# Patient Record
Sex: MALE | Race: WHITE | NOT HISPANIC OR LATINO | Employment: FULL TIME | URBAN - METROPOLITAN AREA
[De-identification: names, ages, dates, MRNs, and addresses within clinical notes are randomized per-mention and may not be internally consistent; named-entity substitution may affect disease eponyms.]

---

## 2018-03-29 ENCOUNTER — OFFICE VISIT (OUTPATIENT)
Dept: CARDIOLOGY CLINIC | Facility: CLINIC | Age: 58
End: 2018-03-29
Payer: COMMERCIAL

## 2018-03-29 VITALS
DIASTOLIC BLOOD PRESSURE: 64 MMHG | HEART RATE: 78 BPM | HEIGHT: 71 IN | WEIGHT: 234 LBS | BODY MASS INDEX: 32.76 KG/M2 | SYSTOLIC BLOOD PRESSURE: 120 MMHG

## 2018-03-29 DIAGNOSIS — R07.9 CHEST PAIN, UNSPECIFIED TYPE: Primary | ICD-10-CM

## 2018-03-29 DIAGNOSIS — G47.30 SLEEP APNEA, UNSPECIFIED TYPE: ICD-10-CM

## 2018-03-29 DIAGNOSIS — I10 HTN (HYPERTENSION), BENIGN: ICD-10-CM

## 2018-03-29 PROBLEM — E78.5 HYPERLIPIDEMIA LDL GOAL <100: Status: ACTIVE | Noted: 2018-03-29

## 2018-03-29 PROBLEM — E11.9 TYPE 2 DIABETES MELLITUS (HCC): Status: ACTIVE | Noted: 2018-03-29

## 2018-03-29 PROCEDURE — 99204 OFFICE O/P NEW MOD 45 MIN: CPT | Performed by: INTERNAL MEDICINE

## 2018-03-29 PROCEDURE — 93000 ELECTROCARDIOGRAM COMPLETE: CPT | Performed by: INTERNAL MEDICINE

## 2018-03-29 RX ORDER — TRIAMCINOLONE ACETONIDE 1 MG/G
CREAM TOPICAL
COMMUNITY

## 2018-03-29 RX ORDER — GLIPIZIDE 5 MG/1
TABLET, FILM COATED, EXTENDED RELEASE ORAL
COMMUNITY

## 2018-03-29 RX ORDER — SAXAGLIPTIN AND METFORMIN HYDROCHLORIDE 2.5; 1 MG/1; MG/1
1 TABLET, FILM COATED, EXTENDED RELEASE ORAL DAILY
Refills: 6 | COMMUNITY
Start: 2018-03-12

## 2018-03-29 RX ORDER — MELOXICAM 15 MG/1
15 TABLET ORAL DAILY
Refills: 5 | COMMUNITY
Start: 2018-03-02

## 2018-03-29 RX ORDER — LISINOPRIL 5 MG/1
5 TABLET ORAL
COMMUNITY
Start: 2017-10-11

## 2018-03-29 RX ORDER — MELOXICAM 15 MG/1
TABLET ORAL
COMMUNITY
End: 2018-03-29

## 2018-03-29 RX ORDER — ASPIRIN 81 MG/1
81 TABLET, CHEWABLE ORAL
COMMUNITY
Start: 2014-06-27

## 2018-03-29 RX ORDER — INSULIN GLARGINE 100 [IU]/ML
INJECTION, SOLUTION SUBCUTANEOUS
Refills: 5 | COMMUNITY
Start: 2017-12-22

## 2018-03-29 RX ORDER — LORAZEPAM 0.5 MG/1
0.5 TABLET ORAL EVERY 8 HOURS
COMMUNITY
Start: 2018-03-26

## 2018-03-29 RX ORDER — HYDROCODONE BITARTRATE AND ACETAMINOPHEN 5; 325 MG/1; MG/1
TABLET ORAL
COMMUNITY

## 2018-03-29 RX ORDER — GABAPENTIN 100 MG/1
CAPSULE ORAL
COMMUNITY
Start: 2018-03-09

## 2018-03-29 NOTE — PROGRESS NOTES
Outpatient Cardiology Consult Note - Yumiko Archibald 62 y o  male MRN: [id number]    @ Encounter: [id number]        There are no active problems to display for this patient  Assessment:  1  HTN- lisinopril 5 mg daily  # DM2, last A1C 9 5%- glipizide, kombiglyze XR 2 5- 1000 mg  On lisinopril 5 mg daily  # Obesity, BMI 32 6%  # hyperlipidemia- 2/21/18: , HDL 43, Tri 132  # likely sleep apnea  Stops breathing at night    Today's Plan:  Stress echo  Recommend statin for goal LDL < 100 given DM, did not tolerate one in past  Weight loss discussed    HPI:     63 yo male with hx DM2, last A1C over 9, obesity, hyperlipidemia, HTN, right carotid aneurysm who presents to establish CV care  Occasionally getting a little chest discomfort, intermittent  Walks about a mile or two every day  Does not seem exertional  Wife states he stops breathing at night  Past Medical History:   Diagnosis Date    Diabetes mellitus ([facility] [address] )        Review of Systems - Negative except occasional chest discomfort as described above  Allergies   Allergen Reactions    Atorvastatin Other (See Comments)     felt flu-like    Rosiglitazone Other (See Comments)    Trazodone Other (See Comments)     Felt "hung over"    Sertraline Rash           Current Outpatient Prescriptions:     aspirin 81 mg chewable tablet, Chew 81 mg, Disp: , Rfl:     Canagliflozin 300 MG TABS, Take 300 mg by mouth, Disp: , Rfl:     gabapentin (NEURONTIN) 100 mg capsule, TAKE ONE CAPSULE DAILY IN THE MORNING, 3 CAPSULES IN THE EVENING, Disp: , Rfl:     insulin glargine (BASAGLAR KWIKPEN) injection pen 100 units/mL, Inject 30 Units under the skin, Disp: , Rfl:     KOMBIGLYZE XR 2 5-1000 MG TB24, Take 1 tablet by mouth daily, Disp: , Rfl: 6    LANTUS SOLOSTAR injection pen 100 units/mL, INJECT 15 UNITS UNDER THE SKIN TWO TIMES A DAY AT LUNCH AND AT BEDTIME, Disp: , Rfl: 5    lidocaine viscous (XYLOCAINE) 2 % mucosal solution, Apply to the mouth or throat, Disp: , Rfl:     lisinopril (ZESTRIL) 5 mg tablet, Take 5 mg by mouth, Disp: , Rfl:     LORazepam (ATIVAN) 0 5 mg tablet, Take 0 5 mg by mouth every 8 (eight) hours, Disp: , Rfl:     meloxicam (MOBIC) 15 mg tablet, Take 15 mg by mouth daily, Disp: , Rfl: 5    triamcinolone (KENALOG) 0 1 % cream, Apply topically, Disp: , Rfl:     glipiZIDE (GLUCOTROL XL) 5 mg 24 hr tablet, Take by mouth, Disp: , Rfl:     HYDROcodone-acetaminophen (NORCO) 5-325 mg per tablet, Take by mouth, Disp: , Rfl:     Social History     Social History    Marital status: /Civil Union     Spouse name: N/A    Number of children: N/A    Years of education: N/A     Occupational History    Not on file  Social History Main Topics    Smoking status: Never Smoker    Smokeless tobacco: Never Used    Alcohol use No    Drug use: No    Sexual activity: Yes     Other Topics Concern    Not on file     Social History Narrative    No narrative on file       No family history on file  Physical Exam:    Vitals: Blood pressure 120/64, pulse 78, height 5' 11" (1 803 m), weight 106 kg (234 lb)  , Body mass index is 32 64 kg/m² ,   Wt Readings from Last 3 Encounters:   03/29/18 106 kg (234 lb)   01/24/16 109 kg (240 lb)   12/04/15 109 kg (241 lb)       Physical Exam:  Vitals:    03/29/18 0834   BP: 120/64   BP Location: Right arm   Patient Position: Sitting   Cuff Size: Standard   Pulse: 78   Weight: 106 kg (234 lb)   Height: 5' 11" (1 803 m)       GEN: Maria A Uribe appears well, alert and oriented x 3, pleasant and cooperative   HEENT: pupils equal, round, and reactive to light; extraocular muscles intact  NECK: supple, no carotid bruits   HEART: regular rhythm, normal S1 and S2, no murmurs, clicks, gallops or rubs, JVP is    LUNGS: clear to auscultation bilaterally; no wheezes, rales, or rhonchi   ABDOMEN: normal bowel sounds, soft, no tenderness, no distention  EXTREMITIES: peripheral pulses normal; no clubbing, cyanosis, or edema  NEURO: no focal findings   SKIN: normal without suspicious lesions on exposed skin    Labs & Results:    No results found for: WBC, HGB, HCT, MCV, PLT  No results found for: GLUCOSE, CALCIUM, NA, K, CO2, CL, BUN, CREATININE  No results found for: BNP   No results found for: CHOL  No results found for: HDL  No results found for: LDLCALC  No results found for: TRIG  No components found for: CHOLHDL      EKG personally reviewed by Trude Hashimoto, DO  Counseling / Coordination of Care  Total floor / unit time spent today 40 minutes  Greater than 50% of total time was spent with the patient and / or family counseling and / or coordination of care  A description of the counseling / coordination of care: 25 min  Thank you for the opportunity to participate in the care of this patient  Trude Hashimoto D O    Director of Advanced Heart Failure Program  Medical Director of [address] [place] [address]

## 2018-04-11 ENCOUNTER — HOSPITAL ENCOUNTER (OUTPATIENT)
Dept: NON INVASIVE DIAGNOSTICS | Facility: CLINIC | Age: 58
Discharge: HOME | End: 2018-04-11
Payer: COMMERCIAL

## 2018-04-11 DIAGNOSIS — G47.30 SLEEP APNEA, UNSPECIFIED TYPE: ICD-10-CM

## 2018-04-11 LAB
ARRHY DURING EX: NORMAL
CHEST PAIN STATEMENT: NORMAL
MAX DIASTOLIC BP: 80 MMHG
MAX HEART RATE: 151 BPM
MAX PREDICTED HEART RATE: 163 BPM
MAX. SYSTOLIC BP: 164 MMHG
PROTOCOL NAME: NORMAL
TARGET HR FORMULA: NORMAL
TEST INDICATION: NORMAL
TIME IN EXERCISE PHASE: NORMAL

## 2018-04-11 PROCEDURE — 93351 STRESS TTE COMPLETE: CPT | Performed by: INTERNAL MEDICINE

## 2018-04-11 PROCEDURE — 93350 STRESS TTE ONLY: CPT

## 2018-05-21 ENCOUNTER — HOSPITAL ENCOUNTER (OUTPATIENT)
Dept: SLEEP CENTER | Facility: CLINIC | Age: 58
Discharge: HOME | End: 2018-05-21
Payer: COMMERCIAL

## 2018-05-21 DIAGNOSIS — G47.30 SLEEP APNEA, UNSPECIFIED TYPE: ICD-10-CM

## 2018-05-21 PROCEDURE — G0399 HOME SLEEP TEST/TYPE 3 PORTA: HCPCS

## 2018-05-24 PROCEDURE — G0399 HOME SLEEP TEST/TYPE 3 PORTA: HCPCS | Performed by: PSYCHIATRY & NEUROLOGY

## 2018-05-27 DIAGNOSIS — G47.33 OBSTRUCTIVE SLEEP APNEA HYPOPNEA, MODERATE: Primary | ICD-10-CM

## 2018-05-29 ENCOUNTER — TELEPHONE (OUTPATIENT)
Dept: SLEEP CENTER | Facility: CLINIC | Age: 58
End: 2018-05-29

## 2018-06-20 NOTE — TELEPHONE ENCOUNTER
Margie message sent to cardiology clinical that patient has not scheduled CPAP study or consult  He has appointment with Dr Gaby Mcknight on 6/22

## 2021-04-08 DIAGNOSIS — Z23 ENCOUNTER FOR IMMUNIZATION: ICD-10-CM

## 2023-03-29 LAB
LEFT EYE DIABETIC RETINOPATHY: NORMAL
RIGHT EYE DIABETIC RETINOPATHY: NORMAL
SEVERITY (EYE EXAM): NORMAL

## 2023-07-24 ENCOUNTER — APPOINTMENT (OUTPATIENT)
Dept: LAB | Facility: HOSPITAL | Age: 63
End: 2023-07-24
Payer: COMMERCIAL

## 2023-07-24 DIAGNOSIS — I25.119 CHEST PAIN DUE TO CAD (HCC): ICD-10-CM

## 2023-07-24 DIAGNOSIS — I25.119 CHEST PAIN DUE TO CAD (HCC): Primary | ICD-10-CM

## 2023-07-24 DIAGNOSIS — R06.09 DYSPNEA ON EXERTION: ICD-10-CM

## 2023-07-24 LAB
ALBUMIN SERPL BCP-MCNC: 4.3 G/DL (ref 3.5–5)
ALP SERPL-CCNC: 53 U/L (ref 34–104)
ALT SERPL W P-5'-P-CCNC: 17 U/L (ref 7–52)
ANION GAP SERPL CALCULATED.3IONS-SCNC: 9 MMOL/L
AST SERPL W P-5'-P-CCNC: 15 U/L (ref 13–39)
BASOPHILS # BLD AUTO: 0.14 THOUSANDS/ÂΜL (ref 0–0.1)
BASOPHILS NFR BLD AUTO: 1 % (ref 0–1)
BILIRUB SERPL-MCNC: 0.74 MG/DL (ref 0.2–1)
BNP SERPL-MCNC: 17 PG/ML (ref 0–100)
BUN SERPL-MCNC: 28 MG/DL (ref 5–25)
CALCIUM SERPL-MCNC: 9.1 MG/DL (ref 8.4–10.2)
CHLORIDE SERPL-SCNC: 100 MMOL/L (ref 96–108)
CO2 SERPL-SCNC: 24 MMOL/L (ref 21–32)
CREAT SERPL-MCNC: 1.21 MG/DL (ref 0.6–1.3)
EOSINOPHIL # BLD AUTO: 0.41 THOUSAND/ÂΜL (ref 0–0.61)
EOSINOPHIL NFR BLD AUTO: 4 % (ref 0–6)
ERYTHROCYTE [DISTWIDTH] IN BLOOD BY AUTOMATED COUNT: 13.1 % (ref 11.6–15.1)
GFR SERPL CREATININE-BSD FRML MDRD: 63 ML/MIN/1.73SQ M
GLUCOSE SERPL-MCNC: 171 MG/DL (ref 65–140)
HCT VFR BLD AUTO: 48.9 % (ref 36.5–49.3)
HGB BLD-MCNC: 16 G/DL (ref 12–17)
IMM GRANULOCYTES # BLD AUTO: 0.11 THOUSAND/UL (ref 0–0.2)
IMM GRANULOCYTES NFR BLD AUTO: 1 % (ref 0–2)
LYMPHOCYTES # BLD AUTO: 3.67 THOUSANDS/ÂΜL (ref 0.6–4.47)
LYMPHOCYTES NFR BLD AUTO: 33 % (ref 14–44)
MCH RBC QN AUTO: 29.1 PG (ref 26.8–34.3)
MCHC RBC AUTO-ENTMCNC: 32.7 G/DL (ref 31.4–37.4)
MCV RBC AUTO: 89 FL (ref 82–98)
MONOCYTES # BLD AUTO: 1.02 THOUSAND/ÂΜL (ref 0.17–1.22)
MONOCYTES NFR BLD AUTO: 9 % (ref 4–12)
NEUTROPHILS # BLD AUTO: 5.69 THOUSANDS/ÂΜL (ref 1.85–7.62)
NEUTS SEG NFR BLD AUTO: 52 % (ref 43–75)
NRBC BLD AUTO-RTO: 0 /100 WBCS
PLATELET # BLD AUTO: 194 THOUSANDS/UL (ref 149–390)
PMV BLD AUTO: 9.9 FL (ref 8.9–12.7)
POTASSIUM SERPL-SCNC: 4.4 MMOL/L (ref 3.5–5.3)
PROT SERPL-MCNC: 7.5 G/DL (ref 6.4–8.4)
RBC # BLD AUTO: 5.5 MILLION/UL (ref 3.88–5.62)
SODIUM SERPL-SCNC: 133 MMOL/L (ref 135–147)
TSH SERPL DL<=0.05 MIU/L-ACNC: 1.76 UIU/ML (ref 0.45–4.5)
WBC # BLD AUTO: 11.04 THOUSAND/UL (ref 4.31–10.16)

## 2023-07-24 PROCEDURE — 36415 COLL VENOUS BLD VENIPUNCTURE: CPT | Performed by: INTERNAL MEDICINE

## 2023-07-24 PROCEDURE — 80053 COMPREHEN METABOLIC PANEL: CPT | Performed by: INTERNAL MEDICINE

## 2023-07-24 PROCEDURE — 84443 ASSAY THYROID STIM HORMONE: CPT | Performed by: INTERNAL MEDICINE

## 2023-07-24 PROCEDURE — 85025 COMPLETE CBC W/AUTO DIFF WBC: CPT | Performed by: INTERNAL MEDICINE

## 2023-07-24 PROCEDURE — 83880 ASSAY OF NATRIURETIC PEPTIDE: CPT

## 2023-07-25 ENCOUNTER — TELEPHONE (OUTPATIENT)
Dept: CARDIOLOGY CLINIC | Facility: CLINIC | Age: 63
End: 2023-07-25

## 2023-07-26 NOTE — H&P (VIEW-ONLY)
Outpatient Cardiology Note - Kathia Kenney 61 y.o. male MRN: [id number]    @ Encounter: [id number]        Patient Active Problem List    Diagnosis Date Noted   • Sleep apnea    • HTN (hypertension), benign 03/29/2018   • Type 2 diabetes mellitus ([facility]) 03/29/2018   • Hyperlipidemia LDL goal <100 03/29/2018       Assessment:  # Chest discomfort    EKG today:  bpm, no ischemic changes    Echo 7/27/23:  LVEF: 60%  RV: normal  PASP: normal    Stress echo 4/11/18: 9 min 30 sec, 10.7 METs, negative for ischemia    #  HTN- lisinopril 5 mg daily  # DM2, last A1C 7.4%- glipizide, kombiglyze XR 2.5- 1000 mg  On lisinopril 5 mg daily  # Obesity, BMI 32.6%  # hyperlipidemia-   6/8 /23: , HDL 42  2/21/18: , HDL 43, Tri 132  # Mild sleep apnea. Stops breathing at night  # depression    Today's Plan:  Stress test already ordered  Continue asa daily  Recommend statin given DM and LDL not at goal    HPI:     61 yo male with hx DM2, last A1C over 9, obesity, hyperlipidemia, HTN, right carotid aneurysm who presents to establish CV care. Occasionally getting a little chest discomfort, intermittent. Walks about a mile or two every day. Does not seem exertional. Wife states he stops breathing at night. Interim:  Last seen 4-5 years ago  Feeling much more fatigued, occasional chest pressure  No energy  Did labs 7/24 normal    EKG today:  bpm    Echo 7/27/23:  LVEF: 60%  RV: normal    Consuming 8 diet coke a day- 520 mg caffeine daily      Past Medical History:   Diagnosis Date   • Diabetes mellitus ([facility])        Review of Systems   Constitutional: Negative for activity change, appetite change, fatigue and unexpected weight change. HENT: Negative for congestion and nosebleeds. Eyes: Negative. Respiratory: Negative for cough, chest tightness and shortness of breath. Cardiovascular: Negative for chest pain, palpitations and leg swelling. Gastrointestinal: Negative for abdominal distention. Endocrine: Negative. Genitourinary: Negative. Musculoskeletal: Negative. Skin: Negative. Neurological: Negative for dizziness, syncope and weakness. Hematological: Negative. Psychiatric/Behavioral: Negative. Allergies   Allergen Reactions   • Atorvastatin Other (See Comments)     felt flu-like   • Rosiglitazone Other (See Comments)   • Trazodone Other (See Comments)     Felt "hung over"   • Sertraline Rash     .     Current Outpatient Medications:   •  aspirin 81 mg chewable tablet, Chew 81 mg, Disp: , Rfl:   •  Canagliflozin 300 MG TABS, Take 300 mg by mouth, Disp: , Rfl:   •  gabapentin (NEURONTIN) 100 mg capsule, TAKE ONE CAPSULE DAILY IN THE MORNING, 3 CAPSULES IN THE EVENING, Disp: , Rfl:   •  glipiZIDE (GLUCOTROL XL) 5 mg 24 hr tablet, Take by mouth, Disp: , Rfl:   •  HYDROcodone-acetaminophen (NORCO) 5-325 mg per tablet, Take by mouth, Disp: , Rfl:   •  insulin glargine (BASAGLAR KWIKPEN) injection pen 100 units/mL, Inject 30 Units under the skin, Disp: , Rfl:   •  KOMBIGLYZE XR 2.5-1000 MG TB24, Take 1 tablet by mouth daily, Disp: , Rfl: 6  •  LANTUS SOLOSTAR injection pen 100 units/mL, INJECT 15 UNITS UNDER THE SKIN TWO TIMES A DAY AT LUNCH AND AT BEDTIME, Disp: , Rfl: 5  •  lidocaine viscous (XYLOCAINE) 2 % mucosal solution, Apply to the mouth or throat, Disp: , Rfl:   •  lisinopril (ZESTRIL) 5 mg tablet, Take 5 mg by mouth, Disp: , Rfl:   •  LORazepam (ATIVAN) 0.5 mg tablet, Take 0.5 mg by mouth every 8 (eight) hours, Disp: , Rfl:   •  meloxicam (MOBIC) 15 mg tablet, Take 15 mg by mouth daily, Disp: , Rfl: 5  •  triamcinolone (KENALOG) 0.1 % cream, Apply topically, Disp: , Rfl:     Social History     Socioeconomic History   • Marital status: /Civil Union     Spouse name: Not on file   • Number of children: Not on file   • Years of education: Not on file   • Highest education level: Not on file   Occupational History   • Not on file   Tobacco Use   • Smoking status: Never • Smokeless tobacco: Never   Substance and Sexual Activity   • Alcohol use: No   • Drug use: No   • Sexual activity: Yes   Other Topics Concern   • Not on file   Social History Narrative   • Not on file     Social Determinants of Health     Financial Resource Strain: Not on file   Food Insecurity: Not on file   Transportation Needs: Not on file   Physical Activity: Not on file   Stress: Not on file   Social Connections: Not on file   Intimate Partner Violence: Not on file   Housing Stability: Not on file       No family history on file. Physical Exam:    Vitals: There were no vitals taken for this visit. , There is no height or weight on file to calculate BMI.,   Wt Readings from Last 3 Encounters:   03/29/18 106 kg (234 lb)   01/24/16 109 kg (240 lb)   12/04/15 109 kg (241 lb)       Physical Exam:  There were no vitals filed for this visit.     GEN: Parisa Fernandes appears well, alert and oriented x 3, pleasant and cooperative   HEENT: pupils equal, round, and reactive to light; extraocular muscles intact  NECK: supple, no carotid bruits   HEART: regular rhythm, normal S1 and S2, no murmurs, clicks, gallops or rubs, JVP is    LUNGS: clear to auscultation bilaterally; no wheezes, rales, or rhonchi   ABDOMEN: normal bowel sounds, soft, no tenderness, no distention  EXTREMITIES: peripheral pulses normal; no clubbing, cyanosis, or edema  NEURO: no focal findings   SKIN: normal without suspicious lesions on exposed skin    Labs & Results:    Lab Results   Component Value Date    WBC 11.04 (H) 07/24/2023    HGB 16.0 07/24/2023    HCT 48.9 07/24/2023    MCV 89 07/24/2023     07/24/2023     Lab Results   Component Value Date    CALCIUM 9.1 07/24/2023    K 4.4 07/24/2023    CO2 24 07/24/2023     07/24/2023    BUN 28 (H) 07/24/2023    CREATININE 1.21 07/24/2023     Lab Results   Component Value Date    BNP 17 07/24/2023      No results found for: "CHOL"  No results found for: "HDL"  No results found for: "[address] [place] [address]"  No results found for: "TRIG"  No results found for: "CHOLHDL"      EKG personally reviewed by Domo Albrecht. Counseling / Coordination of Care  Total floor / unit time spent today 25 minutes. Greater than 50% of total time was spent with the patient and / or family counseling and / or coordination of care. A description of the counseling / coordination of care: 15 min. Thank you for the opportunity to participate in the care of this patient. Domo Albrecht D.O.   Director of Advanced Heart Failure Program  Medical Director of [address] [place] [address]

## 2023-07-26 NOTE — PROGRESS NOTES
Outpatient Cardiology Note - Aparna Malone 61 y.o. male MRN: [id number]    @ Encounter: [id number]        Patient Active Problem List    Diagnosis Date Noted   • Sleep apnea    • HTN (hypertension), benign 03/29/2018   • Type 2 diabetes mellitus ([facility]) 03/29/2018   • Hyperlipidemia LDL goal <100 03/29/2018       Assessment:  # Chest discomfort    EKG today:  bpm, no ischemic changes    Echo 7/27/23:  LVEF: 60%  RV: normal  PASP: normal    Stress echo 4/11/18: 9 min 30 sec, 10.7 METs, negative for ischemia    #  HTN- lisinopril 5 mg daily  # DM2, last A1C 7.4%- glipizide, kombiglyze XR 2.5- 1000 mg  On lisinopril 5 mg daily  # Obesity, BMI 32.6%  # hyperlipidemia-   6/8 /23: , HDL 42  2/21/18: , HDL 43, Tri 132  # Mild sleep apnea. Stops breathing at night  # depression    Today's Plan:  Stress test already ordered  Continue asa daily  Recommend statin given DM and LDL not at goal    HPI:     61 yo male with hx DM2, last A1C over 9, obesity, hyperlipidemia, HTN, right carotid aneurysm who presents to establish CV care. Occasionally getting a little chest discomfort, intermittent. Walks about a mile or two every day. Does not seem exertional. Wife states he stops breathing at night. Interim:  Last seen 4-5 years ago  Feeling much more fatigued, occasional chest pressure  No energy  Did labs 7/24 normal    EKG today:  bpm    Echo 7/27/23:  LVEF: 60%  RV: normal    Consuming 8 diet coke a day- 520 mg caffeine daily      Past Medical History:   Diagnosis Date   • Diabetes mellitus ([facility])        Review of Systems   Constitutional: Negative for activity change, appetite change, fatigue and unexpected weight change. HENT: Negative for congestion and nosebleeds. Eyes: Negative. Respiratory: Negative for cough, chest tightness and shortness of breath. Cardiovascular: Negative for chest pain, palpitations and leg swelling. Gastrointestinal: Negative for abdominal distention. Endocrine: Negative. Genitourinary: Negative. Musculoskeletal: Negative. Skin: Negative. Neurological: Negative for dizziness, syncope and weakness. Hematological: Negative. Psychiatric/Behavioral: Negative. Allergies   Allergen Reactions   • Atorvastatin Other (See Comments)     felt flu-like   • Rosiglitazone Other (See Comments)   • Trazodone Other (See Comments)     Felt "hung over"   • Sertraline Rash     .     Current Outpatient Medications:   •  aspirin 81 mg chewable tablet, Chew 81 mg, Disp: , Rfl:   •  Canagliflozin 300 MG TABS, Take 300 mg by mouth, Disp: , Rfl:   •  gabapentin (NEURONTIN) 100 mg capsule, TAKE ONE CAPSULE DAILY IN THE MORNING, 3 CAPSULES IN THE EVENING, Disp: , Rfl:   •  glipiZIDE (GLUCOTROL XL) 5 mg 24 hr tablet, Take by mouth, Disp: , Rfl:   •  HYDROcodone-acetaminophen (NORCO) 5-325 mg per tablet, Take by mouth, Disp: , Rfl:   •  insulin glargine (BASAGLAR KWIKPEN) injection pen 100 units/mL, Inject 30 Units under the skin, Disp: , Rfl:   •  KOMBIGLYZE XR 2.5-1000 MG TB24, Take 1 tablet by mouth daily, Disp: , Rfl: 6  •  LANTUS SOLOSTAR injection pen 100 units/mL, INJECT 15 UNITS UNDER THE SKIN TWO TIMES A DAY AT LUNCH AND AT BEDTIME, Disp: , Rfl: 5  •  lidocaine viscous (XYLOCAINE) 2 % mucosal solution, Apply to the mouth or throat, Disp: , Rfl:   •  lisinopril (ZESTRIL) 5 mg tablet, Take 5 mg by mouth, Disp: , Rfl:   •  LORazepam (ATIVAN) 0.5 mg tablet, Take 0.5 mg by mouth every 8 (eight) hours, Disp: , Rfl:   •  meloxicam (MOBIC) 15 mg tablet, Take 15 mg by mouth daily, Disp: , Rfl: 5  •  triamcinolone (KENALOG) 0.1 % cream, Apply topically, Disp: , Rfl:     Social History     Socioeconomic History   • Marital status: /Civil Union     Spouse name: Not on file   • Number of children: Not on file   • Years of education: Not on file   • Highest education level: Not on file   Occupational History   • Not on file   Tobacco Use   • Smoking status: Never • Smokeless tobacco: Never   Substance and Sexual Activity   • Alcohol use: No   • Drug use: No   • Sexual activity: Yes   Other Topics Concern   • Not on file   Social History Narrative   • Not on file     Social Determinants of Health     Financial Resource Strain: Not on file   Food Insecurity: Not on file   Transportation Needs: Not on file   Physical Activity: Not on file   Stress: Not on file   Social Connections: Not on file   Intimate Partner Violence: Not on file   Housing Stability: Not on file       No family history on file. Physical Exam:    Vitals: There were no vitals taken for this visit. , There is no height or weight on file to calculate BMI.,   Wt Readings from Last 3 Encounters:   03/29/18 106 kg (234 lb)   01/24/16 109 kg (240 lb)   12/04/15 109 kg (241 lb)       Physical Exam:  There were no vitals filed for this visit.     GEN: Jaya Guo appears well, alert and oriented x 3, pleasant and cooperative   HEENT: pupils equal, round, and reactive to light; extraocular muscles intact  NECK: supple, no carotid bruits   HEART: regular rhythm, normal S1 and S2, no murmurs, clicks, gallops or rubs, JVP is    LUNGS: clear to auscultation bilaterally; no wheezes, rales, or rhonchi   ABDOMEN: normal bowel sounds, soft, no tenderness, no distention  EXTREMITIES: peripheral pulses normal; no clubbing, cyanosis, or edema  NEURO: no focal findings   SKIN: normal without suspicious lesions on exposed skin    Labs & Results:    Lab Results   Component Value Date    WBC 11.04 (H) 07/24/2023    HGB 16.0 07/24/2023    HCT 48.9 07/24/2023    MCV 89 07/24/2023     07/24/2023     Lab Results   Component Value Date    CALCIUM 9.1 07/24/2023    K 4.4 07/24/2023    CO2 24 07/24/2023     07/24/2023    BUN 28 (H) 07/24/2023    CREATININE 1.21 07/24/2023     Lab Results   Component Value Date    BNP 17 07/24/2023      No results found for: "CHOL"  No results found for: "HDL"  No results found for: "[address] [place] [address]"  No results found for: "TRIG"  No results found for: "CHOLHDL"      EKG personally reviewed by Renetta Dang. Counseling / Coordination of Care  Total floor / unit time spent today 25 minutes. Greater than 50% of total time was spent with the patient and / or family counseling and / or coordination of care. A description of the counseling / coordination of care: 15 min. Thank you for the opportunity to participate in the care of this patient. Renetta Dang D.O.   Director of Advanced Heart Failure Program  Medical Director of [address] [place] [address]

## 2023-07-27 ENCOUNTER — OFFICE VISIT (OUTPATIENT)
Dept: CARDIOLOGY CLINIC | Facility: CLINIC | Age: 63
End: 2023-07-27
Payer: COMMERCIAL

## 2023-07-27 ENCOUNTER — HOSPITAL ENCOUNTER (OUTPATIENT)
Dept: NON INVASIVE DIAGNOSTICS | Facility: CLINIC | Age: 63
Discharge: HOME | End: 2023-07-27
Payer: COMMERCIAL

## 2023-07-27 VITALS
WEIGHT: 235 LBS | SYSTOLIC BLOOD PRESSURE: 118 MMHG | BODY MASS INDEX: 32.9 KG/M2 | OXYGEN SATURATION: 97 % | HEART RATE: 106 BPM | DIASTOLIC BLOOD PRESSURE: 70 MMHG | HEIGHT: 71 IN

## 2023-07-27 VITALS
BODY MASS INDEX: 32.2 KG/M2 | HEART RATE: 78 BPM | SYSTOLIC BLOOD PRESSURE: 120 MMHG | DIASTOLIC BLOOD PRESSURE: 64 MMHG | HEIGHT: 71 IN | WEIGHT: 230 LBS

## 2023-07-27 DIAGNOSIS — I25.119 CHEST PAIN DUE TO CAD (HCC): ICD-10-CM

## 2023-07-27 DIAGNOSIS — E78.5 HYPERLIPIDEMIA LDL GOAL <100: Primary | ICD-10-CM

## 2023-07-27 DIAGNOSIS — G47.33 OBSTRUCTIVE SLEEP APNEA SYNDROME: ICD-10-CM

## 2023-07-27 DIAGNOSIS — R07.9 CHEST PAIN IN ADULT: ICD-10-CM

## 2023-07-27 DIAGNOSIS — F32.0 CURRENT MILD EPISODE OF MAJOR DEPRESSIVE DISORDER WITHOUT PRIOR EPISODE (HCC): ICD-10-CM

## 2023-07-27 DIAGNOSIS — R06.09 DYSPNEA ON EXERTION: ICD-10-CM

## 2023-07-27 LAB
AORTIC ROOT: 3.6 CM
APICAL FOUR CHAMBER EJECTION FRACTION: 59 %
ASCENDING AORTA: 3.9 CM
E WAVE DECELERATION TIME: 118 MS
FRACTIONAL SHORTENING: 31 (ref 28–44)
INTERVENTRICULAR SEPTUM IN DIASTOLE (PARASTERNAL SHORT AXIS VIEW): 1 CM
INTERVENTRICULAR SEPTUM: 1 CM (ref 0.6–1.1)
LAAS-AP2: 14.2 CM2
LAAS-AP4: 19.2 CM2
LEFT ATRIUM SIZE: 4.3 CM
LEFT ATRIUM VOLUME (MOD BIPLANE): 47 ML
LEFT INTERNAL DIMENSION IN SYSTOLE: 2.7 CM (ref 2.1–4)
LEFT VENTRICULAR INTERNAL DIMENSION IN DIASTOLE: 3.9 CM (ref 3.5–6)
LEFT VENTRICULAR POSTERIOR WALL IN END DIASTOLE: 1.1 CM
LEFT VENTRICULAR STROKE VOLUME: 41 ML
LVSV (TEICH): 41 ML
MV E'TISSUE VEL-SEP: 8 CM/S
MV PEAK A VEL: 0.98 M/S
MV PEAK E VEL: 57 CM/S
MV STENOSIS PRESSURE HALF TIME: 34 MS
MV VALVE AREA P 1/2 METHOD: 6.47
RIGHT ATRIUM AREA SYSTOLE A4C: 15.9 CM2
RIGHT VENTRICLE ID DIMENSION: 4.2 CM
SL CV LEFT ATRIUM LENGTH A2C: 4.6 CM
SL CV LV EF: 60
SL CV PED ECHO LEFT VENTRICLE DIASTOLIC VOLUME (MOD BIPLANE) 2D: 67 ML
SL CV PED ECHO LEFT VENTRICLE SYSTOLIC VOLUME (MOD BIPLANE) 2D: 26 ML
TRICUSPID ANNULAR PLANE SYSTOLIC EXCURSION: 2.1 CM

## 2023-07-27 PROCEDURE — 93306 TTE W/DOPPLER COMPLETE: CPT | Performed by: INTERNAL MEDICINE

## 2023-07-27 PROCEDURE — 93000 ELECTROCARDIOGRAM COMPLETE: CPT | Performed by: INTERNAL MEDICINE

## 2023-07-27 PROCEDURE — 93306 TTE W/DOPPLER COMPLETE: CPT

## 2023-07-27 PROCEDURE — 99214 OFFICE O/P EST MOD 30 MIN: CPT | Performed by: INTERNAL MEDICINE

## 2023-07-27 RX ORDER — EMPAGLIFLOZIN 25 MG/1
TABLET, FILM COATED ORAL
COMMUNITY
Start: 2023-07-26

## 2023-07-27 RX ORDER — METFORMIN HYDROCHLORIDE 500 MG/1
1000 TABLET, EXTENDED RELEASE ORAL 2 TIMES DAILY
Status: ON HOLD | COMMUNITY
Start: 2023-07-12 | End: 2023-08-02 | Stop reason: SDUPTHER

## 2023-07-27 RX ORDER — SEMAGLUTIDE 2.68 MG/ML
INJECTION, SOLUTION SUBCUTANEOUS
COMMUNITY
Start: 2023-06-12

## 2023-07-31 ENCOUNTER — TELEPHONE (OUTPATIENT)
Dept: CARDIOLOGY CLINIC | Facility: CLINIC | Age: 63
End: 2023-07-31

## 2023-07-31 ENCOUNTER — HOSPITAL ENCOUNTER (OUTPATIENT)
Dept: NON INVASIVE DIAGNOSTICS | Facility: CLINIC | Age: 63
Discharge: HOME | End: 2023-07-31
Payer: COMMERCIAL

## 2023-07-31 VITALS
HEIGHT: 71 IN | SYSTOLIC BLOOD PRESSURE: 138 MMHG | OXYGEN SATURATION: 98 % | RESPIRATION RATE: 16 BRPM | BODY MASS INDEX: 32.76 KG/M2 | DIASTOLIC BLOOD PRESSURE: 78 MMHG | WEIGHT: 234 LBS | HEART RATE: 82 BPM

## 2023-07-31 DIAGNOSIS — I25.119 CHEST PAIN DUE TO CAD (HCC): ICD-10-CM

## 2023-07-31 DIAGNOSIS — R06.09 DYSPNEA ON EXERTION: ICD-10-CM

## 2023-07-31 LAB
CHEST PAIN STATEMENT: NORMAL
MAX DIASTOLIC BP: 82 MMHG
MAX HEART RATE: 139 BPM
MAX HR PERCENT: 88 %
MAX HR: 139 BPM
MAX PREDICTED HEART RATE: 157 BPM
MAX. SYSTOLIC BP: 140 MMHG
NUC STRESS EJECTION FRACTION: 55 %
PROTOCOL NAME: NORMAL
RATE PRESSURE PRODUCT: NORMAL
SL CV REST NUCLEAR ISOTOPE DOSE: 10.41 MCI
SL CV STRESS NUCLEAR ISOTOPE DOSE: 32.7 MCI
SL CV STRESS RECOVERY BP: NORMAL MMHG
SL CV STRESS RECOVERY HR: 83 BPM
SL CV STRESS RECOVERY O2 SAT: 99 %
SL CV STRESS STAGE REACHED: 2
STRESS ANGINA INDEX: 1
STRESS BASELINE BP: NORMAL MMHG
STRESS BASELINE HR: 82 BPM
STRESS DUKE TREADMILL SCORE: -7
STRESS O2 SAT REST: 98 %
STRESS PEAK HR: 137 BPM
STRESS POST ESTIMATED WORKLOAD: 7 METS
STRESS POST EXERCISE DUR MIN: 7 MIN
STRESS POST EXERCISE DUR SEC: 17 SEC
STRESS POST O2 SAT PEAK: 98 %
STRESS POST PEAK BP: 138 MMHG
STRESS ST DEPRESSION: 2 MM
STRESS ST ELEVATION: 1 MM
STRESS/REST PERFUSION RATIO: 1.11
TARGET HR FORMULA: NORMAL
TEST INDICATION: NORMAL
TIME IN EXERCISE PHASE: NORMAL

## 2023-07-31 PROCEDURE — 93016 CV STRESS TEST SUPVJ ONLY: CPT | Performed by: INTERNAL MEDICINE

## 2023-07-31 PROCEDURE — 93017 CV STRESS TEST TRACING ONLY: CPT

## 2023-07-31 PROCEDURE — 78452 HT MUSCLE IMAGE SPECT MULT: CPT

## 2023-07-31 PROCEDURE — A9502 TC99M TETROFOSMIN: HCPCS

## 2023-07-31 PROCEDURE — 78452 HT MUSCLE IMAGE SPECT MULT: CPT | Performed by: INTERNAL MEDICINE

## 2023-07-31 PROCEDURE — G1004 CDSM NDSC: HCPCS

## 2023-07-31 PROCEDURE — 93018 CV STRESS TEST I&R ONLY: CPT | Performed by: INTERNAL MEDICINE

## 2023-07-31 NOTE — TELEPHONE ENCOUNTER
Case Request Cath Lab: Cardiac Left Heart CathReleased 7/31/2023  Location: [facility], Date: 7/31/2023  Panel 1  Cardiac Left Heart Cath, Dx: 1. Angina of effort ([address])     Ordered on: 7/31/2023   Associated Dx:  Angina of effort [facility])   Completed:  7/31/2023   Authorizing provider: Alex Dawson MD

## 2023-07-31 NOTE — TELEPHONE ENCOUNTER
Left voicemail on machine informing patient to call and schedule a Left Heart Cath procedure.      Thanks,  Gagandeep and Jojo

## 2023-07-31 NOTE — TELEPHONE ENCOUNTER
Patient scheduled for Left Heart Cath on 8/2/23 at [facility] with Dr. Brittney Mart. Instructions sent to patient through [address] [place] [address]. Also printed instructions in office in person. Patient aware of all general instructions. Medication holds:   Jardiance - Do not take morning of procedure. Metformin - Do not take night before and morning of procedure. Glipizide - Do not take morning of procedure. Labs to be done on:  N/A  (Patient did CMP / CBC on 7/24/23)    Insurance: [facility]     Please obtain auth.      Thank you,  Lizeth Lin

## 2023-07-31 NOTE — LETTER
2023       Jaya Brant              : 1960        MRN: [id number]  [address] [address]       Procedure Name: LEFT HEART CATHETERIZATION    Procedure date: 23    Location: [address]  Address: [facility], [address] [facility]      The hospital will contact you the day prior to your procedure, usually between 4PM - 6PM to instruct you on the time and place to report. If you do not hear from a [facility]  by 6PM the evening prior to your procedure, please contact the campus that you are scheduled at. [place]: [address] [facility], [facility], [address]: [facility], [address] [place] [address] Day Surgery [phone]   [address] [place] [address] Avenue: [address] [facility] [address] [facility] [address] [phone]    You may have nothing to eat or drink from midnight the night prior to your procedure. You may have a minimal amount of water with your morning medications. DO NOT stop taking Plavix or Aspirin unless advised otherwise. If your procedure is scheduled after 12:00 noon, you may have clear liquids until 8:00AM the morning of your procedure. Clear liquids are 7UP, Ginger Ale, Jello or broth. Arrange for a responsible person to drive you to and from the hospital.    Please shower/bathe the night before your procedure and do not use powders or lotions. Please notify us if you have been admitted to the hospital within the past 30 days. Bring a list of daily medications, vitamins, minerals, herbals and nutritional supplements you take. Include dosage and time you take them each day. If packing an overnight bag, pack minimal clothing, you will be given hospital sleepwear. Do not bring money, valuables or jewelry. Wedding band is OK. If you use CPAP machine, bring it to the hospital.      Have your Photo ID and Insurance cards with you.     DO NOT take any diabetic medication, including insulin, the morning of the procedure. Oral diabetic medications may include: Glucophage, Prandin, Glyburide, Micronase, Avandia, Glocovance, Precose, Glynase y Starlix. You should continue to take your morning dose of heart and/or blood pressure medications with a sip of water UNLESS ADVISED OTHERWISE. Special Instructions:    Medication hold:     Jardiance - Do not take morning of procedure. Metformin - Do not take night before and morning of procedure. Glipizide - Do not take morning of procedure.       Blood work to be done on:  N/A        Thank you,   [facility]   [facility], [facility]  Ph: [phone]

## 2023-07-31 NOTE — TELEPHONE ENCOUNTER
I called patient and informed the pre-auth dept called and no auth required for his LHC and he's all good for 8/2/23.      Thanks,  Ja

## 2023-08-02 ENCOUNTER — HOSPITAL ENCOUNTER (OUTPATIENT)
Facility: HOSPITAL | Age: 63
Setting detail: OUTPATIENT SURGERY
Discharge: HOME | End: 2023-08-02
Attending: INTERNAL MEDICINE | Admitting: INTERNAL MEDICINE
Payer: COMMERCIAL

## 2023-08-02 VITALS
OXYGEN SATURATION: 95 % | HEART RATE: 74 BPM | WEIGHT: 239 LBS | SYSTOLIC BLOOD PRESSURE: 116 MMHG | RESPIRATION RATE: 18 BRPM | BODY MASS INDEX: 33.46 KG/M2 | TEMPERATURE: 97.9 F | HEIGHT: 71 IN | DIASTOLIC BLOOD PRESSURE: 63 MMHG

## 2023-08-02 DIAGNOSIS — I20.8 ANGINA OF EFFORT (HCC): ICD-10-CM

## 2023-08-02 DIAGNOSIS — I25.10 CORONARY ARTERY DISEASE INVOLVING NATIVE CORONARY ARTERY: Primary | ICD-10-CM

## 2023-08-02 LAB
ATRIAL RATE: 83 BPM
P AXIS: 21 DEGREES
PR INTERVAL: 200 MS
QRS AXIS: 16 DEGREES
QRSD INTERVAL: 96 MS
QT INTERVAL: 372 MS
QTC INTERVAL: 437 MS
T WAVE AXIS: -4 DEGREES
VENTRICULAR RATE: 83 BPM

## 2023-08-02 PROCEDURE — C1769 GUIDE WIRE: HCPCS | Performed by: INTERNAL MEDICINE

## 2023-08-02 PROCEDURE — 93458 L HRT ARTERY/VENTRICLE ANGIO: CPT | Performed by: INTERNAL MEDICINE

## 2023-08-02 PROCEDURE — C1894 INTRO/SHEATH, NON-LASER: HCPCS | Performed by: INTERNAL MEDICINE

## 2023-08-02 PROCEDURE — 99152 MOD SED SAME PHYS/QHP 5/>YRS: CPT | Performed by: INTERNAL MEDICINE

## 2023-08-02 PROCEDURE — 93005 ELECTROCARDIOGRAM TRACING: CPT

## 2023-08-02 PROCEDURE — 93010 ELECTROCARDIOGRAM REPORT: CPT | Performed by: INTERNAL MEDICINE

## 2023-08-02 PROCEDURE — 99153 MOD SED SAME PHYS/QHP EA: CPT | Performed by: INTERNAL MEDICINE

## 2023-08-02 RX ORDER — ONDANSETRON 2 MG/ML
4 INJECTION INTRAMUSCULAR; INTRAVENOUS ONCE AS NEEDED
Status: DISCONTINUED | OUTPATIENT
Start: 2023-08-02 | End: 2023-08-02 | Stop reason: HOSPADM

## 2023-08-02 RX ORDER — LIDOCAINE HYDROCHLORIDE 10 MG/ML
INJECTION, SOLUTION EPIDURAL; INFILTRATION; INTRACAUDAL; PERINEURAL CODE/TRAUMA/SEDATION MEDICATION
Status: DISCONTINUED | OUTPATIENT
Start: 2023-08-02 | End: 2023-08-02 | Stop reason: HOSPADM

## 2023-08-02 RX ORDER — SODIUM CHLORIDE 9 MG/ML
125 INJECTION, SOLUTION INTRAVENOUS CONTINUOUS
Status: DISCONTINUED | OUTPATIENT
Start: 2023-08-02 | End: 2023-08-02

## 2023-08-02 RX ORDER — HEPARIN SODIUM 1000 [USP'U]/ML
INJECTION, SOLUTION INTRAVENOUS; SUBCUTANEOUS CODE/TRAUMA/SEDATION MEDICATION
Status: DISCONTINUED | OUTPATIENT
Start: 2023-08-02 | End: 2023-08-02 | Stop reason: HOSPADM

## 2023-08-02 RX ORDER — ROSUVASTATIN CALCIUM 20 MG/1
20 TABLET, COATED ORAL DAILY
Qty: 90 TABLET | Refills: 3 | Status: ON HOLD | OUTPATIENT
Start: 2023-08-02 | End: 2023-08-14 | Stop reason: SDUPTHER

## 2023-08-02 RX ORDER — ACETAMINOPHEN 325 MG/1
975 TABLET ORAL ONCE AS NEEDED
Status: DISCONTINUED | OUTPATIENT
Start: 2023-08-02 | End: 2023-08-02 | Stop reason: HOSPADM

## 2023-08-02 RX ORDER — METFORMIN HYDROCHLORIDE 500 MG/1
1000 TABLET, EXTENDED RELEASE ORAL 2 TIMES DAILY
Refills: 0 | Status: ON HOLD
Start: 2023-08-03

## 2023-08-02 RX ORDER — VERAPAMIL HYDROCHLORIDE 2.5 MG/ML
INJECTION, SOLUTION INTRAVENOUS CODE/TRAUMA/SEDATION MEDICATION
Status: DISCONTINUED | OUTPATIENT
Start: 2023-08-02 | End: 2023-08-02 | Stop reason: HOSPADM

## 2023-08-02 RX ORDER — MIDAZOLAM HYDROCHLORIDE 2 MG/2ML
INJECTION, SOLUTION INTRAMUSCULAR; INTRAVENOUS CODE/TRAUMA/SEDATION MEDICATION
Status: DISCONTINUED | OUTPATIENT
Start: 2023-08-02 | End: 2023-08-02 | Stop reason: HOSPADM

## 2023-08-02 RX ORDER — FENTANYL CITRATE 50 UG/ML
INJECTION, SOLUTION INTRAMUSCULAR; INTRAVENOUS CODE/TRAUMA/SEDATION MEDICATION
Status: DISCONTINUED | OUTPATIENT
Start: 2023-08-02 | End: 2023-08-02 | Stop reason: HOSPADM

## 2023-08-02 RX ORDER — NITROGLYCERIN 0.4 MG/1
0.4 TABLET SUBLINGUAL ONCE AS NEEDED
Status: DISCONTINUED | OUTPATIENT
Start: 2023-08-02 | End: 2023-08-02 | Stop reason: HOSPADM

## 2023-08-02 RX ORDER — SODIUM CHLORIDE 9 MG/ML
100 INJECTION, SOLUTION INTRAVENOUS CONTINUOUS
Status: DISCONTINUED | OUTPATIENT
Start: 2023-08-02 | End: 2023-08-02 | Stop reason: HOSPADM

## 2023-08-02 RX ORDER — NITROGLYCERIN 20 MG/100ML
INJECTION INTRAVENOUS CODE/TRAUMA/SEDATION MEDICATION
Status: DISCONTINUED | OUTPATIENT
Start: 2023-08-02 | End: 2023-08-02 | Stop reason: HOSPADM

## 2023-08-02 RX ORDER — LISINOPRIL 5 MG/1
5 TABLET ORAL DAILY
Refills: 0 | Status: ON HOLD
Start: 2023-08-03

## 2023-08-02 RX ORDER — ASPIRIN 81 MG/1
324 TABLET, CHEWABLE ORAL ONCE
Status: COMPLETED | OUTPATIENT
Start: 2023-08-02 | End: 2023-08-02

## 2023-08-02 RX ADMIN — SODIUM CHLORIDE 125 ML/HR: 0.9 INJECTION, SOLUTION INTRAVENOUS at 10:07

## 2023-08-02 RX ADMIN — ASPIRIN 81 MG CHEWABLE TABLET 324 MG: 81 TABLET CHEWABLE at 10:06

## 2023-08-02 NOTE — INTERVAL H&P NOTE
H&P reviewed. After examining the patient, I find no changed to the H&P since it had been written. 63M w/ DM, HLD, HTN w/ intermittent nonexertional chest discomfort w/ exercise MPI showing large reversible inferior ischemia is referred for [facility] +/- PCI. /63   Pulse 83   Temp 98.3 °F (36.8 °C) (Temporal)   Resp 17   Ht 5' 11" (1.803 m)   Wt 108 kg (239 lb)   SpO2 94%   BMI 33.33 kg/m²      Patient re-evaluated.  Accept as history and physical.    Jenifer Bahena, DO/August 2, 2023/12:48 PM

## 2023-08-02 NOTE — DISCHARGE INSTR - AVS FIRST PAGE
1. Please see the post cardiac catheterization dishcarge instructions. No heavy lifting, greater than 10 lbs. or strenuous  activity for 48 hrs. 2.Remove band aid tomorrow. Shower and wash area- wrist gently with soap and water- beginning tomorrow. Rinse and pat dry. Apply new water seal band aid. Repeat this process for 5 days. No powders, creams lotions or antibiotic ointments  for 5 days. No tub baths, hot tubs or swimming for 5 days. 3. Please call our office ([phone]) if you have any fever, redness, swelling, discharge from your wrist access site.     4.No driving for 1 day

## 2023-08-02 NOTE — LETTER
[id number] [facility]  [address] [address]  Dept: [phone]    August 2, 2023     Patient: Meenakshi Mead   YOB: 1960   Date of Visit: 8/2/2023       To Whom it May Concern:    Meenakshi Mead is under my professional care. He was seen in the hospital from 8/2/2023 to 08/02/23. He may return to work on 8/3/2023. He may require occasional rest periods throughout the work day. If you have any questions or concerns, please don't hesitate to call.          Sincerely,          MARLENY Guzman

## 2023-08-03 ENCOUNTER — APPOINTMENT (OUTPATIENT)
Dept: LAB | Facility: CLINIC | Age: 63
End: 2023-08-03
Payer: COMMERCIAL

## 2023-08-03 ENCOUNTER — LAB REQUISITION (OUTPATIENT)
Dept: LAB | Facility: HOSPITAL | Age: 63
End: 2023-08-03
Payer: COMMERCIAL

## 2023-08-03 ENCOUNTER — OFFICE VISIT (OUTPATIENT)
Dept: CARDIAC SURGERY | Facility: CLINIC | Age: 63
End: 2023-08-03
Payer: COMMERCIAL

## 2023-08-03 VITALS
WEIGHT: 230 LBS | HEART RATE: 94 BPM | DIASTOLIC BLOOD PRESSURE: 62 MMHG | SYSTOLIC BLOOD PRESSURE: 113 MMHG | HEIGHT: 71 IN | OXYGEN SATURATION: 98 % | BODY MASS INDEX: 32.2 KG/M2 | TEMPERATURE: 98.5 F

## 2023-08-03 DIAGNOSIS — I25.118 CORONARY ARTERY DISEASE OF NATIVE ARTERY OF NATIVE HEART WITH STABLE ANGINA PECTORIS (HCC): ICD-10-CM

## 2023-08-03 DIAGNOSIS — I20.8 STABLE ANGINA PECTORIS (HCC): ICD-10-CM

## 2023-08-03 DIAGNOSIS — I25.118 ATHEROSCLEROTIC HEART DISEASE OF NATIVE CORONARY ARTERY WITH OTHER FORMS OF ANGINA PECTORIS (HCC): ICD-10-CM

## 2023-08-03 DIAGNOSIS — R06.02 SOB (SHORTNESS OF BREATH): ICD-10-CM

## 2023-08-03 DIAGNOSIS — Z13.6 ENCOUNTER FOR SCREENING FOR STENOSIS OF CAROTID ARTERY: ICD-10-CM

## 2023-08-03 DIAGNOSIS — Z01.89 ENCOUNTER FOR IMAGING OF BILATERAL GREATER SAPHENOUS VEINS: ICD-10-CM

## 2023-08-03 DIAGNOSIS — Z00.00 ROUTINE ADULT HEALTH MAINTENANCE: ICD-10-CM

## 2023-08-03 DIAGNOSIS — I25.118 CORONARY ARTERY DISEASE OF NATIVE ARTERY OF NATIVE HEART WITH STABLE ANGINA PECTORIS (HCC): Primary | ICD-10-CM

## 2023-08-03 LAB
ABO GROUP BLD: NORMAL
BLD GP AB SCN SERPL QL: NEGATIVE
INR PPP: 0.97 (ref 0.84–1.19)
PROTHROMBIN TIME: 13.1 SECONDS (ref 11.6–14.5)
RH BLD: POSITIVE
SPECIMEN EXPIRATION DATE: NORMAL

## 2023-08-03 PROCEDURE — 86901 BLOOD TYPING SEROLOGIC RH(D): CPT | Performed by: THORACIC SURGERY (CARDIOTHORACIC VASCULAR SURGERY)

## 2023-08-03 PROCEDURE — 99205 OFFICE O/P NEW HI 60 MIN: CPT | Performed by: THORACIC SURGERY (CARDIOTHORACIC VASCULAR SURGERY)

## 2023-08-03 PROCEDURE — 87081 CULTURE SCREEN ONLY: CPT

## 2023-08-03 PROCEDURE — 36415 COLL VENOUS BLD VENIPUNCTURE: CPT

## 2023-08-03 PROCEDURE — 86850 RBC ANTIBODY SCREEN: CPT | Performed by: THORACIC SURGERY (CARDIOTHORACIC VASCULAR SURGERY)

## 2023-08-03 PROCEDURE — 86900 BLOOD TYPING SEROLOGIC ABO: CPT | Performed by: THORACIC SURGERY (CARDIOTHORACIC VASCULAR SURGERY)

## 2023-08-03 PROCEDURE — 85610 PROTHROMBIN TIME: CPT

## 2023-08-03 RX ORDER — NITROGLYCERIN 0.4 MG/1
0.4 TABLET SUBLINGUAL
Qty: 30 TABLET | Refills: 3 | Status: ON HOLD | OUTPATIENT
Start: 2023-08-03

## 2023-08-03 RX ORDER — CHLORHEXIDINE GLUCONATE 0.12 MG/ML
15 RINSE ORAL ONCE
Status: CANCELLED | OUTPATIENT
Start: 2023-08-03 | End: 2023-08-03

## 2023-08-03 RX ORDER — CEFAZOLIN SODIUM 2 G/50ML
2000 SOLUTION INTRAVENOUS ONCE
Status: CANCELLED | OUTPATIENT
Start: 2023-08-03 | End: 2023-08-03

## 2023-08-03 NOTE — H&P (VIEW-ONLY)
Pre-Op History & Physical - Cardiothoracic Surgery   Christiane Alfonso 61 y.o. male MRN: [id number]    Physician Requesting Consult: Souleymane Ramirez MD  Cardiologist: Dr. Clara Colón    Reason for Consult / Principal Problem: Coronary artery disease    History of Present Illness: Christiane Alfonso is a 61y.o. year old male referred for surgical consultation for coronary artery disease. Patient PMHx is notable for HTN, HLD, DM2, obesity (BMI 32.08), DUDLEY (no CPAP), depression and h/o R carotid aneurysm s/p repair (age 13). Upon interview, patient reports feeling "run down" for months. He noted during the winter months, while walking the dog in cold temperatures,  he was experiencing chest tightness. He states over the past few months his symptoms have become progressively worse. He feels more fatigued and is napping more. With activities,  such as mowing the lawn or carrying groceries,  he experiences chest tightness, SOB, diaphoresis and dizziness. His NM stress test on 7/31/23 was abnormal and subsequent cardiac cath performed yesterday demonstrates severe multivessel CAD. Patient denies tobacco, alcohol or drug use. FH is notable for premature CAD- father suffered an MI at age 46. Past Medical History:  Past Medical History:   Diagnosis Date   • Coronary artery disease    • Diabetes mellitus ([address])    • Hyperlipidemia    • Hypertension          Past Surgical History:   Past Surgical History:   Procedure Laterality Date   • ARTERIAL ANEURYSM REPAIR      Right Carotid Aneurysm - age 13. • CARDIAC CATHETERIZATION Left 08/02/2023    Procedure: Cardiac Left Heart Cath;  Surgeon: Tonie Aguirre DO;  Location: [facility]; Service: Cardiology   • CARDIAC CATHETERIZATION N/A 08/02/2023    Procedure: Cardiac Coronary Angiogram;  Surgeon: Tonie Aguirre DO;  Location: [facility];   Service: Cardiology   • CARDIAC CATHETERIZATION  08/02/2023    Procedure: Cardiac catheterization;  Surgeon: Catracho Huang DO;  Location: [facility]; Service: Cardiology   • HERNIA REPAIR     • VASECTOMY           Family History:  Family History   Problem Relation Age of Onset   • Leukemia Mother    • Heart attack Father 46         Social History:    Social History     Substance and Sexual Activity   Alcohol Use No     Social History     Substance and Sexual Activity   Drug Use No     Social History     Tobacco Use   Smoking Status Never   Smokeless Tobacco Never       Home Medications:   Prior to Admission medications    Medication Sig Start Date End Date Taking?  Authorizing Provider   aspirin 81 mg chewable tablet Chew 81 mg 6/27/14  Yes Historical Provider, MD   Continuous Blood Gluc Sensor (FreeStyle Ayanna 2 Sensor) MISC APPLY AND CHANGE EVERY 14 DAYS 7/26/23  Yes Historical Provider, MD   gabapentin (NEURONTIN) 100 mg capsule TAKE ONE CAPSULE DAILY IN THE MORNING, 3 CAPSULES IN THE EVENING 3/9/18  Yes Historical Provider, MD   glipiZIDE (GLUCOTROL XL) 5 mg 24 hr tablet Take 10 mg by mouth   Yes Historical Provider, MD   Jardiance 25 MG TABS  7/26/23  Yes Historical Provider, MD   lisinopril (ZESTRIL) 5 mg tablet Take 1 tablet (5 mg total) by mouth daily Do not start before August 3, 2023. 8/3/23  Yes MARLENY Melgar   LORazepam (ATIVAN) 0.5 mg tablet Take 0.5 mg by mouth daily at bedtime 3/26/18  Yes Historical Provider, MD   meloxicam (MOBIC) 15 mg tablet Take 15 mg by mouth daily 3/2/18  Yes Historical Provider, MD   metFORMIN (GLUCOPHAGE-XR) 500 mg 24 hr tablet Take 2 tablets (1,000 mg total) by mouth 2 (two) times a day Do not start before August 3, 2023. 8/3/23  Yes MARLENY Melgar   metoprolol tartrate (LOPRESSOR) 25 mg tablet Take 1 tablet (25 mg total) by mouth every 12 (twelve) hours 8/2/23  Yes MARLENY Oliver   Ozempic, 2 MG/DOSE, 8 MG/3ML injection pen INJECT 2MG UNDER THE SKIN ONCE A WEEK 6/12/23  Yes Historical Provider, MD   rosuvastatin (CRESTOR) 20 MG tablet Take 1 tablet (20 mg total) by mouth daily 8/2/23  Yes MARLENY Kay   sertraline (Zoloft) 50 mg tablet Take 1 tablet (50 mg total) by mouth daily 7/27/23  Yes Vivienne Dang DO       Allergies: Allergies   Allergen Reactions   • Atorvastatin Other (See Comments)     felt flu-like   • Rosiglitazone Other (See Comments)   • Trazodone Other (See Comments)     Felt "hung over"       Review of Systems:  Review of Systems - History obtained from chart review and the patient  General ROS: positive for  - fatigue and change in activity tolerance  negative for - chills, fever or sleep disturbance  Psychological ROS: negative  Ophthalmic ROS: positive for - decreased vision right eye (result of R carotid aneurysm repair age 13)  ENT ROS: negative  Allergy and Immunology ROS: negative  Hematological and Lymphatic ROS: negative  Endocrine ROS: negative  Breast ROS: negative  Respiratory ROS: no cough, shortness of breath, or wheezing  Cardiovascular ROS: positive for - chest pain, dyspnea on exertion and diaphoresis  negative for - edema, irregular heartbeat, loss of consciousness, orthopnea, palpitations, paroxysmal nocturnal dyspnea or rapid heart rate  Gastrointestinal ROS: no abdominal pain, change in bowel habits, or black or bloody stools  Genito-Urinary ROS: no dysuria, trouble voiding, or hematuria  Musculoskeletal ROS: negative  Neurological ROS: positive for - dizziness and lightheadedness  Dermatological ROS: negative    Vital Signs:     Vitals:    08/03/23 1506 08/03/23 1508   BP: 129/73 113/62   BP Location: Left arm Right arm   Patient Position: Sitting Sitting   Cuff Size: Standard Standard   Pulse: 94    Temp: 98.5 °F (36.9 °C)    TempSrc: Tympanic    SpO2: 98%    Weight: 104 kg (230 lb)    Height: 5' 11" (1.803 m)        Physical Exam:    General: Alert, oriented, obese, no acute distress  HEENT/NECK:  PERRLA. No jugular venous distention.     Cardiac:Regular rate and rhythm, no murmurs rubs or gallops. Carotid arteries: 2+ pulses, no bruits  Pulmonary:  Breath sounds clear bilaterally. Abdomen:  Non-tender, Non-distended. Positive bowel sounds. Upper extremities: 2+ radial pulses; brisk capillary refill; right hand dominance  Lower extremities: Extremities warm/dry. PT/DP pulses 2+ bilaterally. No edema B/L; no varicostities  Neuro: Alert and oriented X 3. Sensation is grossly intact. No focal deficits. Musculoskeletal: MAEE, stable gait  Skin: Warm/Dry, without rashes or lesions. Lab Results:   Lab Results   Component Value Date    WBC 11.04 (H) 07/24/2023    HGB 16.0 07/24/2023    HCT 48.9 07/24/2023    MCV 89 07/24/2023     07/24/2023     Lab Results   Component Value Date    SODIUM 133 (L) 07/24/2023    K 4.4 07/24/2023     07/24/2023    CO2 24 07/24/2023    AGAP 9 07/24/2023    BUN 28 (H) 07/24/2023    CREATININE 1.21 07/24/2023    GLUC 171 (H) 07/24/2023    CALCIUM 9.1 07/24/2023    AST 15 07/24/2023    ALT 17 07/24/2023    ALKPHOS 53 07/24/2023    TP 7.5 07/24/2023    TBILI 0.74 07/24/2023    EGFR 63 07/24/2023 6/8/23  8:50 AM     Cholesterol <200 mg/dL 193    Triglyceride <150 mg/dL 155 High     Cholesterol, HDL, Direct 23 - 92 mg/dL 42    Cholesterol, Non-HDL <160 mg/dL 151    Cholesterol, LDL, Calculated <130 mg/dL 120    Comment: LDL Cholesterol was calculated using the Friedewald equation. Direct measurement of LDL is not indicated for this patient based on [facility]'s analytical algorithm for measurement of LDL Cholesterol. CHOL/HDL Ratio  4.6        Lab Results   Component Value Date    HGBA1C 7.4 (H) 06/08/2023         Imaging Studies:     Cardiac Catheterization:  8/2/23    Left Anterior Descending   Prox LAD lesion is 70% stenosed. Mid LAD lesion is 80% stenosed. Dist LAD lesion is 90% stenosed. First Diagonal Branch   1st Diag-1 lesion is 90% stenosed. 1st Diag-2 lesion is 80% stenosed. Ramus Intermedius   Ramus lesion is 80% stenosed.       Left Circumflex   Mid Cx to Dist Cx lesion is 90% stenosed. Right Coronary Artery      Right Posterior Descending Artery   Collaterals   RPDA filled by collaterals from Dist LAD. EC23    Sinus rhythm with Premature atrial complexes with Aberrant conduction    Echocardiogram: 23    •  Left Ventricle: Left ventricular cavity size is normal. Wall thickness is upper normal. The left ventricular ejection fraction is 60%. Systolic function is normal. Wall motion is normal. Diastolic function is mildly abnormal, consistent with grade I (abnormal) relaxation. •  Mitral Valve: There is mild annular calcification. There is trace regurgitation. •  Aorta: The aortic root is normal in size. The ascending aorta is mildly dilated. The ascending aorta is 3.9 cm.     Findings    Left Ventricle Left ventricular cavity size is normal. Wall thickness is upper normal. The left ventricular ejection fraction is 60%. Systolic function is normal.  Wall motion is normal. Diastolic function is mildly abnormal, consistent with grade I (abnormal) relaxation. Right Ventricle Right ventricular cavity size is normal. Systolic function is normal. Wall thickness is normal.      Left Atrium The atrium is normal in size. Right Atrium The atrium is normal in size. Aortic Valve The aortic valve is trileaflet. The leaflets are not thickened. The leaflets are not calcified. The leaflets exhibit normal mobility. There is no evidence of regurgitation. The aortic valve has no significant stenosis. Mitral Valve The leaflets are not thickened. There is mild calcification of the anterior leaflet. The leaflets exhibit normal mobility. There is mild annular calcification. There is trace regurgitation. There is no evidence of stenosis. Tricuspid Valve Tricuspid valve structure is normal. There is trace regurgitation. There is no evidence of stenosis.       Pulmonic Valve Pulmonic valve structure is normal. There is trace regurgitation. There is no evidence of stenosis. Ascending Aorta The aortic root is normal in size. The ascending aorta is mildly dilated. The ascending aorta is 3.9 cm. IVC/SVC The inferior vena cava is normal in size. Pericardium There is no pericardial effusion. The pericardium is normal in appearance. Left Ventricle Measurements    Function/Volumes   A4C EF 59 %         Dimensions   LVIDd 3.9 cm         LVIDS 2.7 cm         IVSd 1 cm         LVPWd 1.1 cm         FS 31          Diastolic Filling   MV E' Tissue Velocity Septal 8 cm/s         E wave deceleration time 118 ms         MV Peak E Senthil 57 cm/s         MV Peak A Senthil 0.98 m/s               Right Ventricle Measurements    Dimensions   RVID d 4.2 cm         Tricuspid annular plane systolic excursion 2.1 cm               Left Atrium Measurements    Dimensions   LA size 4.3 cm         LA length (A2C) 4.6 cm         Volumes   LA volume (BP) 47 mL               Right Atrium Measurements    Dimensions   RAA A4C 15.9 cm2               Mitral Valve Measurements    Stenosis   MV stenosis pressure 1/2 time 34 ms         MV valve area p 1/2 method 6.47                Aorta Measurements    Aortic Dimensions   Ao root 3.6 cm         Asc Ao 3.9 cm                 I have personally reviewed pertinent films in PACS     PCP and Cardiology notes reviewed      Assessment:  Patient Active Problem List    Diagnosis Date Noted   • Coronary artery disease involving native coronary artery 08/02/2023   • Sleep apnea    • HTN (hypertension), benign 03/29/2018   • Type 2 diabetes mellitus ([address]) 03/29/2018   • Hyperlipidemia LDL goal <100 03/29/2018         Impression/Plan:    Multivessel Coronary Artery Disease    Risks and benefits of coronary artery bypass grafting were discussed in detail today with the patient. They understand and wish to proceed with further workup and ultimately surgical intervention.   We have ordered routine preoperative non-contrast chest CT scan, carotid duple, vein mapping and laboratory  studies. Pending the results of these tests, they will be scheduled for surgery on 8/11/23 with Dr. Seward Frankel, M.D. Pre op instructions reviewed with patient. He was instructed to stop Glipizide, Jardiance, Lisinopril, Meloxicam and Metformin 3 days before surgery. Luigi Felix was comfortable with our recommendations, and their questions were answered to their satisfaction. The patient was referred to gastroenterology for consideration of routine colonoscopy screening of all patients aged 52-65. Gastroenterology evaluation will not be necessary prior to any open heart procedures, and can be completed following surgical recovery.     SIGNATURE: MARLENY Olmedo  DATE: August 3, 2023  TIME: 4:05 PM

## 2023-08-03 NOTE — LETTER
August 3, 2023     Sybil Denilson[address] [address]    Patient: Monie Armstrong   YOB: 1960   Date of Visit: 8/3/2023       Dear Dr. Avinash Torres: Thank you for referring Monie Armstrong to me for evaluation. Below are my notes for this consultation. If you have questions, please do not hesitate to call me. I look forward to following your patient along with you. Sincerely,        Jessi Serrano MD        CC: DO Jaymie Zavala, [address] [place] [address]  8/3/2023  4:29 PM  Attested  Consultation - Cardiothoracic Surgery   Monie Armstrong 61 y.o. male MRN: [id number]    Physician Requesting Consult: Sybil Oreilly MD  Cardiologist: Dr. Melva Gallagher    Reason for Consult / Principal Problem: Coronary artery disease    History of Present Illness: Monie Armstrong is a 61y.o. year old male referred for surgical consultation for coronary artery disease. Patient PMHx is notable for HTN, HLD, DM2, obesity (BMI 32.08), DUDLEY (no CPAP), depression and h/o R carotid aneurysm s/p repair (age 13). Upon interview, patient reports feeling "run down" for months. He noted during the winter months, while walking the dog in cold temperatures,  he was experiencing chest tightness. He states over the past few months his symptoms have become progressively worse. He feels more fatigued and is napping more. With activities,  such as mowing the lawn or carrying groceries,  he experiences chest tightness, SOB, diaphoresis and dizziness. His NM stress test on 7/31/23 was abnormal and subsequent cardiac cath performed yesterday demonstrates severe multivessel CAD. Patient denies tobacco, alcohol or drug use. FH is notable for premature CAD- father suffered an MI at age 46.     Past Medical History:  Past Medical History:   Diagnosis Date   • Coronary artery disease    • Diabetes mellitus ([address])    • Hyperlipidemia    • Hypertension          Past Surgical History:   Past Surgical History:   Procedure Laterality Date   • ARTERIAL ANEURYSM REPAIR      Right Carotid Aneurysm - age 13. • CARDIAC CATHETERIZATION Left 08/02/2023    Procedure: Cardiac Left Heart Cath;  Surgeon: Nate Chapman DO;  Location: [facility]; Service: Cardiology   • CARDIAC CATHETERIZATION N/A 08/02/2023    Procedure: Cardiac Coronary Angiogram;  Surgeon: Nate Chapman DO;  Location: [facility]; Service: Cardiology   • CARDIAC CATHETERIZATION  08/02/2023    Procedure: Cardiac catheterization;  Surgeon: Nate Chapman DO;  Location: [facility]; Service: Cardiology   • HERNIA REPAIR     • VASECTOMY           Family History:  Family History   Problem Relation Age of Onset   • Leukemia Mother    • Heart attack Father 46         Social History:    Social History     Substance and Sexual Activity   Alcohol Use No     Social History     Substance and Sexual Activity   Drug Use No     Social History     Tobacco Use   Smoking Status Never   Smokeless Tobacco Never       Home Medications:   Prior to Admission medications    Medication Sig Start Date End Date Taking?  Authorizing Provider   aspirin 81 mg chewable tablet Chew 81 mg 6/27/14  Yes Historical Provider, MD   Continuous Blood Gluc Sensor (FreeStyle Ayanna 2 Sensor) MISC APPLY AND CHANGE EVERY 14 DAYS 7/26/23  Yes Historical Provider, MD   gabapentin (NEURONTIN) 100 mg capsule TAKE ONE CAPSULE DAILY IN THE MORNING, 3 CAPSULES IN THE EVENING 3/9/18  Yes Historical Provider, MD   glipiZIDE (GLUCOTROL XL) 5 mg 24 hr tablet Take 10 mg by mouth   Yes Historical Provider, MD   Jardiance 25 MG TABS  7/26/23  Yes Historical Provider, MD   lisinopril (ZESTRIL) 5 mg tablet Take 1 tablet (5 mg total) by mouth daily Do not start before August 3, 2023. 8/3/23  Yes Cheron Brittle, CRNP   LORazepam (ATIVAN) 0.5 mg tablet Take 0.5 mg by mouth daily at bedtime 3/26/18  Yes Historical Provider, MD   meloxicam (MOBIC) 15 mg tablet Take 15 mg by mouth daily 3/2/18  Yes Historical Provider, MD   metFORMIN (GLUCOPHAGE-XR) 500 mg 24 hr tablet Take 2 tablets (1,000 mg total) by mouth 2 (two) times a day Do not start before August 3, 2023. 8/3/23  Yes MARLENY Reeder   metoprolol tartrate (LOPRESSOR) 25 mg tablet Take 1 tablet (25 mg total) by mouth every 12 (twelve) hours 8/2/23  Yes MARLENY Oliver   Ozempic, 2 MG/DOSE, 8 MG/3ML injection pen INJECT 2MG UNDER THE SKIN ONCE A WEEK 6/12/23  Yes Historical Provider, MD   rosuvastatin (CRESTOR) 20 MG tablet Take 1 tablet (20 mg total) by mouth daily 8/2/23  Yes MARLENY Reeder   sertraline (Zoloft) 50 mg tablet Take 1 tablet (50 mg total) by mouth daily 7/27/23  Yes Socorro Cutler DO       Allergies:   Allergies   Allergen Reactions   • Atorvastatin Other (See Comments)     felt flu-like   • Rosiglitazone Other (See Comments)   • Trazodone Other (See Comments)     Felt "hung over"       Review of Systems:  Review of Systems - History obtained from chart review and the patient  General ROS: positive for  - fatigue and change in activity tolerance  negative for - chills, fever or sleep disturbance  Psychological ROS: negative  Ophthalmic ROS: positive for - decreased vision right eye (result of R carotid aneurysm repair age 13)  ENT ROS: negative  Allergy and Immunology ROS: negative  Hematological and Lymphatic ROS: negative  Endocrine ROS: negative  Breast ROS: negative  Respiratory ROS: no cough, shortness of breath, or wheezing  Cardiovascular ROS: positive for - chest pain, dyspnea on exertion and diaphoresis  negative for - edema, irregular heartbeat, loss of consciousness, orthopnea, palpitations, paroxysmal nocturnal dyspnea or rapid heart rate  Gastrointestinal ROS: no abdominal pain, change in bowel habits, or black or bloody stools  Genito-Urinary ROS: no dysuria, trouble voiding, or hematuria  Musculoskeletal ROS: negative  Neurological ROS: positive for - dizziness and lightheadedness  Dermatological ROS: negative    Vital Signs:     Vitals:    08/03/23 1506 08/03/23 1508   BP: 129/73 113/62   BP Location: Left arm Right arm   Patient Position: Sitting Sitting   Cuff Size: Standard Standard   Pulse: 94    Temp: 98.5 °F (36.9 °C)    TempSrc: Tympanic    SpO2: 98%    Weight: 104 kg (230 lb)    Height: 5' 11" (1.803 m)        Physical Exam:    General: Alert, oriented, obese, no acute distress  HEENT/NECK:  PERRLA. No jugular venous distention. Cardiac:Regular rate and rhythm, no murmurs rubs or gallops. Carotid arteries: 2+ pulses, no bruits  Pulmonary:  Breath sounds clear bilaterally. Abdomen:  Non-tender, Non-distended. Positive bowel sounds. Upper extremities: 2+ radial pulses; brisk capillary refill; right hand dominance  Lower extremities: Extremities warm/dry. PT/DP pulses 2+ bilaterally. No edema B/L; no varicostities  Neuro: Alert and oriented X 3. Sensation is grossly intact. No focal deficits. Musculoskeletal: MAEE, stable gait  Skin: Warm/Dry, without rashes or lesions. Lab Results:   Lab Results   Component Value Date    WBC 11.04 (H) 07/24/2023    HGB 16.0 07/24/2023    HCT 48.9 07/24/2023    MCV 89 07/24/2023     07/24/2023     Lab Results   Component Value Date    SODIUM 133 (L) 07/24/2023    K 4.4 07/24/2023     07/24/2023    CO2 24 07/24/2023    AGAP 9 07/24/2023    BUN 28 (H) 07/24/2023    CREATININE 1.21 07/24/2023    GLUC 171 (H) 07/24/2023    CALCIUM 9.1 07/24/2023    AST 15 07/24/2023    ALT 17 07/24/2023    ALKPHOS 53 07/24/2023    TP 7.5 07/24/2023    TBILI 0.74 07/24/2023    EGFR 63 07/24/2023 6/8/23  8:50 AM     Cholesterol <200 mg/dL 193    Triglyceride <150 mg/dL 155 High     Cholesterol, HDL, Direct 23 - 92 mg/dL 42    Cholesterol, Non-HDL <160 mg/dL 151    Cholesterol, LDL, Calculated <130 mg/dL 120    Comment: LDL Cholesterol was calculated using the Friedewald equation.  Direct measurement of LDL is not indicated for this patient based on [facility]'s analytical algorithm for measurement of LDL Cholesterol. CHOL/HDL Ratio  4.6        Lab Results   Component Value Date    HGBA1C 7.4 (H) 2023         Imaging Studies:     Cardiac Catheterization:  23    Left Anterior Descending   Prox LAD lesion is 70% stenosed. Mid LAD lesion is 80% stenosed. Dist LAD lesion is 90% stenosed. First Diagonal Branch   1st Diag-1 lesion is 90% stenosed. 1st Diag-2 lesion is 80% stenosed. Ramus Intermedius   Ramus lesion is 80% stenosed. Left Circumflex   Mid Cx to Dist Cx lesion is 90% stenosed. Right Coronary Artery      Right Posterior Descending Artery   Collaterals   RPDA filled by collaterals from Dist LAD. EC23    Sinus rhythm with Premature atrial complexes with Aberrant conduction    Echocardiogram: 23    •  Left Ventricle: Left ventricular cavity size is normal. Wall thickness is upper normal. The left ventricular ejection fraction is 60%. Systolic function is normal. Wall motion is normal. Diastolic function is mildly abnormal, consistent with grade I (abnormal) relaxation. •  Mitral Valve: There is mild annular calcification. There is trace regurgitation. •  Aorta: The aortic root is normal in size. The ascending aorta is mildly dilated. The ascending aorta is 3.9 cm. Findings    Left Ventricle Left ventricular cavity size is normal. Wall thickness is upper normal. The left ventricular ejection fraction is 60%. Systolic function is normal.  Wall motion is normal. Diastolic function is mildly abnormal, consistent with grade I (abnormal) relaxation. Right Ventricle Right ventricular cavity size is normal. Systolic function is normal. Wall thickness is normal.      Left Atrium The atrium is normal in size. Right Atrium The atrium is normal in size. Aortic Valve The aortic valve is trileaflet. The leaflets are not thickened. The leaflets are not calcified.  The leaflets exhibit normal mobility. There is no evidence of regurgitation. The aortic valve has no significant stenosis. Mitral Valve The leaflets are not thickened. There is mild calcification of the anterior leaflet. The leaflets exhibit normal mobility. There is mild annular calcification. There is trace regurgitation. There is no evidence of stenosis. Tricuspid Valve Tricuspid valve structure is normal. There is trace regurgitation. There is no evidence of stenosis. Pulmonic Valve Pulmonic valve structure is normal. There is trace regurgitation. There is no evidence of stenosis. Ascending Aorta The aortic root is normal in size. The ascending aorta is mildly dilated. The ascending aorta is 3.9 cm. IVC/SVC The inferior vena cava is normal in size. Pericardium There is no pericardial effusion. The pericardium is normal in appearance.         Left Ventricle Measurements    Function/Volumes   A4C EF 59 %         Dimensions   LVIDd 3.9 cm         LVIDS 2.7 cm         IVSd 1 cm         LVPWd 1.1 cm         FS 31          Diastolic Filling   MV E' Tissue Velocity Septal 8 cm/s         E wave deceleration time 118 ms         MV Peak E Senthil 57 cm/s         MV Peak A Senthil 0.98 m/s               Right Ventricle Measurements    Dimensions   RVID d 4.2 cm         Tricuspid annular plane systolic excursion 2.1 cm               Left Atrium Measurements    Dimensions   LA size 4.3 cm         LA length (A2C) 4.6 cm         Volumes   LA volume (BP) 47 mL               Right Atrium Measurements    Dimensions   RAA A4C 15.9 cm2               Mitral Valve Measurements    Stenosis   MV stenosis pressure 1/2 time 34 ms         MV valve area p 1/2 method 6.47                Aorta Measurements    Aortic Dimensions   Ao root 3.6 cm         Asc Ao 3.9 cm                 I have personally reviewed pertinent films in PACS     PCP and Cardiology notes reviewed      Assessment:  Patient Active Problem List    Diagnosis Date Noted   • Coronary artery disease involving native coronary artery 08/02/2023   • Sleep apnea    • HTN (hypertension), benign 03/29/2018   • Type 2 diabetes mellitus ([address]) 03/29/2018   • Hyperlipidemia LDL goal <100 03/29/2018         Impression/Plan:    Multivessel Coronary Artery Disease    Risks and benefits of coronary artery bypass grafting were discussed in detail today with the patient. They understand and wish to proceed with further workup and ultimately surgical intervention. We have ordered routine preoperative non-contrast chest CT scan, carotid duple, vein mapping and laboratory  studies. Pending the results of these tests, they will be scheduled for surgery on 8/11/23 with Dr. Gavino Hodgson M.D. Pre op instructions reviewed with patient. He was instructed to stop Glipizide, Jardiance, Lisinopril, Meloxicam and Metformin 3 days before surgery. Amada Lawson was comfortable with our recommendations, and their questions were answered to their satisfaction. The patient was referred to gastroenterology for consideration of routine colonoscopy screening of all patients aged 52-65. Gastroenterology evaluation will not be necessary prior to any open heart procedures, and can be completed following surgical recovery. SIGNATURE: MARLENY Miller  DATE: August 3, 2023  TIME: 4:05 PM  Attestation signed by Gavino Hodgson MD at 8/3/2023  4:51 PM:  Patient seen and evaluated with [address] [place] [address] / CRISTO. I agree with the above assessment and plan with the following additions. The patient is a 70-year-old man referred for evaluation of symptomatic severe multivessel coronary artery disease, he has dyspnea and angina with mild exertion. Significant comorbidities include obesity (BMI 32), DUDLEY, HTN, HLD, DM 2. I reviewed his diagnostic images, coronary angiography shows severe diffuse multivessel coronary artery disease, LAD proximal 70%, mid 80%, distal 90%.   D1 90%, D2 80% small vessel, ramus 80%, circumflex 90% with very small OM1, RCA is occluded with a faint collateral flow to the PDA. I explained the diagnosis to the patient and his wife and the treatment options including medications, PCI and surgery. I recommend coronary artery bypass surgery based on his coronary anatomy and associated diabetes. I explained the procedure, benefits, risk and possible complications. He understands and agrees to proceed with surgery. He will undergo preoperative testing and will return for an elective CABG. This note was completed in part utilizing [employer] direct voice recognition software. Grammatical errors, random word insertion, spelling mistakes, and incomplete sentences may be an occasional consequence of the system secondary to software limitations, ambient noise and hardware issues. At the time of dictation, efforts were made to edit, clarify and /or correct errors. Please read the chart carefully and recognize, using context, where substitutions have occurred.   If you have any questions or concerns about the context, text or information contained within the body of this dictation, please contact myself, the provider, for further clarification

## 2023-08-03 NOTE — PROGRESS NOTES
Assessment/Plan:    No problem-specific Assessment & Plan notes found for this encounter. {Assess/PlanSmartLinks:73084}      Subjective:      Patient ID: Jeana Banegas is a 61 y.o. male. HPI    {[facility] SmartLinks:39387}    Review of Systems      Objective: There were no vitals taken for this visit.          Physical Exam

## 2023-08-03 NOTE — PROGRESS NOTES
Consultation - Cardiothoracic Surgery   Alexandrea Sevilla 61 y.o. male MRN: [id number]    Physician Requesting Consult: Micheline Peck MD  Cardiologist: Dr. Brodie Almaguer    Reason for Consult / Principal Problem: Coronary artery disease    History of Present Illness: Alexandrea Sevilla is a 61y.o. year old male referred for surgical consultation for coronary artery disease. Patient PMHx is notable for HTN, HLD, DM2, obesity (BMI 32.08), DUDLEY (no CPAP), depression and h/o R carotid aneurysm s/p repair (age 13). Upon interview, patient reports feeling "run down" for months. He noted during the winter months, while walking the dog in cold temperatures,  he was experiencing chest tightness. He states over the past few months his symptoms have become progressively worse. He feels more fatigued and is napping more. With activities,  such as mowing the lawn or carrying groceries,  he experiences chest tightness, SOB, diaphoresis and dizziness. His NM stress test on 7/31/23 was abnormal and subsequent cardiac cath performed yesterday demonstrates severe multivessel CAD. Patient denies tobacco, alcohol or drug use. FH is notable for premature CAD- father suffered an MI at age 46. Past Medical History:  Past Medical History:   Diagnosis Date   • Coronary artery disease    • Diabetes mellitus ([address])    • Hyperlipidemia    • Hypertension          Past Surgical History:   Past Surgical History:   Procedure Laterality Date   • ARTERIAL ANEURYSM REPAIR      Right Carotid Aneurysm - age 13. • CARDIAC CATHETERIZATION Left 08/02/2023    Procedure: Cardiac Left Heart Cath;  Surgeon: Alberto Bahena DO;  Location: [facility]; Service: Cardiology   • CARDIAC CATHETERIZATION N/A 08/02/2023    Procedure: Cardiac Coronary Angiogram;  Surgeon: Alberto Bahena DO;  Location: [facility];   Service: Cardiology   • CARDIAC CATHETERIZATION  08/02/2023    Procedure: Cardiac catheterization;  Surgeon: Farnaz Pillai DO Henny;  Location: [facility]; Service: Cardiology   • HERNIA REPAIR     • VASECTOMY           Family History:  Family History   Problem Relation Age of Onset   • Leukemia Mother    • Heart attack Father 46         Social History:    Social History     Substance and Sexual Activity   Alcohol Use No     Social History     Substance and Sexual Activity   Drug Use No     Social History     Tobacco Use   Smoking Status Never   Smokeless Tobacco Never       Home Medications:   Prior to Admission medications    Medication Sig Start Date End Date Taking?  Authorizing Provider   aspirin 81 mg chewable tablet Chew 81 mg 6/27/14  Yes Historical Provider, MD   Continuous Blood Gluc Sensor (FreeStyle Ayanna 2 Sensor) MISC APPLY AND CHANGE EVERY 14 DAYS 7/26/23  Yes Historical Provider, MD   gabapentin (NEURONTIN) 100 mg capsule TAKE ONE CAPSULE DAILY IN THE MORNING, 3 CAPSULES IN THE EVENING 3/9/18  Yes Historical Provider, MD   glipiZIDE (GLUCOTROL XL) 5 mg 24 hr tablet Take 10 mg by mouth   Yes Historical Provider, MD   Jardiance 25 MG TABS  7/26/23  Yes Historical Provider, MD   lisinopril (ZESTRIL) 5 mg tablet Take 1 tablet (5 mg total) by mouth daily Do not start before August 3, 2023. 8/3/23  Yes MARLENY Sandy   LORazepam (ATIVAN) 0.5 mg tablet Take 0.5 mg by mouth daily at bedtime 3/26/18  Yes Historical Provider, MD   meloxicam (MOBIC) 15 mg tablet Take 15 mg by mouth daily 3/2/18  Yes Historical Provider, MD   metFORMIN (GLUCOPHAGE-XR) 500 mg 24 hr tablet Take 2 tablets (1,000 mg total) by mouth 2 (two) times a day Do not start before August 3, 2023. 8/3/23  Yes MARLENY Sandy   metoprolol tartrate (LOPRESSOR) 25 mg tablet Take 1 tablet (25 mg total) by mouth every 12 (twelve) hours 8/2/23  Yes MARLENY Oliver   Ozempic, 2 MG/DOSE, 8 MG/3ML injection pen INJECT 2MG UNDER THE SKIN ONCE A WEEK 6/12/23  Yes Historical Provider, MD   rosuvastatin (CRESTOR) 20 MG tablet Take 1 tablet (20 mg total) by mouth daily 8/2/23  Yes MARLENY Morrow   sertraline (Zoloft) 50 mg tablet Take 1 tablet (50 mg total) by mouth daily 7/27/23  Yes Karla Duverney, DO       Allergies: Allergies   Allergen Reactions   • Atorvastatin Other (See Comments)     felt flu-like   • Rosiglitazone Other (See Comments)   • Trazodone Other (See Comments)     Felt "hung over"       Review of Systems:  Review of Systems - History obtained from chart review and the patient  General ROS: positive for  - fatigue and change in activity tolerance  negative for - chills, fever or sleep disturbance  Psychological ROS: negative  Ophthalmic ROS: positive for - decreased vision right eye (result of R carotid aneurysm repair age 13)  ENT ROS: negative  Allergy and Immunology ROS: negative  Hematological and Lymphatic ROS: negative  Endocrine ROS: negative  Breast ROS: negative  Respiratory ROS: no cough, shortness of breath, or wheezing  Cardiovascular ROS: positive for - chest pain, dyspnea on exertion and diaphoresis  negative for - edema, irregular heartbeat, loss of consciousness, orthopnea, palpitations, paroxysmal nocturnal dyspnea or rapid heart rate  Gastrointestinal ROS: no abdominal pain, change in bowel habits, or black or bloody stools  Genito-Urinary ROS: no dysuria, trouble voiding, or hematuria  Musculoskeletal ROS: negative  Neurological ROS: positive for - dizziness and lightheadedness  Dermatological ROS: negative    Vital Signs:     Vitals:    08/03/23 1506 08/03/23 1508   BP: 129/73 113/62   BP Location: Left arm Right arm   Patient Position: Sitting Sitting   Cuff Size: Standard Standard   Pulse: 94    Temp: 98.5 °F (36.9 °C)    TempSrc: Tympanic    SpO2: 98%    Weight: 104 kg (230 lb)    Height: 5' 11" (1.803 m)        Physical Exam:    General: Alert, oriented, obese, no acute distress  HEENT/NECK:  PERRLA. No jugular venous distention. Cardiac:Regular rate and rhythm, no murmurs rubs or gallops.   Carotid arteries: 2+ pulses, no bruits  Pulmonary:  Breath sounds clear bilaterally. Abdomen:  Non-tender, Non-distended. Positive bowel sounds. Upper extremities: 2+ radial pulses; brisk capillary refill; right hand dominance  Lower extremities: Extremities warm/dry. PT/DP pulses 2+ bilaterally. No edema B/L; no varicostities  Neuro: Alert and oriented X 3. Sensation is grossly intact. No focal deficits. Musculoskeletal: MAEE, stable gait  Skin: Warm/Dry, without rashes or lesions. Lab Results:   Lab Results   Component Value Date    WBC 11.04 (H) 07/24/2023    HGB 16.0 07/24/2023    HCT 48.9 07/24/2023    MCV 89 07/24/2023     07/24/2023     Lab Results   Component Value Date    SODIUM 133 (L) 07/24/2023    K 4.4 07/24/2023     07/24/2023    CO2 24 07/24/2023    AGAP 9 07/24/2023    BUN 28 (H) 07/24/2023    CREATININE 1.21 07/24/2023    GLUC 171 (H) 07/24/2023    CALCIUM 9.1 07/24/2023    AST 15 07/24/2023    ALT 17 07/24/2023    ALKPHOS 53 07/24/2023    TP 7.5 07/24/2023    TBILI 0.74 07/24/2023    EGFR 63 07/24/2023 6/8/23  8:50 AM     Cholesterol <200 mg/dL 193    Triglyceride <150 mg/dL 155 High     Cholesterol, HDL, Direct 23 - 92 mg/dL 42    Cholesterol, Non-HDL <160 mg/dL 151    Cholesterol, LDL, Calculated <130 mg/dL 120    Comment: LDL Cholesterol was calculated using the Friedewald equation. Direct measurement of LDL is not indicated for this patient based on [facility]'s analytical algorithm for measurement of LDL Cholesterol. CHOL/HDL Ratio  4.6        Lab Results   Component Value Date    HGBA1C 7.4 (H) 06/08/2023         Imaging Studies:     Cardiac Catheterization:  8/2/23    Left Anterior Descending   Prox LAD lesion is 70% stenosed. Mid LAD lesion is 80% stenosed. Dist LAD lesion is 90% stenosed. First Diagonal Branch   1st Diag-1 lesion is 90% stenosed. 1st Diag-2 lesion is 80% stenosed. Ramus Intermedius   Ramus lesion is 80% stenosed.       Left Circumflex   Mid Cx to Dist Cx lesion is 90% stenosed. Right Coronary Artery      Right Posterior Descending Artery   Collaterals   RPDA filled by collaterals from Dist LAD. EC23    Sinus rhythm with Premature atrial complexes with Aberrant conduction    Echocardiogram: 23    •  Left Ventricle: Left ventricular cavity size is normal. Wall thickness is upper normal. The left ventricular ejection fraction is 60%. Systolic function is normal. Wall motion is normal. Diastolic function is mildly abnormal, consistent with grade I (abnormal) relaxation. •  Mitral Valve: There is mild annular calcification. There is trace regurgitation. •  Aorta: The aortic root is normal in size. The ascending aorta is mildly dilated. The ascending aorta is 3.9 cm.     Findings    Left Ventricle Left ventricular cavity size is normal. Wall thickness is upper normal. The left ventricular ejection fraction is 60%. Systolic function is normal.  Wall motion is normal. Diastolic function is mildly abnormal, consistent with grade I (abnormal) relaxation. Right Ventricle Right ventricular cavity size is normal. Systolic function is normal. Wall thickness is normal.      Left Atrium The atrium is normal in size. Right Atrium The atrium is normal in size. Aortic Valve The aortic valve is trileaflet. The leaflets are not thickened. The leaflets are not calcified. The leaflets exhibit normal mobility. There is no evidence of regurgitation. The aortic valve has no significant stenosis. Mitral Valve The leaflets are not thickened. There is mild calcification of the anterior leaflet. The leaflets exhibit normal mobility. There is mild annular calcification. There is trace regurgitation. There is no evidence of stenosis. Tricuspid Valve Tricuspid valve structure is normal. There is trace regurgitation. There is no evidence of stenosis. Pulmonic Valve Pulmonic valve structure is normal. There is trace regurgitation. There is no evidence of stenosis. Ascending Aorta The aortic root is normal in size. The ascending aorta is mildly dilated. The ascending aorta is 3.9 cm. IVC/SVC The inferior vena cava is normal in size. Pericardium There is no pericardial effusion. The pericardium is normal in appearance. Left Ventricle Measurements    Function/Volumes   A4C EF 59 %         Dimensions   LVIDd 3.9 cm         LVIDS 2.7 cm         IVSd 1 cm         LVPWd 1.1 cm         FS 31          Diastolic Filling   MV E' Tissue Velocity Septal 8 cm/s         E wave deceleration time 118 ms         MV Peak E Senthil 57 cm/s         MV Peak A Senthil 0.98 m/s               Right Ventricle Measurements    Dimensions   RVID d 4.2 cm         Tricuspid annular plane systolic excursion 2.1 cm               Left Atrium Measurements    Dimensions   LA size 4.3 cm         LA length (A2C) 4.6 cm         Volumes   LA volume (BP) 47 mL               Right Atrium Measurements    Dimensions   RAA A4C 15.9 cm2               Mitral Valve Measurements    Stenosis   MV stenosis pressure 1/2 time 34 ms         MV valve area p 1/2 method 6.47                Aorta Measurements    Aortic Dimensions   Ao root 3.6 cm         Asc Ao 3.9 cm                 I have personally reviewed pertinent films in PACS     PCP and Cardiology notes reviewed      Assessment:  Patient Active Problem List    Diagnosis Date Noted   • Coronary artery disease involving native coronary artery 08/02/2023   • Sleep apnea    • HTN (hypertension), benign 03/29/2018   • Type 2 diabetes mellitus ([address]) 03/29/2018   • Hyperlipidemia LDL goal <100 03/29/2018         Impression/Plan:    Multivessel Coronary Artery Disease    Risks and benefits of coronary artery bypass grafting were discussed in detail today with the patient. They understand and wish to proceed with further workup and ultimately surgical intervention.   We have ordered routine preoperative non-contrast chest CT scan, carotid duple, vein mapping and laboratory  studies. Pending the results of these tests, they will be scheduled for surgery on 8/11/23 with Dr. Shin Batres M.D. Pre op instructions reviewed with patient. He was instructed to stop Glipizide, Jardiance, Lisinopril, Meloxicam and Metformin 3 days before surgery. Leo Potter was comfortable with our recommendations, and their questions were answered to their satisfaction. The patient was referred to gastroenterology for consideration of routine colonoscopy screening of all patients aged 52-65. Gastroenterology evaluation will not be necessary prior to any open heart procedures, and can be completed following surgical recovery.     SIGNATURE: MARLENY Samaniego  DATE: August 3, 2023  TIME: 4:05 PM

## 2023-08-03 NOTE — H&P
Pre-Op History & Physical - Cardiothoracic Surgery   Blayne Ramirez 61 y.o. male MRN: [id number]    Physician Requesting Consult: Jak Sher MD  Cardiologist: Dr. Sabiha Yao    Reason for Consult / Principal Problem: Coronary artery disease    History of Present Illness: Blayne Ramirez is a 61y.o. year old male referred for surgical consultation for coronary artery disease. Patient PMHx is notable for HTN, HLD, DM2, obesity (BMI 32.08), DUDLEY (no CPAP), depression and h/o R carotid aneurysm s/p repair (age 13). Upon interview, patient reports feeling "run down" for months. He noted during the winter months, while walking the dog in cold temperatures,  he was experiencing chest tightness. He states over the past few months his symptoms have become progressively worse. He feels more fatigued and is napping more. With activities,  such as mowing the lawn or carrying groceries,  he experiences chest tightness, SOB, diaphoresis and dizziness. His NM stress test on 7/31/23 was abnormal and subsequent cardiac cath performed yesterday demonstrates severe multivessel CAD. Patient denies tobacco, alcohol or drug use. FH is notable for premature CAD- father suffered an MI at age 46. Past Medical History:  Past Medical History:   Diagnosis Date   • Coronary artery disease    • Diabetes mellitus ([address])    • Hyperlipidemia    • Hypertension          Past Surgical History:   Past Surgical History:   Procedure Laterality Date   • ARTERIAL ANEURYSM REPAIR      Right Carotid Aneurysm - age 13. • CARDIAC CATHETERIZATION Left 08/02/2023    Procedure: Cardiac Left Heart Cath;  Surgeon: Shaye Cadena DO;  Location: [facility]; Service: Cardiology   • CARDIAC CATHETERIZATION N/A 08/02/2023    Procedure: Cardiac Coronary Angiogram;  Surgeon: Shaye Cadena DO;  Location: [facility];   Service: Cardiology   • CARDIAC CATHETERIZATION  08/02/2023    Procedure: Cardiac catheterization;  Surgeon: Ariana James DO;  Location: [facility]; Service: Cardiology   • HERNIA REPAIR     • VASECTOMY           Family History:  Family History   Problem Relation Age of Onset   • Leukemia Mother    • Heart attack Father 46         Social History:    Social History     Substance and Sexual Activity   Alcohol Use No     Social History     Substance and Sexual Activity   Drug Use No     Social History     Tobacco Use   Smoking Status Never   Smokeless Tobacco Never       Home Medications:   Prior to Admission medications    Medication Sig Start Date End Date Taking?  Authorizing Provider   aspirin 81 mg chewable tablet Chew 81 mg 6/27/14  Yes Historical Provider, MD   Continuous Blood Gluc Sensor (FreeStyle Ayanna 2 Sensor) MISC APPLY AND CHANGE EVERY 14 DAYS 7/26/23  Yes Historical Provider, MD   gabapentin (NEURONTIN) 100 mg capsule TAKE ONE CAPSULE DAILY IN THE MORNING, 3 CAPSULES IN THE EVENING 3/9/18  Yes Historical Provider, MD   glipiZIDE (GLUCOTROL XL) 5 mg 24 hr tablet Take 10 mg by mouth   Yes Historical Provider, MD   Jardiance 25 MG TABS  7/26/23  Yes Historical Provider, MD   lisinopril (ZESTRIL) 5 mg tablet Take 1 tablet (5 mg total) by mouth daily Do not start before August 3, 2023. 8/3/23  Yes MARLENY Arriaga   LORazepam (ATIVAN) 0.5 mg tablet Take 0.5 mg by mouth daily at bedtime 3/26/18  Yes Historical Provider, MD   meloxicam (MOBIC) 15 mg tablet Take 15 mg by mouth daily 3/2/18  Yes Historical Provider, MD   metFORMIN (GLUCOPHAGE-XR) 500 mg 24 hr tablet Take 2 tablets (1,000 mg total) by mouth 2 (two) times a day Do not start before August 3, 2023. 8/3/23  Yes MARLENY Arriaga   metoprolol tartrate (LOPRESSOR) 25 mg tablet Take 1 tablet (25 mg total) by mouth every 12 (twelve) hours 8/2/23  Yes MARLENY Oliver   Ozempic, 2 MG/DOSE, 8 MG/3ML injection pen INJECT 2MG UNDER THE SKIN ONCE A WEEK 6/12/23  Yes Historical Provider, MD   rosuvastatin (CRESTOR) 20 MG tablet Take 1 tablet (20 mg total) by mouth daily 8/2/23  Yes MARLENY Good   sertraline (Zoloft) 50 mg tablet Take 1 tablet (50 mg total) by mouth daily 7/27/23  Yes Reese Horton DO       Allergies: Allergies   Allergen Reactions   • Atorvastatin Other (See Comments)     felt flu-like   • Rosiglitazone Other (See Comments)   • Trazodone Other (See Comments)     Felt "hung over"       Review of Systems:  Review of Systems - History obtained from chart review and the patient  General ROS: positive for  - fatigue and change in activity tolerance  negative for - chills, fever or sleep disturbance  Psychological ROS: negative  Ophthalmic ROS: positive for - decreased vision right eye (result of R carotid aneurysm repair age 13)  ENT ROS: negative  Allergy and Immunology ROS: negative  Hematological and Lymphatic ROS: negative  Endocrine ROS: negative  Breast ROS: negative  Respiratory ROS: no cough, shortness of breath, or wheezing  Cardiovascular ROS: positive for - chest pain, dyspnea on exertion and diaphoresis  negative for - edema, irregular heartbeat, loss of consciousness, orthopnea, palpitations, paroxysmal nocturnal dyspnea or rapid heart rate  Gastrointestinal ROS: no abdominal pain, change in bowel habits, or black or bloody stools  Genito-Urinary ROS: no dysuria, trouble voiding, or hematuria  Musculoskeletal ROS: negative  Neurological ROS: positive for - dizziness and lightheadedness  Dermatological ROS: negative    Vital Signs:     Vitals:    08/03/23 1506 08/03/23 1508   BP: 129/73 113/62   BP Location: Left arm Right arm   Patient Position: Sitting Sitting   Cuff Size: Standard Standard   Pulse: 94    Temp: 98.5 °F (36.9 °C)    TempSrc: Tympanic    SpO2: 98%    Weight: 104 kg (230 lb)    Height: 5' 11" (1.803 m)        Physical Exam:    General: Alert, oriented, obese, no acute distress  HEENT/NECK:  PERRLA. No jugular venous distention.     Cardiac:Regular rate and rhythm, no murmurs rubs or gallops. Carotid arteries: 2+ pulses, no bruits  Pulmonary:  Breath sounds clear bilaterally. Abdomen:  Non-tender, Non-distended. Positive bowel sounds. Upper extremities: 2+ radial pulses; brisk capillary refill; right hand dominance  Lower extremities: Extremities warm/dry. PT/DP pulses 2+ bilaterally. No edema B/L; no varicostities  Neuro: Alert and oriented X 3. Sensation is grossly intact. No focal deficits. Musculoskeletal: MAEE, stable gait  Skin: Warm/Dry, without rashes or lesions. Lab Results:   Lab Results   Component Value Date    WBC 11.04 (H) 07/24/2023    HGB 16.0 07/24/2023    HCT 48.9 07/24/2023    MCV 89 07/24/2023     07/24/2023     Lab Results   Component Value Date    SODIUM 133 (L) 07/24/2023    K 4.4 07/24/2023     07/24/2023    CO2 24 07/24/2023    AGAP 9 07/24/2023    BUN 28 (H) 07/24/2023    CREATININE 1.21 07/24/2023    GLUC 171 (H) 07/24/2023    CALCIUM 9.1 07/24/2023    AST 15 07/24/2023    ALT 17 07/24/2023    ALKPHOS 53 07/24/2023    TP 7.5 07/24/2023    TBILI 0.74 07/24/2023    EGFR 63 07/24/2023 6/8/23  8:50 AM     Cholesterol <200 mg/dL 193    Triglyceride <150 mg/dL 155 High     Cholesterol, HDL, Direct 23 - 92 mg/dL 42    Cholesterol, Non-HDL <160 mg/dL 151    Cholesterol, LDL, Calculated <130 mg/dL 120    Comment: LDL Cholesterol was calculated using the Friedewald equation. Direct measurement of LDL is not indicated for this patient based on [facility]'s analytical algorithm for measurement of LDL Cholesterol. CHOL/HDL Ratio  4.6        Lab Results   Component Value Date    HGBA1C 7.4 (H) 06/08/2023         Imaging Studies:     Cardiac Catheterization:  8/2/23    Left Anterior Descending   Prox LAD lesion is 70% stenosed. Mid LAD lesion is 80% stenosed. Dist LAD lesion is 90% stenosed. First Diagonal Branch   1st Diag-1 lesion is 90% stenosed. 1st Diag-2 lesion is 80% stenosed. Ramus Intermedius   Ramus lesion is 80% stenosed.       Left Circumflex   Mid Cx to Dist Cx lesion is 90% stenosed. Right Coronary Artery      Right Posterior Descending Artery   Collaterals   RPDA filled by collaterals from Dist LAD. EC23    Sinus rhythm with Premature atrial complexes with Aberrant conduction    Echocardiogram: 23    •  Left Ventricle: Left ventricular cavity size is normal. Wall thickness is upper normal. The left ventricular ejection fraction is 60%. Systolic function is normal. Wall motion is normal. Diastolic function is mildly abnormal, consistent with grade I (abnormal) relaxation. •  Mitral Valve: There is mild annular calcification. There is trace regurgitation. •  Aorta: The aortic root is normal in size. The ascending aorta is mildly dilated. The ascending aorta is 3.9 cm.     Findings    Left Ventricle Left ventricular cavity size is normal. Wall thickness is upper normal. The left ventricular ejection fraction is 60%. Systolic function is normal.  Wall motion is normal. Diastolic function is mildly abnormal, consistent with grade I (abnormal) relaxation. Right Ventricle Right ventricular cavity size is normal. Systolic function is normal. Wall thickness is normal.      Left Atrium The atrium is normal in size. Right Atrium The atrium is normal in size. Aortic Valve The aortic valve is trileaflet. The leaflets are not thickened. The leaflets are not calcified. The leaflets exhibit normal mobility. There is no evidence of regurgitation. The aortic valve has no significant stenosis. Mitral Valve The leaflets are not thickened. There is mild calcification of the anterior leaflet. The leaflets exhibit normal mobility. There is mild annular calcification. There is trace regurgitation. There is no evidence of stenosis. Tricuspid Valve Tricuspid valve structure is normal. There is trace regurgitation. There is no evidence of stenosis.       Pulmonic Valve Pulmonic valve structure is normal. There is trace regurgitation. There is no evidence of stenosis. Ascending Aorta The aortic root is normal in size. The ascending aorta is mildly dilated. The ascending aorta is 3.9 cm. IVC/SVC The inferior vena cava is normal in size. Pericardium There is no pericardial effusion. The pericardium is normal in appearance. Left Ventricle Measurements    Function/Volumes   A4C EF 59 %         Dimensions   LVIDd 3.9 cm         LVIDS 2.7 cm         IVSd 1 cm         LVPWd 1.1 cm         FS 31          Diastolic Filling   MV E' Tissue Velocity Septal 8 cm/s         E wave deceleration time 118 ms         MV Peak E Senthil 57 cm/s         MV Peak A Senthil 0.98 m/s               Right Ventricle Measurements    Dimensions   RVID d 4.2 cm         Tricuspid annular plane systolic excursion 2.1 cm               Left Atrium Measurements    Dimensions   LA size 4.3 cm         LA length (A2C) 4.6 cm         Volumes   LA volume (BP) 47 mL               Right Atrium Measurements    Dimensions   RAA A4C 15.9 cm2               Mitral Valve Measurements    Stenosis   MV stenosis pressure 1/2 time 34 ms         MV valve area p 1/2 method 6.47                Aorta Measurements    Aortic Dimensions   Ao root 3.6 cm         Asc Ao 3.9 cm                 I have personally reviewed pertinent films in PACS     PCP and Cardiology notes reviewed      Assessment:  Patient Active Problem List    Diagnosis Date Noted   • Coronary artery disease involving native coronary artery 08/02/2023   • Sleep apnea    • HTN (hypertension), benign 03/29/2018   • Type 2 diabetes mellitus ([address]) 03/29/2018   • Hyperlipidemia LDL goal <100 03/29/2018         Impression/Plan:    Multivessel Coronary Artery Disease    Risks and benefits of coronary artery bypass grafting were discussed in detail today with the patient. They understand and wish to proceed with further workup and ultimately surgical intervention.   We have ordered routine preoperative non-contrast chest CT scan, carotid duple, vein mapping and laboratory  studies. Pending the results of these tests, they will be scheduled for surgery on 8/11/23 with Dr. Alie Sethi M.D. Pre op instructions reviewed with patient. He was instructed to stop Glipizide, Jardiance, Lisinopril, Meloxicam and Metformin 3 days before surgery. Meenakshi Mead was comfortable with our recommendations, and their questions were answered to their satisfaction. The patient was referred to gastroenterology for consideration of routine colonoscopy screening of all patients aged 52-65. Gastroenterology evaluation will not be necessary prior to any open heart procedures, and can be completed following surgical recovery.     SIGNATURE: MARLENY Garcia  DATE: August 3, 2023  TIME: 4:05 PM

## 2023-08-04 LAB — MRSA NOSE QL CULT: NORMAL

## 2023-08-08 ENCOUNTER — HOSPITAL ENCOUNTER (OUTPATIENT)
Dept: NON INVASIVE DIAGNOSTICS | Facility: HOSPITAL | Age: 63
Discharge: HOME | End: 2023-08-08
Payer: COMMERCIAL

## 2023-08-08 DIAGNOSIS — Z01.89 ENCOUNTER FOR IMAGING OF BILATERAL GREATER SAPHENOUS VEINS: ICD-10-CM

## 2023-08-08 DIAGNOSIS — I25.118 CORONARY ARTERY DISEASE OF NATIVE ARTERY OF NATIVE HEART WITH STABLE ANGINA PECTORIS (HCC): ICD-10-CM

## 2023-08-08 DIAGNOSIS — R06.02 SOB (SHORTNESS OF BREATH): ICD-10-CM

## 2023-08-08 DIAGNOSIS — Z13.6 ENCOUNTER FOR SCREENING FOR STENOSIS OF CAROTID ARTERY: ICD-10-CM

## 2023-08-08 DIAGNOSIS — I20.8 STABLE ANGINA PECTORIS (HCC): ICD-10-CM

## 2023-08-08 PROCEDURE — 93971 EXTREMITY STUDY: CPT | Performed by: SURGERY

## 2023-08-08 PROCEDURE — 93880 EXTRACRANIAL BILAT STUDY: CPT

## 2023-08-08 PROCEDURE — 93971 EXTREMITY STUDY: CPT

## 2023-08-09 ENCOUNTER — TELEPHONE (OUTPATIENT)
Dept: CARDIAC SURGERY | Facility: CLINIC | Age: 63
End: 2023-08-09

## 2023-08-09 ENCOUNTER — HOSPITAL ENCOUNTER (OUTPATIENT)
Dept: RADIOLOGY | Facility: HOSPITAL | Age: 63
Discharge: HOME | End: 2023-08-09
Payer: COMMERCIAL

## 2023-08-09 DIAGNOSIS — I25.118 CORONARY ARTERY DISEASE OF NATIVE ARTERY OF NATIVE HEART WITH STABLE ANGINA PECTORIS (HCC): ICD-10-CM

## 2023-08-09 PROCEDURE — 71250 CT THORAX DX C-: CPT

## 2023-08-09 PROCEDURE — G1004 CDSM NDSC: HCPCS

## 2023-08-09 NOTE — TELEPHONE ENCOUNTER
Called patient with vein mapping and carotid duplex results. CT scan performed today- report pending.

## 2023-08-10 ENCOUNTER — ANESTHESIA EVENT (INPATIENT)
Dept: PERIOP | Facility: HOSPITAL | Age: 63
DRG: 236 | End: 2023-08-10
Payer: COMMERCIAL

## 2023-08-10 ENCOUNTER — TELEPHONE (OUTPATIENT)
Dept: CARDIAC SURGERY | Facility: CLINIC | Age: 63
End: 2023-08-10

## 2023-08-10 NOTE — ANESTHESIA PREPROCEDURE EVALUATION
Procedure:  CORONARY ARTERY BYPASS GRAFT (CABG) X-4 VESSELS EVH  W/ JOHN (Chest)    Relevant Problems   CARDIO   (+) Coronary artery disease involving native coronary artery   (+) HTN (hypertension), benign   (+) Hyperlipidemia LDL goal <100      ENDO   (+) Type 2 diabetes mellitus (HCC)      PULMONARY   (+) Sleep apnea      LAD proximal 70%, mid 80%, distal 90%. D1 90%, D2 80% small vessel, ramus 80%, circumflex 90% with very small OM1, RCA is occluded with a faint collateral flow to the PDA.    7/27/23: Left Ventricle: Left ventricular cavity size is normal. Wall thickness is upper normal. The left ventricular ejection fraction is 60%. Systolic function is normal. Wall motion is normal. Diastolic function is mildly abnormal, consistent with grade I (abnormal) relaxation. •  Mitral Valve: There is mild annular calcification. There is trace regurgitation. •  Aorta: The aortic root is normal in size. The ascending aorta is mildly dilated. The ascending aorta is 3.9 cm. Physical Exam    Airway  Comment: guerrero  Mallampati score: IV  TM Distance: >3 FB  Neck ROM: full     Dental   Comment: As per patient, no loose teeth  Lower partial place removable, lower dentures,     Cardiovascular      Pulmonary      Other Findings        Anesthesia Plan  ASA Score- 4     Anesthesia Type- general with ASA Monitors. Additional Monitors: arterial line, central venous line and pulmonary artery catheter. Airway Plan: ETT. Comment: GA with ETT, IV, [place], TLC/PAC, JOHN, blood products, postop mechanical ventilation. Plan Factors-    Chart reviewed. EKG reviewed. Existing labs reviewed. Patient summary reviewed. Patient is not a current smoker. Patient did not smoke on day of surgery. Induction- intravenous. Postoperative Plan-     Informed Consent- Anesthetic plan and risks discussed with patient. I personally reviewed this patient with the CRNA.  Discussed and agreed on the Anesthesia Plan with Hugh Martin

## 2023-08-11 ENCOUNTER — HOSPITAL ENCOUNTER (INPATIENT)
Facility: HOSPITAL | Age: 63
LOS: 4 days | Discharge: HOME HEALTH | DRG: 236 | End: 2023-08-15
Attending: THORACIC SURGERY (CARDIOTHORACIC VASCULAR SURGERY) | Admitting: THORACIC SURGERY (CARDIOTHORACIC VASCULAR SURGERY)
Payer: COMMERCIAL

## 2023-08-11 ENCOUNTER — APPOINTMENT (OUTPATIENT)
Dept: RADIOLOGY | Facility: HOSPITAL | Age: 63
DRG: 236 | End: 2023-08-11
Payer: COMMERCIAL

## 2023-08-11 ENCOUNTER — APPOINTMENT (OUTPATIENT)
Dept: NON INVASIVE DIAGNOSTICS | Facility: HOSPITAL | Age: 63
DRG: 236 | End: 2023-08-11
Payer: COMMERCIAL

## 2023-08-11 ENCOUNTER — ANESTHESIA (INPATIENT)
Dept: PERIOP | Facility: HOSPITAL | Age: 63
DRG: 236 | End: 2023-08-11
Payer: COMMERCIAL

## 2023-08-11 DIAGNOSIS — I25.10 CAD IN NATIVE ARTERY: ICD-10-CM

## 2023-08-11 DIAGNOSIS — Z95.1 S/P CABG X 5: ICD-10-CM

## 2023-08-11 DIAGNOSIS — Z95.1 S/P CABG X 4: Primary | ICD-10-CM

## 2023-08-11 DIAGNOSIS — E11.9 TYPE 2 DIABETES MELLITUS (HCC): ICD-10-CM

## 2023-08-11 DIAGNOSIS — I25.10 CORONARY ARTERY DISEASE INVOLVING NATIVE CORONARY ARTERY: ICD-10-CM

## 2023-08-11 PROBLEM — E11.51 PERIPHERAL ANGIOPATHY DUE TO TYPE 2 DIABETES MELLITUS (HCC): Status: ACTIVE | Noted: 2023-01-16

## 2023-08-11 LAB
ABO GROUP BLD: NORMAL
ANION GAP SERPL CALCULATED.3IONS-SCNC: 8 MMOL/L
BASE EXCESS BLDA CALC-SCNC: -3 MMOL/L (ref -2–3)
BASE EXCESS BLDA CALC-SCNC: -4 MMOL/L (ref -2–3)
BASE EXCESS BLDA CALC-SCNC: -5 MMOL/L (ref -2–3)
BASE EXCESS BLDA CALC-SCNC: 0 MMOL/L (ref -2–3)
BASE EXCESS BLDA CALC-SCNC: 1 MMOL/L (ref -2–3)
BUN SERPL-MCNC: 12 MG/DL (ref 5–25)
CA-I BLD-SCNC: 1.04 MMOL/L (ref 1.12–1.32)
CA-I BLD-SCNC: 1.05 MMOL/L (ref 1.12–1.32)
CA-I BLD-SCNC: 1.1 MMOL/L (ref 1.12–1.32)
CA-I BLD-SCNC: 1.11 MMOL/L (ref 1.12–1.32)
CA-I BLD-SCNC: 1.13 MMOL/L (ref 1.12–1.32)
CA-I BLD-SCNC: 1.15 MMOL/L (ref 1.12–1.32)
CA-I BLD-SCNC: 1.16 MMOL/L (ref 1.12–1.32)
CA-I BLD-SCNC: 1.22 MMOL/L (ref 1.12–1.32)
CALCIUM SERPL-MCNC: 7.3 MG/DL (ref 8.3–10.1)
CHLORIDE SERPL-SCNC: 113 MMOL/L (ref 96–108)
CO2 SERPL-SCNC: 22 MMOL/L (ref 21–32)
CREAT SERPL-MCNC: 0.97 MG/DL (ref 0.6–1.3)
GFR SERPL CREATININE-BSD FRML MDRD: 82 ML/MIN/1.73SQ M
GLUCOSE SERPL-MCNC: 121 MG/DL (ref 65–140)
GLUCOSE SERPL-MCNC: 127 MG/DL (ref 65–140)
GLUCOSE SERPL-MCNC: 131 MG/DL (ref 65–140)
GLUCOSE SERPL-MCNC: 139 MG/DL (ref 65–140)
GLUCOSE SERPL-MCNC: 144 MG/DL (ref 65–140)
GLUCOSE SERPL-MCNC: 146 MG/DL (ref 65–140)
GLUCOSE SERPL-MCNC: 149 MG/DL (ref 65–140)
GLUCOSE SERPL-MCNC: 149 MG/DL (ref 65–140)
GLUCOSE SERPL-MCNC: 152 MG/DL (ref 65–140)
GLUCOSE SERPL-MCNC: 180 MG/DL (ref 65–140)
GLUCOSE SERPL-MCNC: 182 MG/DL (ref 65–140)
GLUCOSE SERPL-MCNC: 184 MG/DL (ref 65–140)
GLUCOSE SERPL-MCNC: 191 MG/DL (ref 65–140)
GLUCOSE SERPL-MCNC: 194 MG/DL (ref 65–140)
HCO3 BLDA-SCNC: 21 MMOL/L (ref 22–28)
HCO3 BLDA-SCNC: 21.2 MMOL/L (ref 22–28)
HCO3 BLDA-SCNC: 24.9 MMOL/L (ref 22–28)
HCO3 BLDA-SCNC: 24.9 MMOL/L (ref 22–28)
HCO3 BLDA-SCNC: 25.6 MMOL/L (ref 22–28)
HCO3 BLDA-SCNC: 25.7 MMOL/L (ref 24–30)
HCO3 BLDA-SCNC: 26.2 MMOL/L (ref 22–28)
HCO3 BLDA-SCNC: 26.9 MMOL/L (ref 22–28)
HCT VFR BLD AUTO: 36.4 % (ref 36.5–49.3)
HCT VFR BLD AUTO: 36.8 % (ref 36.5–49.3)
HCT VFR BLD CALC: 26 % (ref 36.5–49.3)
HCT VFR BLD CALC: 33 % (ref 36.5–49.3)
HCT VFR BLD CALC: 35 % (ref 36.5–49.3)
HCT VFR BLD CALC: 35 % (ref 36.5–49.3)
HCT VFR BLD CALC: 41 % (ref 36.5–49.3)
HCT VFR BLD CALC: 45 % (ref 36.5–49.3)
HGB BLD-MCNC: 12.4 G/DL (ref 12–17)
HGB BLD-MCNC: 12.4 G/DL (ref 12–17)
HGB BLDA-MCNC: 11.2 G/DL (ref 12–17)
HGB BLDA-MCNC: 11.9 G/DL (ref 12–17)
HGB BLDA-MCNC: 11.9 G/DL (ref 12–17)
HGB BLDA-MCNC: 13.9 G/DL (ref 12–17)
HGB BLDA-MCNC: 15.3 G/DL (ref 12–17)
HGB BLDA-MCNC: 8.8 G/DL (ref 12–17)
KCT BLD-ACNC: 132 SEC (ref 89–137)
KCT BLD-ACNC: 139 SEC (ref 89–137)
KCT BLD-ACNC: 496 SEC (ref 89–137)
KCT BLD-ACNC: 516 SEC (ref 89–137)
KCT BLD-ACNC: 613 SEC (ref 89–137)
KCT BLD-ACNC: 650 SEC (ref 89–137)
KCT BLD-ACNC: 819 SEC (ref 89–137)
PCO2 BLD: 22 MMOL/L (ref 21–32)
PCO2 BLD: 23 MMOL/L (ref 21–32)
PCO2 BLD: 26 MMOL/L (ref 21–32)
PCO2 BLD: 27 MMOL/L (ref 21–32)
PCO2 BLD: 28 MMOL/L (ref 21–32)
PCO2 BLD: 28 MMOL/L (ref 21–32)
PCO2 BLD: 37.1 MM HG (ref 36–44)
PCO2 BLD: 40.9 MM HG (ref 36–44)
PCO2 BLD: 45.5 MM HG (ref 36–44)
PCO2 BLD: 46.1 MM HG (ref 36–44)
PCO2 BLD: 46.9 MM HG (ref 36–44)
PCO2 BLD: 47.2 MM HG (ref 42–50)
PCO2 BLD: 49 MM HG (ref 36–44)
PCO2 BLD: 53.7 MM HG (ref 36–44)
PH BLD: 7.28 [PH] (ref 7.35–7.45)
PH BLD: 7.28 [PH] (ref 7.35–7.45)
PH BLD: 7.34 [PH] (ref 7.35–7.45)
PH BLD: 7.34 [PH] (ref 7.3–7.4)
PH BLD: 7.35 [PH] (ref 7.35–7.45)
PH BLD: 7.36 [PH] (ref 7.35–7.45)
PH BLD: 7.37 [PH] (ref 7.35–7.45)
PH BLD: 7.39 [PH] (ref 7.35–7.45)
PLATELET # BLD AUTO: 200 THOUSANDS/UL (ref 149–390)
PLATELET # BLD AUTO: 216 THOUSANDS/UL (ref 149–390)
PMV BLD AUTO: 10.3 FL (ref 8.9–12.7)
PMV BLD AUTO: 9.9 FL (ref 8.9–12.7)
PO2 BLD: 112 MM HG (ref 75–129)
PO2 BLD: 120 MM HG (ref 75–129)
PO2 BLD: 234 MM HG (ref 75–129)
PO2 BLD: 241 MM HG (ref 75–129)
PO2 BLD: 285 MM HG (ref 75–129)
PO2 BLD: 327 MM HG (ref 75–129)
PO2 BLD: 42 MM HG (ref 35–45)
PO2 BLD: >400 MM HG (ref 75–129)
POTASSIUM BLD-SCNC: 3.3 MMOL/L (ref 3.5–5.3)
POTASSIUM BLD-SCNC: 3.5 MMOL/L (ref 3.5–5.3)
POTASSIUM BLD-SCNC: 4.4 MMOL/L (ref 3.5–5.3)
POTASSIUM BLD-SCNC: 4.7 MMOL/L (ref 3.5–5.3)
POTASSIUM BLD-SCNC: 4.9 MMOL/L (ref 3.5–5.3)
POTASSIUM BLD-SCNC: 5.2 MMOL/L (ref 3.5–5.3)
POTASSIUM BLD-SCNC: 5.3 MMOL/L (ref 3.5–5.3)
POTASSIUM BLD-SCNC: 5.8 MMOL/L (ref 3.5–5.3)
POTASSIUM SERPL-SCNC: 3.5 MMOL/L (ref 3.5–5.3)
POTASSIUM SERPL-SCNC: 4.8 MMOL/L (ref 3.5–5.3)
POTASSIUM SERPL-SCNC: 5 MMOL/L (ref 3.5–5.3)
RH BLD: POSITIVE
SAO2 % BLD FROM PO2: 100 % (ref 60–85)
SAO2 % BLD FROM PO2: 73 % (ref 60–85)
SAO2 % BLD FROM PO2: 98 % (ref 60–85)
SAO2 % BLD FROM PO2: 99 % (ref 60–85)
SODIUM BLD-SCNC: 135 MMOL/L (ref 136–145)
SODIUM BLD-SCNC: 137 MMOL/L (ref 136–145)
SODIUM BLD-SCNC: 138 MMOL/L (ref 136–145)
SODIUM BLD-SCNC: 138 MMOL/L (ref 136–145)
SODIUM BLD-SCNC: 139 MMOL/L (ref 136–145)
SODIUM BLD-SCNC: 139 MMOL/L (ref 136–145)
SODIUM BLD-SCNC: 141 MMOL/L (ref 136–145)
SODIUM BLD-SCNC: 142 MMOL/L (ref 136–145)
SODIUM SERPL-SCNC: 143 MMOL/L (ref 135–147)
SPECIMEN SOURCE: ABNORMAL
SPECIMEN SOURCE: NORMAL

## 2023-08-11 PROCEDURE — 86920 COMPATIBILITY TEST SPIN: CPT

## 2023-08-11 PROCEDURE — 71045 X-RAY EXAM CHEST 1 VIEW: CPT

## 2023-08-11 PROCEDURE — 33521 CABG ARTERY-VEIN FOUR: CPT | Performed by: THORACIC SURGERY (CARDIOTHORACIC VASCULAR SURGERY)

## 2023-08-11 PROCEDURE — 33533 CABG ARTERIAL SINGLE: CPT | Performed by: THORACIC SURGERY (CARDIOTHORACIC VASCULAR SURGERY)

## 2023-08-11 PROCEDURE — 82948 REAGENT STRIP/BLOOD GLUCOSE: CPT

## 2023-08-11 PROCEDURE — 93005 ELECTROCARDIOGRAM TRACING: CPT

## 2023-08-11 PROCEDURE — 82330 ASSAY OF CALCIUM: CPT

## 2023-08-11 PROCEDURE — 02100Z9 BYPASS CORONARY ARTERY, ONE ARTERY FROM LEFT INTERNAL MAMMARY, OPEN APPROACH: ICD-10-PCS | Performed by: THORACIC SURGERY (CARDIOTHORACIC VASCULAR SURGERY)

## 2023-08-11 PROCEDURE — 85347 COAGULATION TIME ACTIVATED: CPT

## 2023-08-11 PROCEDURE — A7041 WATER SEAL DRAIN CONTAINER: HCPCS | Performed by: THORACIC SURGERY (CARDIOTHORACIC VASCULAR SURGERY)

## 2023-08-11 PROCEDURE — 93355 ECHO TRANSESOPHAGEAL (TEE): CPT

## 2023-08-11 PROCEDURE — 33508 ENDOSCOPIC VEIN HARVEST: CPT | Performed by: THORACIC SURGERY (CARDIOTHORACIC VASCULAR SURGERY)

## 2023-08-11 PROCEDURE — 5A1223Z PERFORMANCE OF CARDIAC PACING, CONTINUOUS: ICD-10-PCS | Performed by: THORACIC SURGERY (CARDIOTHORACIC VASCULAR SURGERY)

## 2023-08-11 PROCEDURE — 33521 CABG ARTERY-VEIN FOUR: CPT | Performed by: PHYSICIAN ASSISTANT

## 2023-08-11 PROCEDURE — 5A1221Z PERFORMANCE OF CARDIAC OUTPUT, CONTINUOUS: ICD-10-PCS | Performed by: THORACIC SURGERY (CARDIOTHORACIC VASCULAR SURGERY)

## 2023-08-11 PROCEDURE — 85014 HEMATOCRIT: CPT | Performed by: PHYSICIAN ASSISTANT

## 2023-08-11 PROCEDURE — 82803 BLOOD GASES ANY COMBINATION: CPT

## 2023-08-11 PROCEDURE — 84295 ASSAY OF SERUM SODIUM: CPT

## 2023-08-11 PROCEDURE — 30233N0 TRANSFUSION OF AUTOLOGOUS RED BLOOD CELLS INTO PERIPHERAL VEIN, PERCUTANEOUS APPROACH: ICD-10-PCS | Performed by: THORACIC SURGERY (CARDIOTHORACIC VASCULAR SURGERY)

## 2023-08-11 PROCEDURE — 33533 CABG ARTERIAL SINGLE: CPT | Performed by: PHYSICIAN ASSISTANT

## 2023-08-11 PROCEDURE — 84132 ASSAY OF SERUM POTASSIUM: CPT | Performed by: PHYSICIAN ASSISTANT

## 2023-08-11 PROCEDURE — 82947 ASSAY GLUCOSE BLOOD QUANT: CPT

## 2023-08-11 PROCEDURE — 99223 1ST HOSP IP/OBS HIGH 75: CPT | Performed by: ANESTHESIOLOGY

## 2023-08-11 PROCEDURE — 80048 BASIC METABOLIC PNL TOTAL CA: CPT | Performed by: PHYSICIAN ASSISTANT

## 2023-08-11 PROCEDURE — 33508 ENDOSCOPIC VEIN HARVEST: CPT | Performed by: PHYSICIAN ASSISTANT

## 2023-08-11 PROCEDURE — 85018 HEMOGLOBIN: CPT | Performed by: PHYSICIAN ASSISTANT

## 2023-08-11 PROCEDURE — 94002 VENT MGMT INPAT INIT DAY: CPT

## 2023-08-11 PROCEDURE — 06BQ4ZZ EXCISION OF LEFT SAPHENOUS VEIN, PERCUTANEOUS ENDOSCOPIC APPROACH: ICD-10-PCS | Performed by: THORACIC SURGERY (CARDIOTHORACIC VASCULAR SURGERY)

## 2023-08-11 PROCEDURE — 85014 HEMATOCRIT: CPT

## 2023-08-11 PROCEDURE — 06BP4ZZ EXCISION OF RIGHT SAPHENOUS VEIN, PERCUTANEOUS ENDOSCOPIC APPROACH: ICD-10-PCS | Performed by: THORACIC SURGERY (CARDIOTHORACIC VASCULAR SURGERY)

## 2023-08-11 PROCEDURE — 021309W BYPASS CORONARY ARTERY, FOUR OR MORE ARTERIES FROM AORTA WITH AUTOLOGOUS VENOUS TISSUE, OPEN APPROACH: ICD-10-PCS | Performed by: THORACIC SURGERY (CARDIOTHORACIC VASCULAR SURGERY)

## 2023-08-11 PROCEDURE — 85049 AUTOMATED PLATELET COUNT: CPT | Performed by: PHYSICIAN ASSISTANT

## 2023-08-11 PROCEDURE — 02HV33Z INSERTION OF INFUSION DEVICE INTO SUPERIOR VENA CAVA, PERCUTANEOUS APPROACH: ICD-10-PCS | Performed by: ANESTHESIOLOGY

## 2023-08-11 PROCEDURE — 94760 N-INVAS EAR/PLS OXIMETRY 1: CPT

## 2023-08-11 PROCEDURE — 84132 ASSAY OF SERUM POTASSIUM: CPT

## 2023-08-11 PROCEDURE — 85049 AUTOMATED PLATELET COUNT: CPT | Performed by: THORACIC SURGERY (CARDIOTHORACIC VASCULAR SURGERY)

## 2023-08-11 DEVICE — MARKER CORONARY BYPASS VOSS GRAFT: Type: IMPLANTABLE DEVICE | Site: HEART | Status: FUNCTIONAL

## 2023-08-11 RX ORDER — CHLORHEXIDINE GLUCONATE 0.12 MG/ML
15 RINSE ORAL 2 TIMES DAILY
Status: DISCONTINUED | OUTPATIENT
Start: 2023-08-11 | End: 2023-08-15 | Stop reason: HOSPADM

## 2023-08-11 RX ORDER — AMIODARONE HYDROCHLORIDE 200 MG/1
200 TABLET ORAL EVERY 8 HOURS SCHEDULED
Status: DISCONTINUED | OUTPATIENT
Start: 2023-08-11 | End: 2023-08-15 | Stop reason: HOSPADM

## 2023-08-11 RX ORDER — MIDAZOLAM HYDROCHLORIDE 2 MG/2ML
INJECTION, SOLUTION INTRAMUSCULAR; INTRAVENOUS AS NEEDED
Status: DISCONTINUED | OUTPATIENT
Start: 2023-08-11 | End: 2023-08-11

## 2023-08-11 RX ORDER — PANTOPRAZOLE SODIUM 40 MG/1
40 TABLET, DELAYED RELEASE ORAL
Status: DISCONTINUED | OUTPATIENT
Start: 2023-08-12 | End: 2023-08-15 | Stop reason: HOSPADM

## 2023-08-11 RX ORDER — ALBUMIN, HUMAN INJ 5% 5 %
12.5 SOLUTION INTRAVENOUS ONCE
Status: COMPLETED | OUTPATIENT
Start: 2023-08-11 | End: 2023-08-11

## 2023-08-11 RX ORDER — OXYCODONE HYDROCHLORIDE 5 MG/1
5 TABLET ORAL EVERY 4 HOURS PRN
Status: DISCONTINUED | OUTPATIENT
Start: 2023-08-11 | End: 2023-08-11

## 2023-08-11 RX ORDER — FENTANYL CITRATE 50 UG/ML
50 INJECTION, SOLUTION INTRAMUSCULAR; INTRAVENOUS
Status: DISCONTINUED | OUTPATIENT
Start: 2023-08-11 | End: 2023-08-12

## 2023-08-11 RX ORDER — LORAZEPAM 0.5 MG/1
0.5 TABLET ORAL
Status: DISCONTINUED | OUTPATIENT
Start: 2023-08-11 | End: 2023-08-15 | Stop reason: HOSPADM

## 2023-08-11 RX ORDER — MAGNESIUM SULFATE HEPTAHYDRATE 40 MG/ML
2 INJECTION, SOLUTION INTRAVENOUS ONCE
Status: COMPLETED | OUTPATIENT
Start: 2023-08-11 | End: 2023-08-11

## 2023-08-11 RX ORDER — SODIUM CHLORIDE, SODIUM GLUCONATE, SODIUM ACETATE, POTASSIUM CHLORIDE, MAGNESIUM CHLORIDE, SODIUM PHOSPHATE, DIBASIC, AND POTASSIUM PHOSPHATE .53; .5; .37; .037; .03; .012; .00082 G/100ML; G/100ML; G/100ML; G/100ML; G/100ML; G/100ML; G/100ML
INJECTION, SOLUTION INTRAVENOUS AS NEEDED
Status: DISCONTINUED | OUTPATIENT
Start: 2023-08-11 | End: 2023-08-11

## 2023-08-11 RX ORDER — CALCIUM CHLORIDE 100 MG/ML
INJECTION INTRAVENOUS; INTRAVENTRICULAR AS NEEDED
Status: DISCONTINUED | OUTPATIENT
Start: 2023-08-11 | End: 2023-08-11

## 2023-08-11 RX ORDER — ALBUMIN, HUMAN INJ 5% 5 %
SOLUTION INTRAVENOUS
Status: COMPLETED
Start: 2023-08-11 | End: 2023-08-11

## 2023-08-11 RX ORDER — HYDRALAZINE HYDROCHLORIDE 20 MG/ML
10 INJECTION INTRAMUSCULAR; INTRAVENOUS EVERY 6 HOURS PRN
Status: DISCONTINUED | OUTPATIENT
Start: 2023-08-11 | End: 2023-08-12

## 2023-08-11 RX ORDER — SODIUM CHLORIDE, SODIUM LACTATE, POTASSIUM CHLORIDE, CALCIUM CHLORIDE 600; 310; 30; 20 MG/100ML; MG/100ML; MG/100ML; MG/100ML
INJECTION, SOLUTION INTRAVENOUS CONTINUOUS PRN
Status: DISCONTINUED | OUTPATIENT
Start: 2023-08-11 | End: 2023-08-11

## 2023-08-11 RX ORDER — ACETAMINOPHEN 325 MG/1
650 TABLET ORAL EVERY 4 HOURS PRN
Status: DISCONTINUED | OUTPATIENT
Start: 2023-08-11 | End: 2023-08-12

## 2023-08-11 RX ORDER — HEPARIN SODIUM 1000 [USP'U]/ML
400 INJECTION, SOLUTION INTRAVENOUS; SUBCUTANEOUS ONCE
Status: COMPLETED | OUTPATIENT
Start: 2023-08-11 | End: 2023-08-11

## 2023-08-11 RX ORDER — ONDANSETRON 2 MG/ML
4 INJECTION INTRAMUSCULAR; INTRAVENOUS EVERY 6 HOURS PRN
Status: DISCONTINUED | OUTPATIENT
Start: 2023-08-11 | End: 2023-08-15 | Stop reason: HOSPADM

## 2023-08-11 RX ORDER — FUROSEMIDE 10 MG/ML
40 INJECTION INTRAMUSCULAR; INTRAVENOUS EVERY 6 HOURS
Status: DISCONTINUED | OUTPATIENT
Start: 2023-08-11 | End: 2023-08-11

## 2023-08-11 RX ORDER — ASPIRIN 325 MG
325 TABLET ORAL DAILY
Status: DISCONTINUED | OUTPATIENT
Start: 2023-08-11 | End: 2023-08-15 | Stop reason: HOSPADM

## 2023-08-11 RX ORDER — LIDOCAINE HCL/PF 100 MG/5ML
SYRINGE (ML) INJECTION AS NEEDED
Status: DISCONTINUED | OUTPATIENT
Start: 2023-08-11 | End: 2023-08-11

## 2023-08-11 RX ORDER — CEFAZOLIN SODIUM 1 G/3ML
INJECTION, POWDER, FOR SOLUTION INTRAMUSCULAR; INTRAVENOUS AS NEEDED
Status: DISCONTINUED | OUTPATIENT
Start: 2023-08-11 | End: 2023-08-11

## 2023-08-11 RX ORDER — GABAPENTIN 100 MG/1
100 CAPSULE ORAL DAILY
Status: DISCONTINUED | OUTPATIENT
Start: 2023-08-11 | End: 2023-08-15 | Stop reason: HOSPADM

## 2023-08-11 RX ORDER — ALBUMIN, HUMAN INJ 5% 5 %
SOLUTION INTRAVENOUS AS NEEDED
Status: DISCONTINUED | OUTPATIENT
Start: 2023-08-11 | End: 2023-08-11

## 2023-08-11 RX ORDER — GABAPENTIN 300 MG/1
300 CAPSULE ORAL
Status: DISCONTINUED | OUTPATIENT
Start: 2023-08-11 | End: 2023-08-15 | Stop reason: HOSPADM

## 2023-08-11 RX ORDER — PROTAMINE SULFATE 10 MG/ML
INJECTION, SOLUTION INTRAVENOUS AS NEEDED
Status: DISCONTINUED | OUTPATIENT
Start: 2023-08-11 | End: 2023-08-11

## 2023-08-11 RX ORDER — CALCIUM CHLORIDE 100 MG/ML
1 INJECTION INTRAVENOUS; INTRAVENTRICULAR ONCE
Status: DISCONTINUED | OUTPATIENT
Start: 2023-08-11 | End: 2023-08-12

## 2023-08-11 RX ORDER — CEFAZOLIN SODIUM 2 G/50ML
2000 SOLUTION INTRAVENOUS EVERY 8 HOURS
Status: COMPLETED | OUTPATIENT
Start: 2023-08-11 | End: 2023-08-13

## 2023-08-11 RX ORDER — ATORVASTATIN CALCIUM 80 MG/1
80 TABLET, FILM COATED ORAL
Status: DISCONTINUED | OUTPATIENT
Start: 2023-08-11 | End: 2023-08-15 | Stop reason: HOSPADM

## 2023-08-11 RX ORDER — POTASSIUM CHLORIDE 14.9 MG/ML
20 INJECTION INTRAVENOUS ONCE AS NEEDED
Status: DISCONTINUED | OUTPATIENT
Start: 2023-08-11 | End: 2023-08-12

## 2023-08-11 RX ORDER — POLYETHYLENE GLYCOL 3350 17 G/17G
17 POWDER, FOR SOLUTION ORAL DAILY
Status: DISCONTINUED | OUTPATIENT
Start: 2023-08-11 | End: 2023-08-15 | Stop reason: HOSPADM

## 2023-08-11 RX ORDER — SODIUM CHLORIDE 9 MG/ML
INJECTION, SOLUTION INTRAVENOUS CONTINUOUS PRN
Status: DISCONTINUED | OUTPATIENT
Start: 2023-08-11 | End: 2023-08-11

## 2023-08-11 RX ORDER — FENTANYL CITRATE 50 UG/ML
50 INJECTION, SOLUTION INTRAMUSCULAR; INTRAVENOUS ONCE
Status: DISCONTINUED | OUTPATIENT
Start: 2023-08-11 | End: 2023-08-12

## 2023-08-11 RX ORDER — LIDOCAINE HYDROCHLORIDE 10 MG/ML
INJECTION, SOLUTION INFILTRATION; PERINEURAL AS NEEDED
Status: DISCONTINUED | OUTPATIENT
Start: 2023-08-11 | End: 2023-08-11

## 2023-08-11 RX ORDER — BISACODYL 10 MG
10 SUPPOSITORY, RECTAL RECTAL DAILY PRN
Status: DISCONTINUED | OUTPATIENT
Start: 2023-08-11 | End: 2023-08-15 | Stop reason: HOSPADM

## 2023-08-11 RX ORDER — HYDROMORPHONE HCL/PF 1 MG/ML
0.5 SYRINGE (ML) INJECTION ONCE
Status: COMPLETED | OUTPATIENT
Start: 2023-08-11 | End: 2023-08-11

## 2023-08-11 RX ORDER — ETOMIDATE 2 MG/ML
INJECTION INTRAVENOUS AS NEEDED
Status: DISCONTINUED | OUTPATIENT
Start: 2023-08-11 | End: 2023-08-11

## 2023-08-11 RX ORDER — OXYCODONE HYDROCHLORIDE 5 MG/1
5 TABLET ORAL EVERY 4 HOURS PRN
Status: DISCONTINUED | OUTPATIENT
Start: 2023-08-11 | End: 2023-08-15

## 2023-08-11 RX ORDER — CEFAZOLIN SODIUM 2 G/50ML
2000 SOLUTION INTRAVENOUS ONCE
Status: DISCONTINUED | OUTPATIENT
Start: 2023-08-11 | End: 2023-08-11

## 2023-08-11 RX ORDER — LIDOCAINE HCL/PF 100 MG/5ML
100 SYRINGE (ML) INJECTION
Status: DISCONTINUED | OUTPATIENT
Start: 2023-08-11 | End: 2023-08-15 | Stop reason: HOSPADM

## 2023-08-11 RX ORDER — HEPARIN SODIUM 1000 [USP'U]/ML
10000 INJECTION, SOLUTION INTRAVENOUS; SUBCUTANEOUS ONCE
Status: COMPLETED | OUTPATIENT
Start: 2023-08-11 | End: 2023-08-11

## 2023-08-11 RX ORDER — POTASSIUM CHLORIDE 14.9 MG/ML
20 INJECTION INTRAVENOUS
Status: DISCONTINUED | OUTPATIENT
Start: 2023-08-11 | End: 2023-08-12

## 2023-08-11 RX ORDER — CHLORHEXIDINE GLUCONATE 0.12 MG/ML
15 RINSE ORAL ONCE
Status: COMPLETED | OUTPATIENT
Start: 2023-08-11 | End: 2023-08-11

## 2023-08-11 RX ORDER — PHENYLEPHRINE HYDROCHLORIDE 10 MG/ML
INJECTION INTRAVENOUS
Status: COMPLETED
Start: 2023-08-11 | End: 2023-08-11

## 2023-08-11 RX ORDER — MAGNESIUM HYDROXIDE 1200 MG/15ML
LIQUID ORAL AS NEEDED
Status: DISCONTINUED | OUTPATIENT
Start: 2023-08-11 | End: 2023-08-11 | Stop reason: HOSPADM

## 2023-08-11 RX ORDER — HYDROMORPHONE HCL/PF 1 MG/ML
0.5 SYRINGE (ML) INJECTION EVERY 2 HOUR PRN
Status: DISCONTINUED | OUTPATIENT
Start: 2023-08-11 | End: 2023-08-12

## 2023-08-11 RX ORDER — HEPARIN SODIUM 5000 [USP'U]/ML
5000 INJECTION, SOLUTION INTRAVENOUS; SUBCUTANEOUS EVERY 8 HOURS SCHEDULED
Status: DISCONTINUED | OUTPATIENT
Start: 2023-08-12 | End: 2023-08-12

## 2023-08-11 RX ORDER — FENTANYL CITRATE 50 UG/ML
INJECTION, SOLUTION INTRAMUSCULAR; INTRAVENOUS AS NEEDED
Status: DISCONTINUED | OUTPATIENT
Start: 2023-08-11 | End: 2023-08-11

## 2023-08-11 RX ORDER — OXYCODONE HYDROCHLORIDE 5 MG/1
2.5 TABLET ORAL EVERY 4 HOURS PRN
Status: DISCONTINUED | OUTPATIENT
Start: 2023-08-11 | End: 2023-08-11

## 2023-08-11 RX ORDER — ROCURONIUM BROMIDE 10 MG/ML
INJECTION, SOLUTION INTRAVENOUS AS NEEDED
Status: DISCONTINUED | OUTPATIENT
Start: 2023-08-11 | End: 2023-08-11

## 2023-08-11 RX ORDER — OXYCODONE HYDROCHLORIDE 10 MG/1
10 TABLET ORAL EVERY 4 HOURS PRN
Status: DISCONTINUED | OUTPATIENT
Start: 2023-08-11 | End: 2023-08-15

## 2023-08-11 RX ORDER — MANNITOL 250 MG/ML
INJECTION, SOLUTION INTRAVENOUS AS NEEDED
Status: DISCONTINUED | OUTPATIENT
Start: 2023-08-11 | End: 2023-08-11

## 2023-08-11 RX ORDER — PROPOFOL 10 MG/ML
INJECTION, EMULSION INTRAVENOUS CONTINUOUS PRN
Status: DISCONTINUED | OUTPATIENT
Start: 2023-08-11 | End: 2023-08-11

## 2023-08-11 RX ORDER — SODIUM CHLORIDE 450 MG/100ML
20 INJECTION, SOLUTION INTRAVENOUS CONTINUOUS
Status: DISCONTINUED | OUTPATIENT
Start: 2023-08-11 | End: 2023-08-15 | Stop reason: HOSPADM

## 2023-08-11 RX ORDER — VANCOMYCIN HYDROCHLORIDE 1 G/20ML
INJECTION, POWDER, LYOPHILIZED, FOR SOLUTION INTRAVENOUS AS NEEDED
Status: DISCONTINUED | OUTPATIENT
Start: 2023-08-11 | End: 2023-08-11 | Stop reason: HOSPADM

## 2023-08-11 RX ADMIN — MAGNESIUM SULFATE HEPTAHYDRATE 1 G: 500 INJECTION, SOLUTION INTRAMUSCULAR; INTRAVENOUS at 12:23

## 2023-08-11 RX ADMIN — HEPARIN SODIUM 5000 UNITS: 1000 INJECTION INTRAVENOUS; SUBCUTANEOUS at 09:54

## 2023-08-11 RX ADMIN — SODIUM CHLORIDE 20 ML/HR: 0.45 INJECTION, SOLUTION INTRAVENOUS at 14:00

## 2023-08-11 RX ADMIN — Medication 2 MCG/MIN: at 12:07

## 2023-08-11 RX ADMIN — CEFAZOLIN SODIUM 2000 MG: 2 SOLUTION INTRAVENOUS at 21:00

## 2023-08-11 RX ADMIN — LIDOCAINE HYDROCHLORIDE 100 MG: 20 INJECTION INTRAVENOUS at 12:14

## 2023-08-11 RX ADMIN — HEPARIN SODIUM 10000 UNITS: 1000 INJECTION INTRAVENOUS; SUBCUTANEOUS at 07:26

## 2023-08-11 RX ADMIN — MIDAZOLAM 2 MG: 1 INJECTION INTRAMUSCULAR; INTRAVENOUS at 07:28

## 2023-08-11 RX ADMIN — PROPOFOL 50 MCG/KG/MIN: 10 INJECTION, EMULSION INTRAVENOUS at 09:29

## 2023-08-11 RX ADMIN — SODIUM CHLORIDE, SODIUM GLUCONATE, SODIUM ACETATE, POTASSIUM CHLORIDE, MAGNESIUM CHLORIDE, SODIUM PHOSPHATE, DIBASIC, AND POTASSIUM PHOSPHATE 200 ML: .53; .5; .37; .037; .03; .012; .00082 INJECTION, SOLUTION INTRAVENOUS at 11:51

## 2023-08-11 RX ADMIN — SODIUM BICARBONATE 50 MEQ: 84 INJECTION, SOLUTION INTRAVENOUS at 07:26

## 2023-08-11 RX ADMIN — MIDAZOLAM 1 MG: 1 INJECTION INTRAMUSCULAR; INTRAVENOUS at 08:43

## 2023-08-11 RX ADMIN — FENTANYL CITRATE 250 MCG: 0.05 INJECTION, SOLUTION INTRAMUSCULAR; INTRAVENOUS at 12:30

## 2023-08-11 RX ADMIN — SODIUM CHLORIDE, SODIUM GLUCONATE, SODIUM ACETATE, POTASSIUM CHLORIDE, MAGNESIUM CHLORIDE, SODIUM PHOSPHATE, DIBASIC, AND POTASSIUM PHOSPHATE 200 ML: .53; .5; .37; .037; .03; .012; .00082 INJECTION, SOLUTION INTRAVENOUS at 12:05

## 2023-08-11 RX ADMIN — SODIUM CHLORIDE, SODIUM LACTATE, POTASSIUM CHLORIDE, AND CALCIUM CHLORIDE: .6; .31; .03; .02 INJECTION, SOLUTION INTRAVENOUS at 12:35

## 2023-08-11 RX ADMIN — ALBUMIN, HUMAN INJ 5% 12.5 G: 5 SOLUTION at 20:37

## 2023-08-11 RX ADMIN — FENTANYL CITRATE 150 MCG: 0.05 INJECTION, SOLUTION INTRAMUSCULAR; INTRAVENOUS at 07:54

## 2023-08-11 RX ADMIN — HEPARIN SODIUM 36600 UNITS: 1000 INJECTION INTRAVENOUS; SUBCUTANEOUS at 10:16

## 2023-08-11 RX ADMIN — SODIUM CHLORIDE, SODIUM LACTATE, POTASSIUM CHLORIDE, AND CALCIUM CHLORIDE 500 ML: .6; .31; .03; .02 INJECTION, SOLUTION INTRAVENOUS at 15:39

## 2023-08-11 RX ADMIN — OXYCODONE HYDROCHLORIDE 10 MG: 10 TABLET ORAL at 21:24

## 2023-08-11 RX ADMIN — POTASSIUM CHLORIDE 20 MEQ: 14.9 INJECTION, SOLUTION INTRAVENOUS at 16:17

## 2023-08-11 RX ADMIN — SODIUM BICARBONATE 250 ML: 84 INJECTION, SOLUTION INTRAVENOUS at 11:58

## 2023-08-11 RX ADMIN — GABAPENTIN 300 MG: 300 CAPSULE ORAL at 21:22

## 2023-08-11 RX ADMIN — FENTANYL CITRATE 100 MCG: 0.05 INJECTION, SOLUTION INTRAMUSCULAR; INTRAVENOUS at 07:39

## 2023-08-11 RX ADMIN — PHENYLEPHRINE HYDROCHLORIDE 500 MCG: 10 INJECTION INTRAVENOUS at 11:09

## 2023-08-11 RX ADMIN — AMIODARONE HYDROCHLORIDE 200 MG: 200 TABLET ORAL at 21:21

## 2023-08-11 RX ADMIN — LORAZEPAM 0.5 MG: 0.5 TABLET ORAL at 21:22

## 2023-08-11 RX ADMIN — SODIUM CHLORIDE, SODIUM LACTATE, POTASSIUM CHLORIDE, AND CALCIUM CHLORIDE: .6; .31; .03; .02 INJECTION, SOLUTION INTRAVENOUS at 07:21

## 2023-08-11 RX ADMIN — PHENYLEPHRINE HYDROCHLORIDE 350 MCG: 10 INJECTION INTRAVENOUS at 11:01

## 2023-08-11 RX ADMIN — ETOMIDATE 20 MG: 2 INJECTION INTRAVENOUS at 07:54

## 2023-08-11 RX ADMIN — PHENYLEPHRINE HYDROCHLORIDE 500 MCG: 10 INJECTION INTRAVENOUS at 11:22

## 2023-08-11 RX ADMIN — Medication 12.5 MG: at 05:59

## 2023-08-11 RX ADMIN — SODIUM CHLORIDE 5 ML/HR: 9 INJECTION, SOLUTION INTRAVENOUS at 08:35

## 2023-08-11 RX ADMIN — ALBUMIN (HUMAN) 12.5 G: 12.5 INJECTION, SOLUTION INTRAVENOUS at 20:37

## 2023-08-11 RX ADMIN — Medication 5 MG/HR: at 08:42

## 2023-08-11 RX ADMIN — SODIUM CHLORIDE, SODIUM GLUCONATE, SODIUM ACETATE, POTASSIUM CHLORIDE, MAGNESIUM CHLORIDE, SODIUM PHOSPHATE, DIBASIC, AND POTASSIUM PHOSPHATE 500 ML: .53; .5; .37; .037; .03; .012; .00082 INJECTION, SOLUTION INTRAVENOUS at 07:25

## 2023-08-11 RX ADMIN — MANNITOL 25 G: 250 INJECTION, SOLUTION INTRAVENOUS at 07:26

## 2023-08-11 RX ADMIN — FENTANYL CITRATE 250 MCG: 0.05 INJECTION, SOLUTION INTRAMUSCULAR; INTRAVENOUS at 08:48

## 2023-08-11 RX ADMIN — ALBUMIN (HUMAN) 250 ML: 12.5 INJECTION, SOLUTION INTRAVENOUS at 11:37

## 2023-08-11 RX ADMIN — PROTAMINE SULFATE 220 MG: 10 INJECTION, SOLUTION INTRAVENOUS at 12:30

## 2023-08-11 RX ADMIN — MIDAZOLAM 3 MG: 1 INJECTION INTRAMUSCULAR; INTRAVENOUS at 12:29

## 2023-08-11 RX ADMIN — HEPARIN SODIUM 10000 UNITS: 1000 INJECTION INTRAVENOUS; SUBCUTANEOUS at 11:15

## 2023-08-11 RX ADMIN — SODIUM CHLORIDE, SODIUM GLUCONATE, SODIUM ACETATE, POTASSIUM CHLORIDE, MAGNESIUM CHLORIDE, SODIUM PHOSPHATE, DIBASIC, AND POTASSIUM PHOSPHATE 200 ML: .53; .5; .37; .037; .03; .012; .00082 INJECTION, SOLUTION INTRAVENOUS at 11:28

## 2023-08-11 RX ADMIN — CEFAZOLIN 2000 MG: 1 INJECTION, POWDER, FOR SOLUTION INTRAMUSCULAR; INTRAVENOUS at 08:35

## 2023-08-11 RX ADMIN — MUPIROCIN 1 APPLICATION: 20 OINTMENT TOPICAL at 21:22

## 2023-08-11 RX ADMIN — PHENYLEPHRINE HYDROCHLORIDE 250 MCG: 10 INJECTION INTRAVENOUS at 12:18

## 2023-08-11 RX ADMIN — SODIUM CHLORIDE 4 UNITS/HR: 9 INJECTION, SOLUTION INTRAVENOUS at 14:41

## 2023-08-11 RX ADMIN — LIDOCAINE HYDROCHLORIDE 5 ML: 10 INJECTION, SOLUTION EPIDURAL; INFILTRATION; INTRACAUDAL; PERINEURAL at 07:54

## 2023-08-11 RX ADMIN — Medication 50 MCG/MIN: at 11:26

## 2023-08-11 RX ADMIN — MUPIROCIN 1 APPLICATION: 20 OINTMENT TOPICAL at 06:02

## 2023-08-11 RX ADMIN — PHENYLEPHRINE HYDROCHLORIDE: 10 INJECTION INTRAVENOUS at 21:21

## 2023-08-11 RX ADMIN — AMIODARONE HYDROCHLORIDE 150 MG: 50 INJECTION, SOLUTION INTRAVENOUS at 11:13

## 2023-08-11 RX ADMIN — PHENYLEPHRINE HYDROCHLORIDE 500 MCG: 10 INJECTION INTRAVENOUS at 12:26

## 2023-08-11 RX ADMIN — PHENYLEPHRINE HYDROCHLORIDE 500 MCG: 10 INJECTION INTRAVENOUS at 11:33

## 2023-08-11 RX ADMIN — AMINOCAPROIC ACID 5 G: 250 INJECTION, SOLUTION INTRAVENOUS at 08:39

## 2023-08-11 RX ADMIN — HYDROMORPHONE HYDROCHLORIDE 0.5 MG: 1 INJECTION, SOLUTION INTRAMUSCULAR; INTRAVENOUS; SUBCUTANEOUS at 15:56

## 2023-08-11 RX ADMIN — ROCURONIUM BROMIDE 50 MG: 10 INJECTION, SOLUTION INTRAVENOUS at 08:45

## 2023-08-11 RX ADMIN — PHENYLEPHRINE HYDROCHLORIDE 150 MCG: 10 INJECTION INTRAVENOUS at 10:46

## 2023-08-11 RX ADMIN — ROCURONIUM BROMIDE 50 MG: 10 INJECTION, SOLUTION INTRAVENOUS at 07:54

## 2023-08-11 RX ADMIN — SODIUM CHLORIDE: 0.9 INJECTION, SOLUTION INTRAVENOUS at 12:42

## 2023-08-11 RX ADMIN — HEPARIN SODIUM 1 EACH: 5000 INJECTION INTRAVENOUS; SUBCUTANEOUS at 07:25

## 2023-08-11 RX ADMIN — OXYCODONE HYDROCHLORIDE 10 MG: 10 TABLET ORAL at 16:59

## 2023-08-11 RX ADMIN — FENTANYL CITRATE 250 MCG: 0.05 INJECTION, SOLUTION INTRAMUSCULAR; INTRAVENOUS at 08:45

## 2023-08-11 RX ADMIN — PHENYLEPHRINE HYDROCHLORIDE 500 MCG: 10 INJECTION INTRAVENOUS at 11:10

## 2023-08-11 RX ADMIN — SODIUM CHLORIDE: 0.9 INJECTION, SOLUTION INTRAVENOUS at 08:26

## 2023-08-11 RX ADMIN — CALCIUM CHLORIDE 1 G: 100 INJECTION INTRAVENOUS; INTRAVENTRICULAR at 12:27

## 2023-08-11 RX ADMIN — SODIUM CHLORIDE, SODIUM GLUCONATE, SODIUM ACETATE, POTASSIUM CHLORIDE, MAGNESIUM CHLORIDE, SODIUM PHOSPHATE, DIBASIC, AND POTASSIUM PHOSPHATE 200 ML: .53; .5; .37; .037; .03; .012; .00082 INJECTION, SOLUTION INTRAVENOUS at 11:54

## 2023-08-11 RX ADMIN — ACETAMINOPHEN 650 MG: 325 TABLET, FILM COATED ORAL at 21:21

## 2023-08-11 RX ADMIN — MAGNESIUM SULFATE HEPTAHYDRATE 2 G: 40 INJECTION, SOLUTION INTRAVENOUS at 14:14

## 2023-08-11 RX ADMIN — SERTRALINE HYDROCHLORIDE 50 MG: 50 TABLET ORAL at 14:50

## 2023-08-11 RX ADMIN — MAGNESIUM SULFATE HEPTAHYDRATE 2 G: 500 INJECTION, SOLUTION INTRAMUSCULAR; INTRAVENOUS at 12:02

## 2023-08-11 RX ADMIN — CHLORHEXIDINE GLUCONATE 15 ML: 1.2 SOLUTION ORAL at 06:02

## 2023-08-11 RX ADMIN — SODIUM BICARBONATE 1000 ML: 84 INJECTION, SOLUTION INTRAVENOUS at 10:45

## 2023-08-11 RX ADMIN — CEFAZOLIN 2000 MG: 1 INJECTION, POWDER, FOR SOLUTION INTRAMUSCULAR; INTRAVENOUS at 12:06

## 2023-08-11 RX ADMIN — HYDROMORPHONE HYDROCHLORIDE 0.5 MG: 1 INJECTION, SOLUTION INTRAMUSCULAR; INTRAVENOUS; SUBCUTANEOUS at 20:37

## 2023-08-11 RX ADMIN — ALBUMIN (HUMAN): 12.5 INJECTION, SOLUTION INTRAVENOUS at 18:46

## 2023-08-11 RX ADMIN — OXYCODONE HYDROCHLORIDE 5 MG: 5 TABLET ORAL at 14:50

## 2023-08-11 RX ADMIN — ALBUMIN, HUMAN INJ 5%: 5 SOLUTION at 18:46

## 2023-08-11 RX ADMIN — PHENYLEPHRINE HYDROCHLORIDE 500 MCG: 10 INJECTION INTRAVENOUS at 12:12

## 2023-08-11 RX ADMIN — HYDROMORPHONE HYDROCHLORIDE 0.5 MG: 1 INJECTION, SOLUTION INTRAMUSCULAR; INTRAVENOUS; SUBCUTANEOUS at 15:10

## 2023-08-11 RX ADMIN — SODIUM CHLORIDE, SODIUM GLUCONATE, SODIUM ACETATE, POTASSIUM CHLORIDE, MAGNESIUM CHLORIDE, SODIUM PHOSPHATE, DIBASIC, AND POTASSIUM PHOSPHATE 400 ML: .53; .5; .37; .037; .03; .012; .00082 INJECTION, SOLUTION INTRAVENOUS at 12:00

## 2023-08-11 NOTE — ANESTHESIA PROCEDURE NOTES
Procedure Performed: JOHN Anesthesia  Start Time:  8/11/2023 8:36 AM        Preanesthesia Checklist    Patient identified, IV assessed, risks and benefits discussed, monitors and equipment assessed, procedure being performed at surgeon's request and anesthesia consent obtained. Procedure    Diagnostic Indications for JOHN:  assessment of surgical repair and hemodynamic monitoring. Type of JOHN: interventional JOHN with real time guidance of intracardiac procedure. Images Saved: ultrasound permanent image saved. Physician Requesting Echo: Rachel Haynes MD.  Location performed: OR. Not intubated. Bite block not placed. heart not visualized. Insertion of JOHN Probe:  Atraumatic. Probe Type:  Multiplane. Modalities:  3D, color flow mapping, continuous wave Doppler and pulse wave Doppler. Echocardiographic and Doppler Measurements    PREPROCEDURE    LEFT VENTRICLE:  Systolic Function: normal. Ejection Fraction: 60%. Cavity size: normal.   Regional Wall Motion Abnormalities: none seen. RIGHT VENTRICLE:  Systolic Function: normal.  Cavity size normal. No hypertrophy              AORTIC VALVE:  Leaflets: normal and trileaflet. Leaflet motions normal and normal. Stenosis: none. Regurgitation: none. MITRAL VALVE:  Leaflets: normal. Leaflet Motions: normal. Regurgitation: trace. Stenosis: none. TRICUSPID VALVE:  Leaflets: normal. Leaflet Motions: normal. Stenosis: none. Regurgitation: trace. PULMONIC VALVE:  Leaflets: normal. Regurgitation: trace. Stenosis: none. ASCENDING AORTA:  Size:  normal.  Dissection not present. AORTIC ARCH:  Size:  normal.  dissection not present. Grade 3: atheroma protruding < 0.5 cm into lumen. DESCENDING AORTA:  Size: normal.  Dissection not present. Grade 3: atheroma protruding < 0.5 cm into lumen.         RIGHT ATRIUM:  Size:  normal. No spontaneous echo contrast.    LEFT ATRIUM:  Size: normal. No spontaneous echo contrast.    LEFT ATRIAL APPENDAGE:  Size: normal. No spontaneous echo contrast         ATRIAL SEPTUM:  Intra-atrial septal morphology: normal.          VENTRICULAR SEPTUM:  Intra-ventricular septum morphology: normal.         EPIAORTIC:  Plaque Thickness: 0-5 mm. OTHER FINDINGS:  Pericardium:  normal. Pleural Effusion:  none. POSTPROCEDURE    LEFT VENTRICLE: Unchanged . Systolic Function: normal. Ejection Fraction: 60 %. Cavity Size: normal. Regional Wall Motion Abnormalities: none seen. RIGHT VENTRICLE: Unchanged . Systolic Function: normal. Cavity Size: normal.        AORTIC VALVE: Unchanged . Leaflets: native. Stenosis: none. Regurgitation: none. MITRAL VALVE: Unchanged . Leaflets: native. Regurgitation: trace. Stenosis: none. TRICUSPID VALVE: Unchanged . Leaflets: native. Regurgitation: trace. Stenosis: none. PULMONIC VALVE: Unchanged   Leaflets: native. Regurgitation: trace. Stenosis: none. ATRIA: Unchanged . Left Atrial Appendage Ligate: No. Residual Flow in Left Atrial Appendage by Color Flow Doppler: No.       AORTA: Unchanged . Dissection: Dissection not present. REMOVAL:  Probe Removal: atraumatic.

## 2023-08-11 NOTE — ANESTHESIA PROCEDURE NOTES
Arterial Line Insertion    Performed by: Veronica Diaz MD  Authorized by: Veronica Diaz MD  Consent: Written consent obtained. Risks and benefits: risks, benefits and alternatives were discussed  Consent given by: patient  Patient understanding: patient states understanding of the procedure being performed  Required items: required blood products, implants, devices, and special equipment available  Patient identity confirmed: arm band  Preparation: Patient was prepped and draped in the usual sterile fashion. Indications: multiple ABGs and hemodynamic monitoring  Orientation:  Left  Location: radial artery  Sedation:  Patient sedated: yes  Sedatives: midazolam and fentanyl  Vitals: Vital signs were monitored during sedation.     Procedure Details:  Needle gauge: 20  Seldinger technique: Seldinger technique used  Number of attempts: 1    Post-procedure:  Post-procedure: dressing applied  Waveform: good waveform and waveform confirmed  Post-procedure CNS: normal  Patient tolerance: patient tolerated the procedure well with no immediate complications  Comments: Ultrasound guided  Left radial A line, One attempt  Performed predinduction to anesthesia  [phone]

## 2023-08-11 NOTE — ANESTHESIA POSTPROCEDURE EVALUATION
Post-Op Assessment Note    CV Status:  Stable  Pain scale: intubated sedated. Pain control: intubated sedated. Post-procedure mental status: intubated sedated. Hydration Status:  Stable   PONV status: intubated sedated. Airway patency: intubated. Intubated: intubated. Post Op Vitals Reviewed: Yes      Staff: Anesthesiologist   Comments: transferred to CICU with epi, phenylephrine, insulin gtt. VSS. No notable events documented.     BP      Temp      Pulse     Resp      SpO2

## 2023-08-11 NOTE — OP NOTE
OPERATIVE REPORT  PATIENT NAME: Maryjo George    :  1957  MRN: [id number]  Pt Location: [facility] OR ROOM 16     SURGERY DATE: 2023     SURGEON: Soham Hodgson MD     ASSISTANT: Rakel Mary PA-C     ADDITIONAL ASSISTANT: Danya Ferrera PA-C     PREOPERATIVE DIAGNOSIS:  Multivessel coronary artery disease     POSTOPERATIVE DIAGNOSIS:  Multivessel coronary artery disease     NYHA: 3                      CCS: 2     PROCEDURE:   Coronary artery bypass grafting x 5 with left internal mammary artery to left anterior descending artery, saphenous vein graft to diagonal 1, saphenous vein graft - Y -  to ramus intermedius and obtuse marginal 1 and saphenous vein graft to right posterior descending artery     CARDIOPULMONARY BYPASS TIME: 108 minutes. CROSSCLAMP TIME: 89 minutes.     ANESTHESIA: General endotracheal anesthesia with transesophageal echocardiogram  guidance, Dr. Nicolas Beavers     INDICATIONS:  The patient is a 77y.o. year-old male diagnosed with Multivessel coronary artery disease. Coronary angiography showed LAD 90%, D1 90%, Ramus 80%, CX 90%, %. . I saw the patient in consultation and recommended the procedure described previously.     FINDINGS:  1. Intraoperative transesophageal echocardiogram revealed EF 55%, no significant valvular disease. 2. Epiaortic ultrasound showed less than 5 mm non-mobile plaque  3. Coronary anatomy as described in coronary angiography, all the vessels were very heavily calcified  4. Final transesophageal echocardiogram demonstrated no changes.         OPERATIVE TECHNIQUE:     The patient was taken to the operating room, properly identified and placed supine on the operating table. Following the satisfactory induction of general anesthesia and placement of monitoring lines, the patient was prepped and draped in the usual sterile fashion.  A time-out procedure was performed.     The patient underwent median sternotomy, pericardiotomy, left internal mammary artery harvest with the standard technique and endoscopic bilateral greater saphenous vein harvest, systemic heparinization and conduit preparation. Epiaortic ultrasound showed less than 5 mm non-mobile plaque. Cannulation for cardiopulmonary bypass was performed with an arterial dispersion cannula, double stage venous cannula and a vented antegrade cardioplegia cannula. Once the ACT was greater than 480 seconds we placed the patient on cardiopulmonary bypass, the ascending aorta was crossclamped and cardiac arrest was obtained without complications with [place] cardioplegia.      The following grafts were completed, left internal mamamry artery to left anterior descending artery with excellent flow, saphenous vein graft to diagonal 1 with flow of 90 ml/min, saphenous vein graft - Y -  to ramus intermedius and obtuse marginal 1 with flow of 120 ml/min and 80 ml/min respectively and saphenous vein graft to right posterior descending artery with flow of 90 ml/min. All the distal anastomoses were performed in end-to-side fashion with 7-0 Prolene. A total of 3 proximal anastomoses were completed on the ascending aorta in end-to-side fashion using running 6-0 Prolene suture. The patient was placed in the Trendelenburg position, the heart was de-aired, a hotshot was given and the crossclamp was removed. The SVG to OM1 was anastomosed to the SVG to ramus in an end-to-side fashion with a running suture of 7-0 Prolene. Atrial and ventricular pacing wires were placed. Following a period of reperfusion, the patient was weaned from cardiopulmonary bypass and decannulated. Protamine was administered with normalization of the ACT. Hemostasis was confirmed in all fields. Thoracostomy tubes were placed. The sternum was closed with stainless steel wires.  The fascia, subdermis and skin were closed with multiple layers of running absorbable suture.     COMPLICATIONS: None.     PACKS/TUBES/DRAINS: Chest tubes x 3.      EBL: 200 cc.     TRANSFUSION: None.      SPECIMENS: None.      As the attending surgeon, I was present and scrubbed for all critical portions of this procedure. Sponge, needle and instrument counts were reported as correct by the nursing staff. There is no cardiac residency program at this facility and for complex procedures such as this, it is vital to have a physician assistant experienced in cardiac surgery.   The assistance of Lindy Mack PA-C was necessary for endoscopic saphenous vein harvesting, retraction of the heart and coronary conduit with proper tissue handling techniques, and following of the suture with correct tension during construction of the proximal and distal coronary anastomoses.              SIGNATURE: Jessi Serrano MD  DATE: August 11, 2023  TIME: 1:30 PM

## 2023-08-11 NOTE — ANESTHESIA PROCEDURE NOTES
Introducer/[place]    Performed by: Jacqueline Sweeney MD  Authorized by: Jacqueline Sweeney MD    Date/Time: 8/11/2023 8:26 AM  Consent: Written consent obtained. Risks and benefits: risks, benefits and alternatives were discussed  Consent given by: patient  Patient understanding: patient states understanding of the procedure being performed  Required items: required blood products, implants, devices, and special equipment available  Patient identity confirmed: arm band  Time out: Immediately prior to procedure a "time out" was called to verify the correct patient, procedure, equipment, support staff and site/side marked as required. Indications: vascular access and central pressure monitoring  Location details: right internal jugular  Catheter size: 7 Fr  Patient position: Trendelenburg  Anesthesia method: under GA. Sedation:  Patient sedated: under GA.     Assessment: blood return through all ports and free fluid flow  Preparation: skin prepped with ChloraPrep  Skin prep agent dried: skin prep agent completely dried prior to procedure  Sterile barriers: all five maximum sterile barriers used - cap, mask, sterile gown, sterile gloves, and large sterile sheet  Hand hygiene: hand hygiene performed prior to central venous catheter insertion  Ultrasound guidance: yes  ultrasound permanent image saved  Pre-procedure: landmarks identified  Number of attempts: 1  Successful placement: yes  Post-procedure: line sutured and dressing applied  Patient tolerance: patient tolerated the procedure well with no immediate complications  Comments: Ultrasound guidance  Ultrasound pic in MEDIA section of Epic  Performed postinduction to anesthesia  [id number]

## 2023-08-11 NOTE — CONSULTS
[address] [place]  Consult: Critical Care   Date of Service: 8/11/2023  Hospital Day: 0  Name: David Galvez  MRN: [id number]  Unit/Bed#: [facility] 414-01    Consults  Assessment/Plan     Impressions:  1. MVCAD s/p CABGx5    Neuro:   · Discontinue continuous sedation. · ATC tylenol, PRN oxycodone for pain  · Trend neuro exam  · Delirium precautions    CV:   · Goals:   · Cardiac Surgery Hemodynamic Monitoring goals: MAP goal >65 CI >2.2 Continue pulmonary artery catheter for hemodynamic monitoring  Continue central line due to vasoactive medications Continue arterial line for blood pressure monitoring and/or frequent blood gas draws  · Volume resuscitation as needed. · Epicardial pacing wire plan: Epicardial pacing wires in place. Will pace as needed for bradycardia or cardiac output   · Monitor rhythm on telemetry. Lung:   · Check STAT post-op ABG and CXR  · Wean vent with spontaneous breathing trial with goal to extubate today    GI:   · GI prophylaxis with PPI  · Bowel regimen  · Zofran PRN for nausea. FEN:   · NPO Replenish K >4.0, mag >2.0 and calcium >7.0. :   · Check STAT post-op BMP  · Gandhi in place. · Monitor UOP with goal >0.5cc/kg/hour. · Lasix versus volume resuscitate as needed depending on hemodynamics and volume status. ID:   · Prophylactic post-op abx. Maintain normothermia. Trend temps. Heme:   · Check STAT post-op H/H and platelets. · Monitor incision site, invasive lines, and chest tube outputs for bleeding. Send coag panel if needed. Endo:   · Insulin gtt for blood sugar control. Disposition: ICU Care        Subjective     HPI: Alexandrea Sevilla is a 61 y.o. male with PMH HTN, DM2, DUDLYE, Obesity who was evaluated for chest tightness, fatigue and SOB, diaphoresis and dizziness with exertion. Father had MI at age of 46. 7/31 NM stress test abnormal and LHC 8/2 with MVCAD. CTS evaluated and recommend CABG.  He is now s/p CABGx5 on Zbigniew and Epi. He required shock x2 of 50J. Post op EF 60%. Intra-op JOHN LVEF 60%  Post-op medications: Critical Care Infusions: Neosynephrine 30 mcg/min and Epinephrine 6 mcg/min    ROS: ROS unable to be obtained due to patient being intubated and sedated. History obtained from chart review due to patient being intubated and sedated. Objective          Vitals: Invasive Monitoring      /80   Pulse 80   Resp 16   O2 Sat 98 %   O2 Device Nasal cannula   Temp (!) 96.7 °F (35.9 °C)    Arterial Line  [place] BP    No data recorded   MAP    No data recorded     PA Catheter   Most Recent  Min/Max in 24hrs    PAP   No data recorded   CVP   No data recorded   CI    No data recorded   SVR   No data recorded            Diagnostic Studies Physical exam     08/11/23 CXR: ETT tube above noble appropriately, no pneumothorax, no pleural effusion. This was personally reviewed by myself in PACS.  08/11/23 EKG: NSR, PACs, no ST segment changes, no significant T wave changes. This was personally reviewed by myself. Physical Exam  Eyes:      Extraocular Movements: Extraocular movements intact. Pupils: Pupils are equal, round, and reactive to light. Skin:     General: Skin is warm and dry. Capillary Refill: Capillary refill takes less than 2 seconds. Neck:     Vascular: No JVD. Cardiovascular:      Rate and Rhythm: Normal rate and regular rhythm. Pulses: Normal pulses. Heart sounds: Normal heart sounds. Musculoskeletal:      Right lower leg: No edema. Left lower leg: No edema. Abdominal:      Palpations: Abdomen is soft. Constitutional:       Interventions: He is sedated and intubated. Pulmonary:      Effort: He is intubated. Breath sounds: Normal breath sounds.                    Medications:  Scheduled PRN   amiodarone, 200 mg, Q8H [address]  aspirin, 325 mg, Daily  atorvastatin, 80 mg, Daily With Dinner  calcium chloride, 1 g, Once  cefazolin, 2,000 mg, Q8H  chlorhexidine, 15 mL, BID  fentanyl citrate (PF), 50 mcg, Once  furosemide, 40 mg, Q6H  gabapentin, 100 mg, Daily  gabapentin, 300 mg, HS  [START ON 8/12/2023] heparin (porcine), 5,000 Units, Q8H BECKY  LORazepam, 0.5 mg, HS  magnesium sulfate, 2 g, Once  mupirocin, 1 Application, O98R [address]  [START ON 8/12/2023] pantoprazole, 40 mg, Early Morning  polyethylene glycol, 17 g, Daily  sertraline, 50 mg, Daily      acetaminophen, 650 mg, Q4H PRN  acetaminophen, 650 mg, Q4H PRN  bisacodyl, 10 mg, Daily PRN  fentanyl citrate (PF), 50 mcg, Q1H PRN  hydrALAZINE, 10 mg, Q6H PRN  HYDROmorphone, 0.5 mg, Q2H PRN  lactated ringers, 500 mL, Q30 Min PRN  lidocaine (cardiac), 100 mg, Q30 Min PRN  ondansetron, 4 mg, Q6H PRN  oxyCODONE, 2.5 mg, Q4H PRN  oxyCODONE, 5 mg, Q4H PRN  potassium chloride, 20 mEq, Once PRN  potassium chloride, 20 mEq, Q1H PRN  potassium chloride, 20 mEq, Q30 Min PRN       Continuous    EPINEPHrine 5,000 mcg (STANDARD CONCENTRATION) in sodium chloride 0.9% 250 mL, 1-10 mcg/min, Last Rate: 5 mcg/min (08/11/23 1420)  insulin regular (HumuLIN R,NovoLIN R) 1 Units/mL in sodium chloride 0.9 % 100 mL infusion, 0.3-21 Units/hr, Last Rate: 4 Units/hr (08/11/23 1441)  niCARdipine, 2.5-15 mg/hr  phenylephine,  mcg/min, Last Rate: 50 mcg/min (08/11/23 1415)  sodium chloride, 20 mL/hr, Last Rate: 20 mL/hr (08/11/23 1400)         Labs:  CBC    Recent Labs     08/11/23  1118 08/11/23  1123 08/11/23  1245 08/11/23  1408   HGB  --    < > 8.8* 11.2*   HCT  --    < > 26* 33*     --   --   --     < > = values in this interval not displayed.      BMP    Recent Labs     08/11/23  1405 08/11/23  1408   SODIUM 143  --    K 3.5  --    *  --    CO2 22 23   AGAP 8  --    BUN 12  --    CREATININE 0.97  --    CALCIUM 7.3*  --        Coags    No recent results     Additional Electrolytes  Recent Labs     08/11/23  1245 08/11/23  1408   CAIONIZED 1.16 1.13          Blood Gas    No recent results  No recent results LFTs  No recent results Infectious    No recent results     No recent results Glucose  Recent Labs     08/11/23  1405   GLUC 191*          Invasive lines and devices: Invasive Devices     Central Venous Catheter Line  Duration           CVC Central Lines 08/11/23 Triple <1 day    Introducer 08/11/23 <1 day          Peripheral Intravenous Line  Duration           Peripheral IV 08/11/23 Left Antecubital <1 day    Peripheral IV 08/11/23 Right Antecubital <1 day          Arterial Line  Duration           Arterial Line 08/11/23 Left Radial <1 day          Line  Duration           Pacer Wires <1 day    Pacer Wires <1 day          Drain  Duration           Chest Tube 1 Anterior;Mediastinal 32 Fr. <1 day    Chest Tube 2 Posterior;Mediastinal 32 Fr. <1 day    Chest Tube 3 Posterior;Mediastinal 24 Fr. <1 day    Urethral Catheter Non-latex;Straight-tip; Temperature probe 16 Fr. <1 day          Airway  Duration           ETT  Hi-Lo; Cuffed;Oral 8 mm <1 day                 Historical Information   Past Medical History:  No date: Coronary artery disease  No date: Diabetes mellitus ([address])  No date: Hyperlipidemia  No date: Hypertension Past Surgical History:  No date: ARTERIAL ANEURYSM REPAIR      Comment:  Right Carotid Aneurysm - age 13.  08/02/2023: CARDIAC CATHETERIZATION; Left      Comment:  Procedure: Cardiac Left Heart Cath;  Surgeon:                Leyda Goncalves DO;  Location: [facility]; Service: Cardiology  08/02/2023: CARDIAC CATHETERIZATION; N/A      Comment:  Procedure: Cardiac Coronary Angiogram;  Surgeon:                Leyda Goncalves DO;  [facility]; Service: Cardiology  08/02/2023: CARDIAC CATHETERIZATION      Comment:  Procedure: Cardiac catheterization;  Surgeon:                Leyda Goncalves DO;  Location[facility];                Service: Cardiology  No date: HERNIA REPAIR  No date: VASECTOMY   Current Outpatient Medications   Medication Instructions   • aspirin 81 mg, Oral   • Continuous Blood Gluc Sensor (FreeStyle Ayanna 2 Sensor) MISC APPLY AND CHANGE EVERY 14 DAYS   • gabapentin (NEURONTIN) 100 mg capsule TAKE ONE CAPSULE DAILY IN THE MORNING, 3 CAPSULES IN THE EVENING   • glipiZIDE (GLUCOTROL XL) 10 mg, Oral   • Jardiance 25 MG TABS No dose, route, or frequency recorded. • lisinopril (ZESTRIL) 5 mg, Oral, Daily   • LORazepam (ATIVAN) 0.5 mg, Oral, Daily at bedtime   • meloxicam (MOBIC) 15 mg, Oral, Daily   • metFORMIN (GLUCOPHAGE-XR) 1,000 mg, Oral, 2 times daily   • metoprolol tartrate (LOPRESSOR) 25 mg, Oral, Every 12 hours scheduled   • mupirocin (BACTROBAN) 2 % ointment Topical, 2 times daily, Apply to each nostril twice daily for five days before your operation.    • nitroglycerin (NITROSTAT) 0.4 mg, Sublingual, Every 5 minutes PRN   • Ozempic, 2 MG/DOSE, 8 MG/3ML injection pen INJECT 2MG UNDER THE SKIN ONCE A WEEK   • rosuvastatin (CRESTOR) 20 mg, Oral, Daily   • sertraline (ZOLOFT) 50 mg, Oral, Daily    Allergies   Allergen Reactions   • Atorvastatin Other (See Comments)     felt flu-like   • Rosiglitazone Other (See Comments)   • Trazodone Other (See Comments)     Felt "hung over"      Social History     Tobacco Use   • Smoking status: Never   • Smokeless tobacco: Never   Substance Use Topics   • Alcohol use: No   • Drug use: No    Family History   Problem Relation Age of Onset   • Leukemia Mother    • Heart attack Father 46            SIGNATURE: Ye Hernandez PA-C

## 2023-08-11 NOTE — RESPIRATORY THERAPY NOTE
Resp Care   08/11/23 1513   Respiratory Assessment   Resp Comments Pt extubated per order, pt extubated to 4L NC. Pt able to speak and no stridor heard. Will continue to monitor pt per protocol.    Vent Information   Vent type    Ventilator End Yes   $ Pulse Oximetry Spot Check Charge Completed   Daily Screen   Patient safety screen outcome: Passed   Spont breathing trial % for 30 min Yes   Spont breathing trial outcome: Passed   Name of Medical Team Notified: Dr Crespo Plan   Preparing to extubate/ Notify Nurse Yes   Extubation order obtained Yes   Consider Cuff Test Yes   Patient extubated Yes

## 2023-08-11 NOTE — INTERVAL H&P NOTE
H&P reviewed. After examining the patient I find no changes in the patients condition since the H&P had been written. Vitals:    08/11/23 0546   BP: 122/80   Pulse: 80   Resp: 16   Temp: (!) 96.7 °F (35.9 °C)   SpO2: 98%     Anticipated Length of Stay:  Patient will be admitted on an Inpatient basis with an anticipated length of stay of  greater than 2 midnights. Justification for Hospital Stay:  Post surgical recovery following open heart surgery.

## 2023-08-11 NOTE — ANESTHESIA PROCEDURE NOTES
Central Line Insertion    Performed by: Leopoldo Larch, MD  Authorized by: Leopoldo Larch, MD    Date/Time: 8/11/2023 8:26 AM  Catheter Type:  triple lumen  Consent: Written consent obtained. Risks and benefits: risks, benefits and alternatives were discussed  Consent given by: patient  Patient understanding: patient states understanding of the procedure being performed  Required items: required blood products, implants, devices, and special equipment available  Patient identity confirmed: arm band  Indications: vascular access and central pressure monitoring  Catheter size: 7 Fr  Patient position: Trendelenburg  Anesthesia method: under GA. Sedation:  Patient sedated: under GA.     Assessment: blood return through all ports and free fluid flow  Preparation: skin prepped with ChloraPrep  Skin prep agent dried: skin prep agent completely dried prior to procedure  Sterile barriers: all five maximum sterile barriers used - cap, mask, sterile gown, sterile gloves, and large sterile sheet  Hand hygiene: hand hygiene performed prior to central venous catheter insertion  sterile gel and probe cover used in ultrasound-guided central venous catheter insertionultrasound permanent image saved  Pre-procedure: landmarks identified  Number of attempts: 1  Successful placement: yes  Post-procedure: dressing applied and line sutured  Patient tolerance: patient tolerated the procedure well with no immediate complications  Comments: Ultrasound guidance  Ultrasound pic in MEDIA section of Epic  Performed postinduction to anesthesia [id number]

## 2023-08-12 ENCOUNTER — APPOINTMENT (OUTPATIENT)
Dept: RADIOLOGY | Facility: HOSPITAL | Age: 63
DRG: 236 | End: 2023-08-12
Payer: COMMERCIAL

## 2023-08-12 LAB
ABO GROUP BLD BPU: NORMAL
ANION GAP SERPL CALCULATED.3IONS-SCNC: 5 MMOL/L
ATRIAL RATE: 84 BPM
BASE EX.OXY STD BLDV CALC-SCNC: 59.3 % (ref 60–80)
BASE EXCESS BLDV CALC-SCNC: -4.7 MMOL/L
BPU ID: NORMAL
BUN SERPL-MCNC: 14 MG/DL (ref 5–25)
CA-I BLD-SCNC: 1.03 MMOL/L (ref 1.12–1.32)
CALCIUM SERPL-MCNC: 7.1 MG/DL (ref 8.3–10.1)
CHLORIDE SERPL-SCNC: 116 MMOL/L (ref 96–108)
CO2 SERPL-SCNC: 20 MMOL/L (ref 21–32)
CREAT SERPL-MCNC: 0.91 MG/DL (ref 0.6–1.3)
CROSSMATCH: NORMAL
ERYTHROCYTE [DISTWIDTH] IN BLOOD BY AUTOMATED COUNT: 13.2 % (ref 11.6–15.1)
GFR SERPL CREATININE-BSD FRML MDRD: 89 ML/MIN/1.73SQ M
GLUCOSE SERPL-MCNC: 106 MG/DL (ref 65–140)
GLUCOSE SERPL-MCNC: 111 MG/DL (ref 65–140)
GLUCOSE SERPL-MCNC: 112 MG/DL (ref 65–140)
GLUCOSE SERPL-MCNC: 114 MG/DL (ref 65–140)
GLUCOSE SERPL-MCNC: 118 MG/DL (ref 65–140)
GLUCOSE SERPL-MCNC: 123 MG/DL (ref 65–140)
GLUCOSE SERPL-MCNC: 132 MG/DL (ref 65–140)
GLUCOSE SERPL-MCNC: 151 MG/DL (ref 65–140)
GLUCOSE SERPL-MCNC: 156 MG/DL (ref 65–140)
GLUCOSE SERPL-MCNC: 158 MG/DL (ref 65–140)
GLUCOSE SERPL-MCNC: 161 MG/DL (ref 65–140)
GLUCOSE SERPL-MCNC: 164 MG/DL (ref 65–140)
GLUCOSE SERPL-MCNC: 178 MG/DL (ref 65–140)
GLUCOSE SERPL-MCNC: 179 MG/DL (ref 65–140)
HCO3 BLDV-SCNC: 23.3 MMOL/L (ref 24–30)
HCT VFR BLD AUTO: 37.4 % (ref 36.5–49.3)
HGB BLD-MCNC: 12.3 G/DL (ref 12–17)
MCH RBC QN AUTO: 29.9 PG (ref 26.8–34.3)
MCHC RBC AUTO-ENTMCNC: 32.9 G/DL (ref 31.4–37.4)
MCV RBC AUTO: 91 FL (ref 82–98)
NASAL CANNULA: 2
O2 CT BLDV-SCNC: 10.8 ML/DL
P AXIS: 38 DEGREES
PCO2 BLDV: 55.5 MM HG (ref 42–50)
PH BLDV: 7.24 [PH] (ref 7.3–7.4)
PLATELET # BLD AUTO: 187 THOUSANDS/UL (ref 149–390)
PMV BLD AUTO: 10.6 FL (ref 8.9–12.7)
PO2 BLDV: 32.4 MM HG (ref 35–45)
POTASSIUM SERPL-SCNC: 5.3 MMOL/L (ref 3.5–5.3)
PR INTERVAL: 192 MS
QRS AXIS: 40 DEGREES
QRSD INTERVAL: 92 MS
QT INTERVAL: 390 MS
QTC INTERVAL: 460 MS
RBC # BLD AUTO: 4.11 MILLION/UL (ref 3.88–5.62)
SODIUM SERPL-SCNC: 141 MMOL/L (ref 135–147)
T WAVE AXIS: 63 DEGREES
UNIT DISPENSE STATUS: NORMAL
UNIT PRODUCT CODE: NORMAL
UNIT PRODUCT VOLUME: 350 ML
UNIT RH: NORMAL
VENTRICULAR RATE: 84 BPM
WBC # BLD AUTO: 17.02 THOUSAND/UL (ref 4.31–10.16)

## 2023-08-12 PROCEDURE — 82805 BLOOD GASES W/O2 SATURATION: CPT | Performed by: NURSE PRACTITIONER

## 2023-08-12 PROCEDURE — 99222 1ST HOSP IP/OBS MODERATE 55: CPT | Performed by: INTERNAL MEDICINE

## 2023-08-12 PROCEDURE — 82330 ASSAY OF CALCIUM: CPT | Performed by: ANESTHESIOLOGY

## 2023-08-12 PROCEDURE — 85027 COMPLETE CBC AUTOMATED: CPT | Performed by: PHYSICIAN ASSISTANT

## 2023-08-12 PROCEDURE — 99233 SBSQ HOSP IP/OBS HIGH 50: CPT | Performed by: ANESTHESIOLOGY

## 2023-08-12 PROCEDURE — 82948 REAGENT STRIP/BLOOD GLUCOSE: CPT

## 2023-08-12 PROCEDURE — 93010 ELECTROCARDIOGRAM REPORT: CPT | Performed by: INTERNAL MEDICINE

## 2023-08-12 PROCEDURE — 80048 BASIC METABOLIC PNL TOTAL CA: CPT | Performed by: PHYSICIAN ASSISTANT

## 2023-08-12 PROCEDURE — 99024 POSTOP FOLLOW-UP VISIT: CPT | Performed by: THORACIC SURGERY (CARDIOTHORACIC VASCULAR SURGERY)

## 2023-08-12 PROCEDURE — 71045 X-RAY EXAM CHEST 1 VIEW: CPT

## 2023-08-12 RX ORDER — DOCUSATE SODIUM 100 MG/1
100 CAPSULE, LIQUID FILLED ORAL 2 TIMES DAILY
Status: DISCONTINUED | OUTPATIENT
Start: 2023-08-12 | End: 2023-08-15 | Stop reason: HOSPADM

## 2023-08-12 RX ORDER — FUROSEMIDE 10 MG/ML
40 INJECTION INTRAMUSCULAR; INTRAVENOUS DAILY
Status: DISCONTINUED | OUTPATIENT
Start: 2023-08-12 | End: 2023-08-13

## 2023-08-12 RX ORDER — FONDAPARINUX SODIUM 2.5 MG/.5ML
2.5 INJECTION SUBCUTANEOUS EVERY 24 HOURS
Status: DISCONTINUED | OUTPATIENT
Start: 2023-08-12 | End: 2023-08-15 | Stop reason: HOSPADM

## 2023-08-12 RX ORDER — MAGNESIUM SULFATE HEPTAHYDRATE 40 MG/ML
2 INJECTION, SOLUTION INTRAVENOUS ONCE
Status: COMPLETED | OUTPATIENT
Start: 2023-08-12 | End: 2023-08-12

## 2023-08-12 RX ORDER — TEMAZEPAM 15 MG/1
15 CAPSULE ORAL
Status: DISCONTINUED | OUTPATIENT
Start: 2023-08-12 | End: 2023-08-12

## 2023-08-12 RX ORDER — ACETAMINOPHEN 325 MG/1
975 TABLET ORAL EVERY 8 HOURS SCHEDULED
Status: DISCONTINUED | OUTPATIENT
Start: 2023-08-12 | End: 2023-08-15 | Stop reason: HOSPADM

## 2023-08-12 RX ORDER — PHENYLEPHRINE HYDROCHLORIDE 10 MG/ML
INJECTION INTRAVENOUS
Status: COMPLETED
Start: 2023-08-12 | End: 2023-08-12

## 2023-08-12 RX ORDER — METHOCARBAMOL 500 MG/1
500 TABLET, FILM COATED ORAL EVERY 6 HOURS SCHEDULED
Status: COMPLETED | OUTPATIENT
Start: 2023-08-13 | End: 2023-08-14

## 2023-08-12 RX ADMIN — OXYCODONE HYDROCHLORIDE 10 MG: 10 TABLET ORAL at 22:15

## 2023-08-12 RX ADMIN — SODIUM CHLORIDE 1.5 UNITS/HR: 9 INJECTION, SOLUTION INTRAVENOUS at 20:49

## 2023-08-12 RX ADMIN — Medication 90 MCG/MIN: at 06:34

## 2023-08-12 RX ADMIN — CHLORHEXIDINE GLUCONATE 15 ML: 1.2 SOLUTION ORAL at 08:07

## 2023-08-12 RX ADMIN — GABAPENTIN 100 MG: 100 CAPSULE ORAL at 08:00

## 2023-08-12 RX ADMIN — MUPIROCIN 1 APPLICATION: 20 OINTMENT TOPICAL at 08:00

## 2023-08-12 RX ADMIN — ASPIRIN 325 MG ORAL TABLET 325 MG: 325 PILL ORAL at 08:01

## 2023-08-12 RX ADMIN — HEPARIN SODIUM 5000 UNITS: 5000 INJECTION INTRAVENOUS; SUBCUTANEOUS at 06:14

## 2023-08-12 RX ADMIN — CEFAZOLIN SODIUM 2000 MG: 2 SOLUTION INTRAVENOUS at 03:43

## 2023-08-12 RX ADMIN — PHENYLEPHRINE HYDROCHLORIDE 50 MG: 10 INJECTION INTRAVENOUS at 17:56

## 2023-08-12 RX ADMIN — OXYCODONE HYDROCHLORIDE 10 MG: 10 TABLET ORAL at 14:30

## 2023-08-12 RX ADMIN — GABAPENTIN 300 MG: 300 CAPSULE ORAL at 21:08

## 2023-08-12 RX ADMIN — PANTOPRAZOLE SODIUM 40 MG: 40 TABLET, DELAYED RELEASE ORAL at 06:14

## 2023-08-12 RX ADMIN — POLYETHYLENE GLYCOL 3350 17 G: 17 POWDER, FOR SOLUTION ORAL at 08:00

## 2023-08-12 RX ADMIN — FONDAPARINUX SODIUM 2.5 MG: 2.5 INJECTION, SOLUTION SUBCUTANEOUS at 08:00

## 2023-08-12 RX ADMIN — CHLORHEXIDINE GLUCONATE 15 ML: 1.2 SOLUTION ORAL at 17:55

## 2023-08-12 RX ADMIN — SERTRALINE HYDROCHLORIDE 50 MG: 50 TABLET ORAL at 08:00

## 2023-08-12 RX ADMIN — AMIODARONE HYDROCHLORIDE 200 MG: 200 TABLET ORAL at 06:14

## 2023-08-12 RX ADMIN — ONDANSETRON 4 MG: 2 INJECTION INTRAMUSCULAR; INTRAVENOUS at 06:01

## 2023-08-12 RX ADMIN — AMIODARONE HYDROCHLORIDE 200 MG: 200 TABLET ORAL at 14:32

## 2023-08-12 RX ADMIN — FUROSEMIDE 40 MG: 10 INJECTION, SOLUTION INTRAMUSCULAR; INTRAVENOUS at 08:01

## 2023-08-12 RX ADMIN — ACETAMINOPHEN 975 MG: 325 TABLET, FILM COATED ORAL at 21:08

## 2023-08-12 RX ADMIN — MUPIROCIN 1 APPLICATION: 20 OINTMENT TOPICAL at 21:08

## 2023-08-12 RX ADMIN — LORAZEPAM 0.5 MG: 0.5 TABLET ORAL at 21:08

## 2023-08-12 RX ADMIN — MAGNESIUM SULFATE HEPTAHYDRATE 2 G: 40 INJECTION, SOLUTION INTRAVENOUS at 08:00

## 2023-08-12 RX ADMIN — AMIODARONE HYDROCHLORIDE 200 MG: 200 TABLET ORAL at 21:08

## 2023-08-12 RX ADMIN — OXYCODONE HYDROCHLORIDE 10 MG: 10 TABLET ORAL at 18:23

## 2023-08-12 RX ADMIN — CEFAZOLIN SODIUM 2000 MG: 2 SOLUTION INTRAVENOUS at 12:04

## 2023-08-12 RX ADMIN — DOCUSATE SODIUM 100 MG: 100 CAPSULE, LIQUID FILLED ORAL at 17:55

## 2023-08-12 RX ADMIN — OXYCODONE HYDROCHLORIDE 10 MG: 10 TABLET ORAL at 03:43

## 2023-08-12 RX ADMIN — OXYCODONE HYDROCHLORIDE 10 MG: 10 TABLET ORAL at 08:03

## 2023-08-12 RX ADMIN — ACETAMINOPHEN 975 MG: 325 TABLET, FILM COATED ORAL at 14:30

## 2023-08-12 RX ADMIN — ATORVASTATIN CALCIUM 80 MG: 80 TABLET, FILM COATED ORAL at 17:55

## 2023-08-12 RX ADMIN — DOCUSATE SODIUM 100 MG: 100 CAPSULE, LIQUID FILLED ORAL at 08:00

## 2023-08-12 NOTE — CASE MANAGEMENT
Case Management Discharge Planning Note    Patient name Ceci Pettit  Location [address] [place] [address] [address]/[facility] 257-77 MRN [id number]  : 1960 Date 2023       Current Admission Date: 2023  Current Admission Diagnosis:Coronary artery disease involving native coronary artery   Patient Active Problem List    Diagnosis Date Noted   • S/P CABG x 5 2023   • Coronary artery disease involving native coronary artery 2023   • Peripheral angiopathy due to type 2 diabetes mellitus ([address]) 2023   • Sleep apnea    • HTN (hypertension), benign 2018   • Type 2 diabetes mellitus ([address]) 2018   • Hyperlipidemia LDL goal <100 2018   • Right knee pain 2014   • Myalgia and myositis 2014   • Vitamin D deficiency 2014   • Depressive disorder 2012      LOS (days): 1  Geometric Mean LOS (GMLOS) (days):   Days to GMLOS:     OBJECTIVE:  Risk of Unplanned Readmission Score: 8.7         Current admission status: Inpatient   Preferred Pharmacy:   [place] [address]  Phone: [phone] Fax: [phone]    Primary Care Provider: Isaac Gonzales MD    Primary Insurance: [facility]  Secondary Insurance:     DISCHARGE DETAILS:                                [address] [facility] Agency Name[de-identified] Donavon HAMILTON         Other Referral/Resources/Interventions Provided:  Referral Comments: Accepted by [facility]                                                      Additional Comments: Accepted by [facility]

## 2023-08-12 NOTE — CASE MANAGEMENT
Case Management Assessment & Discharge Planning Note    Patient name Tyrese Purvis  Location [address] [place] [address] 414/[facility] 464-72 MRN [id number]  : 1960 Date 2023       Current Admission Date: 2023  Current Admission Diagnosis:Coronary artery disease involving native coronary artery   Patient Active Problem List    Diagnosis Date Noted   • S/P CABG x 5 2023   • Coronary artery disease involving native coronary artery 2023   • Peripheral angiopathy due to type 2 diabetes mellitus ([address]) 2023   • Sleep apnea    • HTN (hypertension), benign 2018   • Type 2 diabetes mellitus ([address]) 2018   • Hyperlipidemia LDL goal <100 2018   • Right knee pain 2014   • Myalgia and myositis 2014   • Vitamin D deficiency 2014   • Depressive disorder 2012      LOS (days): 1  Geometric Mean LOS (GMLOS) (days):   Days to GMLOS:     OBJECTIVE:    Risk of Unplanned Readmission Score: 9.08         Current admission status: Inpatient       Preferred Pharmacy:   [facility], [address] [address]  Phone: [phone] Fax: [phone]    Primary Care Provider: Oz Deleon MD    Primary Insurance: [facility]  Secondary Insurance:     ASSESSMENT:  [facility] Proxies    There are no active Health Care Proxies on file.        Advance Directives  Does patient have a [address]?: No  Was patient offered paperwork?: Yes (declined)  Does patient currently have a Health Care decision maker?: Yes, please see Health Care Proxy section  Does patient have Advance Directives?: No  Was patient offered paperwork?: Yes (declined)  Primary Contact: wife Chinyere Savage         Readmission Root Cause  30 Day Readmission: No    Patient Information  Admitted from[de-identified] Home  Mental Status: Alert  During Assessment patient was accompanied by: Spouse  Assessment information provided by[de-identified] Patient, Spouse  Primary Caregiver: Self  Support Systems: Spouse/significant other  [facility] Residence: [address] [place] [address] do you live in?: [address] entry access options.  Select all that apply.: Stairs  Number of steps to enter home.: 3  Do the steps have railings?: Yes  Type of Current Residence: 2 story home, Other (Comment) (first floor setup)  Upon entering residence, is there a bedroom on the main floor (no further steps)?: Yes  Upon entering residence, is there a bathroom on the main floor (no further steps)?: Yes  In the last 12 months, was there a time when you were not able to pay the mortgage or rent on time?: No  In the last 12 months, how many places have you lived?: 1  In the last 12 months, was there a time when you did not have a steady place to sleep or slept in a shelter (including now)?: No  Homeless/housing insecurity resource given?: N/A  Living Arrangements: Lives w/ Spouse/significant other  Is patient a ?: No    Activities of Daily Living Prior to Admission  Functional Status: Independent  Completes ADLs independently?: Yes  Ambulates independently?: Yes  Does patient use assisted devices?: No  Does patient currently own DME?: Yes  What DME does the patient currently own?: [employer]  Does patient have a history of Outpatient Therapy (PT/OT)?: No  Does the patient have a history of Short-Term Rehab?: No  Does patient have a history of HHC?: No  Does patient currently have [facility]?: No         Patient Information Continued  Income Source: Employed  Does patient have prescription coverage?: Yes  Within the past 12 months, you worried that your food would run out before you got the money to buy more.: Never true  Within the past 12 months, the food you bought just didn't last and you didn't have money to get more.: Never true  Food insecurity resource given?: N/A  Does patient receive dialysis treatments?: No  Does patient have a history of substance abuse?: No  Does patient have a history of Mental Health Diagnosis?: No         Means of Transportation  Means of Transport to Appts[de-identified] Drives Self  In the past 12 months, has lack of transportation kept you from medical appointments or from getting medications?: No  In the past 12 months, has lack of transportation kept you from meetings, work, or from getting things needed for daily living?: No  Was application for public transport provided?: N/A        DISCHARGE DETAILS:    Discharge planning discussed with[de-identified] pt and wife--would prefer [facility] for [facility], wants to stay with [facility]  [place] of Choice: Yes     CM contacted family/caregiver?: Yes  Were Treatment Team discharge recommendations reviewed with patient/caregiver?: Yes  Did patient/caregiver verbalize understanding of patient care needs?: Yes  Were patient/caregiver advised of the risks associated with not following Treatment Team discharge recommendations?: Yes    Contacts  Patient Contacts: wife Jonathan Brought  Relationship to Patient[de-identified] Family  Contact Method: Phone  Phone Number: [phone]  Reason/Outcome: Continuity of Care, Emergency Contact, Discharge [facility]         Is the patient interested in [facility] at discharge?: Yes  [facility] requested[de-identified] [address] Provider[de-identified] PCP  Home Health Services Needed[de-identified] Post-Op Care and Assessment, Diabetes Management  Homebound Criteria Met[de-identified] Requires the Assistance of Another Person for Safe Ambulation or to Leave the Home  Supporting Clincal Findings[de-identified] Fatigues Easliy in [employer], Limited Endurance         Other Referral/Resources/Interventions Provided:  Interventions: [facility]  Referral Comments: Referral to [facility] as requested    Would you like to participate in our [facility] "[employer]" service program?  : No - Declined    Treatment Team Recommendation: Home with [facility]  Discharge Destination Plan[de-identified] Home with [address] [facility] at Discharge : Family Family notified[de-identified] wife here  Additional Comments: 59yo male is s/p CABGx5, Neosynephrine gtt for low BP. Delined.  Lives with wife in [facility], requested [id number] if available

## 2023-08-12 NOTE — PROGRESS NOTES
[address] [facility]  Progress Note: Critical Care   Date of Service: 8/12/2023  Hospital Day: 1  Name: Shweta Elizalde  MRN: [id number]  Unit/Bed#: [facility] 414-01      Assessment/Plan     Impressions:  1. CAD s/p CABG x5  2. HTN  3. HLD  4. DM2  5. DUDLEY    Neuro:   · Cardiac Surgery Pain Control: ATC tylenol and Increase PRN oxycodone today due to poor pain control  · Delirium precautions  · Regulate sleep/wake cycle  · CAM-ICU daily  · Trend neuro exam      CV:   · Cardiac Surgery Hemodynamic Monitoring: MAP goal >65 Continue central line due to vasoactive medications Continue arterial line for blood pressure monitoring and/or frequent blood gas draws Discontinue PA catheter  · Follow rhythm on telemetry  · Critical Care Infusions: Neosynephrine 100 mcg/min  · Beta Blocker Plan: Hold beta blocker secondary to hypotension  · Epicardial pacing wire plan: Epicardial pacing wires in place. Will pace as needed for bradycardia or cardiac output   · Post op cardiac surgery medications:   ·  mg QD  · Statin: Lipitor 80 mg qHS  · Amiodarone 200 mg PO q8 hours     Lung:   · Good room air saturation   · Chest tube output remains persistently high; Continue chest tubes to suction today    GI:   · Continue PPI for stress ulcer prophylaxis  · Continue bowel regimen  · Trend abdominal exam and bowel function    FEN:   · Diuresis Plan: Lasix 40mg IV QD  · Nutrition plan: as tolerated  · Replenish electrolytes with goals: K >4.0, Mag >2.0, and Phos >3.0    :   Gandhi Plan: Gandhi to be removed. Order has been placed  · Trend UOP and BUN/creat  · Strict I and O     ID:   · Trend temps and WBC count  · Maintain normothermia    Heme:   · Trend hgb and plts    Endo:   Continue Insulin infusion and consult endocrine. Initiate home insulin teaching.     MSK/Skin:  · Mobility goal:   · PT/OT consult as medically appropriate   · Frequent turning and pressure off-loading  · Local wound care as needed    Disposition:  Critical care    ICU Core Measures     A: Assess, Prevent, and Manage Pain · Has pain been assessed? Yes  · Need for changes to pain regimen? No   B: Both SAT/SAT  · N/A   C: Choice of Sedation · RASS Goal: N/A patient not on sedation  · Need for changes to sedation or analgesia regimen? NA   D: Delirium · CAM-ICU: Negative   E: Early Mobility  · Plan for early mobility? Yes   F: Family Engagement · Plan for family engagement today? Yes       Antibiotic Review: Post op requirements     Review of Invasive Devices: Gandhi Plan: Gandhi to be removed. Order has been placed  Central access plan: Medications requiring central line  Sera Plan: Keep arterial line for hemodynamic monitoring    Prophylaxis:  VTE VTE covered by:  heparin (porcine), Subcutaneous, 5,000 Units at 08/12/23 9464       Stress Ulcer  covered bypantoprazole (PROTONIX) EC tablet 40 mg [[id number]]          Significant 24hr Events      24 Hour Events/HPI: POD # 1 s/p CABG x5. Epi weaned to off and delined. Maintained on sonali for MAP goals. Subjective     Review of Systems   Respiratory: Positive for chest tightness. Negative for shortness of breath. Cardiovascular: Negative for chest pain. Gastrointestinal: Negative for nausea and vomiting. All other systems reviewed and are negative.          Objective     Medications:  Scheduled Continuous   acetaminophen, 975 mg, Q8H [address]  amiodarone, 200 mg, Q8H BECKY  aspirin, 325 mg, Daily  atorvastatin, 80 mg, Daily With Dinner  cefazolin, 2,000 mg, Q8H  chlorhexidine, 15 mL, BID  gabapentin, 100 mg, Daily  gabapentin, 300 mg, HS  heparin (porcine), 5,000 Units, Q8H BECKY  LORazepam, 0.5 mg, HS  mupirocin, 1 Application, J72M BECKY  pantoprazole, 40 mg, Early Morning  polyethylene glycol, 17 g, Daily  sertraline, 50 mg, Daily      insulin regular (HumuLIN R,NovoLIN R) 1 Units/mL in sodium chloride 0.9 % 100 mL infusion, 0.3-21 Units/hr, Last Rate: 1 Units/hr (08/12/23 0240)  phenylephine,  mcg/min, Last Rate: 90 mcg/min (08/11/23 2230)  sodium chloride, 20 mL/hr, Last Rate: 20 mL/hr (08/11/23 1400)         Vitals: Invasive Monitoring       Most Recent Min/Max in 24hrs   Temp 99.3 °F (37.4 °C) Temp  Min: 97.7 °F (36.5 °C)  Max: 101.1 °F (38.4 °C)   Pulse 80 Pulse  Min: 80  Max: 101   Resp 20 Resp  Min: 12  Max: 29   /80 No data recorded   O2 Sat 97 % SpO2  Min: 92 %  Max: 99 %   O2 Device: Nasal cannula  Nasal Cannula O2 Flow Rate (L/min): 2 L/min Arterial Line  [place] /56  Arterial Line BP  Min: 95/53  Max: 125/52   MAP 72 mmHg  Arterial Line MAP (mmHg)  Min: 59 mmHg  Max: 72 mmHg     PA Catheter   Most Recent  Min/Max in 24hrs    PAP 37/16 PAP  Min: 18/5  Max: 40/21   CVP 15 mmHg CVP (mean)  Min: 5 mmHg  Max: 22 mmHg   CI 2.1 L/min/m2  CI (L/min/m2)  Min: 1.8 L/min/m2  Max: 2.8 L/min/m2   SVR 1077 (dyne*sec)/cm5 SVR (dyne*sec)/cm5  Min: 663 (dyne*sec)/cm5  Max: 1158 (dyne*sec)/cm5            I/O Chest tube output (if present)     Intake/Output Summary (Last 24 hours) at 8/12/2023 0618  Last data filed at 8/12/2023 0400  Gross per 24 hour   Intake 6447.41 ml   Output 4010 ml   Net 2437.41 ml     UOP: 50/hour    BM: 0 in the last 24 hours  Weight change: 7.101 kg (15 lb 10.5 oz)     Mediastinal tubes:  110 mL/8hr  400 mL/24hr   Pleural tubes: 55 mL/8hr  75 mL/24hr        Physical Exam   Physical Exam  Skin:     General: Skin is warm and dry. Neck:     Vascular: Central line present. Cardiovascular:      Rate and Rhythm: Normal rate and regular rhythm. Heart sounds: No murmur heard. No friction rub. Abdominal:      Palpations: Abdomen is soft. Tenderness: There is no abdominal tenderness. Constitutional:       General: He is not in acute distress. Appearance: He is well-developed. Pulmonary:      Effort: Pulmonary effort is normal.      Breath sounds: Normal breath sounds. Neurological:      General: No focal deficit present. Mental Status: He is alert and oriented to person, place and time. Genitourinary/Anorectal:  FoleyVitals reviewed. Diagnostic Studies      08/12/23 CXR: Lungs clear. No pTX. Gastric air. CVC shallow. This was personally reviewed by myself in PACS.  08/12/23 EKG: NSR. No ST changes. This was personally reviewed by myself. Labs:  CBC    Recent Labs     08/11/23  1405 08/11/23  1408 08/11/23 2204 08/12/23  0403   WBC  --   --   --  17.02*   HGB 12.4   < > 12.4 12.3   HCT 36.8   < > 36.4* 37.4     --   --  187    < > = values in this interval not displayed. BMP    Recent Labs     08/11/23  1405 08/11/23  1408 08/11/23  1802 08/11/23 2204 08/12/23  0402   SODIUM 143  --   --   --  141   K 3.5  --    < > 5.0 5.3   *  --   --   --  116*   CO2 22 23  --   --  20*   AGAP 8  --   --   --  5   BUN 12  --   --   --  14   CREATININE 0.97  --   --   --  0.91   CALCIUM 7.3*  --   --   --  7.1*    < > = values in this interval not displayed. Baseline Creat: 0.8   Coags    No recent results           Additional Electrolytes  Recent Labs     08/11/23  1245 08/11/23  1408   CAIONIZED 1.16 1.13          Blood Gas    No recent results  Recent Labs     08/12/23  0245   PHVEN 7.240*   KVS6YHC 55.5*   PO2VEN 32.4*   JAZ5GER 23.3*   BEVEN -4.7   U8ESDMN 59.3*    LFTs  No recent results    Infectious    No recent results     No recent results Glucose  Recent Labs     08/11/23  1405 08/12/23  0402   GLUC 191* 156*            Collaborative bedside rounds performed with cardiac surgery attending, critical care attending and bedside RN.      Duran Jones PA-C

## 2023-08-12 NOTE — PROGRESS NOTES
Progress Note - Cardiothoracic Surgery   Bertie Castleman 61 y.o. male MRN: [id number]  Unit/Bed#: [facility] 414-01 Encounter: [id number]      POD # 1 s/p CABG    Pt seen/examined. Interval history and data reviewed with critical care team.  Pt doing well. No specific complaints. Zbigniew gtt.       Medications:   Scheduled Meds:  Current Facility-Administered Medications   Medication Dose Route Frequency Provider Last Rate   • acetaminophen  975 mg Oral [facility] Urmila Amaya PA-C     • amiodarone  200 mg Oral [facility] Efra Malone PA-C     • aspirin  325 mg Oral Daily Efra Malone PA-C     • atorvastatin  80 mg Oral Daily With CRISTO Herrera     • bisacodyl  10 mg Rectal Daily PRN Efra Malone PA-C     • cefazolin  2,000 mg Intravenous Q8H Efra Malone PA-C 2,000 mg (08/12/23 0343)   • chlorhexidine  15 mL Mouth/Throat BID Efra Malone PA-C     • docusate sodium  100 mg Oral BID Urmila Amyaa PA-C     • fondaparinux  2.5 mg Subcutaneous Q24H Urmila Amaya PA-C     • furosemide  40 mg Intravenous Daily Urmila Amaya PA-C     • gabapentin  100 mg Oral Daily Efra Malone PA-C     • gabapentin  300 mg Oral HS Efra Malone PA-C     • insulin regular (HumuLIN R,NovoLIN R) 1 Units/mL in sodium chloride 0.9 % 100 mL infusion  0.3-21 Units/hr Intravenous Titrated Efra Malone PA-C 1 Units/hr (08/12/23 1000)   • lidocaine (cardiac)  100 mg Intravenous Q30 Min PRN Efra Malone PA-C     • LORazepam  0.5 mg Oral HS Efra Malone PA-C     • mupirocin  1 Application Nasal V75I [address] Efra Malone PA-C     • ondansetron  4 mg Intravenous Q6H PRN Efra Malone PA-C     • oxyCODONE  10 mg Oral Q4H PRN Urmila Amaya PA-C     • oxyCODONE  5 mg Oral Q4H PRN Urmila Amaya PA-C     • pantoprazole  40 mg Oral Early Morning Efra Malone PA-C     • phenylephine   mcg/min Intravenous Titrated Isaura Sigrid, MD 60 mcg/min (08/12/23 0902)   • polyethylene glycol  17 g Oral Daily Efra Malone PA-C     • sertraline  50 mg Oral Daily Hal Toribio PA-C     • sodium chloride  20 mL/hr Intravenous Continuous Hal Toribio PA-C 20 mL/hr (08/11/23 1400)   • temazepam  15 mg Oral HS PRN Urmila Amaya PA-C       Continuous Infusions:insulin regular (HumuLIN R,NovoLIN R) 1 Units/mL in sodium chloride 0.9 % 100 mL infusion, 0.3-21 Units/hr, Last Rate: 1 Units/hr (08/12/23 1000)  phenylephine,  mcg/min, Last Rate: 60 mcg/min (08/12/23 0902)  sodium chloride, 20 mL/hr, Last Rate: 20 mL/hr (08/11/23 1400)      PRN Meds:.•  bisacodyl  •  lidocaine (cardiac)  •  ondansetron  •  oxyCODONE  •  oxyCODONE  •  temazepam    Vitals: Blood pressure 122/80, pulse 82, temperature 98.9 °F (37.2 °C), temperature source Oral, resp. rate (!) 28, height 5' 11" (1.803 m), weight 108 kg (237 lb 10.5 oz), SpO2 97 %. ,Body mass index is 33.15 kg/m². I/O last 24 hours: In: 6688.5 [I.V.:4148.5;  IV Piggyback:1790]  Out: 1469 [Urine:3585; Blood:750; Chest Tube:580]  Invasive Devices     Central Venous Catheter Line  Duration           CVC Central Lines 08/11/23 Triple 1 day          Peripheral Intravenous Line  Duration           Peripheral IV 08/11/23 Left Antecubital 1 day    Peripheral IV 08/11/23 Right Antecubital 1 day          Arterial Line  Duration           Arterial Line 08/11/23 Left Radial 1 day          Line  Duration           Pacer Wires <1 day    Pacer Wires <1 day          Drain  Duration           Chest Tube 1 Anterior;Mediastinal 32 Fr. <1 day    Chest Tube 2 Posterior;Mediastinal 32 Fr. <1 day    Chest Tube 3 Posterior;Pleural 24 Fr. <1 day                  Lab, Imaging and other studies:   Results from last 7 days   Lab Units 08/12/23  0403 08/11/23  2204 08/11/23  1408 08/11/23  1405 08/11/23  1123 08/11/23  1118   WBC Thousand/uL 17.02*  --   --   --   --   --    HEMOGLOBIN g/dL 12.3 12.4  --  12.4  --   --    I STAT HEMOGLOBIN g/dl  --   --  11.2*  --    < >  --    HEMATOCRIT % 37.4 36.4*  --  36.8  --   --    HEMATOCRIT, ISTAT %  --   --  33* --    < >  --    PLATELETS Thousands/uL 187  --   --  200  --  216    < > = values in this interval not displayed. Results from last 7 days   Lab Units 08/12/23  0402 08/11/23  2204 08/11/23  1802 08/11/23  1408 08/11/23  1405   POTASSIUM mmol/L 5.3 5.0 4.8  --  3.5   CHLORIDE mmol/L 116*  --   --   --  113*   CO2 mmol/L 20*  --   --   --  22   CO2, I-STAT mmol/L  --   --   --  23  --    BUN mg/dL 14  --   --   --  12   CREATININE mg/dL 0.91  --   --   --  0.97   GLUCOSE, ISTAT mg/dl  --   --   --  184*  --    CALCIUM mg/dL 7.1*  --   --   --  7.3*         No results for input(s): "PHART", "EQX6YBR", "PO2ART", "TRO3LYG", "N0VRJHWF", "BEART" in the last 72 hours. Plan:    Wean Zbigniew gtt as able. DC [place]/Cordis  Cont [place]/Gandhi. Continue chest tubes, pacing wires. Ambulate. Incentive spirometry. PRN diuresis. PO ASA/Statin  No B blocker yet.       [facility] MD Chacha  DATE: August 12, 2023  TIME: 10:01 AM

## 2023-08-12 NOTE — UTILIZATION REVIEW
Initial Clinical Review    Elective Inpatient surgical procedure  Age/Sex: 61 y.o. male  Surgery Date: 8/11/23  Procedure: Coronary artery bypass grafting x 5 with left internal mammary artery to left anterior descending artery, saphenous vein graft to diagonal 1, saphenous vein graft - Y -  to ramus intermedius and obtuse marginal 1 and saphenous vein graft to right posterior descending artery  Anesthesia: General endotracheal anesthesia with transesophageal echocardiogram guidance    POD#1 Progress Note:  Pt doing well. No specific complaints. Wean Zbigniew gtt as able. DC [place]/Cordis. Cont [place]/Gandhi. Continue chest tubes, pacing wires. Ambulate. Incentive spirometry. PRN diuresis. PO ASA/Statin. No B blocker yet. Continue accuchecks, HOB at 30 degrees at all times. Incisional care. Admission Orders: Date/Time/Statement:   Admission Orders (From admission, onward)     Ordered        08/11/23 0644  Inpatient Admission  Once                      Orders Placed This Encounter   Procedures   • Inpatient Admission     Standing Status:   Standing     Number of Occurrences:   1     Order Specific Question:   Level of Care     Answer:   Critical Care [15]     Order Specific Question:   Estimated length of stay     Answer:   More than 2 Midnights     Order Specific Question:   Certification     Answer:   I certify that inpatient services are medically necessary for this patient for a duration of greater than two midnights. See H&P and MD Progress Notes for additional information about the patient's course of treatment. Vital Signs: /80   Pulse 82   Temp 98.9 °F (37.2 °C) (Oral)   Resp (!) 28   Ht 5' 11" (1.803 m)   Wt 108 kg (237 lb 10.5 oz)   SpO2 97%   BMI 33.15 kg/m²     Pertinent Labs/Diagnostic Test Results:   XR chest portable ICU   Final Result by Za Boone MD (08/11 7125)      Expected postoperative appearance of the chest with left basilar linear atelectasis.                   Workstation performed: [id number]         XR chest portable    (Results Pending)         Results from last 7 days   Lab Units 08/12/23  0403 08/11/23 2204 08/11/23  1408 08/11/23  1405 08/11/23  1245 08/11/23  1123 08/11/23  1118   WBC Thousand/uL 17.02*  --   --   --   --   --   --    HEMOGLOBIN g/dL 12.3 12.4  --  12.4  --   --   --    I STAT HEMOGLOBIN g/dl  --   --  11.2*  --  8.8*   < >  --    HEMATOCRIT % 37.4 36.4*  --  36.8  --   --   --    HEMATOCRIT, ISTAT %  --   --  33*  --  26*   < >  --    PLATELETS Thousands/uL 187  --   --  200  --   --  216    < > = values in this interval not displayed.          Results from last 7 days   Lab Units 08/12/23  0402 08/11/23 2204 08/11/23  1802 08/11/23  1408 08/11/23  1405 08/11/23  1245 08/11/23  1155 08/11/23  1123 08/11/23  1110   SODIUM mmol/L 141  --   --   --  143  --   --   --   --    POTASSIUM mmol/L 5.3 5.0 4.8  --  3.5  --   --   --   --    CHLORIDE mmol/L 116*  --   --   --  113*  --   --   --   --    CO2 mmol/L 20*  --   --   --  22  --   --   --   --    CO2, I-STAT mmol/L  --   --   --  23  --  22 26 28 27   ANION GAP mmol/L 5  --   --   --  8  --   --   --   --    BUN mg/dL 14  --   --   --  12  --   --   --   --    CREATININE mg/dL 0.91  --   --   --  0.97  --   --   --   --    EGFR ml/min/1.73sq m 89  --   --   --  82  --   --   --   --    CALCIUM mg/dL 7.1*  --   --   --  7.3*  --   --   --   --    CALCIUM, IONIZED, ISTAT mmol/L  --   --   --  1.13  --  1.16 1.05* 1.10* 1.11*         Results from last 7 days   Lab Units 08/12/23  1000 08/12/23  0754 08/12/23  0629 08/12/23  0410 08/12/23  0208 08/12/23  0033 08/11/23  2204 08/11/23  2027 08/11/23  1802 08/11/23  1608 08/11/23  1404 08/11/23  0555   POC GLUCOSE mg/dl 179* 112 132 158* 106 111 161* 121 149* 144* 180* 182*     Results from last 7 days   Lab Units 08/12/23  0402 08/11/23  1405   GLUCOSE RANDOM mg/dL 156* 191*     Results from last 7 days   Lab Units 08/12/23  0245   PH DARINEL  7.240*   PCO2 DARINEL mm Hg 55.5*   PO2 DARINEL mm Hg 32.4*   HCO3 DARINEL mmol/L 23.3*   BASE EXC DARINEL mmol/L -4.7   O2 CONTENT DARINEL ml/dL 10.8   O2 HGB, VENOUS % 59.3*     Results from last 7 days   Lab Units 08/11/23  1408 08/11/23  1245 08/11/23  1155 08/11/23  1123 08/11/23  1110   PH, DARINEL I-STAT   --   --   --   --  7.343   PCO2, DARINEL ISTAT mm HG  --   --   --   --  47.2   PO2, DARINEL ISTAT mm HG  --   --   --   --  42.0   HCO3, DARINEL ISTAT mmol/L  --   --   --   --  25.7   I STAT BASE EXC mmol/L -5* -4* 0   < > 0   I STAT O2 SAT % 98* 99* 100*  --  73   ISTAT PH ART  7.278* 7.361 7.392   < >  --    I STAT ART PCO2 mm HG 45.5* 37.1 40.9   < >  --    I STAT ART PO2 mm .0 120.0 327.0*   < >  --    I STAT ART HCO3 mmol/L 21.2* 21.0* 24.9   < >  --     < > = values in this interval not displayed.      Results from last 7 days   Lab Units 08/12/23  0642   UNIT PRODUCT CODE  [id number]  [id number]  [id number]  [id number]   UNIT NUMBER  [id number]  [id number]  [id number]  [id number]   UNITABO  O  O  O  O   UNITRH  POS  POS  POS  POS   CROSSMATCH  Compatible  Compatible  Compatible  Compatible   UNIT DISPENSE STATUS  Return to Inv  Return to Inv  Return to Inv  Return to Inv   UNIT PRODUCT VOL mL 350  350  350  350     Diet: cardiac with fluid restriction  Mobility: OOB and ambulation  DVT Prophylaxis: heparin, scd, ambulatory    Medications/Pain Control:   Scheduled Medications:  acetaminophen, 975 mg, Oral, Q8H [address]  amiodarone, 200 mg, Oral, Q8H BECKY  aspirin, 325 mg, Oral, Daily  atorvastatin, 80 mg, Oral, Daily With Dinner  cefazolin, 2,000 mg, Intravenous, Q8H  chlorhexidine, 15 mL, Mouth/Throat, BID  docusate sodium, 100 mg, Oral, BID  fondaparinux, 2.5 mg, Subcutaneous, Q24H  furosemide, 40 mg, Intravenous, Daily  gabapentin, 100 mg, Oral, Daily  gabapentin, 300 mg, Oral, HS  LORazepam, 0.5 mg, Oral, HS  mupirocin, 1 Application, Nasal, Y95V BECKY  pantoprazole, 40 mg, Oral, Early Morning  polyethylene glycol, 17 g, Oral, Daily  sertraline, 50 mg, Oral, Daily      Continuous IV Infusions:  insulin regular (HumuLIN R,NovoLIN R) 1 Units/mL in sodium chloride 0.9 % 100 mL infusion, 0.3-21 Units/hr, Intravenous, Titrated  phenylephine,  mcg/min, Intravenous, Titrated  sodium chloride, 20 mL/hr, Intravenous, Continuous      PRN Meds:  bisacodyl, 10 mg, Rectal, Daily PRN  lidocaine (cardiac), 100 mg, Intravenous, Q30 Min PRN  ondansetron, 4 mg, Intravenous, Q6H PRN  oxyCODONE, 10 mg, Oral, Q4H PRN  oxyCODONE, 5 mg, Oral, Q4H PRN  temazepam, 15 mg, Oral, HS PRN        Network Utilization Review Department  ATTENTION: Please call with any questions or concerns to [phone] and carefully listen to the prompts so that you are directed to the right person. All voicemails are confidential.  Maryan Gitelman all requests for admission clinical reviews, approved or denied determinations and any other requests to dedicated fax number below belonging to the campus where the patient is receiving treatment.  List of dedicated fax numbers for the Facilities:  [place] DENIALS (Administrative/Medical Necessity) [phone] [address] (Maternity/NICU/Pediatrics) [phone]   [address] [place] [address] [phone]   [facility] [address] [facility] [phone]   [address] [phone]   [address] [id number] [place] [address] [phone]

## 2023-08-12 NOTE — CONSULTS
Consultation - Katarina Scott 61 y.o. male MRN: [id number]    Unit/Bed#: [facility] 414-01 Encounter: [id number]      Assessment/Plan     Assessment: This is a 61y.o.-year-old male with diabetes with hyperglycemia. 1.  Type 2 diabetes. Hemoglobin A1c of 7.4% demonstrates reasonably controlled diabetes but previous to that had quite uncontrolled for many years with hemoglobin A1c over 12 in the past.  At this point, p.o. intake is not good and he is on IV pressors so we will continue IV insulin another 24 hours. 2.  CAD status post CABG x4. Treatment per primary care team and cardiothoracic surgery. Plan:  1. Continue IV insulin for another 24 hours and reevaluate to see if his appetite and eating improved. Hopefully plan is to transition to subcutaneous insulin on 8/13/2023.    2.  Continue Accu-Cheks every 2 hours. CC: Diabetes Consult    History of Present Illness     HPI: Katarina Scott is a 61y.o. year old male with type 2 diabetes for 15 years admitted with multivessel CAD and is post CABG x4 vessels of on 8/11/2023. He has a history of DUDLEY, hypertension, hyperlipidemia, anxiety and depression. Endocrine is asked to consult for diabetes treatment. He is on IV insulin drip at present and IV phenylephrine as his p.o. intake is quite poor. He is on oral agents at home. He was taking metformin  mg 2 tablets twice a day, Ozempic 2 mg once a week, Jardiance 25 mg daily, and glipizide XL 5 mg 2 daily. Of note, he was on Lantus insulin in the past.  He denies any polyuria, polyphagia, and blurry vision. He is always somewhat thirsty and has some nocturia. He does have some slight numbness and tingling the feet when lying down. He denies nephropathy, heart attack and stroke but does admit to neuropathy, retinopathy and claudication. He denies any hypoglycemia.   He utilizes freestyle lambert continuous glucose monitoring system at home and reports that his blood sugars have been between 130 and 170 at home. He he was following with [facility] endocrinology but is interested in switching to [facility] endocrinology. Inpatient consult to Endocrinology  Consult performed by: Samia Diehl MD  Consult ordered by: Gema Navarro PA-C          Review of Systems   Constitutional: Positive for appetite change and fatigue. Negative for unexpected weight change. Appetite still poor. HENT: Negative for trouble swallowing. Eyes: Negative for visual disturbance. Respiratory: Negative for chest tightness and shortness of breath. Feels can always get a deep breath in due to a sharp chest pain at incision site. Cardiovascular: Negative for chest pain, palpitations and leg swelling. Still some incisional chest pain. Gastrointestinal: Negative for abdominal pain, constipation, diarrhea and nausea. Endocrine: Positive for polydipsia. Negative for polyphagia and polyuria. Has nocturia. Skin: Negative for wound. Neurological: Positive for numbness. Negative for tremors and headaches. Some occasional numbness and tingling of the feet at night. Psychiatric/Behavioral: Negative for sleep disturbance. Historical Information   Past Medical History:   Diagnosis Date   • Coronary artery disease    • Diabetes mellitus ([address])    • Hyperlipidemia    • Hypertension      Past Surgical History:   Procedure Laterality Date   • ARTERIAL ANEURYSM REPAIR      Right Carotid Aneurysm - age 13. • CARDIAC CATHETERIZATION Left 08/02/2023    Procedure: Cardiac Left Heart Cath;  Surgeon: Pierre Stewart DO;  Location: [facility]; Service: Cardiology   • CARDIAC CATHETERIZATION N/A 08/02/2023    Procedure: Cardiac Coronary Angiogram;  Surgeon: Pierre Stewart DO;  Location: [facility];   Service: Cardiology   • CARDIAC CATHETERIZATION  08/02/2023    Procedure: Cardiac catheterization;  Surgeon: Pierre Stewart DO;  Location: [facility]; Service: Cardiology   • HERNIA REPAIR     • VASECTOMY       Social History   Social History     Substance and Sexual Activity   Alcohol Use No     Social History     Substance and Sexual Activity   Drug Use No     Social History     Tobacco Use   Smoking Status Never   Smokeless Tobacco Never     Family History:   Family History   Problem Relation Age of Onset   • Leukemia Mother    • Heart attack Father 46       Meds/Allergies   Current Facility-Administered Medications   Medication Dose Route Frequency Provider Last Rate Last Admin   • acetaminophen (TYLENOL) tablet 975 mg  975 mg Oral Q8H [address] Urmila Amaya PA-C       • amiodarone tablet 200 mg  200 mg Oral [facility] Darcy Schaefer PA-C   200 mg at 08/12/23 9150   • aspirin tablet 325 mg  325 mg Oral Daily Darcy Schaefer PA-C   325 mg at 08/12/23 0801   • atorvastatin (LIPITOR) tablet 80 mg  80 mg Oral Daily With CRISTO Herrera       • bisacodyl (DULCOLAX) rectal suppository 10 mg  10 mg Rectal Daily PRN Darcy Schaefer PA-C       • ceFAZolin (ANCEF) IVPB (premix in dextrose) 2,000 mg 50 mL  2,000 mg Intravenous Q8H Luisa Talavera PA-C 100 mL/hr at 08/12/23 0343 2,000 mg at 08/12/23 0343   • chlorhexidine (PERIDEX) 0.12 % oral rinse 15 mL  15 mL Mouth/Throat BID Darcy Schaefer PA-C   15 mL at 08/12/23 6246   • docusate sodium (COLACE) capsule 100 mg  100 mg Oral BID Urmila Amaya PA-C   100 mg at 08/12/23 0800   • fondaparinux (ARIXTRA) subcutaneous injection 2.5 mg  2.5 mg Subcutaneous Q24H Urmila Amaya PA-C   2.5 mg at 08/12/23 0800   • furosemide (LASIX) injection 40 mg  40 mg Intravenous Daily Urmila Amaya PA-C   40 mg at 08/12/23 0801   • gabapentin (NEURONTIN) capsule 100 mg  100 mg Oral Daily Darcy Schaefer PA-C   100 mg at 08/12/23 0800   • gabapentin (NEURONTIN) capsule 300 mg  300 mg Oral HS Darcy Schaefer PA-C   300 mg at 08/11/23 2122   • insulin regular (HumuLIN R,NovoLIN R) 1 Units/mL in sodium chloride 0.9 % 100 mL infusion  0.3-21 Units/hr Intravenous Titrated Jaskaran Zamorano PA-C 1 mL/hr at 08/12/23 1000 1 Units/hr at 08/12/23 1000   • lidocaine (cardiac) injection 100 mg  100 mg Intravenous Q30 Min PRN Jaskaran Zamorano PA-C       • LORazepam (ATIVAN) tablet 0.5 mg  0.5 mg Oral HS Jaskaran Zamorano PA-C   0.5 mg at 08/11/23 2122   • mupirocin (BACTROBAN) 2 % nasal ointment 1 Application  1 Application Nasal T82Z [address] Jaskaran Zamorano PA-C   1 Application at 05/31/57 0800   • ondansetron (ZOFRAN) injection 4 mg  4 mg Intravenous Q6H PRN Jaskaran Zamorano PA-C   4 mg at 08/12/23 0601   • oxyCODONE (ROXICODONE) immediate release tablet 10 mg  10 mg Oral Q4H PRN Gema Navarro PA-C   10 mg at 08/12/23 4509   • oxyCODONE (ROXICODONE) IR tablet 5 mg  5 mg Oral Q4H PRN Gema Navarro PA-C       • pantoprazole (PROTONIX) EC tablet 40 mg  40 mg Oral Early Morning Jaskaran Zamorano PA-C   40 mg at 08/12/23 5498   • phenylephine (PATRICK-SYNEPHRINE) 200 mcg/mL in sodium chloride 0.9% 250 mL   mcg/min Intravenous Titrated Fabian Pagan MD 21 mL/hr at 08/12/23 1013 70 mcg/min at 08/12/23 1013   • polyethylene glycol (MIRALAX) packet 17 g  17 g Oral Daily Jaskaran Zamorano PA-C   17 g at 08/12/23 0800   • sertraline (ZOLOFT) tablet 50 mg  50 mg Oral Daily Luisa Talavera PA-C   50 mg at 08/12/23 0800   • sodium chloride infusion 0.45 %  20 mL/hr Intravenous Continuous Jaskaran Zamorano PA-C 20 mL/hr at 08/11/23 1400 20 mL/hr at 08/11/23 1400     Allergies   Allergen Reactions   • Atorvastatin Other (See Comments)     felt flu-like   • Rosiglitazone Other (See Comments)   • Trazodone Other (See Comments)     Felt "hung over"       Objective   Vitals: Blood pressure 122/80, pulse 82, temperature 98.9 °F (37.2 °C), temperature source Oral, resp. rate (!) 28, height 5' 11" (1.803 m), weight 108 kg (237 lb 10.5 oz), SpO2 97 %.     Intake/Output Summary (Last 24 hours) at 8/12/2023 1140  Last data filed at 8/12/2023 0945  Gross per 24 hour   Intake 6648.49 ml   Output 4565 ml   Net 2083.49 ml Invasive Devices     Central Venous Catheter Line  Duration           CVC Central Lines 08/11/23 Triple 1 day          Peripheral Intravenous Line  Duration           Peripheral IV 08/11/23 Left Antecubital 1 day    Peripheral IV 08/11/23 Right Antecubital 1 day          Arterial Line  Duration           Arterial Line 08/11/23 Left Radial 1 day          Line  Duration           Pacer Wires <1 day    Pacer Wires <1 day          Drain  Duration           Chest Tube 1 Anterior;Mediastinal 32 Fr. <1 day    Chest Tube 2 Posterior;Mediastinal 32 Fr. <1 day    Chest Tube 3 Posterior;Pleural 24 Fr. <1 day                Physical Exam  Vitals reviewed. Constitutional:       Appearance: Normal appearance. He is well-developed. He is obese. HENT:      Head: Normocephalic and atraumatic. Eyes:      Conjunctiva/sclera: Conjunctivae normal.   Neck:      Thyroid: No thyromegaly. Vascular: No carotid bruit. Comments: Right IJ in place. No thyroid enlargement. Cardiovascular:      Rate and Rhythm: Normal rate and regular rhythm. Pulses: Normal pulses. Heart sounds: Normal heart sounds. No murmur heard. Pulmonary:      Effort: Pulmonary effort is normal.      Breath sounds: Normal breath sounds. No wheezing. Abdominal:      General: Bowel sounds are normal.      Palpations: Abdomen is soft. Tenderness: There is no abdominal tenderness. Musculoskeletal:         General: No deformity. Cervical back: Neck supple. Right lower leg: No edema. Left lower leg: No edema. Comments: No tremor of the outstretched hands. Lymphadenopathy:      Cervical: No cervical adenopathy. Skin:     General: Skin is warm and dry. Findings: No erythema or rash. Neurological:      Mental Status: He is alert and oriented to person, place, and time. Deep Tendon Reflexes: Reflexes are normal and symmetric.       Comments: Light touch sensation grossly intact to the plantar surface of the feet.         The history was obtained from the review of the chart, patient. Wife was present in the room. Lab Results:     Hemoglobin A1c performed on 6/8/2023 was 7.4%. Lab Results   Component Value Date    WBC 17.02 (H) 08/12/2023    HGB 12.3 08/12/2023    HCT 37.4 08/12/2023    MCV 91 08/12/2023     08/12/2023     Lab Results   Component Value Date/Time    BUN 14 08/12/2023 04:02 AM    K 5.3 08/12/2023 04:02 AM     (H) 08/12/2023 04:02 AM    CO2 20 (L) 08/12/2023 04:02 AM    CO2 23 08/11/2023 02:08 PM    CREATININE 0.91 08/12/2023 04:02 AM    AST 15 07/24/2023 04:16 PM    ALT 17 07/24/2023 04:16 PM    TP 7.5 07/24/2023 04:16 PM    ALB 4.3 07/24/2023 04:16 PM     No results for input(s): "CHOL", "HDL", "LDL", "TRIG", "VLDL" in the last 72 hours. No results found for: "Lia Mike", "[id number]"  POC Glucose (mg/dl)   Date Value   08/12/2023 179 (H)   08/12/2023 112   08/12/2023 132   08/12/2023 158 (H)   08/12/2023 106   08/12/2023 111   08/11/2023 161 (H)   08/11/2023 121   08/11/2023 149 (H)   08/11/2023 144 (H)       Imaging Studies: I have personally reviewed pertinent reports. Portions of the record may have been created with voice recognition software.

## 2023-08-13 ENCOUNTER — HOME HEALTH ADMISSION (OUTPATIENT)
Dept: HOME HEALTH SERVICES | Facility: HOME HEALTHCARE | Age: 63
End: 2023-08-13
Payer: COMMERCIAL

## 2023-08-13 LAB
ANION GAP SERPL CALCULATED.3IONS-SCNC: 8 MMOL/L
BUN SERPL-MCNC: 26 MG/DL (ref 5–25)
CALCIUM SERPL-MCNC: 8 MG/DL (ref 8.3–10.1)
CHLORIDE SERPL-SCNC: 108 MMOL/L (ref 96–108)
CO2 SERPL-SCNC: 21 MMOL/L (ref 21–32)
CREAT SERPL-MCNC: 1.2 MG/DL (ref 0.6–1.3)
ERYTHROCYTE [DISTWIDTH] IN BLOOD BY AUTOMATED COUNT: 13 % (ref 11.6–15.1)
GFR SERPL CREATININE-BSD FRML MDRD: 63 ML/MIN/1.73SQ M
GLUCOSE SERPL-MCNC: 105 MG/DL (ref 65–140)
GLUCOSE SERPL-MCNC: 113 MG/DL (ref 65–140)
GLUCOSE SERPL-MCNC: 115 MG/DL (ref 65–140)
GLUCOSE SERPL-MCNC: 125 MG/DL (ref 65–140)
GLUCOSE SERPL-MCNC: 126 MG/DL (ref 65–140)
GLUCOSE SERPL-MCNC: 127 MG/DL (ref 65–140)
GLUCOSE SERPL-MCNC: 129 MG/DL (ref 65–140)
GLUCOSE SERPL-MCNC: 131 MG/DL (ref 65–140)
GLUCOSE SERPL-MCNC: 139 MG/DL (ref 65–140)
GLUCOSE SERPL-MCNC: 141 MG/DL (ref 65–140)
GLUCOSE SERPL-MCNC: 145 MG/DL (ref 65–140)
GLUCOSE SERPL-MCNC: 179 MG/DL (ref 65–140)
GLUCOSE SERPL-MCNC: 196 MG/DL (ref 65–140)
GLUCOSE SERPL-MCNC: 209 MG/DL (ref 65–140)
HCT VFR BLD AUTO: 33.3 % (ref 36.5–49.3)
HGB BLD-MCNC: 11 G/DL (ref 12–17)
MAGNESIUM SERPL-MCNC: 2.6 MG/DL (ref 1.6–2.6)
MCH RBC QN AUTO: 30.3 PG (ref 26.8–34.3)
MCHC RBC AUTO-ENTMCNC: 33 G/DL (ref 31.4–37.4)
MCV RBC AUTO: 92 FL (ref 82–98)
PLATELET # BLD AUTO: 129 THOUSANDS/UL (ref 149–390)
PMV BLD AUTO: 10.5 FL (ref 8.9–12.7)
POTASSIUM SERPL-SCNC: 5.1 MMOL/L (ref 3.5–5.3)
RBC # BLD AUTO: 3.63 MILLION/UL (ref 3.88–5.62)
SODIUM SERPL-SCNC: 137 MMOL/L (ref 135–147)
WBC # BLD AUTO: 13.65 THOUSAND/UL (ref 4.31–10.16)

## 2023-08-13 PROCEDURE — 83735 ASSAY OF MAGNESIUM: CPT | Performed by: PHYSICIAN ASSISTANT

## 2023-08-13 PROCEDURE — 85027 COMPLETE CBC AUTOMATED: CPT | Performed by: PHYSICIAN ASSISTANT

## 2023-08-13 PROCEDURE — 80048 BASIC METABOLIC PNL TOTAL CA: CPT | Performed by: PHYSICIAN ASSISTANT

## 2023-08-13 PROCEDURE — 97163 PT EVAL HIGH COMPLEX 45 MIN: CPT

## 2023-08-13 PROCEDURE — 97167 OT EVAL HIGH COMPLEX 60 MIN: CPT

## 2023-08-13 PROCEDURE — 99024 POSTOP FOLLOW-UP VISIT: CPT | Performed by: PHYSICIAN ASSISTANT

## 2023-08-13 PROCEDURE — 82948 REAGENT STRIP/BLOOD GLUCOSE: CPT

## 2023-08-13 PROCEDURE — 99232 SBSQ HOSP IP/OBS MODERATE 35: CPT | Performed by: INTERNAL MEDICINE

## 2023-08-13 RX ORDER — FUROSEMIDE 10 MG/ML
40 INJECTION INTRAMUSCULAR; INTRAVENOUS
Status: DISCONTINUED | OUTPATIENT
Start: 2023-08-13 | End: 2023-08-14

## 2023-08-13 RX ORDER — INSULIN LISPRO 100 [IU]/ML
1-5 INJECTION, SOLUTION INTRAVENOUS; SUBCUTANEOUS
Status: DISCONTINUED | OUTPATIENT
Start: 2023-08-14 | End: 2023-08-15 | Stop reason: HOSPADM

## 2023-08-13 RX ORDER — INSULIN GLARGINE 100 [IU]/ML
25 INJECTION, SOLUTION SUBCUTANEOUS
Status: DISCONTINUED | OUTPATIENT
Start: 2023-08-13 | End: 2023-08-15 | Stop reason: HOSPADM

## 2023-08-13 RX ORDER — INSULIN LISPRO 100 [IU]/ML
1-6 INJECTION, SOLUTION INTRAVENOUS; SUBCUTANEOUS
Status: DISCONTINUED | OUTPATIENT
Start: 2023-08-14 | End: 2023-08-15 | Stop reason: HOSPADM

## 2023-08-13 RX ORDER — INSULIN LISPRO 100 [IU]/ML
5 INJECTION, SOLUTION INTRAVENOUS; SUBCUTANEOUS
Status: DISCONTINUED | OUTPATIENT
Start: 2023-08-13 | End: 2023-08-15 | Stop reason: HOSPADM

## 2023-08-13 RX ADMIN — PANTOPRAZOLE SODIUM 40 MG: 40 TABLET, DELAYED RELEASE ORAL at 05:15

## 2023-08-13 RX ADMIN — ASPIRIN 325 MG ORAL TABLET 325 MG: 325 PILL ORAL at 08:41

## 2023-08-13 RX ADMIN — ATORVASTATIN CALCIUM 80 MG: 80 TABLET, FILM COATED ORAL at 17:40

## 2023-08-13 RX ADMIN — AMIODARONE HYDROCHLORIDE 200 MG: 200 TABLET ORAL at 20:44

## 2023-08-13 RX ADMIN — ACETAMINOPHEN 975 MG: 325 TABLET, FILM COATED ORAL at 20:43

## 2023-08-13 RX ADMIN — CHLORHEXIDINE GLUCONATE 15 ML: 1.2 SOLUTION ORAL at 17:40

## 2023-08-13 RX ADMIN — DOCUSATE SODIUM 100 MG: 100 CAPSULE, LIQUID FILLED ORAL at 17:40

## 2023-08-13 RX ADMIN — METHOCARBAMOL 500 MG: 500 TABLET ORAL at 00:03

## 2023-08-13 RX ADMIN — METHOCARBAMOL 500 MG: 500 TABLET ORAL at 23:44

## 2023-08-13 RX ADMIN — OXYCODONE HYDROCHLORIDE 10 MG: 10 TABLET ORAL at 04:26

## 2023-08-13 RX ADMIN — AMIODARONE HYDROCHLORIDE 200 MG: 200 TABLET ORAL at 05:15

## 2023-08-13 RX ADMIN — INSULIN LISPRO 5 UNITS: 100 INJECTION, SOLUTION INTRAVENOUS; SUBCUTANEOUS at 17:40

## 2023-08-13 RX ADMIN — INSULIN LISPRO 5 UNITS: 100 INJECTION, SOLUTION INTRAVENOUS; SUBCUTANEOUS at 12:45

## 2023-08-13 RX ADMIN — AMIODARONE HYDROCHLORIDE 200 MG: 200 TABLET ORAL at 14:22

## 2023-08-13 RX ADMIN — CHLORHEXIDINE GLUCONATE 15 ML: 1.2 SOLUTION ORAL at 08:44

## 2023-08-13 RX ADMIN — MUPIROCIN 1 APPLICATION: 20 OINTMENT TOPICAL at 08:44

## 2023-08-13 RX ADMIN — GABAPENTIN 100 MG: 100 CAPSULE ORAL at 08:42

## 2023-08-13 RX ADMIN — MUPIROCIN 1 APPLICATION: 20 OINTMENT TOPICAL at 20:43

## 2023-08-13 RX ADMIN — FUROSEMIDE 40 MG: 10 INJECTION, SOLUTION INTRAMUSCULAR; INTRAVENOUS at 17:41

## 2023-08-13 RX ADMIN — POLYETHYLENE GLYCOL 3350 17 G: 17 POWDER, FOR SOLUTION ORAL at 08:44

## 2023-08-13 RX ADMIN — METHOCARBAMOL 500 MG: 500 TABLET ORAL at 05:15

## 2023-08-13 RX ADMIN — SODIUM CHLORIDE 20 ML/HR: 0.45 INJECTION, SOLUTION INTRAVENOUS at 16:23

## 2023-08-13 RX ADMIN — Medication 12.5 MG: at 10:21

## 2023-08-13 RX ADMIN — SERTRALINE HYDROCHLORIDE 50 MG: 50 TABLET ORAL at 08:41

## 2023-08-13 RX ADMIN — DOCUSATE SODIUM 100 MG: 100 CAPSULE, LIQUID FILLED ORAL at 08:41

## 2023-08-13 RX ADMIN — METHOCARBAMOL 500 MG: 500 TABLET ORAL at 17:40

## 2023-08-13 RX ADMIN — FONDAPARINUX SODIUM 2.5 MG: 2.5 INJECTION, SOLUTION SUBCUTANEOUS at 08:44

## 2023-08-13 RX ADMIN — OXYCODONE HYDROCHLORIDE 5 MG: 5 TABLET ORAL at 11:55

## 2023-08-13 RX ADMIN — INSULIN GLARGINE 25 UNITS: 100 INJECTION, SOLUTION SUBCUTANEOUS at 22:11

## 2023-08-13 RX ADMIN — GABAPENTIN 300 MG: 300 CAPSULE ORAL at 20:43

## 2023-08-13 RX ADMIN — METHOCARBAMOL 500 MG: 500 TABLET ORAL at 11:31

## 2023-08-13 RX ADMIN — ACETAMINOPHEN 975 MG: 325 TABLET, FILM COATED ORAL at 14:22

## 2023-08-13 RX ADMIN — LORAZEPAM 0.5 MG: 0.5 TABLET ORAL at 20:43

## 2023-08-13 RX ADMIN — FUROSEMIDE 40 MG: 10 INJECTION, SOLUTION INTRAMUSCULAR; INTRAVENOUS at 08:48

## 2023-08-13 RX ADMIN — Medication 12.5 MG: at 20:44

## 2023-08-13 RX ADMIN — ACETAMINOPHEN 975 MG: 325 TABLET, FILM COATED ORAL at 05:15

## 2023-08-13 NOTE — PROGRESS NOTES
Progress Note - Cardiothoracic Surgery   Marc Maurice 61 y.o. male MRN: [id number]  Unit/Bed#: [facility] 408-01 Encounter: [id number]    Coronary artery disease. S/P coronary artery bypass grafting; POD # 2      24 Hour Events:  Weaned off Zbigniew overnight at midnight. SR, no issues/events. 2LNC.      Medications:   Scheduled Meds:  Current Facility-Administered Medications   Medication Dose Route Frequency Provider Last Rate   • acetaminophen  975 mg Oral [facility] Urmila Amaya PA-C     • amiodarone  200 mg Oral [facility] Urmila Amaya PA-C     • aspirin  325 mg Oral Daily Urmila Amaya PA-C     • atorvastatin  80 mg Oral Daily With [employer], PA-C     • bisacodyl  10 mg Rectal Daily PRN Urmila Amaya PA-C     • chlorhexidine  15 mL Mouth/Throat BID Urmila Amaya PA-C     • docusate sodium  100 mg Oral BID Urmila Amaya PA-C     • fondaparinux  2.5 mg Subcutaneous Q24H Urmila Amaya PA-C     • furosemide  40 mg Intravenous Daily Urmila Amaya PA-C     • gabapentin  100 mg Oral Daily Urmila Amaya PA-C     • gabapentin  300 mg Oral HS Urmila Amaya PA-C     • insulin regular (HumuLIN R,NovoLIN R) 1 Units/mL in sodium chloride 0.9 % 100 mL infusion  0.3-21 Units/hr Intravenous Titrated Urmila Amaya PA-C 0.5 Units/hr (08/13/23 0425)   • lidocaine (cardiac)  100 mg Intravenous Q30 Min PRN Urmila Amaya PA-C     • LORazepam  0.5 mg Oral HS Urmila Amaya PA-C     • methocarbamol  500 mg Oral Q6H [facility] MARLENY Henry     • mupirocin  1 Application Nasal F12J [facility] Urmila Amaya PA-C     • ondansetron  4 mg Intravenous Q6H PRN Urmila Amaya PA-C     • oxyCODONE  10 mg Oral Q4H PRN Urmila Amaya PA-C     • oxyCODONE  5 mg Oral Q4H PRN Urmila Amaya PA-C     • pantoprazole  40 mg Oral Early Morning Urmila Amaya PA-C     • phenylephine   mcg/min Intravenous Titrated Margie Wakefield MD Stopped (08/13/23 0004)   • polyethylene glycol  17 g Oral Daily Urmila Amaya PA-C     • sertraline  50 mg Oral Daily Urmila Amaya, CRISTO     • sodium chloride  20 mL/hr Intravenous Continuous Urmila Amaya PA-C 20 mL/hr (08/11/23 1400)     Continuous Infusions:insulin regular (HumuLIN R,NovoLIN R) 1 Units/mL in sodium chloride 0.9 % 100 mL infusion, 0.3-21 Units/hr, Last Rate: 0.5 Units/hr (08/13/23 0425)  phenylephine,  mcg/min, Last Rate: Stopped (08/13/23 0004)  sodium chloride, 20 mL/hr, Last Rate: 20 mL/hr (08/11/23 1400)      PRN Meds:.•  bisacodyl  •  lidocaine (cardiac)  •  ondansetron  •  oxyCODONE  •  oxyCODONE    Vitals:   Vitals:    08/13/23 0200 08/13/23 0300 08/13/23 0500 08/13/23 0600   BP: (!) 87/60 102/61     BP Location: Right arm Right arm     Pulse: 83 82     Resp: 15 15     Temp:  98 °F (36.7 °C)     TempSrc:  Oral     SpO2: 97% 94%     Weight:   107 kg (235 lb 10.8 oz) 107 kg (235 lb 10.8 oz)   Height:           Telemetry: NSR; Heart Rate: 70s-80    Respiratory:   SpO2: SpO2: 94 %; 2 LPM    Intake/Output:     Intake/Output Summary (Last 24 hours) at 8/13/2023 0728  Last data filed at 8/13/2023 0601  Gross per 24 hour   Intake 1666.09 ml   Output 2075 ml   Net -408.91 ml        Chest tube Output:    Mediastinal tubes: 110 mL/8 hours  160 mL/24 hours   Pleural tubes: 50 mL/8 hours  90 mL/24 hours     Weights:   Weight (last 2 days)     Date/Time Weight    08/13/23 0600 107 (235.67)    08/13/23 0500 107 (235.67)    08/12/23 0600 108 (237.66)    08/11/23 0546 101 (222)            Results:   Results from last 7 days   Lab Units 08/13/23  0423 08/12/23  0403 08/11/23  2204 08/11/23  1408 08/11/23  1405   WBC Thousand/uL 13.65* 17.02*  --   --   --    HEMOGLOBIN g/dL 11.0* 12.3 12.4  --  12.4   I STAT HEMOGLOBIN   --   --   --    < >  --    HEMATOCRIT % 33.3* 37.4 36.4*  --  36.8   HEMATOCRIT, ISTAT   --   --   --    < >  --    PLATELETS Thousands/uL 129* 187  --   --  200    < > = values in this interval not displayed.      Results from last 7 days   Lab Units 08/13/23  0423 08/12/23  0402 08/11/23  2204 08/11/23  1805 08/11/23  1408 08/11/23  1405   SODIUM mmol/L 137 141  --   --   --  143   POTASSIUM mmol/L 5.1 5.3 5.0   < >  --  3.5   CHLORIDE mmol/L 108 116*  --   --   --  113*   CO2 mmol/L 21 20*  --   --   --  22   CO2, I-STAT mmol/L  --   --   --   --  23  --    BUN mg/dL 26* 14  --   --   --  12   CREATININE mg/dL 1.20 0.91  --   --   --  0.97   GLUCOSE, ISTAT mg/dl  --   --   --   --  184*  --    CALCIUM mg/dL 8.0* 7.1*  --   --   --  7.3*    < > = values in this interval not displayed. Point of care glucose: 105-140    Invasive Lines/Tubes:  Invasive Devices     Central Venous Catheter Line  Duration           CVC Central Lines 08/11/23 Triple 1 day          Peripheral Intravenous Line  Duration           Peripheral IV 08/11/23 Right Antecubital 1 day          Arterial Line  Duration           Arterial Line 08/11/23 Left Radial 1 day          Line  Duration           Pacer Wires 1 day    Pacer Wires 1 day          Drain  Duration           Chest Tube 1 Anterior;Mediastinal 32 Fr. 1 day    Chest Tube 2 Posterior;Mediastinal 32 Fr. 1 day    Chest Tube 3 Posterior;Pleural 24 Fr. 1 day                Physical Exam:    HEENT/NECK:  Normocephalic. Atraumatic. No jugular venous distention. Cardiac: Regular rate and rhythm  Pulmonary:  Breath sounds clear bilaterally and Breath sounds diminished in the bases bilaterally   Abdomen:  Non-tender, Non-distended and Hypo-active bowel sounds  Incisions: Sternum is stable. Incision dressed with Acticoat. No erythema or drainage and Saphenectomy incision dressed with Acticoat. No erythema or drainage  Extremities: 1+ edema B/L  Neuro: Alert and oriented X 3 and No focal deficits  Skin: Warm/Dry, without rashes or lesions.     Assessment:  Principal Problem:    Coronary artery disease involving native coronary artery  Active Problems:    HTN (hypertension), benign    Type 2 diabetes mellitus (HCC)    Hyperlipidemia LDL goal <100    Sleep apnea    S/P CABG x 5       Coronary artery disease. S/P coronary artery bypass grafting; POD # 2    Plan:    1. Cardiac:     NSR; HR well-controlled  Weaned off Zbigniew overnight, discontinue a- line  Start Lopressor, 12.5mg PO BID  Continue prophylactic Amiodarone, 200 mg PO TID    Continue ASA and Statin therapy  Epicardial pacing wires no longer required. Remove today  Maintain central IV access today for medications requiring central IV access  Continue DVT prophylaxis    2. Pulmonary:   On 2LNC,   Aggressive chest PT/IS, deep breathing, ambulate  Chest tube drainage diminished; D/C today    3. Renal:   Post op Creatinine stable; Follow up labs prn   Intake/Output net: -408 mL/24 hours  Diuretic Regimen:Lasix 40 IV daily  Increase to IV Lasix, 40 mg BID  Continue Potassium Chloride 20 mEq PO QD    4. Neuro:  Neurologically intact; No active issues  Incisional pain relatively well-controlled  Continue Tylenol, 975 mg PO q 8, standing dose  Continue Oxycodone, 5 to 10 mg PO q 4 hours prn pain    5. GI:  Controlled carbohydrate diet level two/Cardiac diet, with 1800 mL fluid restriction  Continue stool softeners and prn suppository  Continue GI prophylaxis    6. Endo:   History of DM2 a1c 7.9;    Endo following, continue insulin gtt, transition off insulin to  SC per endo   7    Hematology:    Post-operative acute blood loss anemia; Hemoglobin 11.0; trend prn    8.    Disposition:      Anticipated discharge date: 2-3 days     VTE Pharmacologic Prophylaxis: Fondaparinux (Arixtra)  VTE Mechanical Prophylaxis: sequential compression device    Collaborative rounds completed with supervising physician  Plan of care discussed with bedside nurse    SIGNATURE: Pari Hoang PA-C  DATE: August 13, 2023  TIME: 7:28 AM

## 2023-08-13 NOTE — OCCUPATIONAL THERAPY NOTE
Occupational Therapy Evaluation     Patient Name: Monie DEL REAL Date: 8/13/2023  Problem List  Principal Problem:    Coronary artery disease involving native coronary artery  Active Problems:    HTN (hypertension), benign    Type 2 diabetes mellitus ([address])    Hyperlipidemia LDL goal <100    Sleep apnea    S/P CABG x 5    Past Medical History  Past Medical History:   Diagnosis Date    Coronary artery disease     Diabetes mellitus ([address])     Hyperlipidemia     Hypertension      Past Surgical History  Past Surgical History:   Procedure Laterality Date    ARTERIAL ANEURYSM REPAIR      Right Carotid Aneurysm - age 13. CARDIAC CATHETERIZATION Left 08/02/2023    Procedure: Cardiac Left Heart Cath;  Surgeon: Stacey Daniels DO;  Location: [facility]; Service: Cardiology    CARDIAC CATHETERIZATION N/A 08/02/2023    Procedure: Cardiac Coronary Angiogram;  Surgeon: Stacey Daniels DO;  [facility]; Service: Cardiology    CARDIAC CATHETERIZATION  08/02/2023    Procedure: Cardiac catheterization;  Surgeon: Stacey Daniels DO;  [facility]; Service: Cardiology    HERNIA REPAIR      VASECTOMY             08/13/23 0950   OT Last Visit   OT Visit Date 08/13/23   Note Type   Note type Evaluation   Pain Assessment   Pain Assessment Tool 0-10   Pain Score 4   Pain Location/Orientation Location: Chest;Location: Incision   Hospital Pain Intervention(s) Repositioned; Ambulation/increased activity   Restrictions/Precautions   Weight Bearing Precautions Per Order No   Other Precautions Multiple lines;O2;Telemetry; Fall Risk;Pain;Cardiac/sternal  (CTx2, jayna)   Home Living   Type of [address] [place] [address] Two level; Work area in basement  ([facility] - primarily resides on lower level with bed/bath with 3STE; however, office is in the basement with FFOS to access)   Bathroom Shower/Tub Tub/shower unit   [employer] Standard   Additional Comments Denies AD/DME   Prior Function   Level of [place] Independent with ADLs; Independent with IADLS   Lives With Spouse;Daughter  (with wife and 2 dtrs)   Receives Help From Family   IADLs Independent with driving; Independent with meal prep; Independent with medication management   Falls in the last 6 months 0   Vocational Full time employment   Lifestyle   Autonomy PTA, pt reports being I with ADLs, IADLs, fnxl mobility, (+)    Reciprocal Relationships Family - wife is home and able to assist as needed, as well as supportive dtrs   Service to Others FTE -  for a [address] [facility]   ADL   Where Assessed Chair   Eating Assistance 7  Independent   Grooming Assistance 5  Supervision/Setup   [address] 4  Minimal Assistance    [address] 3  Moderate Assistance   [address] 4  Minimal Assistance   [address] 3  Moderate Assistance   [address]  4  Minimal Assistance   Bed Mobility   Supine to Sit Unable to assess   Sit to Supine 3  Moderate assistance   Additional items Assist x 1;HOB elevated; Bedrails; Increased time required;LE management   Transfers   Sit to Stand 5  Supervision   Additional items Increased time required   Stand to Sit 5  Supervision   Additional items Increased time required   Additional Comments transfers with RW   Functional Mobility   Functional Mobility 5  Supervision   Additional items Rolling walker   Balance   Static Sitting Fair   Dynamic Sitting Fair   Static Standing Fair -   Dynamic Standing Fair -   Ambulatory Poor +   Activity Tolerance   Activity Tolerance Patient limited by fatigue   Medical Staff Made Aware PT Chrissie Fernandes   Nurse Made Aware RN clearance for session   RUE Assessment   RUE Assessment WFL   LUE Assessment   LUE Assessment WFL   Cognition   Overall Cognitive Status WFL   Arousal/Participation Responsive; Cooperative   Attention Attends with cues to redirect   Orientation Level Oriented X4   Memory Within functional limits   Following Commands Follows all commands and directions without difficulty   Comments Pt pleasant and cooperative during session. Provided pt with cardiac education packet - verbalized understanding   Assessment   Limitation Decreased ADL status; Decreased UE strength;Decreased endurance;Decreased self-care trans;Decreased high-level ADLs   Prognosis Good   Assessment Pt is a 61 y.o. male admitted to [facility] on 8/11/2023 w/ Coronary artery disease involving native coronary artery. Pt now s/p CABG x 5.  has a past medical history of Coronary artery disease, Diabetes mellitus, Sleep apnea, Depressive disorder, Myalgia and myositis, Hyperlipidemia, and Hypertension. Pt with active OT orders and ambulate  orders. Pt seen as a co-evaluation with PT due to the patient's co-morbidities, clinically unstable presentation/clinical complexity, and present impairments. As per pt report, pta, resides with his wife and dtrs in a 2STH, 3STE. Pt was I w/  ADLS and IADLS, (+) drove. Upon evaluation, pt currently requires S with RW for transfers and mobility. Pt currently requires I eating, S grooming, MIN A UB ADLs, MOD A LB ADLs, and MIN A toileting. Pt is limited at this time 2*: pain, endurance, activity tolerance, functional mobility, balance, functional standing tolerance, decreased I w/ ADLS/IADLS and strength. The following Occupational Performance Areas to address include: grooming, bathing/shower, toilet hygiene, dressing, health maintenance, functional mobility, community mobility and clothing management. Pt to benefit from immediate acute skilled OT to address above deficits, improve overall functional independence, maximize fnxl mobility and reduce caregiver burden. From OT standpoint, recommendation at time of d/c would be home with increased social support, pending progress. Pt was left after session with all current needs met.  The patient's raw score on the AM-PAC Daily Activity Inpatient Short Form is 17. A raw score of less than 19 suggests the patient may benefit from discharge to post-acute rehabilitation services. Please refer to the recommendation of the Occupational Therapist for safe discharge planning. Goals   Patient Goals to get some of his lines removed   LTG Time Frame 10-14   Plan   Treatment Interventions ADL retraining;Functional transfer training;UE strengthening/ROM; Endurance training;Patient/family training;Equipment evaluation/education; Compensatory technique education;Continued evaluation; Energy conservation; Activityengagement;Cardiac education   Goal Expiration Date 08/27/23   OT Frequency 3-5x/wk   Recommendation   OT Discharge Recommendation No rehabilitation needs  (pending progress)   AM-PAC Daily Activity Inpatient   Lower Body Dressing 2   Bathing 2   Toileting 3   Upper Body Dressing 3   Grooming 3   Eating 4   Daily Activity Raw Score 17   Daily Activity Standardized Score (Calc for Raw Score >=11) 37.26   AM-PAC Applied Cognition Inpatient   Following a Speech/Presentation 4   Understanding Ordinary Conversation 4   Taking Medications 4   Remembering Where Things Are Placed or Put Away 4   Remembering List of 4-5 Errands 4   Taking Care of Complicated Tasks 4   Applied Cognition Raw Score 24   Applied Cognition Standardized Score 62.21     GOALS    - Pt will complete UB dressing/self care/bathing w/ mod I using adaptive device and DME as needed    - Pt will complete LB dressing/self care/bathing w/ mod I using adaptive device and DME as needed    - Pt will complete toileting w/ mod I w/ G hygiene/thoroughness using DME as needed    - Pt will improve functional transfers to Mod I on/off all surfaces using DME as needed w/ G balance/safety     - Pt will improve functional mobility during ADL/IADL/leisure tasks to Mod I using DME as needed w/ G balance/safety     - Pt will demonstrate G carryover of pt/caregiver education and training as appropriate.     - Pt will demonstrate 100% carryover of cardiac precautions t/o functional I/ADL/leisure tasks w/o cues s/p skilled education    - Pt will increase static and dynamic standing/sitting balance to F+  using AD/DME as needed to increase safety and I during fnxl transfers and ADLs    - Pt will maintain upright sitting position for at least 20 min during dynamic fnxl activity with F+  balance and endurance to improve ADL performance and independence, as well as reduce risk of falls     - Pt will participate in simulated IADL management task with DME as needed to increase independence to  w/ G safety and endurance       Fermín Arthur MS, OTR/L

## 2023-08-13 NOTE — PROGRESS NOTES
Progress Note - Ara Lundborg 61 y.o. male MRN: [id number]    Unit/Bed#: [facility] 408-01 Encounter: [id number]      CC: diabetes f/u    Subjective:   Ara Lundborg is a 61y.o. year old male with type 2 diabetes. Feels well. No complaints. No hypoglycemia. Still on IV insulin. Still not the best with p.o. intake. Does not like the food. Objective:     Vitals: Blood pressure 100/59, pulse 80, temperature (!) 97.1 °F (36.2 °C), temperature source Axillary, resp. rate 21, height 5' 11" (1.803 m), weight 107 kg (235 lb 10.8 oz), SpO2 95 %. ,Body mass index is 32.87 kg/m². Intake/Output Summary (Last 24 hours) at 8/13/2023 1233  Last data filed at 8/13/2023 1133  Gross per 24 hour   Intake 1589.19 ml   Output 1460 ml   Net 129.19 ml       Physical Exam:  General Appearance: awake, appears stated age and cooperative  Head: Normocephalic, without obvious abnormality, atraumatic  Extremities: moves all extremities  Skin: Skin color and temperature normal.   Pulm: no labored breathing    Lab, Imaging and other studies: I have personally reviewed pertinent reports. POC Glucose (mg/dl)   Date Value   08/13/2023 209 (H)   08/13/2023 196 (H)   08/13/2023 145 (H)   08/13/2023 141 (H)   08/13/2023 126   08/13/2023 105   08/13/2023 113   08/12/2023 123   08/12/2023 118   08/12/2023 114       Assessment:  diabetes with hyperglycemia     1. Type 2 diabetes. Currently on IV insulin with poor p.o. intake. Will transition to subcu insulin today and utilize low-dose Humalog insulin since not eating much. Will likely need to increase the Humalog dosage over time once he eats more. We will be able to switch from every 2 hour to 4 times daily blood sugars once he is on basal/bolus insulin regimen. 2.  Post CABG. Treatment per primary team and cardiothoracic surgery. Plan:  1. Start Lantus insulin 25 units at bedtime. 2.  Discontinue IV insulin 1 hour after Lantus insulin started.     3.  Start Humalog insulin 5 units with meals, this will likely need to be increased as p.o. intake increases. 4.  Humalog insulin sliding scale with 4 times daily blood sugar testing starting after insulin drip discontinued. Portions of the record may have been created with voice recognition software.

## 2023-08-13 NOTE — PROCEDURES
08/13/23    Procedure: Epicardial Pacing Wire removal    Pedro Luis Minor was returned to bed and informed of mandatory one hour post-procedure bed rest.  The assigned nurse was notified. Epicardial pacing wires removed in routine fashion, without incident. The patient tolerated the procedure well. Vital signs ordered  q 15 minutes for one hour, as per protocol. Procedure: Chest tube removal    Chest tubes removed in routine fashion without incident. Insertion site dressed with Acticoat. Pedro Luis Minor tolerated the procedure well. Nurse notified.     SIGNATURE: Kenia Albarran PA-C  DATE: August 13, 2023  TIME: 11:57 AM

## 2023-08-13 NOTE — PLAN OF CARE
Problem: MOBILITY - ADULT  Goal: Maintain or return to baseline ADL function  Description: INTERVENTIONS:  -  Assess patient's ability to carry out ADLs; assess patient's baseline for ADL function and identify physical deficits which impact ability to perform ADLs (bathing, care of mouth/teeth, toileting, grooming, dressing, etc.)  - Assess/evaluate cause of self-care deficits   - Assess range of motion  - Assess patient's mobility; develop plan if impaired  - Assess patient's need for assistive devices and provide as appropriate  - Encourage maximum independence but intervene and supervise when necessary  - Involve family in performance of ADLs  - Assess for home care needs following discharge   - Consider OT consult to assist with ADL evaluation and planning for discharge  - Provide patient education as appropriate  Outcome: Progressing  Goal: Maintains/Returns to pre admission functional level  Description: INTERVENTIONS:  - Perform BMAT or MOVE assessment daily.   - Set and communicate daily mobility goal to care team and patient/family/caregiver. - Collaborate with rehabilitation services on mobility goals if consulted  - Perform Range of Motion 3 times a day. - Reposition patient every 2 hours.   - Dangle patient 3 times a day  - Stand patient 3 times a day  - Ambulate patient 3-4 times a day  - Out of bed to chair 3 times a day   - Out of bed for meals 3 times a day  - Out of bed for toileting  - Record patient progress and toleration of activity level   Outcome: Progressing     Problem: PAIN - ADULT  Goal: Verbalizes/displays adequate comfort level or baseline comfort level  Description: Interventions:  - Encourage patient to monitor pain and request assistance  - Assess pain using appropriate pain scale  - Administer analgesics based on type and severity of pain and evaluate response  - Implement non-pharmacological measures as appropriate and evaluate response  - Consider cultural and social influences on pain and pain management  - Notify physician/advanced practitioner if interventions unsuccessful or patient reports new pain  Outcome: Progressing     Problem: SAFETY ADULT  Goal: Maintain or return to baseline ADL function  Description: INTERVENTIONS:  -  Assess patient's ability to carry out ADLs; assess patient's baseline for ADL function and identify physical deficits which impact ability to perform ADLs (bathing, care of mouth/teeth, toileting, grooming, dressing, etc.)  - Assess/evaluate cause of self-care deficits   - Assess range of motion  - Assess patient's mobility; develop plan if impaired  - Assess patient's need for assistive devices and provide as appropriate  - Encourage maximum independence but intervene and supervise when necessary  - Involve family in performance of ADLs  - Assess for home care needs following discharge   - Consider OT consult to assist with ADL evaluation and planning for discharge  - Provide patient education as appropriate  Outcome: Progressing  Goal: Maintains/Returns to pre admission functional level  Description: INTERVENTIONS:  - Perform BMAT or MOVE assessment daily.   - Set and communicate daily mobility goal to care team and patient/family/caregiver. - Collaborate with rehabilitation services on mobility goals if consulted  - Perform Range of Motion 3 times a day. - Reposition patient every 2 hours.   - Dangle patient 3 times a day  - Stand patient 3 times a day  - Ambulate patient 3 times a day  - Out of bed to chair 3 times a day   - Out of bed for meals 3 times a day  - Out of bed for toileting  - Record patient progress and toleration of activity level   Outcome: Progressing     Problem: CARDIOVASCULAR - ADULT  Goal: Maintains optimal cardiac output and hemodynamic stability  Description: INTERVENTIONS:  - Monitor I/O, vital signs and rhythm  - Monitor for S/S and trends of decreased cardiac output  - Administer and titrate ordered vasoactive medications to optimize hemodynamic stability  - Assess quality of pulses, skin color and temperature  - Assess for signs of decreased coronary artery perfusion  - Instruct patient to report change in severity of symptoms  Outcome: Progressing     Problem: CARDIOVASCULAR - ADULT  Goal: Absence of cardiac dysrhythmias or at baseline rhythm  Description: INTERVENTIONS:  - Continuous cardiac monitoring, vital signs, obtain 12 lead EKG if ordered  - Administer antiarrhythmic and heart rate control medications as ordered  - Monitor electrolytes and administer replacement therapy as ordered  Outcome: Progressing

## 2023-08-13 NOTE — PLAN OF CARE
Problem: OCCUPATIONAL THERAPY ADULT  Goal: Performs self-care activities at highest level of function for planned discharge setting. See evaluation for individualized goals. Description: Treatment Interventions: ADL retraining, Functional transfer training, UE strengthening/ROM, Endurance training, Patient/family training, Equipment evaluation/education, Compensatory technique education, Continued evaluation, Energy conservation, Activityengagement, Cardiac education          See flowsheet documentation for full assessment, interventions and recommendations. Note: Limitation: Decreased ADL status, Decreased UE strength, Decreased endurance, Decreased self-care trans, Decreased high-level ADLs  Prognosis: Good  Assessment: Pt is a 61 y.o. male admitted to [facility] on 8/11/2023 w/ Coronary artery disease involving native coronary artery. Pt now s/p CABG x 5.  has a past medical history of Coronary artery disease, Diabetes mellitus, Sleep apnea, Depressive disorder, Myalgia and myositis, Hyperlipidemia, and Hypertension. Pt with active OT orders and ambulate  orders. Pt seen as a co-evaluation with PT due to the patient's co-morbidities, clinically unstable presentation/clinical complexity, and present impairments. As per pt report, pta, resides with his wife and dtrs in a 2STH, 3STE. Pt was I w/  ADLS and IADLS, (+) drove. Upon evaluation, pt currently requires S with RW for transfers and mobility. Pt currently requires I eating, S grooming, MIN A UB ADLs, MOD A LB ADLs, and MIN A toileting. Pt is limited at this time 2*: pain, endurance, activity tolerance, functional mobility, balance, functional standing tolerance, decreased I w/ ADLS/IADLS and strength. The following Occupational Performance Areas to address include: grooming, bathing/shower, toilet hygiene, dressing, health maintenance, functional mobility, community mobility and clothing management.  Pt to benefit from immediate acute skilled OT to address above deficits, improve overall functional independence, maximize fnxl mobility and reduce caregiver burden. From OT standpoint, recommendation at time of d/c would be home with increased social support, pending progress. Pt was left after session with all current needs met. The patient's raw score on the AM-PAC Daily Activity Inpatient Short Form is 17. A raw score of less than 19 suggests the patient may benefit from discharge to post-acute rehabilitation services. Please refer to the recommendation of the Occupational Therapist for safe discharge planning.      OT Discharge Recommendation: No rehabilitation needs (pending progress)

## 2023-08-13 NOTE — PLAN OF CARE
Problem: PHYSICAL THERAPY ADULT  Goal: Performs mobility at highest level of function for planned discharge setting. See evaluation for individualized goals. Description: Treatment/Interventions: LE strengthening/ROM, Functional transfer training, Elevations, Therapeutic exercise, Endurance training, Patient/family training, Equipment eval/education, Bed mobility, Gait training  Equipment Recommended: Kiera Choudhury (may need RW)       See flowsheet documentation for full assessment, interventions and recommendations. Note: Prognosis: Good  Problem List: Decreased strength, Decreased endurance, Impaired balance, Decreased mobility  Assessment: Pt seen for physical therapy evaluation, portion of which was performed as a co-evaluation w/ OT due to concerns re: medical stability. PT portion of evaluation focused on mobility assessment where OT portion focused more on ADLs, self care. Pt is a 62 y/o male w/ history/comorbidities of HTN, HLD, DM II, obesity, DUDLEY, depression, R carootid aneurysm w/ repair, CAD who is now admitted w/ known CAD and symptoms of fatigue, chest tightness who is now admitted for elective CABG x 5 8/11/23. Due to acute medical issues, acute surgery, pain, fall risk, note unstable clinical picture. PT consulted to assess postop mobility, d/c needs. Pt presents w/ decreased functional mob, standing balance, endurance, B LE strength, barriers at home. Pt will benefit from skilled PT to correct for the above problems. anticipate d/c home once medically stable, may need RW        PT Discharge Recommendation: No rehabilitation needs    See flowsheet documentation for full assessment.

## 2023-08-13 NOTE — PHYSICAL THERAPY NOTE
Physical Therapy Evaluation    Patient's Name: Kojo Burgessr    Admitting Diagnosis  Coronary artery disease of native artery of native heart with stable angina pectoris ([address]) [I25.118]    Problem List  Patient Active Problem List   Diagnosis    HTN (hypertension), benign    Type 2 diabetes mellitus ([address])    Hyperlipidemia LDL goal <100    Sleep apnea    Coronary artery disease involving native coronary artery    S/P CABG x 5    Depressive disorder    Myalgia and myositis    Peripheral angiopathy due to type 2 diabetes mellitus ([address])    Right knee pain    Vitamin D deficiency       Past Medical History  Past Medical History:   Diagnosis Date    Coronary artery disease     Diabetes mellitus ([address])     Hyperlipidemia     Hypertension        Past Surgical History  Past Surgical History:   Procedure Laterality Date    ARTERIAL ANEURYSM REPAIR      Right Carotid Aneurysm - age 13. CARDIAC CATHETERIZATION Left 08/02/2023    Procedure: Cardiac Left Heart Cath;  Surgeon: Ese oGnzalez DO;  Location: [facility]; Service: Cardiology    CARDIAC CATHETERIZATION N/A 08/02/2023    Procedure: Cardiac Coronary Angiogram;  Surgeon: Ese Gonzalez DO;  Location: [facility]; Service: Cardiology    CARDIAC CATHETERIZATION  08/02/2023    Procedure: Cardiac catheterization;  Surgeon: Ese Gonzalez DO;  Location: [facility];   Service: Cardiology    HERNIA REPAIR      VASECTOMY          08/13/23 0951   PT Last Visit   PT Visit Date 08/13/23   Note Type   Note type Evaluation   Pain Assessment   Pain Assessment Tool 0-10   Pain Score 5   Pain Location/Orientation Location: Chest   Patient's Stated Pain Goal No pain   Hospital Pain Intervention(s) Ambulation/increased activity   Restrictions/Precautions   Weight Bearing Precautions Per Order No   Other Precautions Cardiac/sternal;Multiple lines;Telemetry;O2;Pain   Home Living   Type of Home House   Additional Comments Resides w/ wife in 2 story home, primarily stays on 1st floor. several MANUEL. Indep self care, ambulates w/ out device. Prior Function   Level of [place] Independent with ADLs; Independent with functional mobility   Falls in the last 6 months 0   General   Family/Caregiver Present No   Cognition   Overall Cognitive Status WFL   Arousal/Participation Responsive   Attention Within functional limits   Orientation Level Oriented X4   Memory Unable to assess   Following Commands Follows all commands and directions without difficulty   Subjective   Subjective cooperative w/ session, some pain   RLE Assessment   RLE Assessment   (strength grossly 4-/5)   LLE Assessment   LLE Assessment   (strength grossly 4-/5)   Coordination   Movements are Fluid and Coordinated 1   Bed Mobility   Sit to Supine 3  Moderate assistance   Additional items Assist x 1;LE management; Increased time required   Additional Comments returned to supine post session to have tubes pulled   Transfers   Sit to Stand 5  Supervision   Additional items Assist x 1; Increased time required;Verbal cues   Stand to Sit 5  Supervision   Additional items Assist x 1; Increased time required;Verbal cues   Additional Comments stood x several minutes prior to gait to urinate   Ambulation/Elevation   Gait pattern   (slow, short step length, forward flexion)   Gait Assistance 4  Minimal assist   Additional items Assist x 1  (w/ 2nd for lines)   Assistive Device Rolling walker   Distance 120'   Balance   Static Sitting Normal   Dynamic Sitting Good   Static Standing Fair   Dynamic Standing Fair -   Ambulatory Fair -   Endurance Deficit   Endurance Deficit Yes   Endurance Deficit Description fatigue, weakness, pain   Activity Tolerance   Activity Tolerance Patient limited by fatigue;Patient limited by pain;Treatment limited secondary to medical complications (Comment)   Nurse Made Aware yes   Assessment   Prognosis Good   Problem List Decreased strength;Decreased endurance; Impaired balance;Decreased mobility   Assessment Pt seen for physical therapy evaluation, portion of which was performed as a co-evaluation w/ OT due to concerns re: medical stability. PT portion of evaluation focused on mobility assessment where OT portion focused more on ADLs, self care. Pt is a 62 y/o male w/ history/comorbidities of HTN, HLD, DM II, obesity, DUDLEY, depression, R carootid aneurysm w/ repair, CAD who is now admitted w/ known CAD and symptoms of fatigue, chest tightness who is now admitted for elective CABG x 5 8/11/23. Due to acute medical issues, acute surgery, pain, fall risk, note unstable clinical picture. PT consulted to assess postop mobility, d/c needs. Pt presents w/ decreased functional mob, standing balance, endurance, B LE strength, barriers at home. Pt will benefit from skilled PT to correct for the above problems. anticipate d/c home once medically stable, may need RW   Goals   Patient Goals to have less pain   STG Expiration Date 08/27/23   Short Term Goal #1 1-2 wks: bed mob and transfers w/ indep, standing balance to good/normal w/ device, ambulate 200-300 ft w/ RW vs least restrictive device and S/mod I, increase B LE strength by 1/2 -1 grade, ambulate 3-7 stairs w/ S   PT Treatment Day 0   Plan   Treatment/Interventions LE strengthening/ROM; Functional transfer training;Elevations; Therapeutic exercise; Endurance training;Patient/family training;Equipment eval/education; Bed mobility;Gait training   PT Frequency 3-5x/wk   Recommendation   PT Discharge Recommendation No rehabilitation needs   Equipment Recommended Walker  (may need RW)   Walker Package Recommended Wheeled walker   Change/add to Apprity?  No   AM-PAC Basic Mobility Inpatient   Turning in Flat Bed Without Bedrails 4   Lying on Back to Sitting on Edge of Flat Bed Without Bedrails 2   Moving Bed to Chair 3   Standing Up From Chair Using Arms 3   Walk in Room 3   Climb 3-5 Stairs With Railing 3   Basic Mobility Inpatient Raw Score 18   Basic Mobility Standardized Score 41.05   Highest Level Of Mobility   JH-HLM Goal 6: Walk 10 steps or more   JH-HLM Achieved 7: Walk 25 feet or more           Candelario Harkins PT, DPT, CSRS

## 2023-08-14 LAB
ANION GAP SERPL CALCULATED.3IONS-SCNC: 5 MMOL/L
BUN SERPL-MCNC: 32 MG/DL (ref 5–25)
CALCIUM SERPL-MCNC: 7.9 MG/DL (ref 8.3–10.1)
CHLORIDE SERPL-SCNC: 106 MMOL/L (ref 96–108)
CO2 SERPL-SCNC: 20 MMOL/L (ref 21–32)
CREAT SERPL-MCNC: 1.14 MG/DL (ref 0.6–1.3)
ERYTHROCYTE [DISTWIDTH] IN BLOOD BY AUTOMATED COUNT: 12.7 % (ref 11.6–15.1)
GFR SERPL CREATININE-BSD FRML MDRD: 68 ML/MIN/1.73SQ M
GLUCOSE SERPL-MCNC: 125 MG/DL (ref 65–140)
GLUCOSE SERPL-MCNC: 150 MG/DL (ref 65–140)
GLUCOSE SERPL-MCNC: 162 MG/DL (ref 65–140)
GLUCOSE SERPL-MCNC: 174 MG/DL (ref 65–140)
GLUCOSE SERPL-MCNC: 189 MG/DL (ref 65–140)
HCT VFR BLD AUTO: 31 % (ref 36.5–49.3)
HGB BLD-MCNC: 10.2 G/DL (ref 12–17)
MCH RBC QN AUTO: 29.6 PG (ref 26.8–34.3)
MCHC RBC AUTO-ENTMCNC: 32.9 G/DL (ref 31.4–37.4)
MCV RBC AUTO: 90 FL (ref 82–98)
PLATELET # BLD AUTO: 139 THOUSANDS/UL (ref 149–390)
PMV BLD AUTO: 10.3 FL (ref 8.9–12.7)
POTASSIUM SERPL-SCNC: 4.5 MMOL/L (ref 3.5–5.3)
RBC # BLD AUTO: 3.45 MILLION/UL (ref 3.88–5.62)
SODIUM SERPL-SCNC: 131 MMOL/L (ref 135–147)
WBC # BLD AUTO: 11.18 THOUSAND/UL (ref 4.31–10.16)

## 2023-08-14 PROCEDURE — 80048 BASIC METABOLIC PNL TOTAL CA: CPT | Performed by: THORACIC SURGERY (CARDIOTHORACIC VASCULAR SURGERY)

## 2023-08-14 PROCEDURE — 85027 COMPLETE CBC AUTOMATED: CPT | Performed by: THORACIC SURGERY (CARDIOTHORACIC VASCULAR SURGERY)

## 2023-08-14 PROCEDURE — 82948 REAGENT STRIP/BLOOD GLUCOSE: CPT

## 2023-08-14 PROCEDURE — 99024 POSTOP FOLLOW-UP VISIT: CPT | Performed by: THORACIC SURGERY (CARDIOTHORACIC VASCULAR SURGERY)

## 2023-08-14 PROCEDURE — 97116 GAIT TRAINING THERAPY: CPT

## 2023-08-14 RX ORDER — OXYCODONE HYDROCHLORIDE 5 MG/1
TABLET ORAL
Qty: 30 TABLET | Refills: 0 | Status: SHIPPED | OUTPATIENT
Start: 2023-08-14

## 2023-08-14 RX ORDER — POTASSIUM CHLORIDE 750 MG/1
10 TABLET, EXTENDED RELEASE ORAL DAILY
Status: DISCONTINUED | OUTPATIENT
Start: 2023-08-14 | End: 2023-08-15 | Stop reason: HOSPADM

## 2023-08-14 RX ORDER — ASPIRIN 325 MG
325 TABLET ORAL DAILY
Qty: 30 TABLET | Refills: 2 | Status: SHIPPED | OUTPATIENT
Start: 2023-08-15 | End: 2023-08-15 | Stop reason: SDUPTHER

## 2023-08-14 RX ORDER — PANTOPRAZOLE SODIUM 40 MG/1
40 TABLET, DELAYED RELEASE ORAL
Qty: 30 TABLET | Refills: 0 | Status: SHIPPED | OUTPATIENT
Start: 2023-08-15 | End: 2023-09-14

## 2023-08-14 RX ORDER — TORSEMIDE 20 MG/1
40 TABLET ORAL DAILY
Status: DISCONTINUED | OUTPATIENT
Start: 2023-08-14 | End: 2023-08-15 | Stop reason: HOSPADM

## 2023-08-14 RX ORDER — ROSUVASTATIN CALCIUM 20 MG/1
40 TABLET, COATED ORAL DAILY
Qty: 30 TABLET | Refills: 2 | Status: SHIPPED | OUTPATIENT
Start: 2023-08-14 | End: 2023-08-15 | Stop reason: SDUPTHER

## 2023-08-14 RX ORDER — ACETAMINOPHEN 325 MG/1
TABLET ORAL
Refills: 0 | COMMUNITY
Start: 2023-08-14

## 2023-08-14 RX ORDER — DOCUSATE SODIUM 100 MG/1
100 CAPSULE, LIQUID FILLED ORAL 2 TIMES DAILY
Qty: 60 CAPSULE | Refills: 0 | Status: SHIPPED | OUTPATIENT
Start: 2023-08-14 | End: 2023-09-13

## 2023-08-14 RX ORDER — POTASSIUM CHLORIDE 750 MG/1
10 TABLET, EXTENDED RELEASE ORAL DAILY
Qty: 10 TABLET | Refills: 1 | Status: SHIPPED | OUTPATIENT
Start: 2023-08-15 | End: 2023-08-15 | Stop reason: SDUPTHER

## 2023-08-14 RX ORDER — POLYETHYLENE GLYCOL 3350 17 G/17G
17 POWDER, FOR SOLUTION ORAL DAILY
Qty: 30 EACH | Refills: 0 | Status: SHIPPED | OUTPATIENT
Start: 2023-08-15 | End: 2023-09-14

## 2023-08-14 RX ADMIN — MUPIROCIN 1 APPLICATION: 20 OINTMENT TOPICAL at 21:00

## 2023-08-14 RX ADMIN — FONDAPARINUX SODIUM 2.5 MG: 2.5 INJECTION, SOLUTION SUBCUTANEOUS at 08:21

## 2023-08-14 RX ADMIN — AMIODARONE HYDROCHLORIDE 200 MG: 200 TABLET ORAL at 21:00

## 2023-08-14 RX ADMIN — MAGNESIUM HYDROXIDE 30 ML: 400 SUSPENSION ORAL at 07:44

## 2023-08-14 RX ADMIN — INSULIN LISPRO 5 UNITS: 100 INJECTION, SOLUTION INTRAVENOUS; SUBCUTANEOUS at 16:39

## 2023-08-14 RX ADMIN — MUPIROCIN 1 APPLICATION: 20 OINTMENT TOPICAL at 08:21

## 2023-08-14 RX ADMIN — GABAPENTIN 300 MG: 300 CAPSULE ORAL at 21:00

## 2023-08-14 RX ADMIN — INSULIN LISPRO 1 UNITS: 100 INJECTION, SOLUTION INTRAVENOUS; SUBCUTANEOUS at 16:39

## 2023-08-14 RX ADMIN — INSULIN GLARGINE 25 UNITS: 100 INJECTION, SOLUTION SUBCUTANEOUS at 21:02

## 2023-08-14 RX ADMIN — ACETAMINOPHEN 975 MG: 325 TABLET, FILM COATED ORAL at 05:26

## 2023-08-14 RX ADMIN — POTASSIUM CHLORIDE 10 MEQ: 750 TABLET, EXTENDED RELEASE ORAL at 08:20

## 2023-08-14 RX ADMIN — AMIODARONE HYDROCHLORIDE 200 MG: 200 TABLET ORAL at 14:23

## 2023-08-14 RX ADMIN — INSULIN LISPRO 1 UNITS: 100 INJECTION, SOLUTION INTRAVENOUS; SUBCUTANEOUS at 21:04

## 2023-08-14 RX ADMIN — METHOCARBAMOL 500 MG: 500 TABLET ORAL at 11:15

## 2023-08-14 RX ADMIN — INSULIN LISPRO 5 UNITS: 100 INJECTION, SOLUTION INTRAVENOUS; SUBCUTANEOUS at 07:44

## 2023-08-14 RX ADMIN — METHOCARBAMOL 500 MG: 500 TABLET ORAL at 05:26

## 2023-08-14 RX ADMIN — DOCUSATE SODIUM 100 MG: 100 CAPSULE, LIQUID FILLED ORAL at 17:02

## 2023-08-14 RX ADMIN — Medication 12.5 MG: at 08:21

## 2023-08-14 RX ADMIN — METHOCARBAMOL 500 MG: 500 TABLET ORAL at 17:01

## 2023-08-14 RX ADMIN — CHLORHEXIDINE GLUCONATE 15 ML: 1.2 SOLUTION ORAL at 17:02

## 2023-08-14 RX ADMIN — POLYETHYLENE GLYCOL 3350 17 G: 17 POWDER, FOR SOLUTION ORAL at 08:21

## 2023-08-14 RX ADMIN — DOCUSATE SODIUM 100 MG: 100 CAPSULE, LIQUID FILLED ORAL at 08:20

## 2023-08-14 RX ADMIN — TORSEMIDE 40 MG: 20 TABLET ORAL at 08:20

## 2023-08-14 RX ADMIN — AMIODARONE HYDROCHLORIDE 200 MG: 200 TABLET ORAL at 05:26

## 2023-08-14 RX ADMIN — LORAZEPAM 0.5 MG: 0.5 TABLET ORAL at 21:00

## 2023-08-14 RX ADMIN — PANTOPRAZOLE SODIUM 40 MG: 40 TABLET, DELAYED RELEASE ORAL at 05:26

## 2023-08-14 RX ADMIN — ACETAMINOPHEN 975 MG: 325 TABLET, FILM COATED ORAL at 14:23

## 2023-08-14 RX ADMIN — Medication 12.5 MG: at 21:00

## 2023-08-14 RX ADMIN — GABAPENTIN 100 MG: 100 CAPSULE ORAL at 08:20

## 2023-08-14 RX ADMIN — SERTRALINE HYDROCHLORIDE 50 MG: 50 TABLET ORAL at 08:20

## 2023-08-14 RX ADMIN — ASPIRIN 325 MG ORAL TABLET 325 MG: 325 PILL ORAL at 08:26

## 2023-08-14 RX ADMIN — INSULIN LISPRO 5 UNITS: 100 INJECTION, SOLUTION INTRAVENOUS; SUBCUTANEOUS at 11:15

## 2023-08-14 RX ADMIN — CHLORHEXIDINE GLUCONATE 15 ML: 1.2 SOLUTION ORAL at 08:20

## 2023-08-14 RX ADMIN — INSULIN LISPRO 1 UNITS: 100 INJECTION, SOLUTION INTRAVENOUS; SUBCUTANEOUS at 11:14

## 2023-08-14 RX ADMIN — ATORVASTATIN CALCIUM 80 MG: 80 TABLET, FILM COATED ORAL at 16:40

## 2023-08-14 RX ADMIN — INSULIN LISPRO 1 UNITS: 100 INJECTION, SOLUTION INTRAVENOUS; SUBCUTANEOUS at 06:16

## 2023-08-14 RX ADMIN — ACETAMINOPHEN 975 MG: 325 TABLET, FILM COATED ORAL at 21:00

## 2023-08-14 NOTE — DISCHARGE SUMMARY
Discharge Summary - Cardiothoracic Surgery   Luigi Felix 61 y.o. male MRN: [id number]  Unit/Bed#: [facility] 408-01 Encounter: [id number]    Admission Date: 8/11/2023     Discharge Date: 08/15/23    Admitting Diagnosis: Coronary artery disease of native artery of native heart with stable angina pectoris [facility]) [I25.118]    Primary Discharge Diagnosis:   Coronary artery disease. S/P coronary artery bypass grafting;    Secondary Discharge Diagnosis:   1. HTN  2. HLD  3. NIDDM2 (A1c 7.4)  4. Obesity (BMI 30)  5. DUDLEY (no CPAP)  6. Depression  7. R carotid aneurysm s/p repair (age 13)    Attending: Seward Frankel, M.D. Consulting Physician(s):   Cardiology  Endocrinology  Medical critical care  Occupational/Physical Threapy    Procedures Performed:   Procedure(s):  CORONARY ARTERY BYPASS GRAFT (CABG) X-5 VESSELS ; SVG to PDA, Ramus, Diagonal and OM. [place] -->  [facility]  W/ [facility]     Hospital Course: The patient was seen in consultation prior to this admission for evaluation of Coronary artery disease. Risks and benefits of coronary artery bypass grafting were discussed in detail, and patient was agreeable. Routine preoperative evaluation was completed and informed consent was obtained prior to admission. 8/11: Elective admission for CABG x 5. Intraoperative blood products include: none. No significant postoperative bleeding. Transferred to ICU supported with epi at 6, zbigniew at 48. Wean towards extubation. EKG NSR. PM: Epi 2, Zbigniew 100, extubated. 8/12: Epi off overnight. Zbigniew at 70, d/c colby. Keep sue. Holding BB. Hgb 12, Cr 0.9. Keep CTs/PWs. Lasix 40 IV daily. Cont craven. Consult endo for DM2.      8/13: weaned off zbigniew overnight at midnight. 2LNC, SR. d/c sue/tele in AM.-400, wt up 13 lbs, increase lasix to 40 IV BID. D/C CTs/PWs. Start lopressor 12.5 mg BID later in AM. Transition insulin gtt per endo. 8/14: Pain controlled, denies SOB, weaned to RA, using IS (750). Tolerating diet, +flatus, no BM. Ambulating well. NSR, HR/BP well controlled. Net neg 1.8L, transition to demadex. Labs stable. Decrease oxycodone. Patient in good condition for discharge. Patient in agreement with plan and follow-up appointments. PM: Patient is uncomfortable w/ discharge, discharge delayed til 8/15.    8/15:  Denies pain, denies SOB, weaned to RA, using IS (1250). Tolerating diet, + BM. Ambulating well x 6, independently. Eager to be d/c’d today. NSR, HR/BP well controlled. Net neg 3L, decrease demadex. Endocrine with final recs for resuming home medication and follow-up with Dr. Katelin Marie. Patient in good condition for discharge. Patient in agreement with plan and follow-up appointments. Condition at Discharge:   good     Discharge Physical Exam:    Please see the documented physical exam from this morning's progress note for details. Discharge Data:  Results from last 7 days   Lab Units 08/14/23  0411 08/13/23  0423 08/12/23  0403   WBC Thousand/uL 11.18* 13.65* 17.02*   HEMOGLOBIN g/dL 10.2* 11.0* 12.3   HEMATOCRIT % 31.0* 33.3* 37.4   PLATELETS Thousands/uL 139* 129* 187     Results from last 7 days   Lab Units 08/14/23  0328 08/13/23  0423 08/12/23  0402 08/11/23  1802 08/11/23  1408   POTASSIUM mmol/L 4.5 5.1 5.3   < >  --    CHLORIDE mmol/L 106 108 116*  --   --    CO2 mmol/L 20* 21 20*  --   --    CO2, I-STAT mmol/L  --   --   --   --  23   BUN mg/dL 32* 26* 14  --   --    CREATININE mg/dL 1.14 1.20 0.91  --   --    GLUCOSE, ISTAT mg/dl  --   --   --   --  184*   CALCIUM mg/dL 7.9* 8.0* 7.1*  --   --     < > = values in this interval not displayed. Discharge instructions/Information to patient and family:   See after visit summary for information provided to patient and family. Patyyared Patti was educated on restrictions regarding driving and lifting, and techniques of proper incisional care.   They were specifically counselled on signs and symptoms of an incisional infection, and advised to contact our service immediately should they develop fevers, sweats, chill, redness or drainage at the site of any incisions. Provisions for Follow-Up Care:  See after visit summary for information related to follow-up care and any pertinent home health orders. Disposition:  Home w/ HHC, home PT & RW    Planned Readmission:   No    Discharge Medications:  See after visit summary for reconciled discharge medications provided to patient and family. Leo Potter was provided contact information and scheduled a follow up appointment with Shin Batres M.D. Additionally, follow up appointments have been scheduled for their primary care physician and primary cardiologist.  Contact information was provided. DM2 regimen confirmed w/ endo PTD. No refills on PTA medications or glucometer supplies per patient. To f/u w/ endo as o/p. Leo Potter was counseled on the importance of avoiding tobacco products. As with all patients whom have undergone open heart surgery, tobacco cessation medication was contraindicated at the time of discharge. ACE/ARB was Contraindicated secondary to hypotension    Beta Blocker was Prescribed at discharge    Aspirin was Prescribed at discharge    Statin was Prescribed at discharge      The patient was discharged on ongoing diuretic therapy with Torsemide, 20 mg, PO QD and Potassium Chloride 10 mEq, PO QD. They were advised to continue these medications for 10 days, unless otherwise directed. Narcotic pain medication was prescribed in the form of Oxycodone. Prior to prescribing, their prescription profile was reviewed on the [place] department of health prescription drug monitoring program. Tylenol PRN also recommended. The patient was informed that following their postoperative surgical evaluation, they will be referred to outpatient cardiac rehabilitation.   They were counseled that this program is run by specialists who will help them safely strengthen their heart and prevent more heart disease. Cardiac rehabilitation will include exercise, relaxation, stress management, and heart-healthy nutrition. Caregivers will also check to make sure their medication regimen is working. During this admission, the patient was questioned on their use of tobacco, alcohol, and illicit/non-prescription drug use in the  previous 24 months. Balinda Romberg states that they have not used any of these substances in this time frame. I spent 30 minutes discharging the patient. This time was spent on the day of discharge. I had direct contact with the patient on the day of discharge. Additional documentation is required if more than 30 minutes were spent on discharge.      SIGNATURE: Mukesh Knowles PA-C  DATE: August 15, 2023  TIME: 7:30 AM

## 2023-08-14 NOTE — PROGRESS NOTES
Progress Note - Cardiothoracic Surgery   Christiane Alfonso 61 y.o. male MRN: [id number]  Unit/Bed#: [facility] 408-01 Encounter: [id number]    Coronary artery disease. S/P coronary artery bypass grafting; POD # 3      24 Hour Events:  Pain controlled, denies SOB, weaned to RA, using IS (750). Tolerating diet, +flatus, no BM. Ambulating well.      Medications:   Scheduled Meds:  Current Facility-Administered Medications   Medication Dose Route Frequency Provider Last Rate   • acetaminophen  975 mg Oral [facility] Urmila Amaya PA-C     • amiodarone  200 mg Oral [facility] Urmila Amaya PA-C     • aspirin  325 mg Oral Daily Urmila Amaya PA-C     • atorvastatin  80 mg Oral Daily With [employer], PA-C     • bisacodyl  10 mg Rectal Daily PRN Urmila Amaya PA-C     • chlorhexidine  15 mL Mouth/Throat BID Urmila Amaya PA-C     • docusate sodium  100 mg Oral BID Urmila Amaya PA-C     • fondaparinux  2.5 mg Subcutaneous Q24H Urmila Amaya PA-C     • furosemide  40 mg Intravenous BID (diuretic) Julio Cesar Carbone PA-C     • gabapentin  100 mg Oral Daily Urmila Amaya PA-C     • gabapentin  300 mg Oral HS Urmila Amaya PA-C     • insulin glargine  25 Units Subcutaneous HS Soren Smith MD     • insulin lispro  1-5 Units Subcutaneous HS Soren Smith MD     • insulin lispro  1-6 Units Subcutaneous TID AC Soren Smith MD     • insulin lispro  5 Units Subcutaneous TID With Meals Soren Smith MD     • lidocaine (cardiac)  100 mg Intravenous Q30 Min PRN Rogerio Argueta PA-C     • LORazepam  0.5 mg Oral HS Urmila Amaya PA-C     • methocarbamol  500 mg Oral Q6H [address] MARLENY Bradley     • metoprolol tartrate  12.5 mg Oral Q12H [address] Julio Cesar Carbone PA-C     • mupirocin  1 Application Nasal [id number] [address] Urmila Amaya PA-C     • ondansetron  4 mg Intravenous Q6H PRN Urmila Amaya PA-C     • oxyCODONE  10 mg Oral Q4H PRN Urmila Amaya PA-C     • oxyCODONE  5 mg Oral Q4H PRN Urmila Amaya PA-C     • pantoprazole  40 mg Oral Early Morning Urmila CRISTO Amaya     • phenylephine   mcg/min Intravenous Titrated Jaylen Colmenares MD Stopped (08/13/23 0004)   • polyethylene glycol  17 g Oral Daily Urmila Amaya PA-C     • sertraline  50 mg Oral Daily Urmila Amaya PA-C     • sodium chloride  20 mL/hr Intravenous Continuous Urmila Amaya PA-C Stopped (08/13/23 2348)     Continuous Infusions:phenylephine,  mcg/min, Last Rate: Stopped (08/13/23 0004)  sodium chloride, 20 mL/hr, Last Rate: Stopped (08/13/23 2348)      PRN Meds:.•  bisacodyl  •  lidocaine (cardiac)  •  ondansetron  •  oxyCODONE  •  oxyCODONE    Vitals:   Vitals:    08/13/23 2044 08/13/23 2255 08/14/23 0241 08/14/23 0542   BP:  99/60 102/59    BP Location:  Right arm Right arm    Pulse: 76 74 71    Resp:  18 14    Temp:  97.5 °F (36.4 °C) 98 °F (36.7 °C)    TempSrc:  Oral Oral    SpO2:  95% 94%    Weight:    106 kg (233 lb 7.5 oz)   Height:           Telemetry: NSR; Heart Rate: 73    Respiratory:   SpO2: SpO2: 94 %;RA     Intake/Output:     Intake/Output Summary (Last 24 hours) at 8/14/2023 0704  Last data filed at 8/14/2023 0546  Gross per 24 hour   Intake 810.54 ml   Output 2650 ml   Net -1839.46 ml          Weights:   Weight (last 2 days)     Date/Time Weight    08/14/23 0542 106 (233.47)    08/13/23 0600 107 (235.67)    08/13/23 0500 107 (235.67)    08/12/23 0600 108 (237.66)      8/11- 101 kg      Results:   Results from last 7 days   Lab Units 08/14/23  0411 08/13/23  0423 08/12/23  0403   WBC Thousand/uL 11.18* 13.65* 17.02*   HEMOGLOBIN g/dL 10.2* 11.0* 12.3   HEMATOCRIT % 31.0* 33.3* 37.4   PLATELETS Thousands/uL 139* 129* 187     Results from last 7 days   Lab Units 08/14/23  0328 08/13/23  0423 08/12/23  0402 08/11/23  1802 08/11/23  1408   SODIUM mmol/L 131* 137 141  --   --    POTASSIUM mmol/L 4.5 5.1 5.3   < >  --    CHLORIDE mmol/L 106 108 116*  --   --    CO2 mmol/L 20* 21 20*  --   --    CO2, I-STAT mmol/L  --   --   --   --  23   BUN mg/dL 32* 26* 14  --   --    CREATININE mg/dL 1.14 1.20 0.91  --   --    GLUCOSE, ISTAT mg/dl  --   --   --   --  184*   CALCIUM mg/dL 7.9* 8.0* 7.1*  --   --     < > = values in this interval not displayed. Point of care glucose: 125 - 150 (NIDDM2 (A1c 7.4)- endo following)    Invasive Lines/Tubes:  Invasive Devices     Central Venous Catheter Line  Duration           CVC Central Lines 08/11/23 Triple 2 days          Peripheral Intravenous Line  Duration           Peripheral IV 08/11/23 Right Antecubital 2 days              Physical Exam:    HEENT/NECK:  Normocephalic. Atraumatic. No jugular venous distention. Cardiac: Regular rate and rhythm and No murmurs/rubs/gallops  Pulmonary:  Breath sounds clear bilaterally and No rales/rhonchi/wheezes  Abdomen:  Non-tender, Non-distended and Normal bowel sounds  Incisions: Sternum is stable. Incision is clean, dry, and intact. and Saphenectomy incison is clean, dry, and intact. Extremities: Extremities warm/dry and Trace edema B/L  Neuro: Alert and oriented X 3 and No focal deficits  Skin: Warm/Dry, without rashes or lesions. Assessment:  Principal Problem:    Coronary artery disease involving native coronary artery  Active Problems:    HTN (hypertension), benign    Type 2 diabetes mellitus ([address])    Hyperlipidemia LDL goal <100    Sleep apnea    S/P CABG x 5       Coronary artery disease. S/P coronary artery bypass grafting; POD # 3    Plan:    1. Cardiac:     NSR; HR/BP well-controlled  Continue Lopressor, 12.5mg PO BID  Continue prophylactic Amiodarone, 200 mg PO TID    Continue ASA and Statin therapy  Epicardial pacing wires have been removed  Maintain central IV access today for medications requiring central IV access  Continue DVT prophylaxis    2. Pulmonary:   RA   Aggressive chest PT/IS, deep breathing, ambulate  Chest tubes have been discontinued    3. Renal:   Post op Creatinine stable;  Follow up labs prn   Intake/Output net: -1840 mL/24 hours  Diuretic Regimen:Lasix 40 IV daily  Discontinue IV Lasix, 40 mg BID- transition to demadex 40 mg QD  Continue Potassium Chloride 10 mEq PO QD    4. Neuro:  Neurologically intact; No active issues  Incisional pain relatively well-controlled  Continue Tylenol, 975 mg PO q 8, standing dose  Decrease Oxycodone to 2.5- 5 mg PO q 4 hours prn pain  Continue home gabapentin   Continue home zoloft  Continue robaxin    5. GI:  Controlled carbohydrate diet level two/Cardiac diet, with 1800 mL fluid restriction  Continue stool softeners and prn suppository  Continue GI prophylaxis    6. Endo:   History of DM2 a1c 7.9;    Endo following    7    Hematology:    Post-operative blood count acceptable; Trend prn  Thrombocytopenia  Leukocytosis; Afebrile with no signs of infection; Trend CBC prn    8.    Disposition:      PT/OT: no rehab needs  Anticipated discharge date: today    VTE Pharmacologic Prophylaxis: Fondaparinux (Arixtra)  VTE Mechanical Prophylaxis: sequential compression device    Collaborative rounds completed with supervising physician  Plan of care discussed with bedside nurse    SIGNATURE: Hal Toribio PA-C  DATE: August 14, 2023  TIME: 7:04 AM

## 2023-08-14 NOTE — RESTORATIVE TECHNICIAN NOTE
Restorative Technician Note      Patient Name: Kojo Malave     Restorative Tech Visit Date: 08/14/23  Note Type: Mobility  Patient Position Upon Consult: Bedside chair  Activity Performed: Ambulated  Assistive Device: Other (Comment) (none)  Patient Position at End of Consult: All needs within reach;  Bedside chair

## 2023-08-14 NOTE — UTILIZATION REVIEW
Continued Stay Review    Date: 08/13/2023                          Current Patient Class: Inpatient  Current Level of Care:  Med/Surg    HPI:63 y.o. male initially admitted on 8/11/2023    Assessment/Plan:   Coronary artery disease. S/P coronary artery bypass grafting; POD # 2. Wean off Zbigniew overnight. Currently O2 @ 2L via NC, wean as able. Continue IV Diuresis: Increase IV lasix to 40 BID. IV Lasix & PO KCl. Daily weight / I&O. Maintain  Triple CVC line, Left radial A.Line. Maintain epicardial pacing wires A/V. Maintain chest tubes #1,2,3, to suction (-20cm).       VTE PPX:  Arixtra / Anish SCDs    Vital Signs:   08/13/23 2255 97.5 °F (36.4 °C) 74 18 99/60 71 -- -- 95 % -- -- None (Room air) -- -- Lying --   08/13/23 2044 -- 76 -- -- -- -- -- -- -- -- -- -- -- -- --   08/13/23 1943 97.5 °F (36.4 °C) 70 18 104/57 91 -- -- 95 % -- -- None (Room air) -- -- Lying --   08/13/23 1757 -- 75 18 101/56 68 -- -- -- -- -- -- -- -- Sitting --   08/13/23 1556 98 °F (36.7 °C) 78 20 104/64 77 -- -- 99 % -- -- Nasal cannula -- -- Sitting --   08/13/23 1315 -- -- -- 101/65 78 -- -- -- -- -- -- -- -- Lying --   08/13/23 1300 -- -- -- 90/61 68 -- -- -- -- -- -- -- -- Lying --   08/13/23 1245 -- -- -- 104/63 86 -- -- -- -- -- -- -- -- Lying --   08/13/23 1230 -- -- -- 100/64 68 -- -- -- -- -- -- -- -- Lying --   08/13/23 1215 -- -- -- 103/64 77 -- -- -- -- -- -- -- -- Lying --   08/13/23 1200 -- -- -- 105/64 75 -- -- -- -- -- -- -- -- Lying --   08/13/23 1109 97.1 °F (36.2 °C) Abnormal  80 21 100/59 74 -- -- 95 % -- -- Nasal cannula -- -- Lying --   08/13/23 1021 -- 97 -- 100/63  -- -- -- -- -- -- -- -- -- -- --   BP: map 78 at 08/13/23 1021   08/13/23 0842 -- -- -- 97/59 68 -- -- -- -- -- -- -- -- Sitting --   08/13/23 0742 97.4 °F (36.3 °C) Abnormal  82 19 109/63 82 -- -- 97 % -- -- Nasal cannula -- -- Sitting --   08/13/23 0300 98 °F (36.7 °C) 82 15 102/61 67 -- -- 94 % 28 2 L/min Nasal cannula -- -- Lying --   08/13/23 0200 -- 83 15 87/60 Abnormal  68 104/54 67 mmHg 97 % 28 2 L/min Nasal cannula -- -- Lying --         Pertinent Labs/Diagnostic Results:     Results from last 7 days   Lab Units 08/14/23  0411 08/13/23  0423 08/12/23  0403 08/11/23  2204 08/11/23  1408   WBC Thousand/uL 11.18* 13.65* 17.02*  --   --    HEMOGLOBIN g/dL 10.2* 11.0* 12.3 12.4  --    I STAT HEMOGLOBIN g/dl  --   --   --   --  11.2*   HEMATOCRIT % 31.0* 33.3* 37.4 36.4*  --    HEMATOCRIT, ISTAT %  --   --   --   --  33*   PLATELETS Thousands/uL 139* 129* 187  --   --      Results from last 7 days   Lab Units 08/14/23  0328 08/13/23  0423 08/12/23  1059 08/12/23  0402 08/11/23  2204 08/11/23  1802 08/11/23  1408 08/11/23  1405 08/11/23  1405 08/11/23  1245 08/11/23  1155 08/11/23  1123   SODIUM mmol/L 131* 137  --  141  --   --   --   --  143  --   --   --    POTASSIUM mmol/L 4.5 5.1  --  5.3 5.0 4.8  --    < > 3.5  --   --   --    CHLORIDE mmol/L 106 108  --  116*  --   --   --   --  113*  --   --   --    CO2 mmol/L 20* 21  --  20*  --   --   --   --  22  --   --   --    CO2, I-STAT mmol/L  --   --   --   --   --   --  23  --   --  22 26 28   ANION GAP mmol/L 5 8  --  5  --   --   --   --  8  --   --   --    BUN mg/dL 32* 26*  --  14  --   --   --   --  12  --   --   --    CREATININE mg/dL 1.14 1.20  --  0.91  --   --   --   --  0.97  --   --   --    EGFR ml/min/1.73sq m 68 63  --  89  --   --   --   --  82  --   --   --    CALCIUM mg/dL 7.9* 8.0*  --  7.1*  --   --   --   --  7.3*  --   --   --    CALCIUM, IONIZED mmol/L  --   --  1.03*  --   --   --   --   --   --   --   --   --    CALCIUM, IONIZED, ISTAT mmol/L  --   --   --   --   --   --  1.13  --   --  1.16 1.05* 1.10*   MAGNESIUM mg/dL  --  2.6  --   --   --   --   --   --   --   --   --   --     < > = values in this interval not displayed.      Results from last 7 days   Lab Units 08/14/23  1059 08/14/23  0545 08/13/23  2344 08/13/23  2203 08/13/23  2036 08/13/23  1807 08/13/23  1555 08/13/23  1428 08/13/23  1158 08/13/23  1002 08/13/23  0815 08/13/23  0558   POC GLUCOSE mg/dl 174* 150* 125 127 139 179* 115 131 209* 196* 145* 141*     Results from last 7 days   Lab Units 08/14/23  0328 08/13/23  0423 08/12/23  0402 08/11/23  1405   GLUCOSE RANDOM mg/dL 125 129 156* 191*     Results from last 7 days   Lab Units 08/12/23  0245   PH DARINEL  7.240*   PCO2 DARINEL mm Hg 55.5*   PO2 DARINEL mm Hg 32.4*   HCO3 DARINEL mmol/L 23.3*   BASE EXC DARINEL mmol/L -4.7   O2 CONTENT DARINEL ml/dL 10.8   O2 HGB, VENOUS % 59.3*     Results from last 7 days   Lab Units 08/11/23  1408 08/11/23  1245 08/11/23  1155 08/11/23  1123 08/11/23  1110   PH, DARINEL I-STAT   --   --   --   --  7.343   PCO2, DARINEL ISTAT mm HG  --   --   --   --  47.2   PO2, DARINEL ISTAT mm HG  --   --   --   --  42.0   HCO3, DARINEL ISTAT mmol/L  --   --   --   --  25.7   I STAT BASE EXC mmol/L -5* -4* 0   < > 0   I STAT O2 SAT % 98* 99* 100*  --  73   ISTAT PH ART  7.278* 7.361 7.392   < >  --    I STAT ART PCO2 mm HG 45.5* 37.1 40.9   < >  --    I STAT ART PO2 mm .0 120.0 327.0*   < >  --    I STAT ART HCO3 mmol/L 21.2* 21.0* 24.9   < >  --     < > = values in this interval not displayed.      Results from last 7 days   Lab Units 08/12/23  0642   UNIT PRODUCT CODE  [id number]  [id number]  [id number]  [id number]   UNIT NUMBER  [id number]  [id number]  [id number]  [id number]   UNITABO  O  O  O  O   UNITRH  POS  POS  POS  POS   CROSSMATCH  Compatible  Compatible  Compatible  Compatible   UNIT DISPENSE STATUS  Return to Inv  Return to Inv  Return to Inv  Return to Inv   UNIT PRODUCT VOL mL 350  350  350  350     Medications:   Scheduled Meds:  Medication Dose Route Frequency   • acetaminophen  975 mg Oral Q8H [address]   • amiodarone  200 mg Oral Q8H [address]   • aspirin  325 mg Oral Daily   • atorvastatin  80 mg Oral Daily With Dinner   • bisacodyl  10 mg Rectal Daily PRN   • chlorhexidine  15 mL Mouth/Throat BID   • docusate sodium  100 mg Oral BID   • fondaparinux 2.5 mg Subcutaneous Q24H   • furosemide  40 mg Intravenous Daily   • gabapentin  100 mg Oral Daily   • gabapentin  300 mg Oral HS   • insulin regular (HumuLIN R,NovoLIN R) 1 Units/mL in sodium chloride 0.9 % 100 mL infusion  0.3-21 Units/hr Intravenous Titrated   • lidocaine (cardiac)  100 mg Intravenous Q30 Min PRN   • LORazepam  0.5 mg Oral HS   • methocarbamol  500 mg Oral Q6H [address]   • mupirocin  1 Application Nasal E06F [address]   • ondansetron  4 mg Intravenous Q6H PRN   • oxyCODONE  10 mg Oral Q4H PRN   • oxyCODONE  5 mg Oral Q4H PRN   • pantoprazole  40 mg Oral Early Morning   • phenylephine   mcg/min Intravenous Titrated   • polyethylene glycol  17 g Oral Daily   • sertraline  50 mg Oral Daily   • sodium chloride  20 mL/hr Intravenous Continuous      Continuous Infusions:insulin regular (HumuLIN R,NovoLIN R) 1 Units/mL in sodium chloride 0.9 % 100 mL infusion, 0.3-21 Units/hr, Last Rate: 0.5 Units/hr (08/13/23 0425)  phenylephine,  mcg/min, Last Rate: Stopped (08/13/23 0004)  sodium chloride, 20 mL/hr, Last Rate: 20 mL/hr (08/11/23 1400)        PRN Meds:.•  bisacodyl  •  lidocaine (cardiac)  •  ondansetron  •  oxyCODONE  •  oxyCODONE      Discharge Plan: D    Network Utilization Review Department  ATTENTION: Please call with any questions or concerns to [phone] and carefully listen to the prompts so that you are directed to the right person. All voicemails are confidential.  Maryan Gitelman all requests for admission clinical reviews, approved or denied determinations and any other requests to dedicated fax number below belonging to the campus where the patient is receiving treatment.  List of dedicated fax numbers for the Facilities:  [place] DENIALS (Administrative/Medical Necessity) [phone] [address] [facility] (Maternity/NICU/Pediatrics) [phone]   [address] [place] [address] Drive [phone]   [facility] [phone]   [facility] [address] [facility] [phone]   [address] [facility] [address] [address] [facility] [address] [facility] [address] [place] [address] [phone]

## 2023-08-14 NOTE — DISCHARGE INSTRUCTIONS
Please test blood sugars three times daily before meals & once daily before bedtime. Record in a log & bring to follow-up appointment with endocrinologist or primary care physician. Use [email or web address] for coupon. Instructions Following Open Heart Surgery      Protect your sternum (breast bone). Wires are placed during surgery to hold the sternum together. Do not lift anything heavier than 10 pounds for the first four weeks after surgery. (For example, a gallon of milk weighs 8 pounds) When you are seen for a routine postop check, you will be cleared to lift up to 25 lbs. Following three months from the time of surgery, you may lift without any restrictions. Hug a pillow to your chest or cross your arms over your chest when you laugh, sneeze, or cough. You may resume sexual activity when you feel ready. Do not put any excessive pressure on your chest.    Be careful when you get into or out of a chair or bed. Hug a pillow or cross your arms when you stand or sit. Do not twist as you move. Use only your legs to sit and stand. You may need a raised toilet seat if you have trouble standing up without using your arms. No sleeping on side for the first 4 weeks. Limit daytime naps, to prevent difficulty sleeping at night. Women: Wear a clean surgical bra every day. Do not play sports that use your shoulder. Examples include tennis and golf. Do not drive until cleared by surgeon. Typically cleared to drive after one month, determination will be made at routine postop appointment. When a passenger in the car, wear a seat belt. Continue you breathing exercises throughout the day with incentive spirometry    Activity: Your goal is to go on frequent walks, slowly increasing your activity level. Walking will help prevent leg swelling and prevent blood clots. When you start outpatient cardiac rehab in one month, you will be given additional instructions and a formal exercise plan.  It is OK to go up steps, with help. You may resume sexual activity when you are comfortable. Do not put excessive pressure on your chest.     Weigh yourself daily in the morning and keep of log of your weight. If your weight increases more than 2 lbs per day or more than 5 lbs in a week, call your surgeon's office. Keep your legs elevated when sitting. Pressure stockings may be worn to prevent significant leg swelling. You may get routine vaccines any time after open heart surgery    Do not smoke:  Nicotine can damage blood vessels and make it more difficult to heal. Do not use e-cigarettes or smokeless tobacco in place of cigarettes or to help you quit. They still contain nicotine. Ask your primary care provider for information if you currently smoke and need help quitting. Incision/Wound Care: Shower daily. Gently wash your incision with warm water and mild soap. Do not scrub the area. Gently pat the area dry with a clean towel. If you have a bandage, dry the area and put on a new, clean bandage. Change your bandage at least daily, or if it gets wet or dirty. Always wash your hands before you care for your wound. Do not apply ointments, creams, lotions, powders, etc. If you develop signs of an infection (fever > 101, redness, warm skin, or drainage) please call your surgeon's office. Do not pick at or touch puncture sites or incisions. Allow and scabs to come off on their own. It is normal to have some drainage from the chest tube sites. Apply clean/dry dressings, until this resolves. You may have discomfort or numbness along the incision for 3-4 weeks. It is normal to occasionally experience brief sharp shooing pains, tightness, or pulling sensations. Do not take a bath or swim until cleared by surgeon. As your incision heals, sometimes small tender lumps or pimple-like bumps develop which is due to your body attempting to [facility] the suture (stiches).      Call your local emergency number [phone] in the [address]) or have someone call if:   You have squeezing, pressure, or pain in your chest or back, lightheadedness or sudden cold sweat  Short of breath that does not go away with rest  You cough up blood. You have a fast heartbeat that flutters. Or palpitations  You faint. Signs of a stroke:  Numbness or drooping on one side of your face. Weakness in an arm or leg. Confusion or difficulty speaking. Dizziness, a severe headache, or vision loss    Call your surgeons office if:   You have bleeding that does not stop even after you apply pressure for 5 minutes. You have redness, tenderness, or signs of infection at incision (pain, swelling, odor, or green/yellow discharge from incision site)  You have a severe headache. You have a fever higher than 101°F (38.4°C). You urinate less, or not at all. You have persistent nausea, vomiting, or diarrhea  You hear persistent or worsening crunching or grinding in your sternum. You have questions or concerns about your condition or care. Diet:    Eat heart-healthy foods, low in unhealthy fats and sodium (salt). A heart healthy diet helps improve your cholesterol levels and lowers your risk for heart disease and stroke. You will receive formal education on healthy eating and label reading during your cardiac rehab in one month. Choose foods that contain healthy fats, such as soybean, canola, olive, corn, and sunflower oils. Limit unhealthy fats. Examples include:  Cholesterol  is found in animal foods, such as eggs and lobster, and in dairy products made from whole milk. Saturated fat  is found in meats, such as cartwright and hamburger. It is also found in chicken or turkey skin, whole milk, and butter. Trans fat  is found in packaged foods, such as potato chips and cookies. It is also in some fried foods, and shortening. Do not eat foods that contain trans fats. Get 20 to 30 grams of fiber each day.   Fruits, vegetables, whole-grain foods, and legumes (cooked beans) are good sources of fiber. Low Sodium: Limit to 2,000 mg or less each day, unless otherwise directed. Choose low-sodium or no-salt-added foods. Add little or no salt to food you prepare. Use herbs and spices in place of salt. Foods high in sodium include frozen dinners, macaroni and cheese, instant potatoes, canned vegetables, cured or smoked meats, hot dogs, cartwright and sausage, ketchup, barbecue sauce, salad dressing, pickles, olives, soy sauce, and miso. Fluid Restriction: Fluid restriction after hospital discharge is advised. Limit your fluid intake to no more than 8 cups of liquid (8oz = 1cup) or 2000 mL per day. Limit/Do not drink alcohol. Alcohol can damage heart and raise your blood pressure. The general recommended limit is 1 drink a day for women 21 or older and for men 72 or older. Do not have more than 3 drinks within 24 hours or 7 within a week. A drink of alcohol is 12 oz of beer, 5 oz of wine, or 1½ oz of liquor. Narcotic Safety     What do I need to know about narcotic safety? A narcotic is a type of medicine used to treat pain. When you are discharged from the hospital, you will be prescribed a narcotic called oxycodone. Pain control and management may help you rest, heal, and return to your daily activities. Do not take more than the recommended amount. Too much can cause a life-threatening overdose. Do not continue to take it after your pain stops. How do I use narcotics safely? Take prescribed narcotics exactly as directed. You may develop tolerance. This means you keep needing higher doses to get the same effect. You may also develop narcotic use disorder. This means you are not able to control your narcotic use. Do not give narcotics to others or take narcotics that belong to someone else. Do not mix narcotics with other medicines or alcohol. The combination can cause an overdose, or cause you to stop breathing.    Do not drive or operate heavy machinery after you use a narcotic. Talk to your healthcare provider if you have any side effects. Side effects include nausea, sleepiness, itching, and trouble thinking clearly. What can I do to manage constipation? Constipation is the most common side effect of narcotic medicine. Constipation is when you have hard, dry bowel movements, or you go longer than usual between bowel movements. The following are ways you can prevent or relieve constipation:  Drink liquids as allowed. Drinking extra liquids will help soften and move your bowels. You should drink up to your maximum fluid allotment of 2 liters. Eat high-fiber foods. This may help decrease constipation by adding bulk to your bowel movements. High-fiber foods include fruits, vegetables, whole-grain breads and cereals, and beans  Supplements. You have been prescribed a fiber supplement called polyethylene glycol (Asha lax) and a stool softener called docusate sodium (Colace). You should take these for as long as you continue narcotic medications. Exercise regularly. Regular physical activity can help stimulate your intestines. Walking is a good exercise to prevent or relieve constipation. Ask which exercises are best for you. How do I store narcotics safely? Store narcotics where others cannot easily get them. Keep them in a locked cabinet or secure area. Do not  keep them in a purse or other bag you carry with you. A person may be looking for something else and find the narcotics. Make sure narcotics are stored out of the reach of children. A child can easily overdose on narcotics. Narcotics may look like candy to a small child.

## 2023-08-14 NOTE — PLAN OF CARE
Problem: PHYSICAL THERAPY ADULT  Goal: Performs mobility at highest level of function for planned discharge setting. See evaluation for individualized goals. Description: Treatment/Interventions: LE strengthening/ROM, Functional transfer training, Elevations, Therapeutic exercise, Endurance training, Patient/family training, Equipment eval/education, Bed mobility, Gait training  Equipment Recommended: Denisha Beard (may need RW)       See flowsheet documentation for full assessment, interventions and recommendations. Outcome: Completed  Note: Prognosis: Good  Problem List: Decreased strength, Decreased endurance, Impaired balance, Decreased mobility  Assessment: Pt seen for session for setup, transfers, gait and stairs, repositioning. Pt cooperative w/ session, willing to participate. Needing less assist w/ mobility, and ambulated w/ out RW.  did well w/ stairs. will sign off. May continue to mobilize w/ restorative while here        PT Discharge Recommendation: No rehabilitation needs    See flowsheet documentation for full assessment.

## 2023-08-14 NOTE — PHYSICAL THERAPY NOTE
Physical Therapy Treatment Note       08/14/23 0950   PT Last Visit   PT Visit Date 08/14/23   Note Type   Note Type Treatment   Pain Assessment   Pain Assessment Tool 0-10   Pain Score 3   Pain Location/Orientation Location: Chest   Patient's Stated Pain Goal No pain   Hospital Pain Intervention(s) Ambulation/increased activity   Restrictions/Precautions   Weight Bearing Precautions Per Order No   Other Precautions Cardiac/sternal;Multiple lines;Pain   General   Chart Reviewed Yes   Family/Caregiver Present No   Cognition   Overall Cognitive Status WFL   Arousal/Participation Responsive   Attention Attends with cues to redirect   Orientation Level Oriented X4   Memory Unable to assess   Following Commands Follows one step commands without difficulty   Subjective   Subjective states he feels OK. some pain. cooperative w/ session   Transfers   Sit to Stand 7  Independent   Stand to Sit 5  Supervision   Ambulation/Elevation   Gait pattern   (slow, short step length)   Gait Assistance 5  Supervision   Additional items Assist x 1   Assistive Device None   Distance 150' total   Stair Management Assistance 5  Supervision   Additional items Assist x 1   Stair Management Technique One rail L   Number of Stairs 4   Balance   Static Sitting Normal   Dynamic Sitting Good   Static Standing Good   Dynamic Standing Fair +   Ambulatory Fair +   Endurance Deficit   Endurance Deficit Yes   Endurance Deficit Description fatigue, pain   Activity Tolerance   Activity Tolerance Patient limited by fatigue;Patient limited by pain;Treatment limited secondary to medical complications (Comment)   Nurse Made Aware yes   Assessment   Prognosis Good   Assessment Pt seen for session for setup, transfers, gait and stairs, repositioning. Pt cooperative w/ session, willing to participate. Needing less assist w/ mobility, and ambulated w/ out RW.  did well w/ stairs. will sign off.   May continue to mobilize w/ restorative while here   Goals PT Treatment Day 1   Plan   Progress Discontinue PT   Recommendation   PT Discharge Recommendation No rehabilitation needs   AM-PAC Basic Mobility Inpatient   Turning in Flat Bed Without Bedrails 4   Lying on Back to Sitting on Edge of Flat Bed Without Bedrails 4   Moving Bed to Chair 4   Standing Up From Chair Using Arms 4   Walk in Room 4   Climb 3-5 Stairs With Railing 3   Basic Mobility Inpatient Raw Score 23   Basic Mobility Standardized Score 50.88   Highest Level Of Mobility   JH-HLM Goal 7: Walk 25 feet or more   JH-HLM Achieved 7: Walk 25 feet or more     Eric Epley PT, DPT CSRS

## 2023-08-15 VITALS
WEIGHT: 228.4 LBS | DIASTOLIC BLOOD PRESSURE: 60 MMHG | OXYGEN SATURATION: 96 % | HEART RATE: 72 BPM | RESPIRATION RATE: 14 BRPM | TEMPERATURE: 98.2 F | HEIGHT: 71 IN | BODY MASS INDEX: 31.98 KG/M2 | SYSTOLIC BLOOD PRESSURE: 109 MMHG

## 2023-08-15 LAB — GLUCOSE SERPL-MCNC: 137 MG/DL (ref 65–140)

## 2023-08-15 PROCEDURE — 99024 POSTOP FOLLOW-UP VISIT: CPT | Performed by: PHYSICIAN ASSISTANT

## 2023-08-15 PROCEDURE — 82948 REAGENT STRIP/BLOOD GLUCOSE: CPT

## 2023-08-15 RX ORDER — OXYCODONE HYDROCHLORIDE 5 MG/1
5 TABLET ORAL EVERY 4 HOURS PRN
Status: DISCONTINUED | OUTPATIENT
Start: 2023-08-15 | End: 2023-08-15 | Stop reason: HOSPADM

## 2023-08-15 RX ORDER — ASPIRIN 325 MG
325 TABLET ORAL DAILY
Qty: 30 TABLET | Refills: 2 | Status: SHIPPED | OUTPATIENT
Start: 2023-08-15

## 2023-08-15 RX ORDER — POTASSIUM CHLORIDE 750 MG/1
10 TABLET, EXTENDED RELEASE ORAL DAILY
Qty: 10 TABLET | Refills: 2 | Status: SHIPPED | OUTPATIENT
Start: 2023-08-15 | End: 2023-08-25

## 2023-08-15 RX ORDER — ROSUVASTATIN CALCIUM 20 MG/1
40 TABLET, COATED ORAL DAILY
Qty: 30 TABLET | Refills: 2 | Status: SHIPPED | OUTPATIENT
Start: 2023-08-15

## 2023-08-15 RX ADMIN — POLYETHYLENE GLYCOL 3350 17 G: 17 POWDER, FOR SOLUTION ORAL at 09:00

## 2023-08-15 RX ADMIN — PANTOPRAZOLE SODIUM 40 MG: 40 TABLET, DELAYED RELEASE ORAL at 06:30

## 2023-08-15 RX ADMIN — MUPIROCIN 1 APPLICATION: 20 OINTMENT TOPICAL at 08:53

## 2023-08-15 RX ADMIN — Medication 12.5 MG: at 08:59

## 2023-08-15 RX ADMIN — DOCUSATE SODIUM 100 MG: 100 CAPSULE, LIQUID FILLED ORAL at 08:59

## 2023-08-15 RX ADMIN — AMIODARONE HYDROCHLORIDE 200 MG: 200 TABLET ORAL at 06:30

## 2023-08-15 RX ADMIN — CHLORHEXIDINE GLUCONATE 15 ML: 1.2 SOLUTION ORAL at 08:53

## 2023-08-15 RX ADMIN — GABAPENTIN 100 MG: 100 CAPSULE ORAL at 08:53

## 2023-08-15 RX ADMIN — FONDAPARINUX SODIUM 2.5 MG: 2.5 INJECTION, SOLUTION SUBCUTANEOUS at 06:31

## 2023-08-15 RX ADMIN — ACETAMINOPHEN 975 MG: 325 TABLET, FILM COATED ORAL at 06:30

## 2023-08-15 RX ADMIN — POTASSIUM CHLORIDE 10 MEQ: 750 TABLET, EXTENDED RELEASE ORAL at 08:59

## 2023-08-15 RX ADMIN — SERTRALINE HYDROCHLORIDE 50 MG: 50 TABLET ORAL at 09:00

## 2023-08-15 RX ADMIN — ASPIRIN 325 MG ORAL TABLET 325 MG: 325 PILL ORAL at 08:53

## 2023-08-15 RX ADMIN — INSULIN LISPRO 5 UNITS: 100 INJECTION, SOLUTION INTRAVENOUS; SUBCUTANEOUS at 08:49

## 2023-08-15 NOTE — PROGRESS NOTES
Progress Note - Cardiothoracic Surgery   Amada Lawson 61 y.o. male MRN: [id number]  Unit/Bed#: [facility] 408-01 Encounter: [id number]    Coronary artery disease. S/P coronary artery bypass grafting; POD # 4      24 Hour Events:  Denies pain, denies SOB, weaned to RA, using IS (1250). Tolerating diet, + BM. Ambulating well x 6, independently .      Medications:   Scheduled Meds:  Current Facility-Administered Medications   Medication Dose Route Frequency Provider Last Rate   • acetaminophen  975 mg Oral [facility] Urmila Amaya PA-C     • amiodarone  200 mg Oral [facility] Urmila Amaya PA-C     • aspirin  325 mg Oral Daily Urmila Amaya PA-C     • atorvastatin  80 mg Oral Daily With [employer], PA-C     • bisacodyl  10 mg Rectal Daily PRN Urmila Amaya PA-C     • chlorhexidine  15 mL Mouth/Throat BID Urmila Amaya PA-C     • docusate sodium  100 mg Oral BID Urmila Amaya PA-C     • fondaparinux  2.5 mg Subcutaneous Q24H Urmila Amaya PA-C     • gabapentin  100 mg Oral Daily Urmila Amaya PA-C     • gabapentin  300 mg Oral HS Urmila Amaya PA-C     • insulin glargine  25 Units Subcutaneous HS Francis Blevins MD     • insulin lispro  1-5 Units Subcutaneous HS Francis Blevins MD     • insulin lispro  1-6 Units Subcutaneous TID AC Francis Blevins MD     • insulin lispro  5 Units Subcutaneous TID With Meals Francis Blevins MD     • lidocaine (cardiac)  100 mg Intravenous Q30 Min PRN Beatriz Almonte PA-C     • LORazepam  0.5 mg Oral HS Urmila Amaya PA-C     • metoprolol tartrate  12.5 mg Oral Q12H [address] Stefania Perales PA-C     • mupirocin  1 Application Nasal F86Y [address] Urmila Amaya PA-C     • ondansetron  4 mg Intravenous Q6H PRN Urmila Amaya PA-C     • oxyCODONE  10 mg Oral Q4H PRN Urmila Amaya PA-C     • oxyCODONE  5 mg Oral Q4H PRN Urmila Ragona, PA-C     • pantoprazole  40 mg Oral Early Morning Urmila Amaya PA-C     • phenylephine   mcg/min Intravenous Titrated Gavino Hodgson MD Stopped (08/13/23 0004)   • polyethylene glycol  17 g Oral Daily Urmila Amaya PA-C     • potassium chloride  10 mEq Oral Daily Yenny Leonardo PA-C     • sertraline  50 mg Oral Daily Urmila Amaya PA-C     • sodium chloride  20 mL/hr Intravenous Continuous Alex Lock PA-C Stopped (08/13/23 2348)   • torsemide  40 mg Oral Daily Yenny Leonardo PA-C       Continuous Infusions:phenylephine,  mcg/min, Last Rate: Stopped (08/13/23 0004)  sodium chloride, 20 mL/hr, Last Rate: Stopped (08/13/23 2348)      PRN Meds:.•  bisacodyl  •  lidocaine (cardiac)  •  ondansetron  •  oxyCODONE  •  oxyCODONE    Vitals:   Vitals:    08/14/23 1919 08/14/23 2300 08/15/23 0259 08/15/23 0600   BP: 112/62 100/55 105/57    BP Location: Right arm Right arm Right arm    Pulse: 70 71 72    Resp: 16 15 14    Temp: 98.2 °F (36.8 °C) 97.9 °F (36.6 °C) 97.8 °F (36.6 °C)    TempSrc: Oral Oral Oral    SpO2: 96% 93% 93%    Weight:    104 kg (228 lb 6.3 oz)   Height:           Telemetry: NSR; Heart Rate: 73    Respiratory:   SpO2: SpO2: 93 %;RA     Intake/Output:     Intake/Output Summary (Last 24 hours) at 8/15/2023 0639  Last data filed at 8/15/2023 6513  Gross per 24 hour   Intake 444 ml   Output 3625 ml   Net -3181 ml          Weights:   Weight (last 2 days)     Date/Time Weight    08/15/23 0600 104 (228.4)    08/14/23 0542 106 (233.47)    08/13/23 0600 107 (235.67)    08/13/23 0500 107 (235.67)      8/11- 101 kg      Results:   Results from last 7 days   Lab Units 08/14/23  0411 08/13/23  0423 08/12/23  0403   WBC Thousand/uL 11.18* 13.65* 17.02*   HEMOGLOBIN g/dL 10.2* 11.0* 12.3   HEMATOCRIT % 31.0* 33.3* 37.4   PLATELETS Thousands/uL 139* 129* 187     Results from last 7 days   Lab Units 08/14/23  0328 08/13/23  0423 08/12/23  0402 08/11/23  1802 08/11/23  1408   SODIUM mmol/L 131* 137 141  --   --    POTASSIUM mmol/L 4.5 5.1 5.3   < >  --    CHLORIDE mmol/L 106 108 116*  --   --    CO2 mmol/L 20* 21 20*  --   --    CO2, I-STAT mmol/L  --   --   --   --  23   BUN mg/dL 32* 26* 14  --   --    CREATININE mg/dL 1.14 1.20 0.91  --   --    GLUCOSE, ISTAT mg/dl  --   --   --   --  184*   CALCIUM mg/dL 7.9* 8.0* 7.1*  --   --     < > = values in this interval not displayed. Point of care glucose: 137 - 189 (NIDDM2 (A1c 7.4)- endo following)    Invasive Lines/Tubes:  Invasive Devices     Central Venous Catheter Line  Duration           CVC Central Lines 08/11/23 Triple 3 days          Peripheral Intravenous Line  Duration           Peripheral IV 08/11/23 Right Antecubital 3 days              Physical Exam:    HEENT/NECK:  Normocephalic. Atraumatic. No jugular venous distention. Cardiac: Regular rate and rhythm and No murmurs/rubs/gallops  Pulmonary:  Breath sounds clear bilaterally and No rales/rhonchi/wheezes  Abdomen:  Non-tender, Non-distended and Normal bowel sounds  Incisions: Sternum is stable. Incision is clean, dry, and intact. and Saphenectomy incison is clean, dry, and intact. Extremities: Extremities warm/dry and Trace edema B/L  Neuro: Alert and oriented X 3 and No focal deficits  Skin: Warm/Dry, without rashes or lesions. Assessment:  Principal Problem:    Coronary artery disease involving native coronary artery  Active Problems:    HTN (hypertension), benign    Type 2 diabetes mellitus ([address])    Hyperlipidemia LDL goal <100    Sleep apnea    S/P CABG x 5       Coronary artery disease. S/P coronary artery bypass grafting; POD # 4    Plan:    1. Cardiac:     NSR; HR/BP well-controlled  Continue Lopressor, 12.5mg PO BID  Continue prophylactic Amiodarone, 200 mg PO TID    Continue ASA and Statin therapy  Epicardial pacing wires have been removed  Maintain central IV access today for medications requiring central IV access  Continue DVT prophylaxis    2. Pulmonary:   RA   Aggressive chest PT/IS, deep breathing, ambulate  Chest tubes have been discontinued    3. Renal:   Post op Creatinine stable;  Follow up labs prn   Intake/Output net: -3 L/24 hours  Diuretic Regimen:Lasix 40 IV daily  Decrease demadex 20 (40) mg QD   Continue Potassium Chloride 10 mEq PO QD    4. Neuro:  Neurologically intact; No active issues  Incisional pain relatively well-controlled  Continue Tylenol, 975 mg PO q 8, standing dose  Decrease Oxycodone to 2.5- 5 mg PO q 4 hours prn pain  Continue home gabapentin   Continue home zoloft  Continue robaxin    5. GI:  Controlled carbohydrate diet level two/Cardiac diet, with 1800 mL fluid restriction  Continue stool softeners and prn suppository  Continue GI prophylaxis    6. Endo:   History of DM2 a1c 7.9;    Endo following    7    Hematology:    Post-operative blood count acceptable; Trend prn  Thrombocytopenia  Leukocytosis; Afebrile with no signs of infection; Trend CBC prn    8.    Disposition:      PT/OT: no rehab needs  Anticipated discharge date: today    VTE Pharmacologic Prophylaxis: Fondaparinux (Arixtra)  VTE Mechanical Prophylaxis: sequential compression device    Collaborative rounds completed with supervising physician  Plan of care discussed with bedside nurse    SIGNATURE: Lindy Mack PA-C  DATE: August 15, 2023  TIME: 6:39 AM

## 2023-08-16 ENCOUNTER — HOME CARE VISIT (OUTPATIENT)
Dept: HOME HEALTH SERVICES | Facility: HOME HEALTHCARE | Age: 63
End: 2023-08-16
Payer: COMMERCIAL

## 2023-08-16 PROCEDURE — 400013 VN SOC

## 2023-08-16 PROCEDURE — G0299 HHS/HOSPICE OF RN EA 15 MIN: HCPCS

## 2023-08-16 NOTE — UTILIZATION REVIEW
NOTIFICATION OF ADMISSION DISCHARGE   This is a Notification of Discharge from [address] [place] [address]. Please be advised that this patient has been discharge from our facility. Below you will find the admission and discharge date and time including the patient’s disposition. UTILIZATION REVIEW CONTACT:  Gerri Rangel  Utilization   Network Utilization Review Department  Phone: [phone] x carefully listen to the prompts. All voicemails are confidential.  Email: [email or web address]     ADMISSION INFORMATION  PRESENTATION DATE: 8/11/2023  5:29 AM  OBERVATION ADMISSION DATE:   INPATIENT ADMISSION DATE: 8/11/23  6:44 AM   DISCHARGE DATE: 8/15/2023 11:10 AM   DISPOSITION:Home with Home Health Care    IMPORTANT INFORMATION:  Send all requests for admission clinical reviews, approved or denied determinations and any other requests to dedicated fax number below belonging to the campus where the patient is receiving treatment.  List of dedicated fax numbers:  [place] DENIALS (Administrative/Medical Necessity) [phone] [address] [facility] (Maternity/NICU/Pediatrics) [phone]   [facility] [phone]   [facility] [phone] [facility] [phone]   [address] [place] [address] [phone]   [facility] [phone]   [address] [place] [address] [facility] [phone]   [address] [place] [address] [phone] [address] [facility] [phone] [address] [facility] [address] [facility] [phone]

## 2023-08-17 DIAGNOSIS — R11.0 POSTOPERATIVE NAUSEA: ICD-10-CM

## 2023-08-17 DIAGNOSIS — Z95.1 S/P CABG X 4: Primary | ICD-10-CM

## 2023-08-17 DIAGNOSIS — Z98.890 POSTOPERATIVE NAUSEA: ICD-10-CM

## 2023-08-17 RX ORDER — ONDANSETRON 4 MG/1
4 TABLET, ORALLY DISINTEGRATING ORAL EVERY 8 HOURS PRN
Status: DISCONTINUED | OUTPATIENT
Start: 2023-08-17 | End: 2023-08-18

## 2023-08-18 ENCOUNTER — HOME CARE VISIT (OUTPATIENT)
Dept: HOME HEALTH SERVICES | Facility: HOME HEALTHCARE | Age: 63
End: 2023-08-18
Payer: COMMERCIAL

## 2023-08-18 DIAGNOSIS — Z98.890 POSTOPERATIVE NAUSEA: ICD-10-CM

## 2023-08-18 DIAGNOSIS — Z95.1 S/P CABG X 4: Primary | ICD-10-CM

## 2023-08-18 DIAGNOSIS — R11.0 POSTOPERATIVE NAUSEA: ICD-10-CM

## 2023-08-18 PROCEDURE — G0299 HHS/HOSPICE OF RN EA 15 MIN: HCPCS

## 2023-08-18 RX ORDER — ONDANSETRON 4 MG/1
4 TABLET, FILM COATED ORAL EVERY 8 HOURS PRN
Qty: 20 TABLET | Refills: 0 | Status: SHIPPED | OUTPATIENT
Start: 2023-08-18

## 2023-08-20 VITALS
OXYGEN SATURATION: 95 % | RESPIRATION RATE: 18 BRPM | TEMPERATURE: 97.4 F | SYSTOLIC BLOOD PRESSURE: 120 MMHG | HEART RATE: 84 BPM | DIASTOLIC BLOOD PRESSURE: 70 MMHG

## 2023-08-21 ENCOUNTER — TELEPHONE (OUTPATIENT)
Dept: CARDIOLOGY CLINIC | Facility: CLINIC | Age: 63
End: 2023-08-21

## 2023-08-21 VITALS
OXYGEN SATURATION: 97 % | SYSTOLIC BLOOD PRESSURE: 110 MMHG | RESPIRATION RATE: 18 BRPM | DIASTOLIC BLOOD PRESSURE: 60 MMHG | HEART RATE: 78 BPM | TEMPERATURE: 97.8 F

## 2023-08-21 NOTE — TELEPHONE ENCOUNTER
Wife called left a message on vm, that had dropped paperwork off at office last week for disability. Pt does have an appt tomorrow.     C/b [id number]

## 2023-08-21 NOTE — PROGRESS NOTES
Outpatient Cardiology Note - Gabrielle Chavez 61 y.o. male MRN: [id number]    @ Encounter: [id number]        Patient Active Problem List    Diagnosis Date Noted   • S/P CABG x 5 08/11/2023   • Coronary artery disease involving native coronary artery 08/02/2023   • Peripheral angiopathy due to type 2 diabetes mellitus ([address]) 01/16/2023   • Sleep apnea    • HTN (hypertension), benign 03/29/2018   • Type 2 diabetes mellitus ([address]) 03/29/2018   • Hyperlipidemia LDL goal <100 03/29/2018   • Right knee pain 09/23/2014   • Myalgia and myositis 06/27/2014   • Vitamin D deficiency 06/27/2014   • Depressive disorder 05/14/2012       Assessment:  # CAD s/p CABG x 5 on 8/11/23- Dr Jane HAINES to LAD, SVG to D1, SVG Y to RI and OM1 and SVG to right PDA  Rx: metoprolol 12.5 mg BID, Crestor 40 mg daily, asa 325 mg daily, Jardiance    Studies- personally reviewed by me  EKG:  bpm, no ischemic changes    LHC 8/2/23:   Left Anterior Descending   Prox LAD lesion is 70% stenosed. Mid LAD lesion is 80% stenosed. Dist LAD lesion is 90% stenosed. First Diagonal Branch   1st Diag-1 lesion is 90% stenosed. 1st Diag-2 lesion is 80% stenosed. Ramus Intermedius   Ramus lesion is 80% stenosed. Left Circumflex   Mid Cx to Dist Cx lesion is 90% stenosed. Right Coronary Artery      Right Posterior Descending Artery   Collaterals   RPDA filled by collaterals from Dist LAD. NM stress 7/31/23: 7 min 17 sec, 7 METs, ischemic EKG changes.  Ischemic perfusion defects in inferior segments    Echo 7/27/23:  LVEF: 60%  RV: normal  PASP: normal    Stress echo 4/11/18: 9 min 30 sec, 10.7 METs, negative for ischemia    # HTN- metoprolol 12.5 mg BID  # DM2, last A1C 7.4%- glipizide, kombiglyze XR 2.5- 1000 mg  On lisinopril 5 mg daily  # Obesity, BMI 32.6%  # hyperlipidemia-   6/8 /23: , HDL 42  2/21/18: , HDL 43, Tri 132  # carotid artery dz  VAS 8/8/23: known occlusion on right, < 50% on left  # Mild sleep apnea. Stops breathing at night  # depression    Today's Plan:  Short term disability filled out  Cardiac rehab  Seeing endocrine for DM  Focus on health lifestyle  HPI:     63 yo male with hx DM2, last A1C over 9, obesity, hyperlipidemia, HTN, right carotid aneurysm who presents to establish CV care. Occasionally getting a little chest discomfort, intermittent. Walks about a mile or two every day. Does not seem exertional. Wife states he stops breathing at night. Given concerning anginal symptoms had stress test which was positive and sent for [facility] leading to recommendation of CABG. Interim:    Having issues with cardiac rehab  Feels ok at home  No dyspnea, getting appetite back      Past Medical History:   Diagnosis Date   • Coronary artery disease    • Diabetes mellitus ([address])    • Hyperlipidemia    • Hypertension        Review of Systems   Constitutional: Negative for activity change, appetite change, fatigue and unexpected weight change. HENT: Negative for congestion and nosebleeds. Eyes: Negative. Respiratory: Negative for cough, chest tightness and shortness of breath. Cardiovascular: Negative for chest pain, palpitations and leg swelling. Gastrointestinal: Negative for abdominal distention. Endocrine: Negative. Genitourinary: Negative. Musculoskeletal: Negative. Skin: Negative. Neurological: Negative for dizziness, syncope and weakness. Hematological: Negative. Psychiatric/Behavioral: Negative. Allergies   Allergen Reactions   • Atorvastatin Other (See Comments)     felt flu-like   • Rosiglitazone Other (See Comments)   • Trazodone Other (See Comments)     Felt "hung over"     . Current Outpatient Medications:   •  acetaminophen (TYLENOL) 325 mg tablet, Take 1-2 tablets Q4-6 hours PRN pain. Do not take more than 4grams in 24 hours. , Disp: , Rfl: 0  •  aspirin 325 mg tablet, Take 1 tablet (325 mg total) by mouth daily, Disp: 30 tablet, Rfl: 2  •  Continuous Blood Gluc Sensor (FreeStyle Ayanna 2 Sensor) MISC, APPLY AND CHANGE EVERY 14 DAYS, Disp: , Rfl:   •  docusate sodium (COLACE) 100 mg capsule, Take 1 capsule (100 mg total) by mouth 2 (two) times a day Hold for soft stools. , Disp: 60 capsule, Rfl: 0  •  gabapentin (NEURONTIN) 100 mg capsule, TAKE ONE CAPSULE DAILY IN THE MORNING, 3 CAPSULES IN THE EVENING, Disp: , Rfl:   •  glipiZIDE (GLUCOTROL XL) 5 mg 24 hr tablet, Take 10 mg by mouth, Disp: , Rfl:   •  Jardiance 25 MG TABS, , Disp: , Rfl:   •  LORazepam (ATIVAN) 0.5 mg tablet, Take 0.5 mg by mouth daily at bedtime, Disp: , Rfl:   •  metFORMIN (GLUCOPHAGE-XR) 500 mg 24 hr tablet, Take 2 tablets (1,000 mg total) by mouth 2 (two) times a day Do not start before August 3, 2023., Disp: , Rfl: 0  •  metoprolol tartrate (LOPRESSOR) 25 mg tablet, Take 0.5 tablets (12.5 mg total) by mouth every 12 (twelve) hours, Disp: 30 tablet, Rfl: 2  •  ondansetron (ZOFRAN) 4 mg tablet, Take 1 tablet (4 mg total) by mouth every 8 (eight) hours as needed for nausea or vomiting, Disp: 20 tablet, Rfl: 0  •  oxyCODONE (ROXICODONE) 5 immediate release tablet, Take 1/2-1 tablet Q4-6 hours PRN pain., Disp: 30 tablet, Rfl: 0  •  Ozempic, 2 MG/DOSE, 8 MG/3ML injection pen, INJECT 2MG UNDER THE SKIN ONCE A WEEK, Disp: , Rfl:   •  pantoprazole (PROTONIX) 40 mg tablet, Take 1 tablet (40 mg total) by mouth daily in the early morning Do not start before August 15, 2023., Disp: 30 tablet, Rfl: 0  •  polyethylene glycol (MIRALAX) 17 g packet, Take 17 g by mouth daily Hold for soft stools.  Do not start before August 15, 2023., Disp: 30 each, Rfl: 0  •  potassium chloride (K-DUR,KLOR-CON) 10 mEq tablet, Take 1 tablet (10 mEq total) by mouth daily for 10 days Unless otherwise directed., Disp: 10 tablet, Rfl: 2  •  rosuvastatin (CRESTOR) 20 MG tablet, Take 2 tablets (40 mg total) by mouth daily, Disp: 30 tablet, Rfl: 2  •  sertraline (Zoloft) 50 mg tablet, Take 1 tablet (50 mg total) by mouth daily, Disp: 30 tablet, Rfl: 5  •  Torsemide 40 MG TABS, Take 20 mg by mouth in the morning for 10 days Unless otherwise directed., Disp: 10 tablet, Rfl: 2    Social History     Socioeconomic History   • Marital status: /Civil Union     Spouse name: Not on file   • Number of children: Not on file   • Years of education: Not on file   • Highest education level: Not on file   Occupational History   • Not on file   Tobacco Use   • Smoking status: Never   • Smokeless tobacco: Never   Substance and Sexual Activity   • Alcohol use: No   • Drug use: No   • Sexual activity: Yes   Other Topics Concern   • Not on file   Social History Narrative   • Not on file     Social Determinants of Health     Financial Resource Strain: Not on file   Food Insecurity: No Food Insecurity (8/12/2023)    Hunger Vital Sign    • Worried About Running Out of Food in the Last Year: Never true    • Ran Out of Food in the Last Year: Never true   Transportation Needs: No Transportation Needs (8/12/2023)    PRAPARE - Transportation    • Lack of Transportation (Medical): No    • Lack of Transportation (Non-Medical): No   Physical Activity: Not on file   Stress: Not on file   Social Connections: Not on file   Intimate Partner Violence: Not on file   Housing Stability: Low Risk  (8/12/2023)    Housing Stability Vital Sign    • Unable to Pay for Housing in the Last Year: No    • Number of Places Lived in the Last Year: 1    • Unstable Housing in the Last Year: No       Family History   Problem Relation Age of Onset   • Leukemia Mother    • Heart attack Father 46       Physical Exam:    Vitals: There were no vitals taken for this visit. , There is no height or weight on file to calculate BMI.,   Wt Readings from Last 3 Encounters:   08/15/23 104 kg (228 lb 6.3 oz)   08/03/23 104 kg (230 lb)   08/02/23 108 kg (239 lb)       Physical Exam:  There were no vitals filed for this visit.     GEN: Poly Lot appears well, alert and oriented x 3, pleasant and cooperative   HEENT: pupils equal, round, and reactive to light; extraocular muscles intact  NECK: supple, no carotid bruits   HEART: regular rhythm, normal S1 and S2, no murmurs, clicks, gallops or rubs, JVP is    LUNGS: clear to auscultation bilaterally; no wheezes, rales, or rhonchi   ABDOMEN: normal bowel sounds, soft, no tenderness, no distention  EXTREMITIES: peripheral pulses normal; no clubbing, cyanosis, or edema  NEURO: no focal findings   SKIN: normal without suspicious lesions on exposed skin    Labs & Results:    Lab Results   Component Value Date    WBC 11.18 (H) 08/14/2023    HGB 10.2 (L) 08/14/2023    HCT 31.0 (L) 08/14/2023    MCV 90 08/14/2023     (L) 08/14/2023     Lab Results   Component Value Date    GLUCOSE 184 (H) 08/11/2023    CALCIUM 7.9 (L) 08/14/2023    K 4.5 08/14/2023    CO2 20 (L) 08/14/2023     08/14/2023    BUN 32 (H) 08/14/2023    CREATININE 1.14 08/14/2023     Lab Results   Component Value Date    BNP 17 07/24/2023      No results found for: "CHOL"  No results found for: "HDL"  No results found for: "LDLCALC"  No results found for: "TRIG"  No results found for: "CHOLHDL"      EKG personally reviewed by Areli Benito. Counseling / Coordination of Care  Total floor / unit time spent today 25 minutes. Greater than 50% of total time was spent with the patient and / or family counseling and / or coordination of care. A description of the counseling / coordination of care: 15 min. Thank you for the opportunity to participate in the care of this patient. Areli Benito D.O.   Director of Advanced Heart Failure Program  Medical Director of [address] [place] [address]

## 2023-08-22 ENCOUNTER — DOCUMENTATION (OUTPATIENT)
Dept: CARDIOLOGY CLINIC | Facility: CLINIC | Age: 63
End: 2023-08-22

## 2023-08-22 ENCOUNTER — TELEPHONE (OUTPATIENT)
Dept: CARDIAC SURGERY | Facility: CLINIC | Age: 63
End: 2023-08-22

## 2023-08-22 ENCOUNTER — HOME CARE VISIT (OUTPATIENT)
Dept: HOME HEALTH SERVICES | Facility: HOME HEALTHCARE | Age: 63
End: 2023-08-22
Payer: COMMERCIAL

## 2023-08-22 ENCOUNTER — OFFICE VISIT (OUTPATIENT)
Dept: CARDIOLOGY CLINIC | Facility: CLINIC | Age: 63
End: 2023-08-22
Payer: COMMERCIAL

## 2023-08-22 VITALS
HEIGHT: 71 IN | DIASTOLIC BLOOD PRESSURE: 52 MMHG | OXYGEN SATURATION: 98 % | BODY MASS INDEX: 30.8 KG/M2 | HEART RATE: 73 BPM | WEIGHT: 220 LBS | SYSTOLIC BLOOD PRESSURE: 100 MMHG

## 2023-08-22 DIAGNOSIS — N52.01 ERECTILE DYSFUNCTION DUE TO ARTERIAL INSUFFICIENCY: ICD-10-CM

## 2023-08-22 DIAGNOSIS — E78.5 HYPERLIPIDEMIA LDL GOAL <100: Primary | ICD-10-CM

## 2023-08-22 DIAGNOSIS — I10 HTN (HYPERTENSION), BENIGN: ICD-10-CM

## 2023-08-22 DIAGNOSIS — I25.10 CORONARY ARTERY DISEASE INVOLVING NATIVE CORONARY ARTERY OF NATIVE HEART WITHOUT ANGINA PECTORIS: ICD-10-CM

## 2023-08-22 DIAGNOSIS — Z95.1 S/P CABG X 5: ICD-10-CM

## 2023-08-22 PROCEDURE — G0299 HHS/HOSPICE OF RN EA 15 MIN: HCPCS

## 2023-08-22 PROCEDURE — 99214 OFFICE O/P EST MOD 30 MIN: CPT | Performed by: INTERNAL MEDICINE

## 2023-08-22 RX ORDER — TADALAFIL 20 MG/1
20 TABLET ORAL DAILY PRN
Qty: 10 TABLET | Refills: 0 | Status: SHIPPED | OUTPATIENT
Start: 2023-08-22

## 2023-08-22 RX ORDER — TORSEMIDE 20 MG/1
TABLET ORAL
COMMUNITY
Start: 2023-08-15

## 2023-08-25 ENCOUNTER — HOME CARE VISIT (OUTPATIENT)
Dept: HOME HEALTH SERVICES | Facility: HOME HEALTHCARE | Age: 63
End: 2023-08-25
Payer: COMMERCIAL

## 2023-08-25 VITALS
RESPIRATION RATE: 18 BRPM | DIASTOLIC BLOOD PRESSURE: 58 MMHG | OXYGEN SATURATION: 97 % | TEMPERATURE: 97.3 F | HEART RATE: 88 BPM | SYSTOLIC BLOOD PRESSURE: 98 MMHG

## 2023-08-25 PROCEDURE — G0299 HHS/HOSPICE OF RN EA 15 MIN: HCPCS

## 2023-08-28 VITALS
OXYGEN SATURATION: 96 % | TEMPERATURE: 98 F | HEART RATE: 88 BPM | DIASTOLIC BLOOD PRESSURE: 60 MMHG | SYSTOLIC BLOOD PRESSURE: 100 MMHG | RESPIRATION RATE: 18 BRPM

## 2023-08-29 ENCOUNTER — HOME CARE VISIT (OUTPATIENT)
Dept: HOME HEALTH SERVICES | Facility: HOME HEALTHCARE | Age: 63
End: 2023-08-29
Payer: COMMERCIAL

## 2023-08-30 ENCOUNTER — OFFICE VISIT (OUTPATIENT)
Dept: ENDOCRINOLOGY | Facility: HOSPITAL | Age: 63
End: 2023-08-30
Payer: COMMERCIAL

## 2023-08-30 VITALS
HEART RATE: 74 BPM | HEIGHT: 71 IN | DIASTOLIC BLOOD PRESSURE: 78 MMHG | BODY MASS INDEX: 30.69 KG/M2 | WEIGHT: 219.2 LBS | SYSTOLIC BLOOD PRESSURE: 116 MMHG

## 2023-08-30 DIAGNOSIS — E11.65 TYPE 2 DIABETES MELLITUS WITH HYPERGLYCEMIA, WITHOUT LONG-TERM CURRENT USE OF INSULIN (HCC): Primary | ICD-10-CM

## 2023-08-30 DIAGNOSIS — E11.42 DIABETIC POLYNEUROPATHY ASSOCIATED WITH TYPE 2 DIABETES MELLITUS (HCC): ICD-10-CM

## 2023-08-30 PROCEDURE — 99214 OFFICE O/P EST MOD 30 MIN: CPT | Performed by: INTERNAL MEDICINE

## 2023-08-30 PROCEDURE — 95251 CONT GLUC MNTR ANALYSIS I&R: CPT | Performed by: INTERNAL MEDICINE

## 2023-08-30 RX ORDER — VITAMIN B COMPLEX
1000 TABLET ORAL 2 TIMES DAILY
COMMUNITY

## 2023-08-30 RX ORDER — GLIPIZIDE 5 MG/1
5 TABLET, FILM COATED, EXTENDED RELEASE ORAL DAILY
Status: SHIPPED
Start: 2023-08-30

## 2023-08-30 RX ORDER — MULTIVIT,TX WITH IRON,MINERALS
TABLET, EXTENDED RELEASE ORAL
COMMUNITY

## 2023-08-30 NOTE — PATIENT INSTRUCTIONS
The freestyle [place] is real good. There are lows in the afternoon. Decrease the glipizide to 5 mg 1 tablet in the am.    Continue the same metformin, jardiance, and ozempic. Continue to use the freestyle lambert. If sugars go up, let me know. If lots of sugars in the upper 100's to 200's. Try vitamin B12 1000 mcg tablet daily. Follow up in 3 months with blood work.

## 2023-08-30 NOTE — PROGRESS NOTES
8/30/2023    Assessment/Plan      Diagnoses and all orders for this visit:    Type 2 diabetes mellitus with hyperglycemia, without long-term current use of insulin ([address])  -     HEMOGLOBIN A1C W/ EAG ESTIMATION Lab Collect; Future  -     Comprehensive metabolic panel Lab Collect; Future  -     CBC and differential Lab Collect; Future  -     TSH, 3rd generation Lab Collect; Future    Diabetic polyneuropathy associated with type 2 diabetes mellitus (HCC)  -     HEMOGLOBIN A1C W/ EAG ESTIMATION Lab Collect; Future  -     Comprehensive metabolic panel Lab Collect; Future  -     CBC and differential Lab Collect; Future  -     TSH, 3rd generation Lab Collect; Future    Other orders  -     cholecalciferol (VITAMIN D3) 25 mcg (1,000 units) tablet; Take 1,000 Units by mouth 2 (two) times a day  -     Magnesium Gluconate 250 MG TABS; Take by mouth daily at bedtime  -     glipiZIDE (GLUCOTROL XL) 5 mg 24 hr tablet; Take 1 tablet (5 mg total) by mouth daily        Assessment/Plan:  1. Type 2 diabetes. Hemoglobin A1c prior to surgery was 7.4%. He is now back on his outpatient medications and blood sugars are doing quite well. For now, he will continue the same metformin, Jardiance, and Ozempic. He is having some lower blood sugars in the afternoon so I have asked him to decrease his glipizide to 5 mg 1 tablet in the morning. If his blood sugars go up he is to call and we can read download another freestyle lambert reports. We can also increase glipizide if needed in the future. He will continue to use the freestyle lambert continuous glucose monitoring system. 2.  Diabetic neuropathy. He has seen significant neuropathic symptoms. He uses gabapentin but still has a lot of nighttime symptoms.   I have asked him to try vitamin B12 1000 mcg daily to see if that improves his symptoms of neuropathy as I have had some success with patient's neuropathic pain when utilizing vitamin B12 especially in light of the fact that sometimes patients with metformin usage have a decreased absorption of vitamin B12. We may want to also consider changing from gabapentin to Lyrica in the future. I have asked him to follow-up in 3 months with preceding hemoglobin A1c, CMP, CBC, and TSH. CC: Diabetes type II Hospital follow-up    History of Present Illness     HPI: Early Crigler is a 61y.o. year old male with type 2 diabetes with neuropathy and retinopathy for 15 years for hospital follow-up. He was seen in hospital in early to mid August 2023 when he underwent CABG of 4 vessels on 8/11/2023. He was on oral agents prior to surgery and transition to insulin therapy with basal/bolus regimen. Medications prior to surgery included metformin  mg 2 tablets twice a day, Jardiance 25 mg daily, glipizide XL 5 mg 2 daily, and Ozempic 2 mg once a week. He was previously following with [facility] endocrinology and is here to transition care to [facility] endocrinology. He is on oral agents at home and takes metformin  mg 2 tablets twice a day, Jardiance 25 mg daily, Ozempic 2 mg once a week, and glipizide XL 5 mg 2 tablets daily. He denies any polyuria, polydipsia, nocturia and blurry vision. He has polydipsia and twice a night nocturia. He has significant numbness and pain of his feet that is much worse at night and this is worse since his surgery. A topical over-the-counter preparation is helping some. He denies chest pain or shortness of breath. He denies nephropathy, heart attack and stroke but does admit to neuropathy, retinopathy and claudication. Hypoglycemic episodes: Yes several times per week. The patient's last eye exam was in march 2023 with mild retinopathy. The patient's last foot exam was in not recently. He does have neuropathy and utilizes gabapentin. Last A1C was   Lab Results   Component Value Date    HGBA1C 7.4 (H) 06/08/2023   .     Blood Sugar/Glucometer/Pump/CGM review: He utilizes a freestyle lambert 2 continuous glucose monitoring system to test his blood sugars frequently throughout the day. Freestyle lambert download from 8/16/2023 through 8/29/2023 was reviewed in the office today. CGM was active 87% of the time. Average glucose is 112 mg/dL with a glucose variability of 24.8%. 96% of blood sugars are in target range, 2% high, 0% very high, 2% low, and 0% very low. Review of Systems   Constitutional: Positive for appetite change, fatigue and unexpected weight change. Still fatigued. Weight loss postop 239 lbs preop and 219 lbs now. Still not much appetite. Eyes: Negative for visual disturbance. Respiratory: Negative for chest tightness and shortness of breath. Cardiovascular: Positive for leg swelling. Negative for chest pain. Some slight ankle and feet edema. Gastrointestinal: Positive for constipation. Negative for abdominal pain, diarrhea and nausea. Slight constipation. Endocrine: Positive for polydipsia. Negative for polyphagia and polyuria. Nocturia 2 times a night. Some thirst as on a water restriction. Skin: Positive for wound. Negative for rash. Wounds on ankles from surgery. Neurological: Positive for numbness and headaches. Negative for dizziness, weakness and light-headedness. Numbness and pain in the leg, worse in the right since surgery. Blue emu topical therapy is helping. Headache occasionally. Psychiatric/Behavioral: Positive for sleep disturbance. Difficulty sleeping with laying on her back and the leg pain. Historical Information   Past Medical History:   Diagnosis Date   • Coronary artery disease    • Diabetes mellitus ([address])    • Hyperlipidemia    • Hypertension      Past Surgical History:   Procedure Laterality Date   • ARTERIAL ANEURYSM REPAIR      Right Carotid Aneurysm - age 13.    • CARDIAC CATHETERIZATION Left 08/02/2023    Procedure: Cardiac Left Heart Cath;  Surgeon: Elizabeth Asp, DO; Location: [facility]; Service: Cardiology   • CARDIAC CATHETERIZATION N/A 08/02/2023    Procedure: Cardiac Coronary Angiogram;  Surgeon: Keira Su DO;  Location: [facility]; Service: Cardiology   • CARDIAC CATHETERIZATION  08/02/2023    Procedure: Cardiac catheterization;  Surgeon: Keira Su DO;  Location: [facility]; Service: Cardiology   • HERNIA REPAIR     • CA CORONARY ARTERY BYP W/VEIN & ARTERY GRAFT 4 VEIN N/A 8/11/2023    Procedure: CORONARY ARTERY BYPASS GRAFT (CABG) X-5 VESSELS ; SVG to PDA, Ramus, Diagonal and OM. LIMA --> LAD EVH  W/ JOHN;  Surgeon: Martina Mix MD;  Location: [facility];  Service: Cardiac Surgery   • VASECTOMY       Social History   Social History     Substance and Sexual Activity   Alcohol Use No     Social History     Substance and Sexual Activity   Drug Use No     Social History     Tobacco Use   Smoking Status Never   Smokeless Tobacco Never     Family History:   Family History   Problem Relation Age of Onset   • Leukemia Mother    • Heart attack Father 46       Meds/Allergies   Current Outpatient Medications   Medication Sig Dispense Refill   • acetaminophen (TYLENOL) 325 mg tablet Take 1-2 tablets Q4-6 hours PRN pain. Do not take more than 4grams in 24 hours. 0   • aspirin 325 mg tablet Take 1 tablet (325 mg total) by mouth daily 30 tablet 2   • cholecalciferol (VITAMIN D3) 25 mcg (1,000 units) tablet Take 1,000 Units by mouth 2 (two) times a day     • Continuous Blood Gluc Sensor (FreeStyle Ayanna 2 Sensor) MISC APPLY AND CHANGE EVERY 14 DAYS     • docusate sodium (COLACE) 100 mg capsule Take 1 capsule (100 mg total) by mouth 2 (two) times a day Hold for soft stools.  60 capsule 0   • gabapentin (NEURONTIN) 100 mg capsule TAKE ONE CAPSULE DAILY IN THE MORNING, 3 CAPSULES IN THE EVENING     • glipiZIDE (GLUCOTROL XL) 5 mg 24 hr tablet Take 1 tablet (5 mg total) by mouth daily     • Jardiance 25 MG TABS Take 25 mg by mouth in the morning     • LORazepam (ATIVAN) 0.5 mg tablet Take 0.5 mg by mouth daily at bedtime     • Magnesium Gluconate 250 MG TABS Take by mouth daily at bedtime     • metFORMIN (GLUCOPHAGE-XR) 500 mg 24 hr tablet Take 2 tablets (1,000 mg total) by mouth 2 (two) times a day Do not start before August 3, 2023.  0   • metoprolol tartrate (LOPRESSOR) 25 mg tablet Take 0.5 tablets (12.5 mg total) by mouth every 12 (twelve) hours 30 tablet 2   • ondansetron (ZOFRAN) 4 mg tablet Take 1 tablet (4 mg total) by mouth every 8 (eight) hours as needed for nausea or vomiting 20 tablet 0   • Ozempic, 2 MG/DOSE, 8 MG/3ML injection pen INJECT 2MG UNDER THE SKIN ONCE A WEEK     • pantoprazole (PROTONIX) 40 mg tablet Take 1 tablet (40 mg total) by mouth daily in the early morning Do not start before August 15, 2023. 30 tablet 0   • polyethylene glycol (MIRALAX) 17 g packet Take 17 g by mouth daily Hold for soft stools. Do not start before August 15, 2023. 30 each 0   • rosuvastatin (CRESTOR) 20 MG tablet Take 2 tablets (40 mg total) by mouth daily 30 tablet 2   • sertraline (Zoloft) 50 mg tablet Take 1 tablet (50 mg total) by mouth daily 30 tablet 5   • tadalafil (CIALIS) 20 MG tablet Take 1 tablet (20 mg total) by mouth daily as needed for erectile dysfunction 10 tablet 0   • oxyCODONE (ROXICODONE) 5 immediate release tablet Take 1/2-1 tablet Q4-6 hours PRN pain. (Patient not taking: Reported on 8/22/2023) 30 tablet 0   • torsemide (DEMADEX) 20 mg tablet TAKE ONE TABLET BY MOUTH EVERY MORNING FOR 10 DAYS UNLESS OTHERWISE DIRECTED (Patient not taking: Reported on 8/30/2023)       No current facility-administered medications for this visit.      Allergies   Allergen Reactions   • Atorvastatin Other (See Comments)     felt flu-like   • Rosiglitazone Other (See Comments)   • Trazodone Other (See Comments)     Felt "hung over"       Objective   Vitals: Blood pressure 116/78, pulse 74, height 5' 11" (1.803 m), weight 99.4 kg (219 lb 3.2 oz).  Invasive Devices     None                 Physical Exam  Vitals reviewed. Constitutional:       Appearance: Normal appearance. He is well-developed. He is obese. HENT:      Head: Normocephalic and atraumatic. Eyes:      Conjunctiva/sclera: Conjunctivae normal.   Neck:      Thyroid: No thyromegaly. Vascular: No carotid bruit. Comments: Thyroid normal in size. Cardiovascular:      Rate and Rhythm: Normal rate and regular rhythm. Pulses: Pulses are weak. Dorsalis pedis pulses are 1+ on the right side and 1+ on the left side. Posterior tibial pulses are 1+ on the right side and 1+ on the left side. Heart sounds: Normal heart sounds. No murmur heard. Comments: 1+ dorsalis pedis and posterior tibialis pulses bilaterally. Pulmonary:      Effort: Pulmonary effort is normal.      Breath sounds: Normal breath sounds. No wheezing. Comments: Healing anterior chest wound. Abdominal:      Palpations: Abdomen is soft. Musculoskeletal:         General: No deformity. Normal range of motion. Cervical back: Normal range of motion and neck supple. Right lower leg: Edema present. Left lower leg: Edema present. Comments: 1+ bilateral lower extremity edema. No ulcerations of the feet. Feet:      Right foot:      Skin integrity: No ulcer, skin breakdown, erythema, warmth, callus or dry skin. Left foot:      Skin integrity: No ulcer, skin breakdown, erythema, warmth, callus or dry skin. Lymphadenopathy:      Cervical: No cervical adenopathy. Skin:     General: Skin is warm and dry. Findings: No erythema or rash. Comments: Healing wounds on the legs. Neurological:      Mental Status: He is alert and oriented to person, place, and time. Deep Tendon Reflexes: Reflexes are normal and symmetric.       Comments: Vibration sensation absent on the left in the first toe DIP joint to the medial malleolus and absent on the right at the DIP joint 1st toe and diminished to the right 1st MP joint. Microfilament sensation absent to the right 1st, 3rd, 5th toes and left heel, arch and dorsum but was otherwise intact bilaterally. Patient's shoes and socks removed. Right Foot/Ankle   Right Foot Inspection  Skin Exam: skin normal and skin intact. No dry skin, no warmth, no callus, no erythema, no maceration, no abnormal color, no pre-ulcer, no ulcer and no callus. Toe Exam: No swelling and  no right toe deformity    Sensory   Vibration: diminished  Monofilament testing: diminished    Vascular  Capillary refills: < 3 seconds  The right DP pulse is 1+. The right PT pulse is 1+. Left Foot/Ankle  Left Foot Inspection  Skin Exam: skin normal and skin intact. No dry skin, no warmth, no erythema, no maceration, normal color, no pre-ulcer, no ulcer and no callus. Toe Exam: No swelling and no left toe deformity. Sensory   Vibration: absent  Monofilament testing: diminished    Vascular  Capillary refills: < 3 seconds  The left DP pulse is 1+. The left PT pulse is 1+. Assign Risk Category  No deformity present  Loss of protective sensation  Weak pulses  Risk: 2        The history was obtained from the review of the chart and from the patient and wife.     Lab Results:    Most recent Alc is  Lab Results   Component Value Date    HGBA1C 7.4 (H) 06/08/2023               Lab Results   Component Value Date    CREATININE 1.14 08/14/2023    CREATININE 1.20 08/13/2023    CREATININE 0.91 08/12/2023    BUN 32 (H) 08/14/2023    K 4.5 08/14/2023     08/14/2023    CO2 20 (L) 08/14/2023     eGFR   Date Value Ref Range Status   08/14/2023 68 ml/min/1.73sq m Final         Lab Results   Component Value Date    ALT 17 07/24/2023    AST 15 07/24/2023    ALKPHOS 53 07/24/2023                 Future Appointments   Date Time Provider [address]   9/1/2023 To Be Determined Stephanie Tan RN [facility]   9/5/2023 To Be Determined Autumn Smart Devante, RN VN [facility]   9/8/2023 To Be Determined Yunier President, RN VN [facility]   9/12/2023 To Be Determined Yunier President, RN VN [facility]   9/13/2023  7:30 AM YE [place]   9/14/2023  3:00 PM Hadley Cullen MD CV SURG [facility]   9/15/2023 To Be Determined Yunier President, RN VN [facility]   9/19/2023 To Be Determined Yunier President, RN VN [facility]   9/22/2023 To Be Determined Yunier President, RN [facility]   9/26/2023 To Be Determined Yunier President, RN [facility]   9/29/2023 To Be Determined Yunier President, RN [facility]   10/3/2023 To Be Determined Yunier President, RN [facility]   10/6/2023 To Be Determined Yunier President, RN [facility]   10/10/2023 To Be Determined Yunier President, RN VN [facility]   10/13/2023 To Be Determined Yunier President, RN VN [facility] VN Home [facility]   10/27/2023  3:00 PM Felton Hebert DO C[facility]   1/19/2024  9:40 AM Erna Monterroso MD ENDO  [facility]

## 2023-08-31 PROCEDURE — G0180 MD CERTIFICATION HHA PATIENT: HCPCS | Performed by: THORACIC SURGERY (CARDIOTHORACIC VASCULAR SURGERY)

## 2023-09-01 ENCOUNTER — HOME CARE VISIT (OUTPATIENT)
Dept: HOME HEALTH SERVICES | Facility: HOME HEALTHCARE | Age: 63
End: 2023-09-01
Payer: COMMERCIAL

## 2023-09-01 PROCEDURE — G0299 HHS/HOSPICE OF RN EA 15 MIN: HCPCS

## 2023-09-02 VITALS
TEMPERATURE: 97.6 F | OXYGEN SATURATION: 97 % | SYSTOLIC BLOOD PRESSURE: 100 MMHG | HEART RATE: 104 BPM | RESPIRATION RATE: 18 BRPM | DIASTOLIC BLOOD PRESSURE: 60 MMHG

## 2023-09-05 ENCOUNTER — HOME CARE VISIT (OUTPATIENT)
Dept: HOME HEALTH SERVICES | Facility: HOME HEALTHCARE | Age: 63
End: 2023-09-05
Payer: COMMERCIAL

## 2023-09-08 ENCOUNTER — HOME CARE VISIT (OUTPATIENT)
Dept: HOME HEALTH SERVICES | Facility: HOME HEALTHCARE | Age: 63
End: 2023-09-08
Payer: COMMERCIAL

## 2023-09-08 PROCEDURE — G0299 HHS/HOSPICE OF RN EA 15 MIN: HCPCS

## 2023-09-10 VITALS
RESPIRATION RATE: 18 BRPM | TEMPERATURE: 97.4 F | SYSTOLIC BLOOD PRESSURE: 120 MMHG | OXYGEN SATURATION: 96 % | DIASTOLIC BLOOD PRESSURE: 80 MMHG | HEART RATE: 82 BPM

## 2023-09-12 ENCOUNTER — HOME CARE VISIT (OUTPATIENT)
Dept: HOME HEALTH SERVICES | Facility: HOME HEALTHCARE | Age: 63
End: 2023-09-12
Payer: COMMERCIAL

## 2023-09-13 ENCOUNTER — CLINICAL SUPPORT (OUTPATIENT)
Dept: CARDIAC REHAB | Facility: CLINIC | Age: 63
End: 2023-09-13
Payer: COMMERCIAL

## 2023-09-13 ENCOUNTER — DOCUMENTATION (OUTPATIENT)
Dept: CARDIOLOGY CLINIC | Facility: CLINIC | Age: 63
End: 2023-09-13

## 2023-09-13 DIAGNOSIS — Z95.1 S/P CABG X 5: ICD-10-CM

## 2023-09-13 PROCEDURE — 93797 PHYS/QHP OP CAR RHAB WO ECG: CPT

## 2023-09-13 NOTE — PROGRESS NOTES
Cardiac Rehabilitation Plan of Care   Initial Care Plan          Today's date: 2023   # of Exercise Sessions Completed: 1 - Eval  Patient name: Rupinder Shore      : 1960  Age: 61 y.o. MRN: [id number]  Referring Physician: Jasson Perera MD  Cardiologist: Shamika Stover DO   Provider: Gisella Ashley  Clinician: Mickey Romero MS, CEP    Dx:   Encounter Diagnosis   Name Primary? • S/P CABG x 5      Date of onset: 2023      SUMMARY OF PROGRESS:  Today is Huan's initial evaluation to begin Cardiac Rehab post CABGx5. He was experiencing chest tightness/SOB/sweating with exertion. Nuclear stress test was done which was abnormal. LHC found stenosis: pLAD 70%, mLAD 80%, dLAD 90%, 1st diag 90% & 80%, Ramus 80%, mCx 90%, collateral to RPDA from dLAD. Post-op he has had good healing at his incision complaining of some oozing at his graft sites. Alia Dos Santos does complain of muscular discomfort/numbness and has FU with CT surgery tomorrow . The patient does currently follow a formal exercise program at home. He has added home exercise 5-6 days/wk including walking 0.5 miles at a light intensity. He has resumed light ADLs following sternal restrictions. Depression screening using the PHQ-9 interprets the patient's score of  8  as  5-9 = Mild Depression and anxiety screening using the ABBY-7 interprets the patient's score of 4 as 0-4  = Not anxious. When addressed, the patient denies having depression/anxiety. They report stressors in their life including including family stress and his current health status. Stress management techniques will be reviewed with patient to limit risk factor. Patient reports excellent social/emotional support from his wife. Information to utilize [employer] was provided as well as contact information for counseling through [employer]. PHQ-9 and ABBY-7 score will be reassessed in 30 days due to elevated initial scoring. The patient is a non-smoker.  Patient admits to 100% medication compliance. They reports the following physical limitations: R knee pain. The patient completed an initial submaximal TM ETT. They reached max METs at 10:20 minutes, stage 4 (5.8 METs), with test termination of RPE 6. Resting /60 with Normal hemodynamic response to exercise reaching 124/56. BP will be monitored throughout program and cardiologist will be notified of abnormal changes. The patient had leg fatigue at max exercise. Telemetry during testing revealed NSR with MF couplet noted during exercise. Maru Phipps was counseled on exercise guidelines to achieve a minimum of 150 mins/wk of moderate intensity (RPE 4-6) exercise and encouraged to add 1-2 days of exercise on opposite days of cardiac rehab as tolerated. We discussed current dietary habits and goals of heart healthy eating for diabetes management and weight loss. The patient has T2D, no insulin, is on Jardiance and monitors with a CGM. He reports swings in his BG sometimes that increase diabetic fatigue. He was encouraged to consider smaller meals throughout the day that may improve overall swings and BG control. Patient's personal goals include: Return to mowing the lawn, increase strength/endurance, return to all ADLs, continue heart healthy eating, improve diabetic fatigue, improve emotional health, return to sexual activity. The patient's CAD risk factors include: inactivity, stress, obesity/overweight, hypertension, hyperlipidemia and diabetes. His education will focus on lifestyle modification/education specific to His needs. Patient will attend group education classes on heart healthy eating, reading food labels, stress management, risk factor reduction, understanding heart disease and common heart medications. Maru Phipps will attend 35 monitored exercise sessions, 3x/wk for 12-18 weeks beginning 9/15/2023 as long as clearance is given from CT surgery.        Medication compliance: Yes   Comments: Pt reports to be compliant with medications  Fall Risk: Low   Comments: Ambulates with a steady gait with no assist device and Denies a fall in the past 6 months    EKG Interpretation: NSRGISEL couplet noted       EXERCISE ASSESSMENT and PLAN    Exercise Prescription:      Frequency: 3 days/week   Supplement with home exercise 2+ days/wk as tolerated       Minutes: 30-40         METS: 4-6            HR: resting + 30    RPE: 4-6         Modalities: Treadmill, Airdyne bike, UBE, Lifecycle, NuStep and Recumbent bike      30 Day Goals for Exercise Progression:    Frequency: 3 days/week of cardiac rehab       Supplement with home exercise 2+ days/wk as tolerated    Minutes: 40-45                              >150 mins/wk of moderate intensity exercise   METS: 4-6   HR: resting + 30     RPE: 4-6   Modalities: Treadmill, Airdyne bike, UBE, Lifecycle, Elliptical, NuStep and Recumbent bike    Strength trainin-3 days / week  12-15 repetitions  1-2 sets per modality   Will be added following at least 8 weeks post surgery and 8-10 monitored sessions   Modalities: Leg Press, Chest Press, Pull Downs, Arm Curl and Seated Row    Home Exercise: Type: walking , Frequency: 5-6 days/week, Distance 0.5 miles     Goals: 10% improvement in functional capacity - based on max METs achieved in fitness assessment, Reduced dyspnea with physical activity  0-1/10, improved DASI score by 10%, Increase in exercise capacity by 40% - based on peak METs tolerated in cardiac rehab exercise session, Exercise 5 days/wk, >150mins/wk of moderate intensity exercise, Resume ADLs with increased strength, Return to work unrestricted, Attend Rehab regularly, Decrease sitting time and Start a walking program    Progression Toward Goals:  Reviewed Pt goals and determined plan of care, Patient will increase home exercise intensities in the next 30 days, Will continue to educate and progress as tolerated.     Education: benefit of exercise for CAD risk factors, home exercise guidelines, AHA guidelines to achieve >150 mins/wk of moderate exercise and RPE scale   Plan:education on home exercise guidelines, home exercise 30+ mins 2 days opposite CR and Education class: Risk Factors for Heart Disease  Readiness to change: Preparation:  (Getting ready to change)       NUTRITION ASSESSMENT AND PLAN    Weight control:    Starting weight: 213.6 lbs    Current weight:       Diabetes: T2D  A1c: 7.4    last measured: 6/8/2023    Lipid management: Discussed diet and lipid management and Last lipid profile 6/8/2023  Chol 193    HDL 42      Goals:reduced BMI to < 25, LDL <100, decreased body fat% <25%   (M), reduced waist circumference <40 inches (M), eat 3 or more servings of whole grains a day, Eat 4-5 cups of fruits and vegetables daily and seldom eat or choose low fat ice-cream, fruit juice bars or frozen yogurt     Measurable goals were based Rate Your Plate Dietary Self-Assessment. These are the areas in which the patient could score higher on the assessment. Goals include recommendations for a heart healthy diet based on American Heart Association. Progression Toward Goals: Reviewed Pt goals and determined plan of care, Patient will continue heart healthy diet, consider smaller meals throughout the day due to swings in BG, monitor BG with exercise added in the next 30 days, Will continue to educate and progress as tolerated.     Education: heart healthy eating  low sodium diet  hydration  nutrition for  lipid management  target goal for A1c <7.0  exercise and diabetes management   wt. loss   Plan: Education class: Reading Food Labels, Education Class: Heart Healthy Eating, replace refined grain bread with whole grain bread, replace unhealthy snacks with fruits & vegs, monitor home blood glucose, drink more water, replace sugar with stevia or truvia and keep added daily sugar <25g/day  Readiness to change: Preparation:  (Getting ready to change)       PSYCHOSOCIAL ASSESSMENT AND PLAN    Emotional:  Depression assessment:  PHQ-9 = 8   5-9 = Mild Depression            Anxiety measure:  ABBY-7 = 7  0-4  = Not anxious  Self-reported stress level:  4  Social support: Excellent and Patient reports excellent emotional/social support from wife     Goals:  Reduce perceived stress to 1-3/10, Feelings in [facility] Score < 3, Physical Fitness in [facility] Score < 3, Overall Health in [facility] Score < 3 and Quality of Life in [facility] Score < 3     Progression Toward Goals: Reviewed Pt goals and determined plan of care, Patient will work on increasing confidence with return to ADLs and work in the next 30 days, Will continue to educate and progress as tolerated. Education: signs/sxs of depression, benefits of a positive support system, stress management techniques and depression and CAD  Plan: Class: Stress and Your Health, Class: Relaxation, Practice relaxation techniques, Exercise and Enjoy a hobby  Readiness to change: Preparation:  (Getting ready to change)       OTHER CORE COMPONENTS     Tobacco:   Social History     Tobacco Use   Smoking Status Never   Smokeless Tobacco Never       Tobacco Use Intervention:   N/A:  Patient is a non-smoker     Anginal Symptoms:  None   NTG use: No prescription    Blood pressure:    Restin/60   Exercise: 112/68 - 124/56    Goals: consistent BP < 130/80, reduced dietary sodium <2300mg, moderate intensity exercise >150 mins/wk, medication compliance and reduce number of medications  needed for BP control    Progression Toward Goals: Reviewed Pt goals and determined plan of care, Patient will maintain BP control and medication compliance in the next 30 days, Will continue to educate and progress as tolerated.     Education:  understanding high blood pressure and it's relationship to CAD and low sodium diet and HTN  Plan: Class: Understanding Heart Disease, Class: Common Heart Medications, medication compliance, engage in regular exercise and monitor home BP  Readiness to change: Preparation:  (Getting ready to change)

## 2023-09-13 NOTE — PROGRESS NOTES
CARDIAC REHAB ASSESSMENT    Today's date: 2023  Patient name: Enid Felty     : 1960       MRN: [id number]  PCP: Elise Branham MD  Referring Physician: Spencer Burris MD  Cardiologist: Jimmy Engel MD   Surgeon: Spencer Burris MD   Dx:   Encounter Diagnosis   Name Primary? • S/P CABG x 5        Date of onset: 2023  Cultural needs: None     Weight    Wt Readings from Last 1 Encounters:   23 99.4 kg (219 lb 3.2 oz)      Height:   Ht Readings from Last 1 Encounters:   23 5' 11" (1.803 m)     Medical History:   Past Medical History:   Diagnosis Date   • Coronary artery disease    • Diabetes mellitus ([address])    • Hyperlipidemia    • Hypertension          Physical Limitations: restless legs, R knee pain     Fall Risk: Low   Comments: Ambulates with a steady gait with no assist device and Denies a fall in the past 6 months    Anginal Equivalent: None/denies angina   NTG use: No prescription    Risk Factors   Cholesterol: Yes  Smoking: Never used  HTN: Yes  DM: Type 2   average FBG   Obesity: Yes   Inactivity: Yes  Stress: perceived stress: 4.5/10   Stressors: health, return to ADLs   Goals for Stress Management:Practice Relaxation Techniques, Exercise, Keep a positive mindset and Enjoy a hobby    Family History:  Family History   Problem Relation Age of Onset   • Leukemia Mother    • Heart attack Father 46       Allergies: Atorvastatin, Rosiglitazone, and Trazodone  ETOH:   Social History     Substance and Sexual Activity   Alcohol Use No         Current Medications:   Current Outpatient Medications   Medication Sig Dispense Refill   • acetaminophen (TYLENOL) 325 mg tablet Take 1-2 tablets Q4-6 hours PRN pain. Do not take more than 4grams in 24 hours.   0   • aspirin 325 mg tablet Take 1 tablet (325 mg total) by mouth daily 30 tablet 2   • cholecalciferol (VITAMIN D3) 25 mcg (1,000 units) tablet Take 1,000 Units by mouth 2 (two) times a day     • Continuous Blood Gluc Sensor (FreeStyle Ayanna 2 Sensor) MISC APPLY AND CHANGE EVERY 14 DAYS     • docusate sodium (COLACE) 100 mg capsule Take 1 capsule (100 mg total) by mouth 2 (two) times a day Hold for soft stools. 60 capsule 0   • gabapentin (NEURONTIN) 100 mg capsule TAKE ONE CAPSULE DAILY IN THE MORNING, 3 CAPSULES IN THE EVENING     • glipiZIDE (GLUCOTROL XL) 5 mg 24 hr tablet Take 1 tablet (5 mg total) by mouth daily     • Jardiance 25 MG TABS Take 25 mg by mouth in the morning     • LORazepam (ATIVAN) 0.5 mg tablet Take 0.5 mg by mouth daily at bedtime     • Magnesium Gluconate 250 MG TABS Take by mouth daily at bedtime     • metFORMIN (GLUCOPHAGE-XR) 500 mg 24 hr tablet Take 2 tablets (1,000 mg total) by mouth 2 (two) times a day Do not start before August 3, 2023.  0   • metoprolol tartrate (LOPRESSOR) 25 mg tablet Take 0.5 tablets (12.5 mg total) by mouth every 12 (twelve) hours 30 tablet 2   • ondansetron (ZOFRAN) 4 mg tablet Take 1 tablet (4 mg total) by mouth every 8 (eight) hours as needed for nausea or vomiting 20 tablet 0   • oxyCODONE (ROXICODONE) 5 immediate release tablet Take 1/2-1 tablet Q4-6 hours PRN pain. (Patient not taking: Reported on 8/22/2023) 30 tablet 0   • Ozempic, 2 MG/DOSE, 8 MG/3ML injection pen INJECT 2MG UNDER THE SKIN ONCE A WEEK     • pantoprazole (PROTONIX) 40 mg tablet Take 1 tablet (40 mg total) by mouth daily in the early morning Do not start before August 15, 2023. 30 tablet 0   • polyethylene glycol (MIRALAX) 17 g packet Take 17 g by mouth daily Hold for soft stools.  Do not start before August 15, 2023. 30 each 0   • rosuvastatin (CRESTOR) 20 MG tablet Take 2 tablets (40 mg total) by mouth daily 30 tablet 2   • sertraline (Zoloft) 50 mg tablet Take 1 tablet (50 mg total) by mouth daily 30 tablet 5   • tadalafil (CIALIS) 20 MG tablet Take 1 tablet (20 mg total) by mouth daily as needed for erectile dysfunction 10 tablet 0   • torsemide (DEMADEX) 20 mg tablet TAKE ONE TABLET BY MOUTH EVERY MORNING FOR 10 DAYS UNLESS OTHERWISE DIRECTED (Patient not taking: Reported on 8/30/2023)       No current facility-administered medications for this visit. Functional Status Prior to Diagnosis for Treatment   Occupation: full time job    Recreation: None   ADL’s: No limitations limited by Dyspnea  Chest Pain  Weakness  [place]: No limitations  Exercise: walking dog   Other: None     Current Functional Status  Occupation: full time job has not gone back,   Recreation: None   ADL’s:resumed all ADLs within sternal precautions  [place]: able to perform self-care  Exercise: walking 0.5 miles most days 5-7 days   Other: None     Patient Specific Goals:  Return to mowing the lawn, increase strength/endurance, return to all ADLs, continue heart healthy eating, improve diabetic fatigue, improve emotional health, return to sexual health     Short Term Program Goals: dietary modifications increased strength improved energy/stamina with ADLs exercise 120-150 mins/wk improved BG control wt loss 1-2 ppw return to work    Long Term Goals: intial claudication time extended  increased maximal walking duration  increased intial training workload  Improved [employer] Activity Status score  Improved functional capacity based on initial fitness assessment  improved exercise tolerance  Improved Quality of Life - [facility] score reduced  Improved lipid profile  Reduced waist circumference  Abstain from smoking  Improved fasting glucose  Reduced stress    Ability to reach goals/rehabilitation potential:  Excellent    Projected return to function: 12 weeks  Objective tests: sub-max TM ETT      Nutritional   Reviewed details of Rate your Plate. Discussed key elements of heart healthy eating. Reviewed patient goals for dietary modifications and their clinical implications. Reviewed most recent lipid profile.      Goals for dietary modification based on Rate Your Plate Dietary Assessment:  choose lean cuts of meat  poultry without the skin  low fat ground meat and poultry  eliminate processed meats  reduce portions of meat to 3 oz  increase fish intake  more meatless meals  low fat dairy   reduced fat cheese  increase whole grains  increase fruits and vegetables  eliminate butter  low sodium  improved snack choices  more nuts/seeds  reduce sweets/frozen desserts  heathier choices while dining out  eat smaller, more frequent meals      Emotional/Social  Patient reports feelings of depression   Reports sufficient emotional support from wife   compliant with medical therapy for depression/anxiety    Marital status: remarried    Domestic Violence Screening: No    Comments: Was getting tightness in chest during the winter and started to have a harder time cutting grass during the summer. Had Sweating, SOB, fatigue. Abnormal stress test found. LHC 8/2 Stenosis: pLAD 70%, mLAD 80%, dLAD 90%, 1st diag 90% & 80%, Ramus 80%, mCx 90%, collateral to RPDA from dLAD.      CABGx5 8/11 to LIMA-LAD, SVG - PDA, RAMUS, Diag, OM1    Hx T2D, [place], depressive disorder, neuropathy

## 2023-09-13 NOTE — PROGRESS NOTES
To whom it may concern,      Arnaud Feeling 1960  Is under my care for a cardiovascular condition.  Under further review, he  Is able to return to work with NO RESTRICTIONS    If you have any further questions please contact me at my office at [phone] [id number] [facility]

## 2023-09-14 ENCOUNTER — TELEPHONE (OUTPATIENT)
Dept: CARDIAC SURGERY | Facility: CLINIC | Age: 63
End: 2023-09-14

## 2023-09-14 ENCOUNTER — OFFICE VISIT (OUTPATIENT)
Dept: CARDIAC SURGERY | Facility: CLINIC | Age: 63
End: 2023-09-14

## 2023-09-14 VITALS
OXYGEN SATURATION: 98 % | DIASTOLIC BLOOD PRESSURE: 60 MMHG | HEIGHT: 71 IN | SYSTOLIC BLOOD PRESSURE: 112 MMHG | TEMPERATURE: 97.2 F | HEART RATE: 81 BPM | BODY MASS INDEX: 29.96 KG/M2 | WEIGHT: 214 LBS

## 2023-09-14 DIAGNOSIS — Z95.1 S/P CABG X 5: Primary | ICD-10-CM

## 2023-09-14 DIAGNOSIS — I25.118 CORONARY ARTERY DISEASE OF NATIVE ARTERY OF NATIVE HEART WITH STABLE ANGINA PECTORIS (HCC): ICD-10-CM

## 2023-09-14 PROCEDURE — 99024 POSTOP FOLLOW-UP VISIT: CPT | Performed by: THORACIC SURGERY (CARDIOTHORACIC VASCULAR SURGERY)

## 2023-09-14 NOTE — PROGRESS NOTES
Procedure: s/p coronary bypass grafting x 5, performed on 8/11/2023 by Dr. Herman Conroy    History: Doreen Edouard is a 61year old male who presents to our office today for routine post-surgical follow up care. He underwent elective admission for coronary artery bypass grafting x 5 by Dr. Herman Conroy on 8/11/2023. His postoperative course was relatively uneventful and he was discharged on postop day #4. Since discharge she has progressed well. He was discharged on a torsemide 20 mg 10-day course. Weight appropriately decreased since discharge. On follow-up call, that time he stated he was doing very well. He saw his cardiologist on 8/22, PCP on 8/30 as well as endocrine on 8/30. Correspondence of these visits were reviewed. He is eating and drinking well. Sleeping was difficult and was sleeping in short increments, somewhat better lately. Passing urine and bowels normally. Pain is controlled. Sternotomy incision has healed well and he denies drainage. He has bilateral endoscopic saphenectomy sites. He states the left lower extremity below-knee saphenectomy site,  has had some drainage of yellow color/puslike color over the previous few days. He denies fevers or chills. He denies chest pain or shortness of breath. He has asked about walking his dog which weighs approximately 70 pounds. He gets occasional chest tingling type pain. Vital Signs:   Vitals:    09/14/23 1451   BP: 112/60   BP Location: Right arm   Patient Position: Sitting   Cuff Size: Standard   Pulse: 81   Temp: (!) 97.2 °F (36.2 °C)   TempSrc: Tympanic   SpO2: 98%   Weight: 97.1 kg (214 lb)   Height: 5' 11" (1.803 m)       Home Medications:   Prior to Admission medications    Medication Sig Start Date End Date Taking? Authorizing Provider   acetaminophen (TYLENOL) 325 mg tablet Take 1-2 tablets Q4-6 hours PRN pain. Do not take more than 4grams in 24 hours.  8/14/23   Claudia Ray PA-C   aspirin 325 mg tablet Take 1 tablet (325 mg total) by mouth daily 8/15/23   Janis Harris PA-C   cholecalciferol (VITAMIN D3) 25 mcg (1,000 units) tablet Take 1,000 Units by mouth 2 (two) times a day    Historical Provider, MD   Continuous Blood Gluc Sensor (FreeStyle Ayanna 2 Sensor) MISC APPLY AND CHANGE EVERY 14 DAYS 7/26/23   Historical Provider, MD   docusate sodium (COLACE) 100 mg capsule Take 1 capsule (100 mg total) by mouth 2 (two) times a day Hold for soft stools. 8/14/23 9/13/23  Claudia Ray PA-C   gabapentin (NEURONTIN) 100 mg capsule TAKE ONE CAPSULE DAILY IN THE MORNING, 3 CAPSULES IN THE EVENING 3/9/18   Historical Provider, MD   glipiZIDE (GLUCOTROL XL) 5 mg 24 hr tablet Take 1 tablet (5 mg total) by mouth daily 8/30/23   Nilesh Arreola MD   Jardiance 25 MG TABS Take 25 mg by mouth in the morning 7/26/23   Historical Provider, MD   LORazepam (ATIVAN) 0.5 mg tablet Take 0.5 mg by mouth daily at bedtime 3/26/18   Historical Provider, MD   Magnesium Gluconate 250 MG TABS Take by mouth daily at bedtime    Historical Provider, MD   metFORMIN (GLUCOPHAGE-XR) 500 mg 24 hr tablet Take 2 tablets (1,000 mg total) by mouth 2 (two) times a day Do not start before August 3, 2023. 8/3/23   Alver Sinks, CRNP   metoprolol tartrate (LOPRESSOR) 25 mg tablet Take 0.5 tablets (12.5 mg total) by mouth every 12 (twelve) hours 8/14/23   Claudia Ray PA-C   ondansetron (ZOFRAN) 4 mg tablet Take 1 tablet (4 mg total) by mouth every 8 (eight) hours as needed for nausea or vomiting 8/18/23   Jamison Dillon PA-C   oxyCODONE (ROXICODONE) 5 immediate release tablet Take 1/2-1 tablet Q4-6 hours PRN pain.   Patient not taking: Reported on 8/22/2023 8/14/23   Claudia Ray PA-C   Ozempic, 2 MG/DOSE, 8 MG/3ML injection pen INJECT 2MG UNDER THE SKIN ONCE A WEEK 6/12/23   Historical Provider, MD   pantoprazole (PROTONIX) 40 mg tablet Take 1 tablet (40 mg total) by mouth daily in the early morning Do not start before August 15, 2023. 8/15/23 9/14/23 Claudia Ray PA-C   polyethylene glycol (MIRALAX) 17 g packet Take 17 g by mouth daily Hold for soft stools. Do not start before August 15, 2023. 8/15/23 9/14/23  Claudia Ray PA-C   rosuvastatin (CRESTOR) 20 MG tablet Take 2 tablets (40 mg total) by mouth daily 8/15/23   Merry Stewart PA-C   sertraline (Zoloft) 50 mg tablet Take 1 tablet (50 mg total) by mouth daily 7/27/23   Denishael Joss, DO   tadalafil (CIALIS) 20 MG tablet Take 1 tablet (20 mg total) by mouth daily as needed for erectile dysfunction 8/22/23   Jewel Joss, DO   torsemide (DEMADEX) 20 mg tablet TAKE ONE TABLET BY MOUTH EVERY MORNING FOR 10 DAYS UNLESS OTHERWISE DIRECTED  Patient not taking: Reported on 8/30/2023 8/15/23   Historical Provider, MD       Physical Exam:    HEENT/NECK:  Normocephalic. Atraumatic. No jugular venous distention. Cardiac: Regular rate and rhythm  Pulmonary:  Breath sounds clear bilaterally  Abdomen:  Non-tender, Non-distended and Normal bowel sounds  Incisions: Sternum is stable. Incision is clean, dry, and intact  and right lower extremity saphenectomy incison is clean, dry, and intact. Left lower extremity below knee saphenectomy site has no active drainage or erythremia seen, healing over with black eschar, no tenderness. Extremities: Extremities warm/dry and No edema right leg. Left leg   Neuro: Alert and oriented X 3. Sensation is grossly intact. No focal deficits. Skin: Warm/Dry, without rashes or lesions. Assessment:   Multivessel coronary artery disease s/p coronary artery bypass grafting x 5 performed on 8/11/2023 by Dr. Oscar Smiley    Plan:     Christiane Chakraborty continues to recover well following surgery. To date they have made progressive improvements with their physical rehabilitation. At this point I have cleared them to begin outpatient cardiac rehabilitation and have encouraged them to to do so. Christiane Chakraborty has also been cleared to resume driving.  I asked that them do so cautiously, in progressive increments. I did remind them of their ongoing lifting restrictions of 25 pounds for an additional 2 months. He was advised not to walk his dog yet alone and to wait until another 2 months since his dog weighs 70 pounds. He was advised of sternal aches and pains are normal and will subside likely over the next few weeks to months. He was advised on proper sleep hygiene, staying awake during the day and avoiding naps which he was doing, help promote sleep at night cycles. Regarding his left lower extremity saphenectomy site, as there is no current erythema or drainage or tenderness at this time is advised just for monitoring. He was advised to call us back if he develops fevers chills, or further erythremia or drainage from the incision site. Otherwise no further treatment advised at this time. Leo Gallardo has already been evaluated by their primary care physician cardiologist for ongoing medical care. At this point we will not schedule Leo Gallardo  for routine followup care with our office. Future medical management will be directed by their outpatient cardiologist.. I have advised them to call with any new concerns that may arise. Leo Gallardo was comfortable with our recommendations and their questions were answered to their satisfaction. The patient was already referred to gastroenterology for consideration of routine colonoscopy screening of all patients aged 52-65. This can now be done at their discretion since recovery of cardiac surgery. Janes Joseph PA-C  09/14/23    * This note was completed in part utilizing [employer] direct voice recognition software. Grammatical errors, random word insertion, spelling mistakes, and incomplete sentences may be an occasional consequence of the system secondary to software limitations, ambient noise and hardware issues. At the time of dictation, efforts were made to edit, clarify and /or correct errors.  Please read the chart carefully and recognize, using context, where substitutions have occurred. If you have any questions or concerns about the context, text or information contained within the body of this dictation, please contact myself, the provider, for further clarification.

## 2023-09-14 NOTE — LETTER
September 14, 2023     [address]ang[address] [address]    Patient: Asif Bassett   YOB: 1960   Date of Visit: 9/14/2023       Dear Dr. Olamide Hermosillo: Thank you for referring Asif Bassett to me for evaluation. Below are my notes for this consultation. If you have questions, please do not hesitate to call me. I look forward to following your patient along with you. Sincerely,        Kimberly Ribera MD        CC: DO Iker Lane PA-C  9/14/2023  3:25 PM  Attested  Procedure: s/p coronary bypass grafting x 5, performed on 8/11/2023 by Dr. Helga Gaston    History: Asif Bassett is a 61year old male who presents to our office today for routine post-surgical follow up care. He underwent elective admission for coronary artery bypass grafting x 5 by Dr. Helga Gaston on 8/11/2023. His postoperative course was relatively uneventful and he was discharged on postop day #4. Since discharge she has progressed well. He was discharged on a torsemide 20 mg 10-day course. Weight appropriately decreased since discharge. On follow-up call, that time he stated he was doing very well. He saw his cardiologist on 8/22, PCP on 8/30 as well as endocrine on 8/30. Correspondence of these visits were reviewed. He is eating and drinking well. Sleeping was difficult and was sleeping in short increments, somewhat better lately. Passing urine and bowels normally. Pain is controlled. Sternotomy incision has healed well and he denies drainage. He has bilateral endoscopic saphenectomy sites. He states the left lower extremity below-knee saphenectomy site,  has had some drainage of yellow color/puslike color over the previous few days. He denies fevers or chills. He denies chest pain or shortness of breath. He has asked about walking his dog which weighs approximately 70 pounds. He gets occasional chest tingling type pain.        Vital Signs:   Vitals:    09/14/23 1451   BP: 112/60   BP Location: Right arm   Patient Position: Sitting   Cuff Size: Standard   Pulse: 81   Temp: (!) 97.2 °F (36.2 °C)   TempSrc: Tympanic   SpO2: 98%   Weight: 97.1 kg (214 lb)   Height: 5' 11" (1.803 m)       Home Medications:   Prior to Admission medications    Medication Sig Start Date End Date Taking? Authorizing Provider   acetaminophen (TYLENOL) 325 mg tablet Take 1-2 tablets Q4-6 hours PRN pain. Do not take more than 4grams in 24 hours. 8/14/23   Claudia Ray PA-C   aspirin 325 mg tablet Take 1 tablet (325 mg total) by mouth daily 8/15/23   Cyrilla Koyanagi, PA-C   cholecalciferol (VITAMIN D3) 25 mcg (1,000 units) tablet Take 1,000 Units by mouth 2 (two) times a day    Historical Provider, MD   Continuous Blood Gluc Sensor (FreeStyle Ayanna 2 Sensor) MISC APPLY AND CHANGE EVERY 14 DAYS 7/26/23   Historical Provider, MD   docusate sodium (COLACE) 100 mg capsule Take 1 capsule (100 mg total) by mouth 2 (two) times a day Hold for soft stools.  8/14/23 9/13/23  Claudia Ray PA-C   gabapentin (NEURONTIN) 100 mg capsule TAKE ONE CAPSULE DAILY IN THE MORNING, 3 CAPSULES IN THE EVENING 3/9/18   Historical Provider, MD   glipiZIDE (GLUCOTROL XL) 5 mg 24 hr tablet Take 1 tablet (5 mg total) by mouth daily 8/30/23   Emile Dash MD   Jardiance 25 MG TABS Take 25 mg by mouth in the morning 7/26/23   Historical Provider, MD   LORazepam (ATIVAN) 0.5 mg tablet Take 0.5 mg by mouth daily at bedtime 3/26/18   Historical Provider, MD   Magnesium Gluconate 250 MG TABS Take by mouth daily at bedtime    Historical Provider, MD   metFORMIN (GLUCOPHAGE-XR) 500 mg 24 hr tablet Take 2 tablets (1,000 mg total) by mouth 2 (two) times a day Do not start before August 3, 2023. 8/3/23   MARLENY Durham   metoprolol tartrate (LOPRESSOR) 25 mg tablet Take 0.5 tablets (12.5 mg total) by mouth every 12 (twelve) hours 8/14/23   Claudia Ray PA-C   ondansetron (ZOFRAN) 4 mg tablet Take 1 tablet (4 mg total) by mouth every 8 (eight) hours as needed for nausea or vomiting 8/18/23   Cher Campos PA-C   oxyCODONE (ROXICODONE) 5 immediate release tablet Take 1/2-1 tablet Q4-6 hours PRN pain. Patient not taking: Reported on 8/22/2023 8/14/23   Claudia Ray PA-C   Ozempic, 2 MG/DOSE, 8 MG/3ML injection pen INJECT 2MG UNDER THE SKIN ONCE A WEEK 6/12/23   Historical Provider, MD   pantoprazole (PROTONIX) 40 mg tablet Take 1 tablet (40 mg total) by mouth daily in the early morning Do not start before August 15, 2023. 8/15/23 9/14/23  Claudia Ray PA-C   polyethylene glycol (MIRALAX) 17 g packet Take 17 g by mouth daily Hold for soft stools. Do not start before August 15, 2023. 8/15/23 9/14/23  Claudia Ray PA-C   rosuvastatin (CRESTOR) 20 MG tablet Take 2 tablets (40 mg total) by mouth daily 8/15/23   Lokesh Calderon PA-C   sertraline (Zoloft) 50 mg tablet Take 1 tablet (50 mg total) by mouth daily 7/27/23   Joni Hussein DO   tadalafil (CIALIS) 20 MG tablet Take 1 tablet (20 mg total) by mouth daily as needed for erectile dysfunction 8/22/23   Joni Hussein DO   torsemide (DEMADEX) 20 mg tablet TAKE ONE TABLET BY MOUTH EVERY MORNING FOR 10 DAYS UNLESS OTHERWISE DIRECTED  Patient not taking: Reported on 8/30/2023 8/15/23   Historical Provider, MD       Physical Exam:    HEENT/NECK:  Normocephalic. Atraumatic. No jugular venous distention. Cardiac: Regular rate and rhythm  Pulmonary:  Breath sounds clear bilaterally  Abdomen:  Non-tender, Non-distended and Normal bowel sounds  Incisions: Sternum is stable. Incision is clean, dry, and intact  and right lower extremity saphenectomy incison is clean, dry, and intact. Left lower extremity below knee saphenectomy site has no active drainage or erythremia seen, healing over with black eschar, no tenderness. Extremities: Extremities warm/dry and No edema right leg. Left leg   Neuro: Alert and oriented X 3. Sensation is grossly intact.   No focal deficits. Skin: Warm/Dry, without rashes or lesions. Assessment:   Multivessel coronary artery disease s/p coronary artery bypass grafting x 5 performed on 8/11/2023 by Dr. Jackeline Burroughs    Plan:     Spencer Lacy continues to recover well following surgery. To date they have made progressive improvements with their physical rehabilitation. At this point I have cleared them to begin outpatient cardiac rehabilitation and have encouraged them to to do so. Spencer Lacy has also been cleared to resume driving. I asked that them do so cautiously, in progressive increments. I did remind them of their ongoing lifting restrictions of 25 pounds for an additional 2 months. He was advised not to walk his dog yet alone and to wait until another 2 months since his dog weighs 70 pounds. He was advised of sternal aches and pains are normal and will subside likely over the next few weeks to months. He was advised on proper sleep hygiene, staying awake during the day and avoiding naps which he was doing, help promote sleep at night cycles. Regarding his left lower extremity saphenectomy site, as there is no current erythema or drainage or tenderness at this time is advised just for monitoring. He was advised to call us back if he develops fevers chills, or further erythremia or drainage from the incision site. Otherwise no further treatment advised at this time. Spencer Lacy has already been evaluated by their primary care physician cardiologist for ongoing medical care. At this point we will not schedule Spencer Lacy  for routine followup care with our office. Future medical management will be directed by their outpatient cardiologist.. I have advised them to call with any new concerns that may arise. Spencer Lacy was comfortable with our recommendations and their questions were answered to their satisfaction.     The patient was already referred to gastroenterology for consideration of routine colonoscopy screening of all patients aged 52-65. This can now be done at their discretion since recovery of cardiac surgery. Emilia Munguia PA-C  09/14/23    * This note was completed in part utilizing m-modal fluency direct voice recognition software. Grammatical errors, random word insertion, spelling mistakes, and incomplete sentences may be an occasional consequence of the system secondary to software limitations, ambient noise and hardware issues. At the time of dictation, efforts were made to edit, clarify and /or correct errors. Please read the chart carefully and recognize, using context, where substitutions have occurred. If you have any questions or concerns about the context, text or information contained within the body of this dictation, please contact myself, the provider, for further clarification. Attestation signed by Jasson Perera MD at 9/14/2023  3:48 PM:  Patient seen and evaluated with [address] [place] [id number] / [facility]. I agree with the above assessment and plan with the following additions. The patient is a very pleasant 17-year-old man, he returns today for a 1 month postop visit after CABG 5. He is recovering well without complications. He said he had some purulent discharge from his vein harvest site, no fever or chills, no erythema. On physical exam I was not able to produce any purulent secretion, there is no erythema, there is still some scab with some dried serous fluid. I do not see any signs of infections. Plan:  Follow-up with cardiology and PCP  Cardiac rehab  I instructed him to call back to our office if he were to develop fever, chills, erythema or drainage from the vein harvest site. This note was completed in part utilizing [employer] direct voice recognition software. Grammatical errors, random word insertion, spelling mistakes, and incomplete sentences may be an occasional consequence of the system secondary to software limitations, ambient noise and hardware issues. At the time of dictation, efforts were made to edit, clarify and /or correct errors. Please read the chart carefully and recognize, using context, where substitutions have occurred. If you have any questions or concerns about the context, text or information contained within the body of this dictation, please contact myself, the provider, for further clarification.

## 2023-09-14 NOTE — LETTER
September 14, 2023     Ramob Allen  [address] [place] [id number]    Patient: Khanh Vail   YOB: 1960   Date of Visit: 9/14/2023       Dear Dr. Maxx Gonzalez: Thank you for referring Khanh Vail to me for evaluation. Below are my notes for this consultation. If you have questions, please do not hesitate to call me. I look forward to following your patient along with you. Sincerely,        Allen Jimenes MD        CC: No Recipients    Mine Freedman PA-C  9/14/2023  3:25 PM  Attested  Procedure: s/p coronary bypass grafting x 5, performed on 8/11/2023 by Dr. Jenn Camarena    History: Khanh Vail is a 61year old male who presents to our office today for routine post-surgical follow up care. He underwent elective admission for coronary artery bypass grafting x 5 by Dr. Jenn Camarena on 8/11/2023. His postoperative course was relatively uneventful and he was discharged on postop day #4. Since discharge she has progressed well. He was discharged on a torsemide 20 mg 10-day course. Weight appropriately decreased since discharge. On follow-up call, that time he stated he was doing very well. He saw his cardiologist on 8/22, PCP on 8/30 as well as endocrine on 8/30. Correspondence of these visits were reviewed. He is eating and drinking well. Sleeping was difficult and was sleeping in short increments, somewhat better lately. Passing urine and bowels normally. Pain is controlled. Sternotomy incision has healed well and he denies drainage. He has bilateral endoscopic saphenectomy sites. He states the left lower extremity below-knee saphenectomy site,  has had some drainage of yellow color/puslike color over the previous few days. He denies fevers or chills. He denies chest pain or shortness of breath. He has asked about walking his dog which weighs approximately 70 pounds. He gets occasional chest tingling type pain.        Vital Signs:   Vitals:    09/14/23 1451   BP: 112/60   BP Location: Right arm   Patient Position: Sitting   Cuff Size: Standard   Pulse: 81   Temp: (!) 97.2 °F (36.2 °C)   TempSrc: Tympanic   SpO2: 98%   Weight: 97.1 kg (214 lb)   Height: 5' 11" (1.803 m)       Home Medications:   Prior to Admission medications    Medication Sig Start Date End Date Taking? Authorizing Provider   acetaminophen (TYLENOL) 325 mg tablet Take 1-2 tablets Q4-6 hours PRN pain. Do not take more than 4grams in 24 hours. 8/14/23   Claudia Ray PA-C   aspirin 325 mg tablet Take 1 tablet (325 mg total) by mouth daily 8/15/23   Lisseth Cardenas PA-C   cholecalciferol (VITAMIN D3) 25 mcg (1,000 units) tablet Take 1,000 Units by mouth 2 (two) times a day    Historical Provider, MD   Continuous Blood Gluc Sensor (FreeStyle Ayanna 2 Sensor) MISC APPLY AND CHANGE EVERY 14 DAYS 7/26/23   Historical Provider, MD   docusate sodium (COLACE) 100 mg capsule Take 1 capsule (100 mg total) by mouth 2 (two) times a day Hold for soft stools.  8/14/23 9/13/23  Claudia Ray PA-C   gabapentin (NEURONTIN) 100 mg capsule TAKE ONE CAPSULE DAILY IN THE MORNING, 3 CAPSULES IN THE EVENING 3/9/18   Historical Provider, MD   glipiZIDE (GLUCOTROL XL) 5 mg 24 hr tablet Take 1 tablet (5 mg total) by mouth daily 8/30/23   Jaime Valentine MD   Jardiance 25 MG TABS Take 25 mg by mouth in the morning 7/26/23   Historical Provider, MD   LORazepam (ATIVAN) 0.5 mg tablet Take 0.5 mg by mouth daily at bedtime 3/26/18   Historical Provider, MD   Magnesium Gluconate 250 MG TABS Take by mouth daily at bedtime    Historical Provider, MD   metFORMIN (GLUCOPHAGE-XR) 500 mg 24 hr tablet Take 2 tablets (1,000 mg total) by mouth 2 (two) times a day Do not start before August 3, 2023. 8/3/23   MARLENY Nicholson   metoprolol tartrate (LOPRESSOR) 25 mg tablet Take 0.5 tablets (12.5 mg total) by mouth every 12 (twelve) hours 8/14/23   Claudia Ray PA-C   ondansetron (ZOFRAN) 4 mg tablet Take 1 tablet (4 mg total) by mouth every 8 (eight) hours as needed for nausea or vomiting 8/18/23   Stefania Montero PA-C   oxyCODONE (ROXICODONE) 5 immediate release tablet Take 1/2-1 tablet Q4-6 hours PRN pain. Patient not taking: Reported on 8/22/2023 8/14/23   Claudia Ray PA-C   Ozempic, 2 MG/DOSE, 8 MG/3ML injection pen INJECT 2MG UNDER THE SKIN ONCE A WEEK 6/12/23   Historical Provider, MD   pantoprazole (PROTONIX) 40 mg tablet Take 1 tablet (40 mg total) by mouth daily in the early morning Do not start before August 15, 2023. 8/15/23 9/14/23  Claudia Ray PA-C   polyethylene glycol (MIRALAX) 17 g packet Take 17 g by mouth daily Hold for soft stools. Do not start before August 15, 2023. 8/15/23 9/14/23  lCaudia Ray PA-C   rosuvastatin (CRESTOR) 20 MG tablet Take 2 tablets (40 mg total) by mouth daily 8/15/23   Tripp Schroeder PA-C   sertraline (Zoloft) 50 mg tablet Take 1 tablet (50 mg total) by mouth daily 7/27/23   Scott Jackson DO   tadalafil (CIALIS) 20 MG tablet Take 1 tablet (20 mg total) by mouth daily as needed for erectile dysfunction 8/22/23   Scott Jackson DO   torsemide (DEMADEX) 20 mg tablet TAKE ONE TABLET BY MOUTH EVERY MORNING FOR 10 DAYS UNLESS OTHERWISE DIRECTED  Patient not taking: Reported on 8/30/2023 8/15/23   Historical Provider, MD       Physical Exam:    HEENT/NECK:  Normocephalic. Atraumatic. No jugular venous distention. Cardiac: Regular rate and rhythm  Pulmonary:  Breath sounds clear bilaterally  Abdomen:  Non-tender, Non-distended and Normal bowel sounds  Incisions: Sternum is stable. Incision is clean, dry, and intact  and right lower extremity saphenectomy incison is clean, dry, and intact. Left lower extremity below knee saphenectomy site has no active drainage or erythremia seen, healing over with black eschar, no tenderness. Extremities: Extremities warm/dry and No edema right leg. Left leg   Neuro: Alert and oriented X 3. Sensation is grossly intact.   No focal deficits. Skin: Warm/Dry, without rashes or lesions. Assessment:   Multivessel coronary artery disease s/p coronary artery bypass grafting x 5 performed on 8/11/2023 by Dr. Yohana Rosenthal    Plan:     Brendan Pederson continues to recover well following surgery. To date they have made progressive improvements with their physical rehabilitation. At this point I have cleared them to begin outpatient cardiac rehabilitation and have encouraged them to to do so. Brendan Pederson has also been cleared to resume driving. I asked that them do so cautiously, in progressive increments. I did remind them of their ongoing lifting restrictions of 25 pounds for an additional 2 months. He was advised not to walk his dog yet alone and to wait until another 2 months since his dog weighs 70 pounds. He was advised of sternal aches and pains are normal and will subside likely over the next few weeks to months. He was advised on proper sleep hygiene, staying awake during the day and avoiding naps which he was doing, help promote sleep at night cycles. Regarding his left lower extremity saphenectomy site, as there is no current erythema or drainage or tenderness at this time is advised just for monitoring. He was advised to call us back if he develops fevers chills, or further erythremia or drainage from the incision site. Otherwise no further treatment advised at this time. Brendan Pederson has already been evaluated by their primary care physician cardiologist for ongoing medical care. At this point we will not schedule Brendan Pederson  for routine followup care with our office. Future medical management will be directed by their outpatient cardiologist.. I have advised them to call with any new concerns that may arise. Brendan Pederson was comfortable with our recommendations and their questions were answered to their satisfaction.     The patient was already referred to gastroenterology for consideration of routine colonoscopy screening of all patients aged 52-65. This can now be done at their discretion since recovery of cardiac surgery. Fawn Srinivasan PA-C  09/14/23    * This note was completed in part utilizing EXPO direct voice recognition software. Grammatical errors, random word insertion, spelling mistakes, and incomplete sentences may be an occasional consequence of the system secondary to software limitations, ambient noise and hardware issues. At the time of dictation, efforts were made to edit, clarify and /or correct errors. Please read the chart carefully and recognize, using context, where substitutions have occurred. If you have any questions or concerns about the context, text or information contained within the body of this dictation, please contact myself, the provider, for further clarification. Attestation signed by Manny Garcia MD at 9/14/2023  3:48 PM:  Patient seen and evaluated with [address] [place] [address] / [place]. I agree with the above assessment and plan with the following additions. The patient is a very pleasant 59-year-old man, he returns today for a 1 month postop visit after CABG 5. He is recovering well without complications. He said he had some purulent discharge from his vein harvest site, no fever or chills, no erythema. On physical exam I was not able to produce any purulent secretion, there is no erythema, there is still some scab with some dried serous fluid. I do not see any signs of infections. Plan:  Follow-up with cardiology and PCP  Cardiac rehab  I instructed him to call back to our office if he were to develop fever, chills, erythema or drainage from the vein harvest site. This note was completed in part utilizing [employer] direct voice recognition software. Grammatical errors, random word insertion, spelling mistakes, and incomplete sentences may be an occasional consequence of the system secondary to software limitations, ambient noise and hardware issues. At the time of dictation, efforts were made to edit, clarify and /or correct errors. Please read the chart carefully and recognize, using context, where substitutions have occurred. If you have any questions or concerns about the context, text or information contained within the body of this dictation, please contact myself, the provider, for further clarification.

## 2023-09-15 ENCOUNTER — CLINICAL SUPPORT (OUTPATIENT)
Dept: CARDIAC REHAB | Facility: CLINIC | Age: 63
End: 2023-09-15
Payer: COMMERCIAL

## 2023-09-15 ENCOUNTER — HOME CARE VISIT (OUTPATIENT)
Dept: HOME HEALTH SERVICES | Facility: HOME HEALTHCARE | Age: 63
End: 2023-09-15
Payer: COMMERCIAL

## 2023-09-15 DIAGNOSIS — Z95.1 S/P CABG X 5: Primary | ICD-10-CM

## 2023-09-15 PROCEDURE — G0299 HHS/HOSPICE OF RN EA 15 MIN: HCPCS

## 2023-09-15 PROCEDURE — 93798 PHYS/QHP OP CAR RHAB W/ECG: CPT

## 2023-09-18 VITALS
OXYGEN SATURATION: 97 % | SYSTOLIC BLOOD PRESSURE: 120 MMHG | TEMPERATURE: 97.8 F | DIASTOLIC BLOOD PRESSURE: 70 MMHG | HEART RATE: 88 BPM | RESPIRATION RATE: 18 BRPM

## 2023-09-19 ENCOUNTER — CLINICAL SUPPORT (OUTPATIENT)
Dept: CARDIAC REHAB | Facility: CLINIC | Age: 63
End: 2023-09-19
Payer: COMMERCIAL

## 2023-09-19 DIAGNOSIS — Z95.1 S/P CABG X 5: Primary | ICD-10-CM

## 2023-09-19 PROCEDURE — 93798 PHYS/QHP OP CAR RHAB W/ECG: CPT

## 2023-09-22 ENCOUNTER — CLINICAL SUPPORT (OUTPATIENT)
Dept: CARDIAC REHAB | Facility: CLINIC | Age: 63
End: 2023-09-22
Payer: COMMERCIAL

## 2023-09-22 DIAGNOSIS — Z95.1 S/P CABG X 5: Primary | ICD-10-CM

## 2023-09-22 PROCEDURE — 93798 PHYS/QHP OP CAR RHAB W/ECG: CPT

## 2023-09-26 ENCOUNTER — APPOINTMENT (OUTPATIENT)
Dept: CARDIAC REHAB | Facility: CLINIC | Age: 63
End: 2023-09-26
Payer: COMMERCIAL

## 2023-09-26 ENCOUNTER — CLINICAL SUPPORT (OUTPATIENT)
Dept: CARDIAC REHAB | Facility: CLINIC | Age: 63
End: 2023-09-26
Payer: COMMERCIAL

## 2023-09-26 DIAGNOSIS — Z95.1 S/P CABG X 5: Primary | ICD-10-CM

## 2023-09-26 PROCEDURE — 93798 PHYS/QHP OP CAR RHAB W/ECG: CPT

## 2023-09-28 ENCOUNTER — CLINICAL SUPPORT (OUTPATIENT)
Dept: CARDIAC REHAB | Facility: CLINIC | Age: 63
End: 2023-09-28
Payer: COMMERCIAL

## 2023-09-28 ENCOUNTER — APPOINTMENT (OUTPATIENT)
Dept: CARDIAC REHAB | Facility: CLINIC | Age: 63
End: 2023-09-28
Payer: COMMERCIAL

## 2023-09-28 DIAGNOSIS — Z95.1 S/P CABG X 5: Primary | ICD-10-CM

## 2023-09-28 PROCEDURE — 93798 PHYS/QHP OP CAR RHAB W/ECG: CPT

## 2023-09-29 ENCOUNTER — APPOINTMENT (OUTPATIENT)
Dept: CARDIAC REHAB | Facility: CLINIC | Age: 63
End: 2023-09-29
Payer: COMMERCIAL

## 2023-09-29 ENCOUNTER — CLINICAL SUPPORT (OUTPATIENT)
Dept: CARDIAC REHAB | Facility: CLINIC | Age: 63
End: 2023-09-29
Payer: COMMERCIAL

## 2023-09-29 DIAGNOSIS — Z95.1 S/P CABG X 5: Primary | ICD-10-CM

## 2023-09-29 PROCEDURE — 93798 PHYS/QHP OP CAR RHAB W/ECG: CPT

## 2023-09-29 NOTE — PROGRESS NOTES
Exercise Session Detail Patient transferred from [facility] for OB/GYN consult. Patient reports multiple syncopal episodes today.

## 2023-10-03 ENCOUNTER — CLINICAL SUPPORT (OUTPATIENT)
Dept: CARDIAC REHAB | Facility: CLINIC | Age: 63
End: 2023-10-03
Payer: COMMERCIAL

## 2023-10-03 DIAGNOSIS — Z95.1 S/P CABG X 5: Primary | ICD-10-CM

## 2023-10-03 PROCEDURE — 93798 PHYS/QHP OP CAR RHAB W/ECG: CPT

## 2023-10-05 ENCOUNTER — CLINICAL SUPPORT (OUTPATIENT)
Dept: CARDIAC REHAB | Facility: CLINIC | Age: 63
End: 2023-10-05
Payer: COMMERCIAL

## 2023-10-05 DIAGNOSIS — Z95.1 S/P CABG X 5: Primary | ICD-10-CM

## 2023-10-05 PROCEDURE — 93798 PHYS/QHP OP CAR RHAB W/ECG: CPT

## 2023-10-06 ENCOUNTER — CLINICAL SUPPORT (OUTPATIENT)
Dept: CARDIAC REHAB | Facility: CLINIC | Age: 63
End: 2023-10-06
Payer: COMMERCIAL

## 2023-10-06 DIAGNOSIS — Z95.1 S/P CABG X 5: Primary | ICD-10-CM

## 2023-10-06 PROCEDURE — 93798 PHYS/QHP OP CAR RHAB W/ECG: CPT

## 2023-10-10 ENCOUNTER — CLINICAL SUPPORT (OUTPATIENT)
Dept: CARDIAC REHAB | Facility: CLINIC | Age: 63
End: 2023-10-10
Payer: COMMERCIAL

## 2023-10-10 DIAGNOSIS — Z95.1 S/P CABG X 5: Primary | ICD-10-CM

## 2023-10-10 PROCEDURE — 93798 PHYS/QHP OP CAR RHAB W/ECG: CPT

## 2023-10-10 NOTE — PROGRESS NOTES
Cardiac Rehabilitation Plan of Care   30 Day Reassessment          Today's date: 10/10/2023   # of Exercise Sessions Completed: 11  Patient name: Rose Lin      : 1960  Age: 61 y.o. MRN: [id number]  Referring Physician: Aparna Louie PA-C  Cardiologist: Scott Jackson DO   Provider: Varsha  Clinician: Javan Rinne, MS     Dx:   Encounter Diagnosis   Name Primary? • S/P CABG x 5 Yes     Date of onset: 2023      SUMMARY OF PROGRESS: Curtis De La Torre is compliant attending cardiac rehab exercise sessions 3x/wk post CABGx5. He was experiencing chest tightness/SOB/sweating with exertion. Nuclear stress test was done which was abnormal. LHC found stenosis: pLAD 70%, mLAD 80%, dLAD 90%, 1st diag 90% & 80%, Ramus 80%, mCx 90%, collateral to RPDA from dLAD. He tolerates 30-40 mins at 2.0 - 3.4 METs. A light strength training component has not been added to their exercise program. and will be added in a future exercise session. He is tolerating progression of intensity levels to maintain RPE 4-6. Resting BP  102/66 - 134/60 with Normal response to exercise reaching 114/66- 134/70. NSR on tele with MF couplet, PVC noted observed. RHR 82 - 104  with Normal response to exercise reaching 107 - 122. He has added home exercise 5-7 days/wk which includes walking for 25-30 minutes. No cardiac complaints, he does report muscle soreness. He is progressing toward wt loss goals with a loss of 7 pounds. Patient has been working on  dietary modifications with the goal of rare red/processed meats, low fat dairy, reduced added sugars and refined flours. The patient has T2D and monitors BG 3 times per day reporting an avg. FBG of 120. The patient is a non-smoker. Depression was reassessed using the PHQ-9. The patient's score was 10 (10-14 = Moderate Depression) showing an DECLINE . Anxiety was reassessed using the ABBY-7. The patient's score was 3  (0-4  = Not anxious) showing an IMPROVEMENT.   When addressed, the patient admits to   depression/anxiety. Patient reports excellent  social/emotional support from his wife. Patient attends group educational classes on cardiac risk factor modification. His exercise program will be progressed as tolerated to maintain RPE 4-6. The patient has the following personal goals he hopes to achieved by discharge: be able to cut grass, increase stamina strength, and continue making changes towards a heart healthy diet. They will continue to be educated on lifestyle modification and encouraged to supplement with a home exercise program as tolerated to reach the following goals in the next 30 days: increase overall strength, stamina, and strength.       Medication compliance: Yes   Comments: Pt reports to be compliant with medications  Fall Risk: Low   Comments: Ambulates with a steady gait with no assist device and Denies a fall in the past 6 months    EKG Interpretation: GISEL MURGUIA couplet noted       EXERCISE ASSESSMENT and PLAN    Exercise Prescription:      Frequency: 3 days/week   Supplement with home exercise 2+ days/wk as tolerated       Minutes: 30-40         METS: 2.0-3.4            HR: 107-122    RPE: 3-6         Modalities: Treadmill, UBE, NuStep and Recumbent bike      30 Day Goals for Exercise Progression:    Frequency: 3 days/week of cardiac rehab       Supplement with home exercise 2+ days/wk as tolerated    Minutes: 40-45                              >150 mins/wk of moderate intensity exercise   METS: 4-6   HR: resting + 30     RPE: 4-6   Modalities: Treadmill, Airdyne bike, UBE, Lifecycle, Elliptical, NuStep and Recumbent bike    Strength trainin-3 days / week  12-15 repetitions  1-2 sets per modality   Will be added following at least 8 weeks post surgery and 8-10 monitored sessions   Modalities: Leg Press, Chest Press, Pull Downs, Arm Curl and Seated Row    Home Exercise: Type: walking , Frequency: 5-6 days/week, Distance 0.5 miles     Goals: 10% improvement in functional capacity - based on max METs achieved in fitness assessment, Reduced dyspnea with physical activity  0-1/10, improved DASI score by 10%, Increase in exercise capacity by 40% - based on peak METs tolerated in cardiac rehab exercise session, Exercise 5 days/wk, >150mins/wk of moderate intensity exercise, Resume ADLs with increased strength, Return to work unrestricted, Attend Rehab regularly, Decrease sitting time and Start a walking program    Progression Toward Goals:  Pt is progressing and showing improvement  toward the following goals:  attending rehab regularly, increasing exercise intensities as tolerated . , Patient will increase home exercise intensities in the next 30 days, Will continue to educate and progress as tolerated. Education: benefit of exercise for CAD risk factors, home exercise guidelines, AHA guidelines to achieve >150 mins/wk of moderate exercise and RPE scale   Plan:education on home exercise guidelines, home exercise 30+ mins 2 days opposite CR and Education class: Risk Factors for Heart Disease  Readiness to change: Action:  (Changing behavior)      NUTRITION ASSESSMENT AND PLAN    Weight control:    Starting weight: 213.6 lbs    Current weight:   206 lbs    Diabetes: T2D  A1c: 7.4    last measured: 6/8/2023    Lipid management: Discussed diet and lipid management and Last lipid profile 6/8/2023  Chol 193    HDL 42      Goals:reduced BMI to < 25, LDL <100, decreased body fat% <25%   (M), reduced waist circumference <40 inches (M), eat 3 or more servings of whole grains a day, Eat 4-5 cups of fruits and vegetables daily and seldom eat or choose low fat ice-cream, fruit juice bars or frozen yogurt     Measurable goals were based Rate Your Plate Dietary Self-Assessment. These are the areas in which the patient could score higher on the assessment. Goals include recommendations for a heart healthy diet based on American Heart Association.     Progression Toward Goals: Pt is progressing and showing improvement  toward the following goals:  watching sodium intake, low fat diet, hydrating well throughout the day .  , Patient will continue heart healthy diet, consider smaller meals throughout the day due to swings in BG, monitor BG with exercise added in the next 30 days, Will continue to educate and progress as tolerated. Education: heart healthy eating  low sodium diet  hydration  nutrition for  lipid management  target goal for A1c <7.0  exercise and diabetes management   wt. loss   Plan: Education class: Reading Food Labels, Education Class: Heart Healthy Eating, replace refined grain bread with whole grain bread, replace unhealthy snacks with fruits & vegs, monitor home blood glucose, drink more water, replace sugar with stevia or truvia and keep added daily sugar <25g/day  Readiness to change: Action:  (Changing behavior)      PSYCHOSOCIAL ASSESSMENT AND PLAN    Emotional:  Depression assessment:  PHQ-9 = 10   10-14 = Moderate Depression            Anxiety measure:  ABBY-7 = 3  0-4  = Not anxious  Self-reported stress level:  4  Social support: Excellent and Patient reports excellent emotional/social support from wife     Goals:  Reduce perceived stress to 1-3/10, Feelings in [facility] Score < 3, Physical Fitness in [facility] Score < 3, Overall Health in [facility] Score < 3 and Quality of Life in [facility] Score < 3     Progression Toward Goals: Pt is progressing and showing improvement  toward the following goals:  managing stress with good support.  , Patient will work on increasing confidence with return to ADLs and work in the next 30 days, Will continue to educate and progress as tolerated.     Education: signs/sxs of depression, benefits of a positive support system, stress management techniques and depression and CAD  Plan: Class: Stress and Your Health, Class: Relaxation, Practice relaxation techniques, Exercise and Enjoy a hobby  Readiness to change: Action: (Changing behavior)      OTHER CORE COMPONENTS     Tobacco:   Social History     Tobacco Use   Smoking Status Never   Smokeless Tobacco Never       Tobacco Use Intervention:   N/A:  Patient is a non-smoker     Anginal Symptoms:  None   NTG use: No prescription    Blood pressure:    Restin//60   Exercise: 114//70    Goals: consistent BP < 130/80, reduced dietary sodium <2300mg, moderate intensity exercise >150 mins/wk, medication compliance and reduce number of medications  needed for BP control    Progression Toward Goals: Pt is progressing and showing improvement  toward the following goals:  watching sodium intake and hydrating .  , Patient will maintain BP control and medication compliance in the next 30 days, Will continue to educate and progress as tolerated.     Education:  understanding high blood pressure and it's relationship to CAD and low sodium diet and HTN  Plan: Class: Understanding Heart Disease, Class: Common Heart Medications, medication compliance, engage in regular exercise and monitor home BP  Readiness to change: Action:  (Changing behavior)

## 2023-10-12 ENCOUNTER — CLINICAL SUPPORT (OUTPATIENT)
Dept: CARDIAC REHAB | Facility: CLINIC | Age: 63
End: 2023-10-12
Payer: COMMERCIAL

## 2023-10-12 DIAGNOSIS — Z95.1 S/P CABG X 5: Primary | ICD-10-CM

## 2023-10-12 PROCEDURE — 93798 PHYS/QHP OP CAR RHAB W/ECG: CPT

## 2023-10-13 ENCOUNTER — CLINICAL SUPPORT (OUTPATIENT)
Dept: CARDIAC REHAB | Facility: CLINIC | Age: 63
End: 2023-10-13
Payer: COMMERCIAL

## 2023-10-13 DIAGNOSIS — Z95.1 S/P CABG X 5: Primary | ICD-10-CM

## 2023-10-13 PROCEDURE — 93798 PHYS/QHP OP CAR RHAB W/ECG: CPT

## 2023-10-17 ENCOUNTER — CLINICAL SUPPORT (OUTPATIENT)
Dept: CARDIAC REHAB | Facility: CLINIC | Age: 63
End: 2023-10-17
Payer: COMMERCIAL

## 2023-10-17 DIAGNOSIS — Z95.1 S/P CABG X 5: Primary | ICD-10-CM

## 2023-10-17 PROCEDURE — 93798 PHYS/QHP OP CAR RHAB W/ECG: CPT

## 2023-10-19 ENCOUNTER — CLINICAL SUPPORT (OUTPATIENT)
Dept: CARDIAC REHAB | Facility: CLINIC | Age: 63
End: 2023-10-19
Payer: COMMERCIAL

## 2023-10-19 DIAGNOSIS — Z95.1 S/P CABG X 5: Primary | ICD-10-CM

## 2023-10-19 PROCEDURE — 93798 PHYS/QHP OP CAR RHAB W/ECG: CPT

## 2023-10-20 ENCOUNTER — CLINICAL SUPPORT (OUTPATIENT)
Dept: CARDIAC REHAB | Facility: CLINIC | Age: 63
End: 2023-10-20
Payer: COMMERCIAL

## 2023-10-20 DIAGNOSIS — Z95.1 S/P CABG X 5: Primary | ICD-10-CM

## 2023-10-20 PROCEDURE — 93798 PHYS/QHP OP CAR RHAB W/ECG: CPT

## 2023-10-24 ENCOUNTER — CLINICAL SUPPORT (OUTPATIENT)
Dept: CARDIAC REHAB | Facility: CLINIC | Age: 63
End: 2023-10-24
Payer: COMMERCIAL

## 2023-10-24 DIAGNOSIS — Z95.1 S/P CABG X 5: Primary | ICD-10-CM

## 2023-10-24 PROCEDURE — 93798 PHYS/QHP OP CAR RHAB W/ECG: CPT

## 2023-10-26 ENCOUNTER — CLINICAL SUPPORT (OUTPATIENT)
Dept: CARDIAC REHAB | Facility: CLINIC | Age: 63
End: 2023-10-26
Payer: COMMERCIAL

## 2023-10-26 DIAGNOSIS — Z95.1 S/P CABG X 5: Primary | ICD-10-CM

## 2023-10-26 PROCEDURE — 93798 PHYS/QHP OP CAR RHAB W/ECG: CPT

## 2023-10-27 ENCOUNTER — APPOINTMENT (OUTPATIENT)
Dept: CARDIAC REHAB | Facility: CLINIC | Age: 63
End: 2023-10-27
Payer: COMMERCIAL

## 2023-10-27 ENCOUNTER — OFFICE VISIT (OUTPATIENT)
Dept: CARDIOLOGY CLINIC | Facility: CLINIC | Age: 63
End: 2023-10-27
Payer: COMMERCIAL

## 2023-10-27 VITALS
HEART RATE: 109 BPM | BODY MASS INDEX: 28.66 KG/M2 | HEIGHT: 71 IN | SYSTOLIC BLOOD PRESSURE: 90 MMHG | WEIGHT: 204.7 LBS | OXYGEN SATURATION: 96 % | DIASTOLIC BLOOD PRESSURE: 60 MMHG

## 2023-10-27 DIAGNOSIS — I25.10 CORONARY ARTERY DISEASE INVOLVING NATIVE CORONARY ARTERY: ICD-10-CM

## 2023-10-27 PROCEDURE — 99214 OFFICE O/P EST MOD 30 MIN: CPT | Performed by: INTERNAL MEDICINE

## 2023-10-27 RX ORDER — ROSUVASTATIN CALCIUM 20 MG/1
40 TABLET, COATED ORAL DAILY
Qty: 90 TABLET | Refills: 3 | Status: SHIPPED | OUTPATIENT
Start: 2023-10-27

## 2023-10-27 NOTE — PROGRESS NOTES
Outpatient Cardiology Note - Paul Denny 61 y.o. male MRN: [id number]    @ Encounter: [id number]        Patient Active Problem List    Diagnosis Date Noted    Diabetic polyneuropathy associated with type 2 diabetes mellitus ([address]) 08/30/2023    S/P CABG x 5 08/11/2023    Coronary artery disease involving native coronary artery 08/02/2023    Peripheral angiopathy due to type 2 diabetes mellitus ([address]) 01/16/2023    Sleep apnea     HTN (hypertension), benign 03/29/2018    Type 2 diabetes mellitus with hyperglycemia, without long-term current use of insulin ([address]) 03/29/2018    Hyperlipidemia LDL goal <100 03/29/2018    Right knee pain 09/23/2014    Myalgia and myositis 06/27/2014    Vitamin D deficiency 06/27/2014    Depressive disorder 05/14/2012       Assessment:  # CAD s/p CABG x 5 on 8/11/23- Dr Reanna HAINES to LAD, SVG to D1, SVG Y to RI and OM1 and SVG to right PDA  Rx: metoprolol 12.5 mg BID, Crestor 40 mg daily, asa 325 mg daily, Jardiance    Studies- personally reviewed by me  EKG:  bpm, no ischemic changes    LHC 8/2/23:   Left Anterior Descending   Prox LAD lesion is 70% stenosed. Mid LAD lesion is 80% stenosed. Dist LAD lesion is 90% stenosed. First Diagonal Branch   1st Diag-1 lesion is 90% stenosed. 1st Diag-2 lesion is 80% stenosed. Ramus Intermedius   Ramus lesion is 80% stenosed. Left Circumflex   Mid Cx to Dist Cx lesion is 90% stenosed. Right Coronary Artery      Right Posterior Descending Artery   Collaterals   RPDA filled by collaterals from Dist LAD. NM stress 7/31/23: 7 min 17 sec, 7 METs, ischemic EKG changes.  Ischemic perfusion defects in inferior segments    Echo 7/27/23:  LVEF: 60%  RV: normal  PASP: normal    Stress echo 4/11/18: 9 min 30 sec, 10.7 METs, negative for ischemia    # HTN- metoprolol 12.5 mg BID  # DM2, last A1C 7.4%- glipizide, kombiglyze XR 2.5- 1000 mg    # Obesity, BMI 32.6%- down to 28  # hyperlipidemia-   6/8 /23: , HDL 42  2/21/18: , HDL 43, Tri 132  # carotid artery dz  VAS 8/8/23: known occlusion on right, < 50% on left  # Mild sleep apnea. Stops breathing at night  # depression    Today's Plan:  Short term disability filled out  Cardiac rehab  Seeing endocrine for DM  Focus on health lifestyle    HPI:     62 yo male with hx DM2, last A1C over 9, obesity, hyperlipidemia, HTN, right carotid aneurysm who presents to establish CV care. Occasionally getting a little chest discomfort, intermittent. Walks about a mile or two every day. Does not seem exertional. Wife states he stops breathing at night. Given concerning anginal symptoms had stress test which was positive and sent for [facility] leading to recommendation of CABG. Interim:   Tired a lot  Wt down to 204 lbs, down from 239 lbs  Eating a lot better and not as much  My BP check 100/02/60 mmHg    Past Medical History:   Diagnosis Date    Coronary artery disease     Diabetes mellitus ([address])     Hyperlipidemia     Hypertension        Review of Systems   Constitutional:  Negative for activity change, appetite change, fatigue and unexpected weight change. HENT:  Negative for congestion and nosebleeds. Eyes: Negative. Respiratory:  Negative for cough, chest tightness and shortness of breath. Cardiovascular:  Negative for chest pain, palpitations and leg swelling. Gastrointestinal:  Negative for abdominal distention. Endocrine: Negative. Genitourinary: Negative. Musculoskeletal: Negative. Skin: Negative. Neurological:  Negative for dizziness, syncope and weakness. Hematological: Negative. Psychiatric/Behavioral: Negative. Allergies   Allergen Reactions    Atorvastatin Other (See Comments)     felt flu-like    Rosiglitazone Other (See Comments)    Trazodone Other (See Comments)     Felt "hung over"     . Current Outpatient Medications:     acetaminophen (TYLENOL) 325 mg tablet, Take 1-2 tablets Q4-6 hours PRN pain.  Do not take more than 4grams in 24 hours. , Disp: , Rfl: 0    aspirin 325 mg tablet, Take 1 tablet (325 mg total) by mouth daily, Disp: 30 tablet, Rfl: 2    cholecalciferol (VITAMIN D3) 25 mcg (1,000 units) tablet, Take 1,000 Units by mouth 2 (two) times a day, Disp: , Rfl:     Continuous Blood Gluc Sensor (FreeStyle Ayanna 2 Sensor) MISC, APPLY AND CHANGE EVERY 14 DAYS, Disp: , Rfl:     docusate sodium (COLACE) 100 mg capsule, Take 1 capsule (100 mg total) by mouth 2 (two) times a day Hold for soft stools. , Disp: 60 capsule, Rfl: 0    gabapentin (NEURONTIN) 100 mg capsule, TAKE ONE CAPSULE DAILY IN THE MORNING, 3 CAPSULES IN THE EVENING, Disp: , Rfl:     Jardiance 25 MG TABS, Take 25 mg by mouth in the morning, Disp: , Rfl:     LORazepam (ATIVAN) 0.5 mg tablet, Take 0.5 mg by mouth daily at bedtime, Disp: , Rfl:     Magnesium Gluconate 250 MG TABS, Take by mouth daily at bedtime, Disp: , Rfl:     metFORMIN (GLUCOPHAGE-XR) 500 mg 24 hr tablet, Take 2 tablets (1,000 mg total) by mouth 2 (two) times a day Do not start before August 3, 2023., Disp: , Rfl: 0    metoprolol tartrate (LOPRESSOR) 25 mg tablet, Take 0.5 tablets (12.5 mg total) by mouth every 12 (twelve) hours, Disp: 30 tablet, Rfl: 2    ondansetron (ZOFRAN) 4 mg tablet, Take 1 tablet (4 mg total) by mouth every 8 (eight) hours as needed for nausea or vomiting, Disp: 20 tablet, Rfl: 0    Ozempic, 2 MG/DOSE, 8 MG/3ML injection pen, INJECT 2MG UNDER THE SKIN ONCE A WEEK, Disp: , Rfl:     polyethylene glycol (MIRALAX) 17 g packet, Take 17 g by mouth daily Hold for soft stools.  Do not start before August 15, 2023., Disp: 30 each, Rfl: 0    rosuvastatin (CRESTOR) 20 MG tablet, Take 2 tablets (40 mg total) by mouth daily, Disp: 30 tablet, Rfl: 2    sertraline (Zoloft) 50 mg tablet, Take 1 tablet (50 mg total) by mouth daily, Disp: 30 tablet, Rfl: 5    tadalafil (CIALIS) 20 MG tablet, Take 1 tablet (20 mg total) by mouth daily as needed for erectile dysfunction, Disp: 10 tablet, Rfl: 0    glipiZIDE (GLUCOTROL XL) 5 mg 24 hr tablet, Take 1 tablet (5 mg total) by mouth daily (Patient not taking: Reported on 10/27/2023), Disp: , Rfl:     oxyCODONE (ROXICODONE) 5 immediate release tablet, Take 1/2-1 tablet Q4-6 hours PRN pain. (Patient not taking: Reported on 8/22/2023), Disp: 30 tablet, Rfl: 0    pantoprazole (PROTONIX) 40 mg tablet, Take 1 tablet (40 mg total) by mouth daily in the early morning Do not start before August 15, 2023. (Patient not taking: Reported on 9/14/2023), Disp: 30 tablet, Rfl: 0    torsemide (DEMADEX) 20 mg tablet, TAKE ONE TABLET BY MOUTH EVERY MORNING FOR 10 DAYS UNLESS OTHERWISE DIRECTED (Patient not taking: Reported on 8/30/2023), Disp: , Rfl:     Social History     Socioeconomic History    Marital status: /Civil Union     Spouse name: Not on file    Number of children: Not on file    Years of education: Not on file    Highest education level: Not on file   Occupational History    Not on file   Tobacco Use    Smoking status: Never    Smokeless tobacco: Never   Substance and Sexual Activity    Alcohol use: No    Drug use: No    Sexual activity: Yes   Other Topics Concern    Not on file   Social History Narrative    Not on file     Social Determinants of Health     Financial Resource Strain: Not on file   Food Insecurity: No Food Insecurity (8/12/2023)    Hunger Vital Sign     Worried About Running Out of Food in the Last Year: Never true     Ran Out of Food in the Last Year: Never true   Transportation Needs: No Transportation Needs (8/12/2023)    PRAPARE - Transportation     Lack of Transportation (Medical): No     Lack of Transportation (Non-Medical):  No   Physical Activity: Not on file   Stress: Not on file   Social Connections: Not on file   Intimate Partner Violence: Not on file   Housing Stability: Low Risk  (8/12/2023)    Housing Stability Vital Sign     Unable to Pay for Housing in the Last Year: No     Number of Places Lived in the Last Year: 1     Unstable Housing in the Last Year: No       Family History   Problem Relation Age of Onset    Leukemia Mother     Heart attack Father 46       Physical Exam:    Vitals: Blood pressure 90/60, pulse (!) 109, height 5' 11" (1.803 m), weight 92.9 kg (204 lb 11.2 oz), SpO2 96 %. , Body mass index is 28.55 kg/m².,   Wt Readings from Last 3 Encounters:   10/27/23 92.9 kg (204 lb 11.2 oz)   09/14/23 97.1 kg (214 lb)   08/30/23 99.4 kg (219 lb 3.2 oz)       Physical Exam:  Vitals:    10/27/23 1523   BP: 90/60   BP Location: Left arm   Patient Position: Sitting   Cuff Size: Large   Pulse: (!) 109   SpO2: 96%   Weight: 92.9 kg (204 lb 11.2 oz)   Height: 5' 11" (1.803 m)       GEN: Rupinder Shore appears well, alert and oriented x 3, pleasant and cooperative   HEENT: pupils equal, round, and reactive to light; extraocular muscles intact  NECK: supple, no carotid bruits   HEART: regular rhythm, normal S1 and S2, no murmurs, clicks, gallops or rubs, JVP is    LUNGS: clear to auscultation bilaterally; no wheezes, rales, or rhonchi   ABDOMEN: normal bowel sounds, soft, no tenderness, no distention  EXTREMITIES: peripheral pulses normal; no clubbing, cyanosis, or edema  NEURO: no focal findings   SKIN: normal without suspicious lesions on exposed skin    Labs & Results:    Lab Results   Component Value Date    WBC 11.18 (H) 08/14/2023    HGB 10.2 (L) 08/14/2023    HCT 31.0 (L) 08/14/2023    MCV 90 08/14/2023     (L) 08/14/2023     Lab Results   Component Value Date    GLUCOSE 184 (H) 08/11/2023    CALCIUM 7.9 (L) 08/14/2023    K 4.5 08/14/2023    CO2 20 (L) 08/14/2023     08/14/2023    BUN 32 (H) 08/14/2023    CREATININE 1.14 08/14/2023     Lab Results   Component Value Date    BNP 17 07/24/2023      No results found for: "CHOL"  No results found for: "HDL"  No results found for: "LDLCALC"  No results found for: "TRIG"  No results found for: "CHOLHDL"      EKG personally reviewed by Shamika Stover.      Counseling / Coordination of Care  Total floor / unit time spent today 25 minutes. Greater than 50% of total time was spent with the patient and / or family counseling and / or coordination of care. A description of the counseling / coordination of care: 15 min. Thank you for the opportunity to participate in the care of this patient. Tangela Lorenz D.O.   Director of Advanced Heart Failure Program  Medical Director of [address] [place] [address]

## 2023-10-31 ENCOUNTER — CLINICAL SUPPORT (OUTPATIENT)
Dept: CARDIAC REHAB | Facility: CLINIC | Age: 63
End: 2023-10-31
Payer: COMMERCIAL

## 2023-10-31 DIAGNOSIS — Z95.1 S/P CABG X 5: Primary | ICD-10-CM

## 2023-10-31 PROCEDURE — 93798 PHYS/QHP OP CAR RHAB W/ECG: CPT

## 2023-11-02 ENCOUNTER — CLINICAL SUPPORT (OUTPATIENT)
Dept: CARDIAC REHAB | Facility: CLINIC | Age: 63
End: 2023-11-02
Payer: COMMERCIAL

## 2023-11-02 DIAGNOSIS — Z95.1 S/P CABG X 5: Primary | ICD-10-CM

## 2023-11-02 PROCEDURE — 93798 PHYS/QHP OP CAR RHAB W/ECG: CPT

## 2023-11-03 ENCOUNTER — CLINICAL SUPPORT (OUTPATIENT)
Dept: CARDIAC REHAB | Facility: CLINIC | Age: 63
End: 2023-11-03
Payer: COMMERCIAL

## 2023-11-03 DIAGNOSIS — Z95.1 S/P CABG X 5: Primary | ICD-10-CM

## 2023-11-03 PROCEDURE — 93798 PHYS/QHP OP CAR RHAB W/ECG: CPT

## 2023-11-07 ENCOUNTER — CLINICAL SUPPORT (OUTPATIENT)
Dept: CARDIAC REHAB | Facility: CLINIC | Age: 63
End: 2023-11-07
Payer: COMMERCIAL

## 2023-11-07 DIAGNOSIS — Z95.1 S/P CABG X 5: Primary | ICD-10-CM

## 2023-11-07 PROCEDURE — 93798 PHYS/QHP OP CAR RHAB W/ECG: CPT

## 2023-11-07 NOTE — PROGRESS NOTES
Cardiac Rehabilitation Plan of Care   60 Day Reassessment          Today's date: 2023   # of Exercise Sessions Completed: 22  Patient name: Christiane Chakraborty      : 1960  Age: 61 y.o. MRN: [id number]  Referring Physician: Alesha Ragland PA-C  Cardiologist: Vern Coreas DO   Provider: [facility]  Clinician: Thee Nguyen MS     Dx:   Encounter Diagnosis   Name Primary? S/P CABG x 5 Yes     Date of onset: 2023      SUMMARY OF PROGRESS: Marques Fraire is compliant attending cardiac rehab exercise sessions 3x/wk post CABGx5. He was experiencing chest tightness/SOB/sweating with exertion. Nuclear stress test was done which was abnormal. LHC found stenosis: pLAD 70%, mLAD 80%, dLAD 90%, 1st diag 90% & 80%, Ramus 80%, mCx 90%, collateral to RPDA from dLAD. He tolerates 45-55 mins at 2.2- 4.2 METs. A light strength training component has not been added to their exercise program. and will be added in a future exercise session. He is tolerating progression of intensity levels to maintain RPE 4-6. Resting BP  102//70 with Normal response to exercise reaching 106//78. NSR on tele with MF couplet, PVC noted observed. RHR   with Normal response to exercise reaching 106-128. He has added home exercise 5-7 days/wk which includes walking for 1 mile, 7d/wk in the morning and 5x/wk twice a day, in the morning and then in the evening. No cardiac complaints. He is progressing toward wt loss goals with a loss of 7 pounds. Patient has been working on  dietary modifications with the goal of rare red/processed meats, low fat dairy, reduced added sugars and refined flours. The patient has T2D and monitors BG 3 times per day reporting an avg. FBG of 120. The patient is a non-smoker. PHQ-9 and ABBY-7 were not reassessed due to patient reporting he has been feeling great, he feels as though he has turned the corner and his seeing great progress.  Most recent assessment found PHQ-9 at a 10 suggesting 10-14 = Moderate Depression and ABBY-7 at a 3 suggesting 0-4  = Not anxious. When addressed, the patient admits to   depression/anxiety, but has been feeling much better in the last 2 weeks. Patient reports excellent  social/emotional support from his wife. Patient attends group educational classes on cardiac risk factor modification. His exercise program will be progressed as tolerated to maintain RPE 4-6. The patient has the following personal goals he hopes to achieved by discharge: be able to cut grass, increase stamina strength, walk his dog alone and continue making changes towards a heart healthy diet. They will continue to be educated on lifestyle modification and encouraged to supplement with a home exercise program as tolerated to reach the following goals in the next 30 days: increase overall strength, stamina, and strength.       Medication compliance: Yes   Comments: Pt reports to be compliant with medications  Fall Risk: Low   Comments: Ambulates with a steady gait with no assist device and Denies a fall in the past 6 months    EKG Interpretation: NSRGISEL couplet noted       EXERCISE ASSESSMENT and PLAN    Exercise Prescription:      Frequency: 3 days/week   Supplement with home exercise 2+ days/wk as tolerated       Minutes: 45-55         METS: 2.2-4.2           HR: 106-128   RPE: 3-5         Modalities: Treadmill, UBE, NuStep and Recumbent bike      30 Day Goals for Exercise Progression:    Frequency: 3 days/week of cardiac rehab       Supplement with home exercise 2+ days/wk as tolerated    Minutes: 40-45                              >150 mins/wk of moderate intensity exercise   METS: 4-6   HR: resting + 30     RPE: 4-6   Modalities: Treadmill, Airdyne bike, UBE, Lifecycle, Elliptical, NuStep and Recumbent bike    Strength trainin-3 days / week  12-15 repetitions  1-2 sets per modality   Will be added following at least 8 weeks post surgery and 8-10 monitored sessions   Modalities: Leg Press, Chest Press, Pull Downs, Arm Curl and Seated Row    Home Exercise: Type: walking , Frequency: 5-6 days/week, Distance 0.5 miles     Goals: 10% improvement in functional capacity - based on max METs achieved in fitness assessment, Reduced dyspnea with physical activity  0-1/10, improved DASI score by 10%, Increase in exercise capacity by 40% - based on peak METs tolerated in cardiac rehab exercise session, Exercise 5 days/wk, >150mins/wk of moderate intensity exercise, Resume ADLs with increased strength, Return to work unrestricted, Attend Rehab regularly, Decrease sitting time and Start a walking program    Progression Toward Goals:  Pt is progressing and showing improvement  toward the following goals:  attending rehab regularly, increasing exercise intensities as tolerated . , Patient will increase home exercise intensities in the next 30 days, Will continue to educate and progress as tolerated. , patient reports great progress in the last 2 weeks    Education: benefit of exercise for CAD risk factors, home exercise guidelines, AHA guidelines to achieve >150 mins/wk of moderate exercise and RPE scale   Plan:education on home exercise guidelines, home exercise 30+ mins 2 days opposite CR and Education class: Risk Factors for Heart Disease  Readiness to change: Action:  (Changing behavior)      NUTRITION ASSESSMENT AND PLAN    Weight control:    Starting weight: 213.6 lbs    Current weight:   199 lbs    Diabetes: T2D  A1c: 7.4    last measured: 6/8/2023    Lipid management: Discussed diet and lipid management and Last lipid profile 6/8/2023  Chol 193    HDL 42      Goals:reduced BMI to < 25, LDL <100, decreased body fat% <25%   (M), reduced waist circumference <40 inches (M), eat 3 or more servings of whole grains a day, Eat 4-5 cups of fruits and vegetables daily and seldom eat or choose low fat ice-cream, fruit juice bars or frozen yogurt     Measurable goals were based Rate Your Plate Dietary Self-Assessment. These are the areas in which the patient could score higher on the assessment. Goals include recommendations for a heart healthy diet based on American Heart Association. Progression Toward Goals: Pt is progressing and showing improvement  toward the following goals:  watching sodium intake, low fat diet, hydrating well throughout the day .  , Patient will continue heart healthy diet, and continue to measure GLU throughout the day in the next 30 days, Will continue to educate and progress as tolerated. Education: heart healthy eating  low sodium diet  hydration  nutrition for  lipid management  target goal for A1c <7.0  exercise and diabetes management   wt. loss   Plan: Education class: Reading Food Labels, Education Class: Heart Healthy Eating, replace refined grain bread with whole grain bread, replace unhealthy snacks with fruits & vegs, monitor home blood glucose, drink more water, replace sugar with stevia or truvia and keep added daily sugar <25g/day  Readiness to change: Action:  (Changing behavior)      PSYCHOSOCIAL ASSESSMENT AND PLAN    Emotional:  Depression assessment:  PHQ-9 = 10   10-14 = Moderate Depression            Anxiety measure:  ABBY-7 = 3  0-4  = Not anxious  Self-reported stress level:  4  Social support: Excellent and Patient reports excellent emotional/social support from wife     Goals:  Reduce perceived stress to 1-3/10, Feelings in [facility] Score < 3, Physical Fitness in [facility] Score < 3, Overall Health in [facility] Score < 3 and Quality of Life in [facility] Score < 3     Progression Toward Goals: Pt is progressing and showing improvement  toward the following goals:  managing stress with good support.  , Patient will work on increasing confidence with return to ADLs and work in the next 30 days, Will continue to educate and progress as tolerated. , patient reports he has felt very good and feels like he turned the corner and is seeing progress    Education: signs/sxs of depression, benefits of a positive support system, stress management techniques and depression and CAD  Plan: Class: Stress and Your Health, Class: Relaxation, Practice relaxation techniques, Exercise and Enjoy a hobby  Readiness to change: Action:  (Changing behavior)      OTHER CORE COMPONENTS     Tobacco:   Social History     Tobacco Use   Smoking Status Never   Smokeless Tobacco Never       Tobacco Use Intervention:   N/A:  Patient is a non-smoker     Anginal Symptoms:  None   NTG use: No prescription    Blood pressure:    Restin//70   Exercise: 106//78    Goals: consistent BP < 130/80, reduced dietary sodium <2300mg, moderate intensity exercise >150 mins/wk, medication compliance and reduce number of medications  needed for BP control    Progression Toward Goals: Pt is progressing and showing improvement  toward the following goals:  watching sodium intake and hydrating .  , Patient will maintain BP control and medication compliance in the next 30 days, Will continue to educate and progress as tolerated.     Education:  understanding high blood pressure and it's relationship to CAD and low sodium diet and HTN  Plan: Class: Understanding Heart Disease, Class: Common Heart Medications, medication compliance, engage in regular exercise and monitor home BP  Readiness to change: Action:  (Changing behavior)

## 2023-11-09 ENCOUNTER — CLINICAL SUPPORT (OUTPATIENT)
Dept: CARDIAC REHAB | Facility: CLINIC | Age: 63
End: 2023-11-09
Payer: COMMERCIAL

## 2023-11-09 DIAGNOSIS — Z95.1 S/P CABG X 5: Primary | ICD-10-CM

## 2023-11-09 PROCEDURE — 93798 PHYS/QHP OP CAR RHAB W/ECG: CPT

## 2023-11-10 ENCOUNTER — CLINICAL SUPPORT (OUTPATIENT)
Dept: CARDIAC REHAB | Facility: CLINIC | Age: 63
End: 2023-11-10
Payer: COMMERCIAL

## 2023-11-10 DIAGNOSIS — Z95.1 S/P CABG X 5: Primary | ICD-10-CM

## 2023-11-10 PROCEDURE — 93798 PHYS/QHP OP CAR RHAB W/ECG: CPT

## 2023-11-14 ENCOUNTER — CLINICAL SUPPORT (OUTPATIENT)
Dept: CARDIAC REHAB | Facility: CLINIC | Age: 63
End: 2023-11-14
Payer: COMMERCIAL

## 2023-11-14 DIAGNOSIS — Z95.1 S/P CABG X 5: Primary | ICD-10-CM

## 2023-11-14 PROCEDURE — 93798 PHYS/QHP OP CAR RHAB W/ECG: CPT

## 2023-11-16 ENCOUNTER — CLINICAL SUPPORT (OUTPATIENT)
Dept: CARDIAC REHAB | Facility: CLINIC | Age: 63
End: 2023-11-16
Payer: COMMERCIAL

## 2023-11-16 DIAGNOSIS — Z95.1 S/P CABG X 5: Primary | ICD-10-CM

## 2023-11-16 PROCEDURE — 93798 PHYS/QHP OP CAR RHAB W/ECG: CPT

## 2023-11-17 ENCOUNTER — CLINICAL SUPPORT (OUTPATIENT)
Dept: CARDIAC REHAB | Facility: CLINIC | Age: 63
End: 2023-11-17
Payer: COMMERCIAL

## 2023-11-17 DIAGNOSIS — Z95.1 S/P CABG X 5: Primary | ICD-10-CM

## 2023-11-17 PROCEDURE — 93798 PHYS/QHP OP CAR RHAB W/ECG: CPT

## 2023-11-21 ENCOUNTER — CLINICAL SUPPORT (OUTPATIENT)
Dept: CARDIAC REHAB | Facility: CLINIC | Age: 63
End: 2023-11-21
Payer: COMMERCIAL

## 2023-11-21 DIAGNOSIS — Z95.1 S/P CABG X 5: Primary | ICD-10-CM

## 2023-11-21 PROCEDURE — 93798 PHYS/QHP OP CAR RHAB W/ECG: CPT

## 2023-11-24 ENCOUNTER — CLINICAL SUPPORT (OUTPATIENT)
Dept: CARDIAC REHAB | Facility: CLINIC | Age: 63
End: 2023-11-24
Payer: COMMERCIAL

## 2023-11-24 DIAGNOSIS — Z95.1 S/P CABG X 5: Primary | ICD-10-CM

## 2023-11-24 PROCEDURE — 93798 PHYS/QHP OP CAR RHAB W/ECG: CPT

## 2023-11-28 ENCOUNTER — CLINICAL SUPPORT (OUTPATIENT)
Dept: CARDIAC REHAB | Facility: CLINIC | Age: 63
End: 2023-11-28
Payer: COMMERCIAL

## 2023-11-28 DIAGNOSIS — Z95.1 S/P CABG X 5: Primary | ICD-10-CM

## 2023-11-28 DIAGNOSIS — N52.01 ERECTILE DYSFUNCTION DUE TO ARTERIAL INSUFFICIENCY: ICD-10-CM

## 2023-11-28 PROCEDURE — 93798 PHYS/QHP OP CAR RHAB W/ECG: CPT

## 2023-11-28 RX ORDER — TADALAFIL 20 MG/1
20 TABLET ORAL DAILY PRN
Qty: 10 TABLET | Refills: 11 | Status: SHIPPED | OUTPATIENT
Start: 2023-11-28

## 2023-11-30 ENCOUNTER — CLINICAL SUPPORT (OUTPATIENT)
Dept: CARDIAC REHAB | Facility: CLINIC | Age: 63
End: 2023-11-30
Payer: COMMERCIAL

## 2023-11-30 DIAGNOSIS — Z95.1 S/P CABG X 5: Primary | ICD-10-CM

## 2023-11-30 PROCEDURE — 93798 PHYS/QHP OP CAR RHAB W/ECG: CPT

## 2023-12-01 ENCOUNTER — CLINICAL SUPPORT (OUTPATIENT)
Dept: CARDIAC REHAB | Facility: CLINIC | Age: 63
End: 2023-12-01
Payer: COMMERCIAL

## 2023-12-01 DIAGNOSIS — Z95.1 S/P CABG X 5: Primary | ICD-10-CM

## 2023-12-01 PROCEDURE — 93798 PHYS/QHP OP CAR RHAB W/ECG: CPT

## 2023-12-05 ENCOUNTER — APPOINTMENT (EMERGENCY)
Dept: RADIOLOGY | Facility: HOSPITAL | Age: 63
DRG: 310 | End: 2023-12-05
Payer: COMMERCIAL

## 2023-12-05 ENCOUNTER — CLINICAL SUPPORT (OUTPATIENT)
Dept: CARDIAC REHAB | Facility: CLINIC | Age: 63
End: 2023-12-05
Payer: COMMERCIAL

## 2023-12-05 ENCOUNTER — HOSPITAL ENCOUNTER (INPATIENT)
Facility: HOSPITAL | Age: 63
LOS: 2 days | Discharge: HOME | DRG: 310 | End: 2023-12-08
Attending: EMERGENCY MEDICINE | Admitting: INTERNAL MEDICINE
Payer: COMMERCIAL

## 2023-12-05 DIAGNOSIS — Z95.1 S/P CABG X 5: ICD-10-CM

## 2023-12-05 DIAGNOSIS — R00.0 WIDE-COMPLEX TACHYCARDIA: Primary | ICD-10-CM

## 2023-12-05 DIAGNOSIS — R94.31 ABNORMAL EKG: ICD-10-CM

## 2023-12-05 DIAGNOSIS — Z95.1 S/P CABG X 5: Primary | ICD-10-CM

## 2023-12-05 LAB
2HR DELTA HS TROPONIN: 0 NG/L
4HR DELTA HS TROPONIN: 0 NG/L
ALBUMIN SERPL BCP-MCNC: 4.5 G/DL (ref 3.5–5)
ALP SERPL-CCNC: 71 U/L (ref 34–104)
ALT SERPL W P-5'-P-CCNC: 22 U/L (ref 7–52)
ANION GAP SERPL CALCULATED.3IONS-SCNC: 12 MMOL/L
AST SERPL W P-5'-P-CCNC: 24 U/L (ref 13–39)
BASOPHILS # BLD AUTO: 0.13 THOUSANDS/ÂΜL (ref 0–0.1)
BASOPHILS NFR BLD AUTO: 1 % (ref 0–1)
BILIRUB SERPL-MCNC: 0.87 MG/DL (ref 0.2–1)
BUN SERPL-MCNC: 24 MG/DL (ref 5–25)
CALCIUM SERPL-MCNC: 9.7 MG/DL (ref 8.4–10.2)
CARDIAC TROPONIN I PNL SERPL HS: 3 NG/L
CHLORIDE SERPL-SCNC: 100 MMOL/L (ref 96–108)
CO2 SERPL-SCNC: 23 MMOL/L (ref 21–32)
CREAT SERPL-MCNC: 1.04 MG/DL (ref 0.6–1.3)
EOSINOPHIL # BLD AUTO: 0.45 THOUSAND/ÂΜL (ref 0–0.61)
EOSINOPHIL NFR BLD AUTO: 5 % (ref 0–6)
ERYTHROCYTE [DISTWIDTH] IN BLOOD BY AUTOMATED COUNT: 13.7 % (ref 11.6–15.1)
GFR SERPL CREATININE-BSD FRML MDRD: 76 ML/MIN/1.73SQ M
GLUCOSE SERPL-MCNC: 129 MG/DL (ref 65–140)
GLUCOSE SERPL-MCNC: 93 MG/DL (ref 65–140)
HCT VFR BLD AUTO: 43.9 % (ref 36.5–49.3)
HGB BLD-MCNC: 13.4 G/DL (ref 12–17)
IMM GRANULOCYTES # BLD AUTO: 0.02 THOUSAND/UL (ref 0–0.2)
IMM GRANULOCYTES NFR BLD AUTO: 0 % (ref 0–2)
LYMPHOCYTES # BLD AUTO: 3.38 THOUSANDS/ÂΜL (ref 0.6–4.47)
LYMPHOCYTES NFR BLD AUTO: 35 % (ref 14–44)
MAGNESIUM SERPL-MCNC: 2.1 MG/DL (ref 1.9–2.7)
MCH RBC QN AUTO: 26.8 PG (ref 26.8–34.3)
MCHC RBC AUTO-ENTMCNC: 30.5 G/DL (ref 31.4–37.4)
MCV RBC AUTO: 88 FL (ref 82–98)
MONOCYTES # BLD AUTO: 0.85 THOUSAND/ÂΜL (ref 0.17–1.22)
MONOCYTES NFR BLD AUTO: 9 % (ref 4–12)
NEUTROPHILS # BLD AUTO: 4.92 THOUSANDS/ÂΜL (ref 1.85–7.62)
NEUTS SEG NFR BLD AUTO: 50 % (ref 43–75)
NRBC BLD AUTO-RTO: 0 /100 WBCS
PLATELET # BLD AUTO: 238 THOUSANDS/UL (ref 149–390)
PLATELET # BLD AUTO: 246 THOUSANDS/UL (ref 149–390)
PMV BLD AUTO: 10.5 FL (ref 8.9–12.7)
PMV BLD AUTO: 10.9 FL (ref 8.9–12.7)
POTASSIUM SERPL-SCNC: 4.7 MMOL/L (ref 3.5–5.3)
PROT SERPL-MCNC: 7.9 G/DL (ref 6.4–8.4)
RBC # BLD AUTO: 5 MILLION/UL (ref 3.88–5.62)
SODIUM SERPL-SCNC: 135 MMOL/L (ref 135–147)
TSH SERPL DL<=0.05 MIU/L-ACNC: 1.81 UIU/ML (ref 0.45–4.5)
WBC # BLD AUTO: 9.75 THOUSAND/UL (ref 4.31–10.16)

## 2023-12-05 PROCEDURE — 71045 X-RAY EXAM CHEST 1 VIEW: CPT

## 2023-12-05 PROCEDURE — 84443 ASSAY THYROID STIM HORMONE: CPT | Performed by: INTERNAL MEDICINE

## 2023-12-05 PROCEDURE — 82948 REAGENT STRIP/BLOOD GLUCOSE: CPT

## 2023-12-05 PROCEDURE — 99285 EMERGENCY DEPT VISIT HI MDM: CPT | Performed by: EMERGENCY MEDICINE

## 2023-12-05 PROCEDURE — 83735 ASSAY OF MAGNESIUM: CPT | Performed by: EMERGENCY MEDICINE

## 2023-12-05 PROCEDURE — 84484 ASSAY OF TROPONIN QUANT: CPT | Performed by: EMERGENCY MEDICINE

## 2023-12-05 PROCEDURE — 80053 COMPREHEN METABOLIC PANEL: CPT | Performed by: EMERGENCY MEDICINE

## 2023-12-05 PROCEDURE — 93005 ELECTROCARDIOGRAM TRACING: CPT

## 2023-12-05 PROCEDURE — 99285 EMERGENCY DEPT VISIT HI MDM: CPT

## 2023-12-05 PROCEDURE — 93798 PHYS/QHP OP CAR RHAB W/ECG: CPT

## 2023-12-05 PROCEDURE — 85049 AUTOMATED PLATELET COUNT: CPT

## 2023-12-05 PROCEDURE — 36415 COLL VENOUS BLD VENIPUNCTURE: CPT | Performed by: EMERGENCY MEDICINE

## 2023-12-05 PROCEDURE — 85025 COMPLETE CBC W/AUTO DIFF WBC: CPT | Performed by: EMERGENCY MEDICINE

## 2023-12-05 PROCEDURE — 99223 1ST HOSP IP/OBS HIGH 75: CPT | Performed by: INTERNAL MEDICINE

## 2023-12-05 PROCEDURE — 83036 HEMOGLOBIN GLYCOSYLATED A1C: CPT

## 2023-12-05 RX ORDER — DOCUSATE SODIUM 100 MG/1
100 CAPSULE, LIQUID FILLED ORAL 2 TIMES DAILY
Status: DISCONTINUED | OUTPATIENT
Start: 2023-12-05 | End: 2023-12-08 | Stop reason: HOSPADM

## 2023-12-05 RX ORDER — ASPIRIN 325 MG
325 TABLET ORAL DAILY
Status: DISCONTINUED | OUTPATIENT
Start: 2023-12-06 | End: 2023-12-08 | Stop reason: HOSPADM

## 2023-12-05 RX ORDER — GABAPENTIN 300 MG/1
300 CAPSULE ORAL
Status: DISCONTINUED | OUTPATIENT
Start: 2023-12-05 | End: 2023-12-08 | Stop reason: HOSPADM

## 2023-12-05 RX ORDER — ONDANSETRON 4 MG/1
4 TABLET, ORALLY DISINTEGRATING ORAL EVERY 6 HOURS PRN
Status: DISCONTINUED | OUTPATIENT
Start: 2023-12-05 | End: 2023-12-07

## 2023-12-05 RX ORDER — PRAVASTATIN SODIUM 40 MG
40 TABLET ORAL
Status: DISCONTINUED | OUTPATIENT
Start: 2023-12-06 | End: 2023-12-05

## 2023-12-05 RX ORDER — INSULIN LISPRO 100 [IU]/ML
1-6 INJECTION, SOLUTION INTRAVENOUS; SUBCUTANEOUS
Status: DISCONTINUED | OUTPATIENT
Start: 2023-12-05 | End: 2023-12-08 | Stop reason: HOSPADM

## 2023-12-05 RX ORDER — LORAZEPAM 0.5 MG/1
0.5 TABLET ORAL
Status: DISCONTINUED | OUTPATIENT
Start: 2023-12-05 | End: 2023-12-08 | Stop reason: HOSPADM

## 2023-12-05 RX ORDER — PRAVASTATIN SODIUM 80 MG/1
80 TABLET ORAL
Status: DISCONTINUED | OUTPATIENT
Start: 2023-12-06 | End: 2023-12-08 | Stop reason: HOSPADM

## 2023-12-05 RX ORDER — ACETAMINOPHEN 325 MG/1
650 TABLET ORAL EVERY 6 HOURS PRN
Status: DISCONTINUED | OUTPATIENT
Start: 2023-12-05 | End: 2023-12-08 | Stop reason: HOSPADM

## 2023-12-05 RX ORDER — GABAPENTIN 100 MG/1
100 CAPSULE ORAL DAILY
Status: DISCONTINUED | OUTPATIENT
Start: 2023-12-06 | End: 2023-12-08 | Stop reason: HOSPADM

## 2023-12-05 RX ORDER — SODIUM CHLORIDE 9 MG/ML
3 INJECTION INTRAVENOUS
Status: DISCONTINUED | OUTPATIENT
Start: 2023-12-05 | End: 2023-12-08 | Stop reason: HOSPADM

## 2023-12-05 RX ORDER — ENOXAPARIN SODIUM 100 MG/ML
40 INJECTION SUBCUTANEOUS DAILY
Status: DISCONTINUED | OUTPATIENT
Start: 2023-12-06 | End: 2023-12-08 | Stop reason: HOSPADM

## 2023-12-05 RX ADMIN — GABAPENTIN 300 MG: 300 CAPSULE ORAL at 21:45

## 2023-12-05 RX ADMIN — DOCUSATE SODIUM 100 MG: 100 CAPSULE, LIQUID FILLED ORAL at 21:45

## 2023-12-05 RX ADMIN — LORAZEPAM 0.5 MG: 0.5 TABLET ORAL at 22:20

## 2023-12-05 RX ADMIN — METOPROLOL TARTRATE 12.5 MG: 25 TABLET, FILM COATED ORAL at 21:45

## 2023-12-05 NOTE — ED PROVIDER NOTES
History  Chief Complaint   Patient presents with    Abnormal ECG     Pt was medical emergency while in cardiac rehab on exercise bike and went into "wide qrs tachycardia" pt denies cp/sob/dizziness. Hx bypass     HPI    60 yo male with history of CAD s/p CABG x 5 on 08/2023 presents from cardiac rehab for evaluation of wide-complex tachycardia. Patient was in the [facility], doing cardiac rehab when he developed a wide-complex tachycardia on EKG, and was subsequently told by staff to stop exercising. Patient was asymptomatic during this episode, and only felt tired as he had been working out for an extended period of time prior to this. He is s/p CABG x 5 on 8/11/2023, and has been doing cardiac rehab 3 times per week since then. He reports that during this session, he increased his weights, but was tolerating the exercise fine. Denies chest pain, prior illness, fevers, palpitations, SOB. Prior to Admission Medications   Prescriptions Last Dose Informant Patient Reported? Taking? Continuous Blood Gluc Sensor (FreeStyle Ayanna 2 Sensor) MISC 12/5/2023 Self Yes Yes   Sig: APPLY AND CHANGE EVERY 14 DAYS   Jardiance 25 MG TABS 12/5/2023 Self Yes Yes   Sig: Take 25 mg by mouth in the morning   LORazepam (ATIVAN) 0.5 mg tablet 12/4/2023 Self Yes Yes   Sig: Take 0.5 mg by mouth daily at bedtime   Magnesium Gluconate 250 MG TABS 12/4/2023 Self Yes Yes   Sig: Take by mouth daily at bedtime   Ozempic, 2 MG/DOSE, 8 MG/3ML injection pen Past Week Self Yes Yes   Sig: INJECT 2MG UNDER THE SKIN ONCE A WEEK   acetaminophen (TYLENOL) 325 mg tablet Past Month Self No Yes   Sig: Take 1-2 tablets Q4-6 hours PRN pain. Do not take more than 4grams in 24 hours.    aspirin 325 mg tablet 12/5/2023 Self No Yes   Sig: Take 1 tablet (325 mg total) by mouth daily   cholecalciferol (VITAMIN D3) 25 mcg (1,000 units) tablet 12/5/2023 Self Yes Yes   Sig: Take 1,000 Units by mouth 2 (two) times a day   docusate sodium (COLACE) 100 mg capsule 12/4/2023 Self No Yes   Sig: Take 1 capsule (100 mg total) by mouth 2 (two) times a day Hold for soft stools. gabapentin (NEURONTIN) 100 mg capsule 12/5/2023 Self Yes Yes   Sig: TAKE ONE CAPSULE DAILY IN THE MORNING, 3 CAPSULES IN THE EVENING   glipiZIDE (GLUCOTROL XL) 5 mg 24 hr tablet Not Taking Self No No   Sig: Take 1 tablet (5 mg total) by mouth daily   Patient not taking: Reported on 10/27/2023   metFORMIN (GLUCOPHAGE-XR) 500 mg 24 hr tablet 12/5/2023 Self No Yes   Sig: Take 2 tablets (1,000 mg total) by mouth 2 (two) times a day Do not start before August 3, 2023. metoprolol tartrate (LOPRESSOR) 25 mg tablet 12/5/2023 Self No Yes   Sig: Take 0.5 tablets (12.5 mg total) by mouth every 12 (twelve) hours   ondansetron (ZOFRAN) 4 mg tablet More than a month Self No No   Sig: Take 1 tablet (4 mg total) by mouth every 8 (eight) hours as needed for nausea or vomiting   oxyCODONE (ROXICODONE) 5 immediate release tablet Not Taking Self No No   Sig: Take 1/2-1 tablet Q4-6 hours PRN pain. Patient not taking: Reported on 8/22/2023   pantoprazole (PROTONIX) 40 mg tablet  Self No No   Sig: Take 1 tablet (40 mg total) by mouth daily in the early morning Do not start before August 15, 2023. Patient not taking: Reported on 9/14/2023   polyethylene glycol (MIRALAX) 17 g packet  Self No No   Sig: Take 17 g by mouth daily Hold for soft stools. Do not start before August 15, 2023.    rosuvastatin (CRESTOR) 20 MG tablet 12/4/2023  No Yes   Sig: Take 2 tablets (40 mg total) by mouth daily   sertraline (Zoloft) 50 mg tablet 12/4/2023 Self No Yes   Sig: Take 1 tablet (50 mg total) by mouth daily   tadalafil (CIALIS) 20 MG tablet Past Week  No Yes   Sig: Take 1 tablet (20 mg total) by mouth daily as needed for erectile dysfunction   torsemide (DEMADEX) 20 mg tablet Not Taking Self Yes No   Sig: TAKE ONE TABLET BY MOUTH EVERY MORNING FOR 10 DAYS UNLESS OTHERWISE DIRECTED   Patient not taking: Reported on 8/30/2023 Facility-Administered Medications: None       Past Medical History:   Diagnosis Date    Coronary artery disease     Diabetes mellitus ([address])     Hyperlipidemia     Hypertension        Past Surgical History:   Procedure Laterality Date    ARTERIAL ANEURYSM REPAIR      Right Carotid Aneurysm - age 13. CARDIAC CATHETERIZATION Left 08/02/2023    Procedure: Cardiac Left Heart Cath;  Surgeon: Gage Savage DO;  Location: [facility]; Service: Cardiology    CARDIAC CATHETERIZATION N/A 08/02/2023    Procedure: Cardiac Coronary Angiogram;  Surgeon: Gage Savage DO;  Location: [facility]; Service: Cardiology    CARDIAC CATHETERIZATION  08/02/2023    Procedure: Cardiac catheterization;  Surgeon: Gage Savage DO;  Location: [facility]; Service: Cardiology    HERNIA REPAIR      AR CORONARY ARTERY BYP W/VEIN & ARTERY GRAFT 4 VEIN N/A 8/11/2023    Procedure: CORONARY ARTERY BYPASS GRAFT (CABG) X-5 VESSELS ; SVG to PDA, Ramus, Diagonal and OM. LIMA --> LAD EVH  W/ JOHN;  Surgeon: Alex Du MD;  Location: [facility];  Service: Cardiac Surgery    VASECTOMY         Family History   Problem Relation Age of Onset    Leukemia Mother     Heart attack Father 46     I have reviewed and agree with the history as documented. E-Cigarette/Vaping     E-Cigarette/Vaping Substances     Social History     Tobacco Use    Smoking status: Never    Smokeless tobacco: Never   Substance Use Topics    Alcohol use: No    Drug use: No        Review of Systems   Constitutional:  Negative for chills, fatigue and fever. HENT:  Negative for rhinorrhea and sore throat. Respiratory:  Negative for cough and shortness of breath. Cardiovascular:  Negative for chest pain and palpitations. Gastrointestinal:  Negative for abdominal pain, nausea and vomiting. Skin:  Negative for color change. Neurological:  Negative for dizziness and headaches.    All other systems reviewed and are negative. Physical Exam  ED Triage Vitals [12/05/23 1650]   Temperature Pulse Respirations Blood Pressure SpO2   98.6 °F (37 °C) (!) 107 18 138/74 98 %      Temp Source Heart Rate Source Patient Position - Orthostatic VS BP Location FiO2 (%)   Oral Monitor Sitting Right arm --      Pain Score       No Pain             Orthostatic Vital Signs  Vitals:    12/05/23 1650 12/05/23 1700 12/05/23 1900   BP: 138/74 138/74 112/58   Pulse: (!) 107 102 79   Patient Position - Orthostatic VS: Sitting  Sitting       Physical Exam  Vitals and nursing note reviewed. Constitutional:       General: He is not in acute distress. Appearance: He is well-developed. HENT:      Head: Normocephalic and atraumatic. Mouth/Throat:      Mouth: Mucous membranes are moist.   Eyes:      Conjunctiva/sclera: Conjunctivae normal.      Pupils: Pupils are equal, round, and reactive to light. Cardiovascular:      Rate and Rhythm: Normal rate and regular rhythm. Heart sounds: No murmur heard. Pulmonary:      Effort: Pulmonary effort is normal. No respiratory distress. Breath sounds: Normal breath sounds. Abdominal:      Palpations: Abdomen is soft. Tenderness: There is no abdominal tenderness. Musculoskeletal:         General: No swelling. Cervical back: Neck supple. Skin:     General: Skin is warm and dry. Capillary Refill: Capillary refill takes less than 2 seconds. Neurological:      General: No focal deficit present. Mental Status: He is alert and oriented to person, place, and time.    Psychiatric:         Mood and Affect: Mood normal.         ED Medications  Medications   sodium chloride (PF) 0.9 % injection 3 mL (has no administration in time range)       Diagnostic Studies  Results Reviewed       Procedure Component Value Units Date/Time    HS Troponin I 2hr [[id number]]  (Normal) Collected: 12/05/23 1901    Lab Status: Final result Specimen: Blood from Arm, Left Updated: 12/05/23 1930 hs TnI 2hr 3 ng/L      Delta 2hr hsTnI 0 ng/L     HS Troponin I 4hr [[id number]]     Lab Status: No result Specimen: Blood     HS Troponin 0hr (reflex protocol) [[id number]]  (Normal) Collected: 12/05/23 1709    Lab Status: Final result Specimen: Blood from Arm, Left Updated: 12/05/23 1741     hs TnI 0hr 3 ng/L     Comprehensive metabolic panel [[id number]] Collected: 12/05/23 1709    Lab Status: Final result Specimen: Blood from Arm, Left Updated: 12/05/23 1736     Sodium 135 mmol/L      Potassium 4.7 mmol/L      Chloride 100 mmol/L      CO2 23 mmol/L      ANION GAP 12 mmol/L      BUN 24 mg/dL      Creatinine 1.04 mg/dL      Glucose 129 mg/dL      Calcium 9.7 mg/dL      AST 24 U/L      ALT 22 U/L      Alkaline Phosphatase 71 U/L      Total Protein 7.9 g/dL      Albumin 4.5 g/dL      Total Bilirubin 0.87 mg/dL      eGFR 76 ml/min/1.73sq m     Narrative:      [place] guidelines for Chronic Kidney Disease (CKD):     Stage 1 with normal or high GFR (GFR > 90 mL/min/1.73 square meters)    Stage 2 Mild CKD (GFR = 60-89 mL/min/1.73 square meters)    Stage 3A Moderate CKD (GFR = 45-59 mL/min/1.73 square meters)    Stage 3B Moderate CKD (GFR = 30-44 mL/min/1.73 square meters)    Stage 4 Severe CKD (GFR = 15-29 mL/min/1.73 square meters)    Stage 5 End Stage CKD (GFR <15 mL/min/1.73 square meters)  Note: GFR calculation is accurate only with a steady state creatinine    Magnesium [[id number]]  (Normal) Collected: 12/05/23 1709    Lab Status: Final result Specimen: Blood from Arm, Left Updated: 12/05/23 1736     Magnesium 2.1 mg/dL     CBC and differential [[id number]]  (Abnormal) Collected: 12/05/23 1709    Lab Status: Final result Specimen: Blood from Arm, Left Updated: 12/05/23 1720     WBC 9.75 Thousand/uL      RBC 5.00 Million/uL      Hemoglobin 13.4 g/dL      Hematocrit 43.9 %      MCV 88 fL      MCH 26.8 pg      MCHC 30.5 g/dL      RDW 13.7 %      MPV 10.5 fL      Platelets 762 Thousands/uL nRBC 0 /100 WBCs      Neutrophils Relative 50 %      Immat GRANS % 0 %      Lymphocytes Relative 35 %      Monocytes Relative 9 %      Eosinophils Relative 5 %      Basophils Relative 1 %      Neutrophils Absolute 4.92 Thousands/µL      Immature Grans Absolute 0.02 Thousand/uL      Lymphocytes Absolute 3.38 Thousands/µL      Monocytes Absolute 0.85 Thousand/µL      Eosinophils Absolute 0.45 Thousand/µL      Basophils Absolute 0.13 Thousands/µL                    X-ray chest 1 view portable   ED Interpretation by Mo Morfin MD (12/05 1750)   Interpreted by ED Resident: No evidence of pneumothorax, pneumonia or pleural effusions. No cardiomegaly            Procedures  ECG 12 Lead Documentation Only    Date/Time: 12/5/2023 5:30 PM    Performed by: Mo Morfin MD  Authorized by: Mo Morfin MD    ECG reviewed by me, the ED Provider: yes    Patient location:  ED  Rate:     ECG rate:  136    ECG rate assessment: tachycardic    Rhythm:     Rhythm: other rhythm    Ectopy:     Ectopy: none    QRS:     QRS axis:  Normal    QRS intervals: Wide  Conduction:     Conduction: abnormal      Abnormal conduction: complete RBBB    Comments:      EKG taken at cardiac rehab. Complete right bundle branch block, no ST elevation or ischemic changes. ECG 12 Lead Documentation Only    Date/Time: 12/5/2023 5:31 PM    Performed by: Mo Morfin MD  Authorized by:  Mo Morfin MD    ECG reviewed by me, the ED Provider: yes    Patient location:  ED  Interpretation:     Interpretation: abnormal    Rate:     ECG rate:  106    ECG rate assessment: tachycardic    Rhythm:     Rhythm: sinus tachycardia and A-V block    Ectopy:     Ectopy: none    QRS:     QRS axis:  Normal    QRS intervals:  Normal  Conduction:     Conduction: abnormal      Abnormal conduction: 1st degree    T waves:     T waves: non-specific    Comments:      Compared to EKG taken at cardiac rehab, bundle branch block not evident, patient now in sinus rhythm. ED Course  ED Course as of 12/05/23 1932   Tue Dec 05, 2023   1738 Given hx and exam findings, differentials include but not limited to ACS, electrolyte imbalance, V. Tach, arrhythmia. 1815 Discussed EKGs extensively with cardiologist on-call, and patient's cardiologist, due to concern for initial rhythm being ventricular tachycardia. Per on-call cardiologist, initial rhythm more likely to be right bundle branch block that converted back to sinus tachycardia with AV block. Given patient's recent cardiac history, all in agreement to have patient's admitted overnight to continue to monitor rhythm and for serial troponins. 0-hour troponin was 3. Will contact inpatient team to have patient admitted. SBIRT 20yo+      Flowsheet Row Most Recent Value   Initial Alcohol Screen: US AUDIT-C     1. How often do you have a drink containing alcohol? 0 Filed at: 12/05/2023 1652   2. How many drinks containing alcohol do you have on a typical day you are drinking? 0 Filed at: 12/05/2023 1652   3a. Male UNDER 65: How often do you have five or more drinks on one occasion? 0 Filed at: 12/05/2023 1652   Audit-C Score 0 Filed at: 12/05/2023 1652   LENNY: How many times in the past year have you. .. Used an illegal drug or used a prescription medication for non-medical reasons? Never Filed at: 12/05/2023 1652                  Medical Decision Making    See ED course.     Disposition  Final diagnoses:   S/P CABG x 5   Abnormal EKG     Time reflects when diagnosis was documented in both MDM as applicable and the Disposition within this note       Time User Action Codes Description Comment    12/5/2023  6:58 PM Ameena Snachez Add [Z95.1] S/P CABG x 5     12/5/2023  7:32 PM Gianluca Hu Add [R94.31] Abnormal EKG     12/5/2023  7:32 PM Gianluca Hu Modify [Z95.1] S/P CABG x 5     12/5/2023  7:32 PM Ashkan Castellanos Modify [R94.31] Abnormal EKG ED Disposition       ED Disposition   Admit    Condition   Stable    Date/Time   Tue Dec 5, 2023 7565    Comment   Case was discussed with Dr Zain Vieira and the patient's admission status was agreed to be Admission Status: observation status to the service of Dr. Zain Vieira . Follow-up Information    None         Patient's Medications   Discharge Prescriptions    No medications on file     No discharge procedures on file. PDMP Review         Value Time User    PDMP Reviewed  Yes 8/14/2023 11:54 AM Brian Anderson PA-C             ED Provider  Attending physically available and evaluated Spencer Lacy. I managed the patient along with the ED Attending.     Electronically Signed by           Latasha Cuevas MD  12/05/23 1932

## 2023-12-05 NOTE — PROGRESS NOTES
Medical Emergency called on patient due to wide complex tach sustained. Patient was immediately stopped from exercising. Asymptomatic at time of onset and prior to transfer to ED. Initial /74. HR 127bpm. Patient was alert throughout time in rehab. Was attended by wife who was informed of onset. Medical Emergency team provided transport of patient to ED for evaluation.

## 2023-12-05 NOTE — ED ATTENDING ATTESTATION
12/5/2023  Daniel Cooper DO, saw and evaluated the patient. I have discussed the patient with the resident/non-physician practitioner and agree with the resident's/non-physician practitioner's findings, Plan of Care, and MDM as documented in the resident's/non-physician practitioner's note, except where noted. All available labs and Radiology studies were reviewed. I was present for key portions of any procedure(s) performed by the resident/non-physician practitioner and I was immediately available to provide assistance. At this point I agree with the current assessment done in the Emergency Department. I have conducted an independent evaluation of this patient a history and physical is as follows: 68-year-old male, past medical history CABG in August, presenting as a rapid response from cardiac rehab as he was on an exercise machine when they noted on telemetry that he was in a wide-complex tachycardia. Patient notes he was asymptomatic at that time. Patient was taken off of the machine and EKG was performed some minutes later noting the persistence of this wide tachycardia. Patient was presented to emergency department. While here, patient was found to be in normal sinus and still remained asymptomatic. Patient notes that he was tolerance of the exercise and has as of recent been at his baseline health. Wife at bedside is independent historian who agrees. Vital signs stable. Patient resting comfortably. Lungs clear to auscultation. No increased work of breathing. No JVD. Abdomen soft nontender. Bilateral lower extremities without edema, calf tenderness, dissymmetry. 68-year-old male presenting from cardiac rehab up with new rhythm of right bundle with question of underlying strain. CBC, CMP, initial troponin without acute pathology. Chest x-ray normal.  Patient remained normal sinus throughout his stay in the emergency department.   Reviewed with Dr. Andriy Birmingham from cardiology who agreed with plan for observation. In addition, Dr. Katy Skelton made aware at the patient's request who also agreed with plan. Call placed to hospital medicine who accepted their care. Patient made aware and agrees with plan.     ED Course         Critical Care Time  Procedures

## 2023-12-05 NOTE — LETTER
Thank you for allowing us to participate in the care of your patient, Desi Alarcon, who was hospitalized from [unfilled] through 12/8/2023 with the admitting diagnosis of sustained V. tach. He presented to the hospital on 12/5/2023 due to sustained V. tach noted on the monitor while at cardiac rehab. Patient remained asymptomatic at that time and was asymptomatic throughout his hospital stay. Cardiology was consulted and recommended Tikosyn which required QTc monitoring through the fourth dose. He also got a cardiac MRI and the read will need to be followed up on. His potassium was persistently below 4 and he will go on 10 mg of K-Dur daily. BMP to follow-up in 1 week to evaluate electrolytes. If you have any additional questions or would like to discuss further, please feel free to contact me.     Daniella Gallego, [address] [place] [address] Internal Medicine, Hospitalist  [phone]

## 2023-12-05 NOTE — H&P
[facility] [facility]  H&P  Name: Alexey Malik 61 y.o. male I MRN: [id number]  Unit/Bed#: ED-19 I Date of Admission: 12/5/2023   Date of Service: 12/5/2023 I Hospital Day: 0      Assessment/Plan   * Wide-complex tachycardia  Assessment & Plan  While exercising in cardiac rehab earlier today, an emergency was called for the patient as the cardiac monitor revealed him to be in wide-complex tachycardia. Patient stated he remained asymptomatic, was only tired because he was lifting heavier weights. Denied any shortness of breath, chest pains, irregular heartbeats, nausea, vomiting, dizziness or lightheadedness. In the ED, EKG showed sinus tachycardia with first-degree block. Labs and chest x-ray were unremarkable. Cardiologist recommended monitoring patient overnight. Plan:  -Cardiology consult placed  -24-hour telemetry  -Monitor for any symptoms of chest pains, shortness of breath, regular heartbeats    S/P CABG x 5  Assessment & Plan  Patient is status post CABG x5 back in August 2023. Patient has been attending cardiac rehab, and states that this is the best he is ever felt. He is able to walk over a mile and a half each day, and has remained very active at home. Patient is experienced no other symptoms of concerns after the surgery, the only event to occur was in his most recent cardiac rehab session with the wide-complex tachycardia.  -Continue aspirin 325 mg daily      Diabetic polyneuropathy associated with type 2 diabetes mellitus ([address])  Assessment & Plan  Lab Results   Component Value Date    HGBA1C 7.4 (H) 06/08/2023       No results for input(s): "POCGLU" in the last 72 hours. Blood Sugar Average: Last 72 hrs:  Continue gabapentin 100 mg in the morning and 300 mg at night.       Depressive disorder  Assessment & Plan  Continue Zoloft 50 mg daily  Ativan 1.5 mg daily at bedtime for sleep    Hyperlipidemia LDL goal <100  Assessment & Plan  Continue home statin regimen    Type 2 diabetes mellitus with hyperglycemia, without long-term current use of insulin (HCC)  Assessment & Plan  Lab Results   Component Value Date    HGBA1C 7.4 (H) 06/08/2023       No results for input(s): "POCGLU" in the last 72 hours. Blood Sugar Average: Last 72 hrs:  Hold home regimen of Ozempic, Jardiance, and metformin.  -Start sliding scale insulin while inpatient      HTN (hypertension), benign  Assessment & Plan  Continue 12.5 milligrams of Lopressor twice daily       VTE Pharmacologic Prophylaxis: VTE Score: 3 Moderate Risk (Score 3-4) - Pharmacological DVT Prophylaxis Ordered: enoxaparin (Lovenox). Code Status: Prior Level 1  Discussion with family: Updated  (wife) at bedside. Anticipated Length of Stay: Patient will be admitted on an observation basis with an anticipated length of stay of less than 2 midnights secondary to Cardiac monitoring afte wide QRS tachycardia. Chief Complaint: Wide-complex tachycardia    History of Present Illness:  Marco Vasquez is a 61 y.o. male with a PMH of CAD s/p CABG x5, T2DM, HLD, MDD, and HTN who presents with wide-complex tachycardia. Patient states he was at his usual cardiac rehab and was lifting heavier weights than usual.  Patient recently had a CABG performed in late August, and has been recovering appropriately well remaining very active. States he was asymptomatic and this has been the best he has ever felt. However an emergency was called on him while at rehab because cardiac monitoring showed evidence of wide-complex tachycardia. Patient states he remained asymptomatic, denied any shortness of breath, headaches, chest pains or pressures, irregular heartbeats, dizziness, lightheadedness, nausea or vomiting. States he may have been slightly tired from the exercise he was doing, but otherwise felt well overall. In the ED, EKG revealed sinus tachycardia with first-degree AV block.   Cardiology recommended patient stay overnight for continuous monitoring. Vitals have remained stable, and labs were unremarkable. Patient denies any recent sicknesses or illnesses, no belly pains, no nausea vomiting or diarrhea. He was admitted to medicine floors for further work-up and evaluation. Review of Systems:  Review of Systems   Constitutional: Negative. HENT: Negative. Eyes: Negative. Respiratory: Negative. Cardiovascular: Negative. Gastrointestinal:  Positive for constipation. Endocrine: Negative. Genitourinary: Negative. Musculoskeletal: Negative. Allergic/Immunologic: Negative. Neurological: Negative. Hematological: Negative. Psychiatric/Behavioral: Negative. Past Medical and Surgical History:   Past Medical History:   Diagnosis Date    Coronary artery disease     Diabetes mellitus ([address])     Hyperlipidemia     Hypertension        Past Surgical History:   Procedure Laterality Date    ARTERIAL ANEURYSM REPAIR      Right Carotid Aneurysm - age 13. CARDIAC CATHETERIZATION Left 08/02/2023    Procedure: Cardiac Left Heart Cath;  Surgeon: Jameel Gutiérrez DO;  Location: [facility]; Service: Cardiology    CARDIAC CATHETERIZATION N/A 08/02/2023    Procedure: Cardiac Coronary Angiogram;  Surgeon: Jameel Gutiérrez DO;  Location: [facility]; Service: Cardiology    CARDIAC CATHETERIZATION  08/02/2023    Procedure: Cardiac catheterization;  Surgeon: Jameel Gutiérrez DO;  Location: [facility]; Service: Cardiology    HERNIA REPAIR      LA CORONARY ARTERY BYP W/VEIN & ARTERY GRAFT 4 VEIN N/A 8/11/2023    Procedure: CORONARY ARTERY BYPASS GRAFT (CABG) X-5 VESSELS ; SVG to PDA, Ramus, Diagonal and OM. LIMA --> LAD EVH  W/ JOHN;  Surgeon: Saira Brown MD;  Location: [facility];  Service: Cardiac Surgery    VASECTOMY         Meds/Allergies:  Prior to Admission medications    Medication Sig Start Date End Date Taking?  Authorizing Provider   acetaminophen (TYLENOL) 325 mg tablet Take 1-2 tablets Q4-6 hours PRN pain. Do not take more than 4grams in 24 hours. 8/14/23   Claudia Ray PA-C   aspirin 325 mg tablet Take 1 tablet (325 mg total) by mouth daily 8/15/23   Angi Jimenez PA-C   cholecalciferol (VITAMIN D3) 25 mcg (1,000 units) tablet Take 1,000 Units by mouth 2 (two) times a day    Historical Provider, MD   Continuous Blood Gluc Sensor (FreeStyle Ayanna 2 Sensor) MISC APPLY AND CHANGE EVERY 14 DAYS 7/26/23   Historical Provider, MD   docusate sodium (COLACE) 100 mg capsule Take 1 capsule (100 mg total) by mouth 2 (two) times a day Hold for soft stools. 8/14/23 10/27/23  Claudia Ray PA-C   gabapentin (NEURONTIN) 100 mg capsule TAKE ONE CAPSULE DAILY IN THE MORNING, 3 CAPSULES IN THE EVENING 3/9/18   Historical Provider, MD   glipiZIDE (GLUCOTROL XL) 5 mg 24 hr tablet Take 1 tablet (5 mg total) by mouth daily  Patient not taking: Reported on 10/27/2023 8/30/23   Bharati Alicea MD   Jardiance 25 MG TABS Take 25 mg by mouth in the morning 7/26/23   Historical Provider, MD   LORazepam (ATIVAN) 0.5 mg tablet Take 0.5 mg by mouth daily at bedtime 3/26/18   Historical Provider, MD   Magnesium Gluconate 250 MG TABS Take by mouth daily at bedtime    Historical Provider, MD   metFORMIN (GLUCOPHAGE-XR) 500 mg 24 hr tablet Take 2 tablets (1,000 mg total) by mouth 2 (two) times a day Do not start before August 3, 2023. 8/3/23   MARLENY Herring   metoprolol tartrate (LOPRESSOR) 25 mg tablet Take 0.5 tablets (12.5 mg total) by mouth every 12 (twelve) hours 8/14/23   Claudia Ray PA-C   ondansetron (ZOFRAN) 4 mg tablet Take 1 tablet (4 mg total) by mouth every 8 (eight) hours as needed for nausea or vomiting 8/18/23   Shaneka Peters PA-C   oxyCODONE (ROXICODONE) 5 immediate release tablet Take 1/2-1 tablet Q4-6 hours PRN pain.   Patient not taking: Reported on 8/22/2023 8/14/23   Claudia Ray PA-C   Ozempic, 2 MG/DOSE, 8 MG/3ML injection pen INJECT 2MG UNDER THE SKIN ONCE A WEEK 6/12/23   Historical Provider, MD   pantoprazole (PROTONIX) 40 mg tablet Take 1 tablet (40 mg total) by mouth daily in the early morning Do not start before August 15, 2023. Patient not taking: Reported on 9/14/2023 8/15/23 9/14/23  Claudia Ray PA-C   polyethylene glycol (MIRALAX) 17 g packet Take 17 g by mouth daily Hold for soft stools. Do not start before August 15, 2023. 8/15/23 10/27/23  Claudia Ray PA-C   rosuvastatin (CRESTOR) 20 MG tablet Take 2 tablets (40 mg total) by mouth daily 10/27/23   Mariano Offer, DO   sertraline (Zoloft) 50 mg tablet Take 1 tablet (50 mg total) by mouth daily 7/27/23   Mariano Offer, DO   tadalafil (CIALIS) 20 MG tablet Take 1 tablet (20 mg total) by mouth daily as needed for erectile dysfunction 11/28/23   Mariano Offer, DO   torsemide (DEMADEX) 20 mg tablet TAKE ONE TABLET BY MOUTH EVERY MORNING FOR 10 DAYS UNLESS OTHERWISE DIRECTED  Patient not taking: Reported on 8/30/2023 8/15/23   Historical Provider, MD     I have reviewed home medications with patient personally. Allergies:    Allergies   Allergen Reactions    Atorvastatin Other (See Comments)     felt flu-like    Rosiglitazone Other (See Comments)    Trazodone Other (See Comments)     Felt "hung over"       Social History:  Marital Status: /Civil Union   Occupation: n/a  Patient Pre-hospital Living Situation: Home  Patient Pre-hospital Level of Mobility: walks  Patient Pre-hospital Diet Restrictions: no red meat  Substance Use History:   Social History     Substance and Sexual Activity   Alcohol Use No     Social History     Tobacco Use   Smoking Status Never   Smokeless Tobacco Never     Social History     Substance and Sexual Activity   Drug Use No       Family History:  Family History   Problem Relation Age of Onset    Leukemia Mother     Heart attack Father 46       Physical Exam:     Vitals:   Blood Pressure: 112/58 (12/05/23 1900)  Pulse: 79 (12/05/23 1900)  Temperature: 98.6 °F (37 °C) (12/05/23 1650)  Temp Source: Oral (12/05/23 1650)  Respirations: 18 (12/05/23 1900)  SpO2: 97 % (12/05/23 1900)    Physical Exam  Vitals and nursing note reviewed. Constitutional:       General: He is not in acute distress. Appearance: Normal appearance. He is well-developed. HENT:      Head: Normocephalic and atraumatic. Right Ear: External ear normal.      Left Ear: External ear normal.      Nose: Nose normal. No congestion. Mouth/Throat:      Mouth: Mucous membranes are moist.      Pharynx: Oropharynx is clear. Eyes:      Conjunctiva/sclera: Conjunctivae normal.   Cardiovascular:      Rate and Rhythm: Normal rate and regular rhythm. Heart sounds: No murmur heard. Pulmonary:      Effort: Pulmonary effort is normal. No respiratory distress. Breath sounds: Normal breath sounds. Abdominal:      General: There is no distension. Palpations: Abdomen is soft. Tenderness: There is no abdominal tenderness. Musculoskeletal:         General: No swelling or tenderness. Cervical back: Normal range of motion and neck supple. Skin:     General: Skin is warm and dry. Neurological:      Mental Status: He is alert and oriented to person, place, and time.    Psychiatric:         Mood and Affect: Mood normal.          Additional Data:     Lab Results:  Results from last 7 days   Lab Units 12/05/23  1709   WBC Thousand/uL 9.75   HEMOGLOBIN g/dL 13.4   HEMATOCRIT % 43.9   PLATELETS Thousands/uL 246   NEUTROS PCT % 50   LYMPHS PCT % 35   MONOS PCT % 9   EOS PCT % 5     Results from last 7 days   Lab Units 12/05/23  1709   SODIUM mmol/L 135   POTASSIUM mmol/L 4.7   CHLORIDE mmol/L 100   CO2 mmol/L 23   BUN mg/dL 24   CREATININE mg/dL 1.04   ANION GAP mmol/L 12   CALCIUM mg/dL 9.7   ALBUMIN g/dL 4.5   TOTAL BILIRUBIN mg/dL 0.87   ALK PHOS U/L 71   ALT U/L 22   AST U/L 24   GLUCOSE RANDOM mg/dL 129                       Lines/Drains:  Invasive Devices       Peripheral Intravenous Line  Duration Peripheral IV 12/05/23 Left Antecubital <1 day                        Imaging: Personally reviewed the following imaging: chest xray  X-ray chest 1 view portable   ED Interpretation by Marcelle Montenegro MD (12/05 1750)   Interpreted by ED Resident: No evidence of pneumothorax, pneumonia or pleural effusions. No cardiomegaly          EKG and Other Studies Reviewed on Admission:   EKG:  Sinus tachycardia with 1st degree A-V block. . ** Please Note: This note has been constructed using a voice recognition system.  **

## 2023-12-06 ENCOUNTER — APPOINTMENT (OUTPATIENT)
Dept: NON INVASIVE DIAGNOSTICS | Facility: HOSPITAL | Age: 63
DRG: 310 | End: 2023-12-06
Payer: COMMERCIAL

## 2023-12-06 LAB
ANION GAP SERPL CALCULATED.3IONS-SCNC: 7 MMOL/L
AORTIC ROOT: 3.3 CM
APICAL FOUR CHAMBER EJECTION FRACTION: 67 %
ATRIAL RATE: 106 BPM
ATRIAL RATE: 78 BPM
ATRIAL RATE: 78 BPM
BUN SERPL-MCNC: 21 MG/DL (ref 5–25)
CALCIUM SERPL-MCNC: 9.4 MG/DL (ref 8.4–10.2)
CHLORIDE SERPL-SCNC: 103 MMOL/L (ref 96–108)
CHOLEST SERPL-MCNC: 90 MG/DL
CO2 SERPL-SCNC: 28 MMOL/L (ref 21–32)
CREAT SERPL-MCNC: 0.97 MG/DL (ref 0.6–1.3)
ERYTHROCYTE [DISTWIDTH] IN BLOOD BY AUTOMATED COUNT: 13.6 % (ref 11.6–15.1)
EST. AVERAGE GLUCOSE BLD GHB EST-MCNC: 151 MG/DL
FRACTIONAL SHORTENING: 29 (ref 28–44)
GFR SERPL CREATININE-BSD FRML MDRD: 82 ML/MIN/1.73SQ M
GLUCOSE SERPL-MCNC: 121 MG/DL (ref 65–140)
GLUCOSE SERPL-MCNC: 122 MG/DL (ref 65–140)
GLUCOSE SERPL-MCNC: 154 MG/DL (ref 65–140)
GLUCOSE SERPL-MCNC: 159 MG/DL (ref 65–140)
GLUCOSE SERPL-MCNC: 167 MG/DL (ref 65–140)
GLUCOSE SERPL-MCNC: 96 MG/DL (ref 65–140)
HBA1C MFR BLD: 6.9 %
HCT VFR BLD AUTO: 42.4 % (ref 36.5–49.3)
HDLC SERPL-MCNC: 39 MG/DL
HGB BLD-MCNC: 13.3 G/DL (ref 12–17)
INTERVENTRICULAR SEPTUM IN DIASTOLE (PARASTERNAL SHORT AXIS VIEW): 1.3 CM
INTERVENTRICULAR SEPTUM: 1.3 CM (ref 0.6–1.1)
LAAS-AP2: 17 CM2
LAAS-AP4: 15.8 CM2
LDLC SERPL CALC-MCNC: 31 MG/DL (ref 0–100)
LEFT ATRIUM AREA SYSTOLE SINGLE PLANE A4C: 15.9 CM2
LEFT ATRIUM SIZE: 4.3 CM
LEFT ATRIUM VOLUME (MOD BIPLANE): 46 ML
LEFT ATRIUM VOLUME INDEX (MOD BIPLANE): 21.6 ML/M2
LEFT INTERNAL DIMENSION IN SYSTOLE: 2.7 CM (ref 2.1–4)
LEFT VENTRICULAR INTERNAL DIMENSION IN DIASTOLE: 3.8 CM (ref 3.5–6)
LEFT VENTRICULAR POSTERIOR WALL IN END DIASTOLE: 1.3 CM
LEFT VENTRICULAR STROKE VOLUME: 35 ML
LVSV (TEICH): 35 ML
MAGNESIUM SERPL-MCNC: 2.1 MG/DL (ref 1.9–2.7)
MCH RBC QN AUTO: 27.2 PG (ref 26.8–34.3)
MCHC RBC AUTO-ENTMCNC: 31.4 G/DL (ref 31.4–37.4)
MCV RBC AUTO: 87 FL (ref 82–98)
P AXIS: 20 DEGREES
P AXIS: 21 DEGREES
P AXIS: 21 DEGREES
PLATELET # BLD AUTO: 215 THOUSANDS/UL (ref 149–390)
PMV BLD AUTO: 9.9 FL (ref 8.9–12.7)
POTASSIUM SERPL-SCNC: 4.1 MMOL/L (ref 3.5–5.3)
PR INTERVAL: 206 MS
PR INTERVAL: 210 MS
PR INTERVAL: 224 MS
QRS AXIS: 35 DEGREES
QRS AXIS: 40 DEGREES
QRS AXIS: 82 DEGREES
QRSD INTERVAL: 100 MS
QRSD INTERVAL: 88 MS
QRSD INTERVAL: 92 MS
QT INTERVAL: 330 MS
QT INTERVAL: 386 MS
QT INTERVAL: 392 MS
QTC INTERVAL: 438 MS
QTC INTERVAL: 440 MS
QTC INTERVAL: 446 MS
RBC # BLD AUTO: 4.89 MILLION/UL (ref 3.88–5.62)
RIGHT ATRIUM AREA SYSTOLE A4C: 12.4 CM2
RIGHT VENTRICLE ID DIMENSION: 3.4 CM
SL CV LEFT ATRIUM LENGTH A2C: 5.4 CM
SL CV LV EF: 60
SL CV PED ECHO LEFT VENTRICLE DIASTOLIC VOLUME (MOD BIPLANE) 2D: 63 ML
SL CV PED ECHO LEFT VENTRICLE SYSTOLIC VOLUME (MOD BIPLANE) 2D: 27 ML
SODIUM SERPL-SCNC: 138 MMOL/L (ref 135–147)
T WAVE AXIS: 25 DEGREES
T WAVE AXIS: 37 DEGREES
T WAVE AXIS: 48 DEGREES
TRIGL SERPL-MCNC: 99 MG/DL
VENTRICULAR RATE: 106 BPM
VENTRICULAR RATE: 78 BPM
VENTRICULAR RATE: 78 BPM
WBC # BLD AUTO: 8.76 THOUSAND/UL (ref 4.31–10.16)

## 2023-12-06 PROCEDURE — 93325 DOPPLER ECHO COLOR FLOW MAPG: CPT

## 2023-12-06 PROCEDURE — 85027 COMPLETE CBC AUTOMATED: CPT

## 2023-12-06 PROCEDURE — 82948 REAGENT STRIP/BLOOD GLUCOSE: CPT

## 2023-12-06 PROCEDURE — 93321 DOPPLER ECHO F-UP/LMTD STD: CPT

## 2023-12-06 PROCEDURE — 93321 DOPPLER ECHO F-UP/LMTD STD: CPT | Performed by: INTERNAL MEDICINE

## 2023-12-06 PROCEDURE — 80061 LIPID PANEL: CPT | Performed by: NURSE PRACTITIONER

## 2023-12-06 PROCEDURE — 99232 SBSQ HOSP IP/OBS MODERATE 35: CPT | Performed by: INTERNAL MEDICINE

## 2023-12-06 PROCEDURE — 93010 ELECTROCARDIOGRAM REPORT: CPT | Performed by: INTERNAL MEDICINE

## 2023-12-06 PROCEDURE — 93325 DOPPLER ECHO COLOR FLOW MAPG: CPT | Performed by: INTERNAL MEDICINE

## 2023-12-06 PROCEDURE — 93308 TTE F-UP OR LMTD: CPT

## 2023-12-06 PROCEDURE — 93005 ELECTROCARDIOGRAM TRACING: CPT

## 2023-12-06 PROCEDURE — 36415 COLL VENOUS BLD VENIPUNCTURE: CPT

## 2023-12-06 PROCEDURE — 83735 ASSAY OF MAGNESIUM: CPT

## 2023-12-06 PROCEDURE — 80048 BASIC METABOLIC PNL TOTAL CA: CPT

## 2023-12-06 PROCEDURE — 99222 1ST HOSP IP/OBS MODERATE 55: CPT | Performed by: INTERNAL MEDICINE

## 2023-12-06 PROCEDURE — 93308 TTE F-UP OR LMTD: CPT | Performed by: INTERNAL MEDICINE

## 2023-12-06 RX ORDER — DOFETILIDE 0.5 MG/1
500 CAPSULE ORAL EVERY 12 HOURS SCHEDULED
Status: DISCONTINUED | OUTPATIENT
Start: 2023-12-06 | End: 2023-12-08 | Stop reason: HOSPADM

## 2023-12-06 RX ORDER — DOFETILIDE 0.5 MG/1
500 CAPSULE ORAL EVERY 12 HOURS SCHEDULED
Qty: 60 CAPSULE | Refills: 1 | Status: SHIPPED | OUTPATIENT
Start: 2023-12-06

## 2023-12-06 RX ADMIN — DOCUSATE SODIUM 100 MG: 100 CAPSULE, LIQUID FILLED ORAL at 09:28

## 2023-12-06 RX ADMIN — ENOXAPARIN SODIUM 40 MG: 40 INJECTION SUBCUTANEOUS at 09:28

## 2023-12-06 RX ADMIN — METOPROLOL TARTRATE 25 MG: 25 TABLET, FILM COATED ORAL at 21:28

## 2023-12-06 RX ADMIN — DOCUSATE SODIUM 100 MG: 100 CAPSULE, LIQUID FILLED ORAL at 17:48

## 2023-12-06 RX ADMIN — DOFETILIDE 500 MCG: 0.5 CAPSULE ORAL at 16:01

## 2023-12-06 RX ADMIN — INSULIN LISPRO 1 UNITS: 100 INJECTION, SOLUTION INTRAVENOUS; SUBCUTANEOUS at 21:31

## 2023-12-06 RX ADMIN — GABAPENTIN 100 MG: 100 CAPSULE ORAL at 09:28

## 2023-12-06 RX ADMIN — GABAPENTIN 300 MG: 300 CAPSULE ORAL at 21:28

## 2023-12-06 RX ADMIN — PRAVASTATIN SODIUM 80 MG: 80 TABLET ORAL at 15:55

## 2023-12-06 RX ADMIN — SERTRALINE HYDROCHLORIDE 50 MG: 50 TABLET ORAL at 09:28

## 2023-12-06 RX ADMIN — ASPIRIN 325 MG ORAL TABLET 325 MG: 325 PILL ORAL at 09:28

## 2023-12-06 RX ADMIN — INSULIN LISPRO 1 UNITS: 100 INJECTION, SOLUTION INTRAVENOUS; SUBCUTANEOUS at 17:48

## 2023-12-06 RX ADMIN — LORAZEPAM 0.5 MG: 0.5 TABLET ORAL at 21:28

## 2023-12-06 RX ADMIN — METOPROLOL TARTRATE 12.5 MG: 25 TABLET, FILM COATED ORAL at 09:28

## 2023-12-06 NOTE — CONSULTS
Consultation - Cardiology Team One  Alexey Malik 61 y.o. male MRN: [id number]  Unit/Bed#: ED-19 Encounter: [id number]    Inpatient consult to Cardiology  Consult performed by: MARLENY Avila  Consult ordered by: Latricia Albright MD        Physician Requesting Consult: Karen Diego MD  Reason for Consult / Principal Problem: WCT    Assessment    VT  WCT noted while on NuStep during cardiac rehab  Review of strips shows PVC prior to onset; likely VT  Asymptomatic, BP stable   ECG in ED showed sinus tachycardia with 1st degree AV block. Tele without events overnight  K 4.7, Mg 2.1, TSH WNL  No evidence of heart failure, no anginal symptoms reported    CAD s/p CABG x 5 08/2023 (LIMA to LAD, SVG to D1, Y graft to ramus and OM1, and SVG to RPDA)  [facility] 08/2023 (performed after abnormal nuclear stress test)- pLAD 70%, mLAD 80%, distal LAD 90%, D1 90%, D2 80% (small vessel), ramus 80%, mid to distal LCx 90%, occluded RCA with collateral flow to RPDA  Doing well at cardiac rehab, has been increasing activity level/exercise  On ASA, statin, and beta blocker    HTN- controlled on metoprolol 12.5 mg BID, avg /65    HLD- , HDL 42,  in June 2023. On rosuvastatin 40 mg daily at home. Substituted with pravastatin 40 mg daily in hospital.    Type 2 DM- A1c 6.9%    Plan  Check limited echo  Increase metoprolol tartrate to 25 mg BID  Consider short term use of amiodarone  Will likely need ischemic evaluation, possible ICD- plan to be discussed with EP and attending cardiologist  Continue to monitor on telemetry  Repeat BMP and Mg in the morning     History of Present Illness   HPI: Alexey Malik is a 61y.o. year old male with CAD s/p CABG x 5 08/2023 with LIMA to LAD, SVG to D1, SVG Y graft to ramus and OM1, and SVG to RPDA, HTN, HLD, carotid artery stenosis, and type 2 DM. He follows with cardiologist Dr. Martha Alcaraz and was last seen in the office in October at which time he was doing well post operatively.  He has been going to cardiac rehab and yesterday went into a wide complex tachycardia while exercising. A medical emergency was called and he was transported to the ED. ECG in the ED shows sinus tachycardia with first degree AV block. Review of cardiac rehab strips show onset of WCT preceded by a PVC so this is likely VT. Labs in the ED showed normal renal function, potassium of 4.7, magnesium 2.1, and normal TSH. Troponins were negative. BP has been well-controlled and he is maintaining adequate O2 sats on room air. Cardiology consulted for further evaluation of his wide complex tachycardia. He has been doing well from a cardiac standpoint post operatively and has been tolerating cardiac rehab well. He recently increased the weights he was using (after getting the OK from rehab staff). Compliant with his medications. He denies any symptoms yesterday afternoon other than fatigue from lifting the heavier weights. He denies chest pain, diaphoresis, nausea, lightheadedness, or SOB. EKG reviewed personally: Sinus tachycardia with 1st degree AV block. Telemetry reviewed personally: Sinus tachycardia with 1st degree AV block    Review of Systems   Constitutional: Negative for chills, decreased appetite, diaphoresis, malaise/fatigue and weight gain. Cardiovascular:  Negative for chest pain, dyspnea on exertion, leg swelling, orthopnea, palpitations and syncope. Respiratory:  Negative for cough, shortness of breath, sleep disturbances due to breathing and sputum production. Endocrine: Negative. Hematologic/Lymphatic: Negative. Gastrointestinal:  Negative for bloating, nausea and vomiting. Genitourinary: Negative. Neurological:  Negative for dizziness, light-headedness and weakness. Psychiatric/Behavioral:  Negative for altered mental status. All other systems reviewed and are negative.     Historical Information   Past Medical History:   Diagnosis Date    Coronary artery disease     Diabetes mellitus ([address])     Hyperlipidemia     Hypertension      Past Surgical History:   Procedure Laterality Date    ARTERIAL ANEURYSM REPAIR      Right Carotid Aneurysm - age 13. CARDIAC CATHETERIZATION Left 08/02/2023    Procedure: Cardiac Left Heart Cath;  Surgeon: Jesse Quiles DO;  Location: [facility]; Service: Cardiology    CARDIAC CATHETERIZATION N/A 08/02/2023    Procedure: Cardiac Coronary Angiogram;  Surgeon: Jesse Quiles DO;  Location: [facility]; Service: Cardiology    CARDIAC CATHETERIZATION  08/02/2023    Procedure: Cardiac catheterization;  Surgeon: Jesse Quiles DO;  Location: [facility]; Service: Cardiology    HERNIA REPAIR      IN CORONARY ARTERY BYP W/VEIN & ARTERY GRAFT 4 VEIN N/A 8/11/2023    Procedure: CORONARY ARTERY BYPASS GRAFT (CABG) X-5 VESSELS ; SVG to PDA, Ramus, Diagonal and OM.  LIMA --> LAD EVH  W/ JOHN;  Surgeon: Pratima Cherry MD;  Location: [facility];  Service: Cardiac Surgery    VASECTOMY       Social History     Substance and Sexual Activity   Alcohol Use No     Social History     Substance and Sexual Activity   Drug Use No     Social History     Tobacco Use   Smoking Status Never   Smokeless Tobacco Never     Family History:   Family History   Problem Relation Age of Onset    Leukemia Mother     Heart attack Father 46       Meds/Allergies   all current active meds have been reviewed and current meds:   Current Facility-Administered Medications   Medication Dose Route Frequency    acetaminophen (TYLENOL) tablet 650 mg  650 mg Oral Q6H PRN    aspirin tablet 325 mg  325 mg Oral Daily    docusate sodium (COLACE) capsule 100 mg  100 mg Oral BID    enoxaparin (LOVENOX) subcutaneous injection 40 mg  40 mg Subcutaneous Daily    gabapentin (NEURONTIN) capsule 100 mg  100 mg Oral Daily    gabapentin (NEURONTIN) capsule 300 mg  300 mg Oral HS    insulin lispro (HumaLOG) 100 units/mL subcutaneous injection 1-6 Units  1-6 Units Subcutaneous 4x Daily (AC & HS)    LORazepam (ATIVAN) tablet 0.5 mg  0.5 mg Oral HS    metoprolol tartrate (LOPRESSOR) tablet 25 mg  25 mg Oral Q12H BECKY    ondansetron (ZOFRAN-ODT) dispersible tablet 4 mg  4 mg Oral Q6H PRN    pravastatin (PRAVACHOL) tablet 80 mg  80 mg Oral Daily With Dinner    sertraline (ZOLOFT) tablet 50 mg  50 mg Oral Daily    sodium chloride (PF) 0.9 % injection 3 mL  3 mL Intravenous Q1H PRN          Allergies   Allergen Reactions    Atorvastatin Other (See Comments)     felt flu-like    Rosiglitazone Other (See Comments)    Trazodone Other (See Comments)     Felt "hung over"       Objective   Vitals: Blood pressure 109/65, pulse 87, temperature 98.6 °F (37 °C), temperature source Oral, resp. rate 18, SpO2 94 %. , There is no height or weight on file to calculate BMI.,     Systolic (24FLV), AAB:635 , Min:109 , BA     Diastolic (71QHH), ZAMBRANO:56, Min:58, Max:74      No intake or output data in the 24 hours ending 23 0836  Wt Readings from Last 3 Encounters:   10/27/23 92.9 kg (204 lb 11.2 oz)   23 97.1 kg (214 lb)   23 99.4 kg (219 lb 3.2 oz)     Invasive Devices       Peripheral Intravenous Line  Duration             Peripheral IV 23 Left Antecubital <1 day                    Physical Exam  Vitals reviewed. Constitutional:       General: He is not in acute distress. Neck:      Vascular: No hepatojugular reflux or JVD. Cardiovascular:      Rate and Rhythm: Regular rhythm. Tachycardia present. Heart sounds: Normal heart sounds. No murmur heard. No friction rub. No gallop. Pulmonary:      Effort: Pulmonary effort is normal. No respiratory distress. Breath sounds: No rales. Comments: Clear, room air  Abdominal:      General: Bowel sounds are normal. There is no distension. Palpations: Abdomen is soft. Tenderness: There is no abdominal tenderness. Musculoskeletal:         General: No tenderness. Normal range of motion.       Cervical back: Neck supple. Right lower leg: No edema. Left lower leg: No edema. Skin:     General: Skin is warm and dry. Capillary Refill: Capillary refill takes less than 2 seconds. Findings: No erythema. Neurological:      Mental Status: He is alert and oriented to person, place, and time.    Psychiatric:         Mood and Affect: Mood normal.         LABORATORY RESULTS:      CBC with diff:   Results from last 7 days   Lab Units 12/06/23 0443 12/05/23 2152 12/05/23  1709   WBC Thousand/uL 8.76  --  9.75   HEMOGLOBIN g/dL 13.3  --  13.4   HEMATOCRIT % 42.4  --  43.9   MCV fL 87  --  88   PLATELETS Thousands/uL 215 238 246   RBC Million/uL 4.89  --  5.00   MCH pg 27.2  --  26.8   MCHC g/dL 31.4  --  30.5*   RDW % 13.6  --  13.7   MPV fL 9.9 10.9 10.5   NRBC AUTO /100 WBCs  --   --  0       CMP:  Results from last 7 days   Lab Units 12/06/23  0443 12/05/23  1709   POTASSIUM mmol/L 4.1 4.7   CHLORIDE mmol/L 103 100   CO2 mmol/L 28 23   BUN mg/dL 21 24   CREATININE mg/dL 0.97 1.04   CALCIUM mg/dL 9.4 9.7   AST U/L  --  24   ALT U/L  --  22   ALK PHOS U/L  --  71   EGFR ml/min/1.73sq m 82 76       BMP:  Results from last 7 days   Lab Units 12/06/23 0443 12/05/23  1709   POTASSIUM mmol/L 4.1 4.7   CHLORIDE mmol/L 103 100   CO2 mmol/L 28 23   BUN mg/dL 21 24   CREATININE mg/dL 0.97 1.04   CALCIUM mg/dL 9.4 9.7       Lab Results   Component Value Date    CREATININE 0.97 12/06/2023    CREATININE 1.04 12/05/2023    CREATININE 1.14 08/14/2023       No results found for: "NTBNP"       Results from last 7 days   Lab Units 12/06/23  0443 12/05/23  1709   MAGNESIUM mg/dL 2.1 2.1          Results from last 7 days   Lab Units 12/05/23  1709   HEMOGLOBIN A1C % 6.9*          Results from last 7 days   Lab Units 12/05/23  1709   TSH 3RD GENERATON uIU/mL 1.814           Lipid Profile:   No results found for: "CHOL"  No results found for: "HDL"  No results found for: "LDLCALC"  No results found for: "TRIG"    Cardiac testing:   No results found for this or any previous visit. No results found for this or any previous visit. No valid procedures specified. Results for orders placed during the hospital encounter of 23    NM myocardial perfusion spect (stress and/or rest)    Interpretation Summary    Stress ECG: A Johnny protocol stress test was performed. The patient reached stage 2.0 of the protocol after exercising for 7 min and 17 sec and had a maximal HR of 139 bpm (88 % of MPHR) and 7.0 METS. The patient experienced non-limiting angina during the test. The test was stopped because the patient experienced dyspnea. The patient achieved the target heart rate. The patient reported dyspnea during the stress test. Symptoms began during stress and ended during recovery. Blood pressure demonstrated a blunted response and heart rate demonstrated a normal response to stress. The patient's heart rate recovery was normal.    Stress EC.0 mm of ST elevation (aVR) and Horizontal ST depression of 2.0 mm (II, III, aVF, V5 and V6) is noted. Improvement of ST depression within 2 minutes of recovery. However, there are still persistent ST-T changes even after 4 minutes into recovery. Arrhythmias during stress: rare PVCs. There were no arrhythmias during recovery. The ECG was positive for ischemia. The stress ECG is consistent with ischemia after maximal exercise, with reproduction of symptoms. Perfusion Defect Conclusion: The stress/rest perfusion ratio is 1.11 . There is no evidence of transient ischemic dilation (TID). Stress Function: Left ventricular function post-stress is normal. Stress ejection fraction is 55 %. There is a defect in the mid to apical inferior location(s). The defect has mildly reduced function. Stress Combined Conclusion: The ECG and SPECT imaging portions of the stress study are concordant with findings concerning for stress induced myocardial ischemia.     Perfusion: There is a left ventricular perfusion defect that is large in size with severe reduction in uptake present in the entire inferior location(s) that is reversible suggestive of inferior wall ischemia. Abnormal study  Exercise 7 minutes with blunted BP response. Appropriate HR response and he met 85% of his [facility]  He did develop chest pain with exercise  1.0 mm of ST elevation (aVR) and Horizontal ST depression of 2.0 mm (II, III, aVF, V5 and V6) is noted. Improvement of ST depression within 2 minutes of recovery. However, there are still persistent ST-T changes even after 4 minutes into recovery. Minimal Ectopy  There is a left ventricular perfusion defect that is large in size with severe reduction in uptake present in the entire inferior location(s) that is reversible suggestive of inferior wall ischemia. Results were discussed with the patient and referring provider and he will be scheduled for coronary angiography. EF normal at 55% but mid-apical inferior wall motion  No TID      Imaging: I have personally reviewed pertinent reports. and I have personally reviewed pertinent films in PACS    Thank you for allowing us to participate in this patient's care. This pt will follow up with          once discharged. Counseling / Coordination of Care  Total floor / unit time spent today 45 minutes. Greater than 50% of total time was spent with the patient and / or family counseling and / or coordination of care. A description of the counseling / coordination of care: Review of history, current assessment, development of a plan. Code Status: Level 1 - Full Code    ** Please Note: Dragon 360 Dictation voice to text software may have been used in the creation of this document.  **

## 2023-12-06 NOTE — ASSESSMENT & PLAN NOTE
While exercising in cardiac rehab earlier today, an emergency was called for the patient as the cardiac monitor revealed him to be in wide-complex tachycardia. Patient stated he remained asymptomatic, was only tired because he was lifting heavier weights. Denied any shortness of breath, chest pains, irregular heartbeats, nausea, vomiting, dizziness or lightheadedness. In the ED, EKG showed sinus tachycardia with first-degree block. Labs and chest x-ray were unremarkable. Cardiologist recommended monitoring patient overnight.     Plan:  -Cardiology consult placed  -24-hour telemetry  -Monitor for any symptoms of chest pains, shortness of breath, regular heartbeats

## 2023-12-06 NOTE — PROGRESS NOTES
[address] [facility]  Progress Note  Name: Sony Lucero  MRN: [id number]  Unit/Bed#: S -01 I Date of Admission: 12/5/2023   Date of Service: 12/6/2023 I Hospital Day: 0    Assessment/Plan   * Wide-complex tachycardia  Assessment & Plan  While exercising in cardiac rehab, an emergency was called for the patient as the cardiac monitor revealed him to be in wide-complex tachycardia. Patient stated he remained asymptomatic, was only tired because he was lifting heavier weights. Denied any shortness of breath, chest pains, irregular heartbeats, nausea, vomiting, dizziness or lightheadedness. In the ED, EKG showed sinus tachycardia with first-degree block. Labs and chest x-ray were unremarkable. Cardiologist recommended monitoring patient overnight.    -No events on telemetry overnight, currently in NSR  -States that he experiences brief intermittent chest flutters but is completely asymptomatic otherwise. Plan:  -Cardiology recommends patient be placed on Tikosyn which requires monitoring of Qtc  -24-hour telemetry  -Monitor for any symptoms of chest pains, shortness of breath, irregular heartbeats    Diabetic polyneuropathy associated with type 2 diabetes mellitus [facility])  Assessment & Plan  Lab Results   Component Value Date    HGBA1C 6.9 (H) 12/05/2023       Recent Labs     12/05/23 2047 12/06/23  0743   POCGLU 93 121       Blood Sugar Average: Last 72 hrs:  (P) 107Continue gabapentin 100 mg in the morning and 300 mg at night. Depressive disorder  Assessment & Plan  Continue Zoloft 50 mg daily  Ativan 1.5 mg daily at bedtime for sleep    S/P CABG x 5  Assessment & Plan  Patient is status post CABG x5 back in August 2023. Patient has been attending cardiac rehab, and states that this is the best he is ever felt. He is able to walk over a mile and a half each day, and has remained very active at home.   Patient is experienced no other symptoms of concerns after the surgery, the only event to occur was in his most recent cardiac rehab session with the wide-complex tachycardia.  -Continue aspirin 325 mg daily      Hyperlipidemia LDL goal <100  Assessment & Plan  Continue home statin regimen    Type 2 diabetes mellitus with hyperglycemia, without long-term current use of insulin [facility])  Assessment & Plan  Lab Results   Component Value Date    HGBA1C 6.9 (H) 2023       Recent Labs     23  0743   POCGLU 93 121       Blood Sugar Average: Last 72 hrs:  (P) 107Hold home regimen of Ozempic, Jardiance, and metformin.  -Start sliding scale insulin while inpatient      HTN (hypertension), benign  Assessment & Plan  Continue 12.5 milligrams of Lopressor twice daily             VTE Pharmacologic Prophylaxis: VTE Score: 3 Moderate Risk (Score 3-4) - Pharmacological DVT Prophylaxis Ordered: enoxaparin (Lovenox). Mobility:   Basic Mobility Inpatient Raw Score: 24  JH-HLM Goal: 8: Walk 250 feet or more  JH-HLM Achieved: 7: Walk 25 feet or more  HLM Goal achieved. Continue to encourage appropriate mobility. Patient Centered Rounds: I performed bedside rounds with nursing staff today. Discussions with Specialists or Other Care Team Provider: Cardiology, EP    Education and Discussions with Family / Patient: Updated  (wife) at bedside. Current Length of Stay: 0 day(s)  Current Patient Status: Observation   Discharge Plan: Anticipate discharge in 24-48 hrs to home. Code Status: Level 1 - Full Code    Subjective:   No acute events overnight. Patient displeased with being in the ED overnight. Denies headache, chest pain, SOB, palpitations, light headedness, abd pain, n/v/d.      Objective:     Vitals:   Temp (24hrs), Av.6 °F (37 °C), Min:98.6 °F (37 °C), Max:98.6 °F (37 °C)    Temp:  [98.6 °F (37 °C)] 98.6 °F (37 °C)  HR:  [] 78  Resp:  [18] 18  BP: (109-138)/(58-74) 114/64  SpO2:  [93 %-98 %] 95 %  There is no height or weight on file to calculate BMI.     Input and Output Summary (last 24 hours):   No intake or output data in the 24 hours ending 12/06/23 1526    Physical Exam:   Physical Exam  Vitals and nursing note reviewed. Constitutional:       General: He is not in acute distress. Appearance: Normal appearance. He is well-developed. HENT:      Head: Normocephalic and atraumatic. Right Ear: External ear normal.      Left Ear: External ear normal.      Nose: Nose normal. No congestion. Mouth/Throat:      Mouth: Mucous membranes are moist.      Pharynx: Oropharynx is clear. Eyes:      Conjunctiva/sclera: Conjunctivae normal.   Cardiovascular:      Rate and Rhythm: Normal rate and regular rhythm. Heart sounds: No murmur heard. Pulmonary:      Effort: Pulmonary effort is normal. No respiratory distress. Breath sounds: Normal breath sounds. Abdominal:      General: There is no distension. Palpations: Abdomen is soft. Tenderness: There is no abdominal tenderness. Musculoskeletal:         General: No swelling or tenderness. Cervical back: Normal range of motion and neck supple. Skin:     General: Skin is warm and dry. Neurological:      Mental Status: He is alert and oriented to person, place, and time. Psychiatric:         Mood and Affect: Mood normal.          Additional Data:     Labs:  Results from last 7 days   Lab Units 12/06/23  0443 12/05/23  2152 12/05/23  1709   WBC Thousand/uL 8.76  --  9.75   HEMOGLOBIN g/dL 13.3  --  13.4   HEMATOCRIT % 42.4  --  43.9   PLATELETS Thousands/uL 215   < > 246   NEUTROS PCT %  --   --  50   LYMPHS PCT %  --   --  35   MONOS PCT %  --   --  9   EOS PCT %  --   --  5    < > = values in this interval not displayed.      Results from last 7 days   Lab Units 12/06/23  0443 12/05/23  1709   SODIUM mmol/L 138 135   POTASSIUM mmol/L 4.1 4.7   CHLORIDE mmol/L 103 100   CO2 mmol/L 28 23   BUN mg/dL 21 24   CREATININE mg/dL 0.97 1.04   ANION GAP mmol/L 7 12   CALCIUM mg/dL 9.4 9.7   ALBUMIN g/dL  --  4.5   TOTAL BILIRUBIN mg/dL  --  0.87   ALK PHOS U/L  --  71   ALT U/L  --  22   AST U/L  --  24   GLUCOSE RANDOM mg/dL 96 129         Results from last 7 days   Lab Units 12/06/23  1508 12/06/23  1300 12/06/23  1113 12/06/23  0743 12/05/23  2047   POC GLUCOSE mg/dl 159* 122 167* 121 93     Results from last 7 days   Lab Units 12/05/23  1709   HEMOGLOBIN A1C % 6.9*           Lines/Drains:  Invasive Devices       Peripheral Intravenous Line  Duration             Peripheral IV 12/05/23 Left Antecubital <1 day                      Telemetry:  Telemetry Orders (From admission, onward)               24 Hour Telemetry Monitoring  (ED Bridging Orders Panel)  Continuous x 24 Hours (Telem)        Question:  Reason for 24 Hour Telemetry  Answer:  Arrhythmias requiring acute medical intervention / PPM or ICD malfunction                     Telemetry Reviewed: Normal Sinus Rhythm  Indication for Continued Telemetry Use: Arrthymias requiring medical therapy             Imaging: No pertinent imaging reviewed.     Recent Cultures (last 7 days):         Last 24 Hours Medication List:   Current Facility-Administered Medications   Medication Dose Route Frequency Provider Last Rate    acetaminophen  650 mg Oral Q6H PRN Naya Mcnulty MD      aspirin  325 mg Oral Daily Ameena Bennett MD      docusate sodium  100 mg Oral BID Naya Mcnulty MD      dofetilide  500 mcg Oral Q12H [address] [facility] Monday, MD      enoxaparin  40 mg Subcutaneous Daily Naya Mcnulty MD      gabapentin  100 mg Oral Daily Ameena Bennett MD      gabapentin  300 mg Oral HS Naya Mcnulty MD      insulin lispro  1-6 Units Subcutaneous 4x Daily (AC & HS) Naya Mcnulty MD      LORazepam  0.5 mg Oral HS Naya Mcnulty MD      metoprolol tartrate  25 mg Oral Q12H [address]  MARLENY Iniguez      ondansetron  4 mg Oral Q6H PRN Naya Mcnulty MD      perflutren lipid microsphere  0.4 mL/min Intravenous Once in imaging Stephen Birmingham MD      pravastatin  80 mg Oral Daily With Jolly Johnson MD      sertraline  50 mg Oral Daily Kavya Kumar MD      sodium chloride (PF)  3 mL Intravenous Q1H PRN Larry White DO          Today, Patient Was Seen By: Tomi Gomez DO    **Please Note: This note may have been constructed using a voice recognition system. **

## 2023-12-06 NOTE — ASSESSMENT & PLAN NOTE
Lab Results   Component Value Date    HGBA1C 7.4 (H) 06/08/2023       No results for input(s): "POCGLU" in the last 72 hours. Blood Sugar Average: Last 72 hrs:  Continue gabapentin 100 mg in the morning and 300 mg at night.

## 2023-12-06 NOTE — ASSESSMENT & PLAN NOTE
Lab Results   Component Value Date    HGBA1C 7.4 (H) 06/08/2023       No results for input(s): "POCGLU" in the last 72 hours.     Blood Sugar Average: Last 72 hrs:  Hold home regimen of Ozempic, Jardiance, and metformin.  -Start sliding scale insulin while inpatient

## 2023-12-06 NOTE — ASSESSMENT & PLAN NOTE
Lab Results   Component Value Date    HGBA1C 6.9 (H) 12/05/2023       Recent Labs     12/05/23 2047 12/06/23  0743   POCGLU 93 121       Blood Sugar Average: Last 72 hrs:  (P) 107Continue gabapentin 100 mg in the morning and 300 mg at night.

## 2023-12-06 NOTE — ASSESSMENT & PLAN NOTE
While exercising in cardiac rehab, an emergency was called for the patient as the cardiac monitor revealed him to be in wide-complex tachycardia. Patient stated he remained asymptomatic, was only tired because he was lifting heavier weights. Denied any shortness of breath, chest pains, irregular heartbeats, nausea, vomiting, dizziness or lightheadedness. In the ED, EKG showed sinus tachycardia with first-degree block. Labs and chest x-ray were unremarkable. Cardiologist recommended monitoring patient overnight.    -No events on telemetry overnight, currently in NSR  -States that he experiences brief intermittent chest flutters but is completely asymptomatic otherwise.     Plan:  -Cardiology recommends patient be placed on Tikosyn which requires monitoring of Qtc  -24-hour telemetry  -Monitor for any symptoms of chest pains, shortness of breath, irregular heartbeats

## 2023-12-06 NOTE — UTILIZATION REVIEW
Initial Clinical Review    Pt initially admitted as Observation on 12/5. Changed to Inpatient on 12/6. Pt requiring continued stay d/t initiation of Tikosyn. Admission: Date/Time/Statement:   Admission Orders (From admission, onward)       Ordered        12/06/23 1540  Inpatient Admission  Once            12/05/23 1838  Place in Observation  Once                          Orders Placed This Encounter   Procedures    Inpatient Admission     Standing Status:   Standing     Number of Occurrences:   1     Order Specific Question:   Level of Care     Answer:   Med Surg [16]     Order Specific Question:   Estimated length of stay     Answer:   More than 2 Midnights     Order Specific Question:   Certification     Answer:   I certify that inpatient services are medically necessary for this patient for a duration of greater than two midnights. See H&P and MD Progress Notes for additional information about the patient's course of treatment. ED Arrival Information       Expected   -    Arrival   12/5/2023 16:48    Acuity   Urgent              Means of arrival   Wheelchair    Escorted by   Spouse    Service   Hospitalist    Admission type   Emergency              Arrival complaint   Tachycardia             Chief Complaint   Patient presents with    Abnormal ECG     Pt was medical emergency while in cardiac rehab on exercise bike and went into "wide qrs tachycardia" pt denies cp/sob/dizziness. Hx bypass       Initial Presentation: 61 y.o. male who presented self from home to [facility] ED. Admitted in observation status for evaluation and treatment of wide-complex tachycardic. PMHx: CAD, T2DM, HLD, HTN. Presented from cardiac rehab appointment w/ wide-complex tachycardia, but remained asymptomatic. EKG showed sinus tachycardia w/ first-degree block. On exam, unremarkable.  Plan: telemetry, continue  mg daily, continue other PTA meds, SSI w/ BG checks ACHS, supportive care, Trend labs, replete electrolytes as needed. Cardiology consulted. 12/06/23 Changed to Inpatient Status: No events on telemetry overnight, in NSR. Plan: start Tikosyn w/ EKGs w/ dosing for QTc monitoring per cardiology, continue telemetry, continue current meds, SSI w/ BG checks ACHS. Trend labs, replete electrolytes as needed. Cardiology: Pt w/ probably re-entry VT. Start Tikosyn 500 mg BID. Close monitoring of QTc. Trend labs, replete electrolytes as needed for K>4, Mag>2. Telemetry for 48 hours. Check echo. Increase metoprolol tartrate to 25 mg BID. 12/07/23 Day 2: Telemetry w/ no events overnight. Plan: Continue Tikosyn 500 mcg BID, QTc 470 after second dose. Must remain in hospital for telemetry and EKG monitoring for at least 4 doses to ensure stability of QTc. Trend labs, replete electrolytes as needed. Continue metoprolol 25 mg BID. Supportive care.        ED Triage Vitals [12/05/23 1650]   Temperature Pulse Respirations Blood Pressure SpO2   98.6 °F (37 °C) (!) 107 18 138/74 98 %      Temp Source Heart Rate Source Patient Position - Orthostatic VS BP Location FiO2 (%)   Oral Monitor Sitting Right arm --      Pain Score       No Pain          Wt Readings from Last 1 Encounters:   10/27/23 92.9 kg (204 lb 11.2 oz)     Additional Vital Signs:   Date/Time Temp Pulse Resp BP MAP (mmHg) SpO2 O2 Device   12/07/23 0739 98.3 °F (36.8 °C) 69 -- 120/76 93 97 % None (Room air)   12/06/23 2127 -- 74 -- 112/61 -- -- --   12/06/23 1430 -- 78 18 114/64 83 95 % None (Room air)     Date/Time Pulse Resp BP MAP (mmHg) SpO2 O2 Device   12/06/23 0430 87 -- 109/65 83 94 % None (Room air)   12/06/23 0300 80 18 111/59 80 93 % None (Room air)   12/06/23 0258 98 18 120/62 -- 95 % None (Room air)   12/05/23 2100 80 18 124/62 87 98 % None (Room air)   12/05/23 1900 79 18 112/58 79 97 % None (Room air)   12/05/23 1700 102 -- 138/74 100 97 % --     Pertinent Labs/Diagnostic Test Results:   12/5 - EKG  Sinus tachycardia with 1st degree A-V block  Poor R Wave Progression  Nonspecific ST abnormality    12/6 - Echo    Left Ventricle: Left ventricular cavity size is normal. Wall thickness is mildly increased. There is concentric remodeling. The left ventricular ejection fraction is 60%. Systolic function is normal. Wall motion is normal.    IVS: There is abnormal septal motion consistent with post-operative status. Right Ventricle: Right ventricular cavity size is normal. Systolic function is normal.    Mitral Valve: There is mild annular calcification. 12/6 - EKG  Sinus rhythm with 1st degree A-V block  Baseline Artifact    12/6 - EKG 2  Normal sinus rhythm with 1st degree A-V block       X-ray chest 1 view portable   ED Interpretation by Curtis Coley MD (12/05 1750)   Interpreted by ED Resident: No evidence of pneumothorax, pneumonia or pleural effusions. No cardiomegaly      Final Result by Sandro Irvin MD (12/06 1059)      No acute cardiopulmonary disease.                   Workstation performed: [id number]               Results from last 7 days   Lab Units 12/07/23  0504 12/06/23  0443 12/05/23 2152 12/05/23  1709   WBC Thousand/uL 8.10 8.76  --  9.75   HEMOGLOBIN g/dL 13.0 13.3  --  13.4   HEMATOCRIT % 41.8 42.4  --  43.9   PLATELETS Thousands/uL 193 215 238 246   NEUTROS ABS Thousands/µL 4.02  --   --  4.92         Results from last 7 days   Lab Units 12/07/23  0504 12/06/23  0443 12/05/23  1709   SODIUM mmol/L 137 138 135   POTASSIUM mmol/L 3.7 4.1 4.7   CHLORIDE mmol/L 104 103 100   CO2 mmol/L 24 28 23   ANION GAP mmol/L 9 7 12   BUN mg/dL 25 21 24   CREATININE mg/dL 0.91 0.97 1.04   EGFR ml/min/1.73sq m 89 82 76   CALCIUM mg/dL 8.9 9.4 9.7   MAGNESIUM mg/dL 2.0 2.1 2.1     Results from last 7 days   Lab Units 12/05/23  1709   AST U/L 24   ALT U/L 22   ALK PHOS U/L 71   TOTAL PROTEIN g/dL 7.9   ALBUMIN g/dL 4.5   TOTAL BILIRUBIN mg/dL 0.87     Results from last 7 days   Lab Units 12/07/23  0742 12/06/23 2127 12/06/23  1508 12/06/23  1300 12/06/23  1113 12/06/23  0743 12/05/23  2047   POC GLUCOSE mg/dl 114 154* 159* 122 167* 121 93     Results from last 7 days   Lab Units 12/07/23  0504 12/06/23  0443 12/05/23  1709   GLUCOSE RANDOM mg/dL 110 96 129         Results from last 7 days   Lab Units 12/05/23  1709   HEMOGLOBIN A1C % 6.9*   EAG mg/dl 151          Results from last 7 days   Lab Units 12/05/23  2152 12/05/23  1901 12/05/23  1709   HS TNI 0HR ng/L  --   --  3   HS TNI 2HR ng/L  --  3  --    HSTNI D2 ng/L  --  0  --    HS TNI 4HR ng/L 3  --   --    HSTNI D4 ng/L 0  --   --              Results from last 7 days   Lab Units 12/05/23  1709   TSH 3RD GENERATON uIU/mL 1.814            ED Treatment:   Medication Administration from 12/05/2023 1648 to 12/06/2023 4016         Date/Time Order Dose Route Action     12/05/2023 2145 EST docusate sodium (COLACE) capsule 100 mg 100 mg Oral Given     12/05/2023 2145 EST gabapentin (NEURONTIN) capsule 300 mg 300 mg Oral Given     12/05/2023 2220 EST LORazepam (ATIVAN) tablet 0.5 mg 0.5 mg Oral Given     12/05/2023 2145 EST metoprolol tartrate (LOPRESSOR) tablet 12.5 mg 12.5 mg Oral Given          Past Medical History:   Diagnosis Date    Coronary artery disease     Diabetes mellitus ([address])     Hyperlipidemia     Hypertension      Present on Admission:   Type 2 diabetes mellitus with hyperglycemia, without long-term current use of insulin (HCC)   Hyperlipidemia LDL goal <100   Diabetic polyneuropathy associated with type 2 diabetes mellitus (HCC)   HTN (hypertension), benign   Depressive disorder      Admitting Diagnosis: Tachycardia [R00.0]  Abnormal EKG [R94.31]  S/P CABG x 5 [Z95.1]  Age/Sex: 61 y.o. male  Admission Orders:  Regular Diet. BG checks ACHS. Telemetry. SCDs. EKG for QTc monitoring 2-3 hours post Tikosyn administration.     Scheduled Medications:  aspirin, 325 mg, Oral, Daily  docusate sodium, 100 mg, Oral, BID  dofetilide, 500 mcg, Oral, Q12H BECKY  enoxaparin, 40 mg, Subcutaneous, Daily  gabapentin, 100 mg, Oral, Daily  gabapentin, 300 mg, Oral, HS  insulin lispro, 1-6 Units, Subcutaneous, 4x Daily (AC & HS)  LORazepam, 0.5 mg, Oral, HS  metoprolol tartrate, 25 mg, Oral, Q12H BECKY  pravastatin, 80 mg, Oral, Daily With Dinner  sertraline, 50 mg, Oral, Daily    Continuous IV Infusions: none    PRN Meds:  acetaminophen, 650 mg, Oral, Q6H PRN  ondansetron, 4 mg, Oral, Q6H PRN  perflutren lipid microsphere, 0.4 mL/min, Intravenous, Once in imaging  sodium chloride (PF), 3 mL, Intravenous, Q1H PRN    metoprolol tartrate (LOPRESSOR) tablet 12.5 mg  Dose: 12.5 mg  Freq: Every 12 hours scheduled Route: PO  Start: 12/05/23 2100 End: 12/06/23 0945       IP CONSULT TO CARDIOLOGY    Network Utilization Review Department  ATTENTION: Please call with any questions or concerns to [phone] and carefully listen to the prompts so that you are directed to the right person. All voicemails are confidential.   For Discharge needs, contact Care Management DC Support Team at [phone] opt. 2  Send all requests for admission clinical reviews, approved or denied determinations and any other requests to dedicated fax number below belonging to the campus where the patient is receiving treatment.  List of dedicated fax numbers for the Facilities:  [place] DENIALS (Administrative/Medical Necessity) [phone]   DISCHARGE SUPPORT TEAM (NETWORK) [id number] (Maternity/NICU/Pediatrics) [phone]   [address] [place] [address] [address] 1000 [facility] [phone]   [facility] [address] [place] [address] 1300 [facility]  [facility] [phone]

## 2023-12-06 NOTE — ASSESSMENT & PLAN NOTE
Lab Results   Component Value Date    HGBA1C 6.9 (H) 12/05/2023       Recent Labs     12/05/23 2047 12/06/23  0743   POCGLU 93 121       Blood Sugar Average: Last 72 hrs:  (P) 107Hold home regimen of Ozempic, Jardiance, and metformin.  -Start sliding scale insulin while inpatient

## 2023-12-06 NOTE — ASSESSMENT & PLAN NOTE
Patient is status post CABG x5 back in August 2023. Patient has been attending cardiac rehab, and states that this is the best he is ever felt. He is able to walk over a mile and a half each day, and has remained very active at home.   Patient is experienced no other symptoms of concerns after the surgery, the only event to occur was in his most recent cardiac rehab session with the wide-complex tachycardia.  -Continue aspirin 325 mg daily

## 2023-12-06 NOTE — CASE MANAGEMENT
Case Management Assessment & Discharge Planning Note    Patient name Early Crigler  Location S /S -01 MRN [id number]  : 1960 Date 2023       Current Admission Date: 2023  Current Admission Diagnosis:Wide-complex tachycardia   Patient Active Problem List    Diagnosis Date Noted    Wide-complex tachycardia 2023    Diabetic polyneuropathy associated with type 2 diabetes mellitus ([address]) 2023    S/P CABG x 5 2023    Coronary artery disease involving native coronary artery 2023    Peripheral angiopathy due to type 2 diabetes mellitus ([address]) 2023    Sleep apnea     HTN (hypertension), benign 2018    Type 2 diabetes mellitus with hyperglycemia, without long-term current use of insulin ([address]) 2018    Hyperlipidemia LDL goal <100 2018    Right knee pain 2014    Myalgia and myositis 2014    Vitamin D deficiency 2014    Depressive disorder 2012      LOS (days): 0  Geometric Mean LOS (GMLOS) (days):   Days to GMLOS:     OBJECTIVE:    Risk of Unplanned Readmission Score: 10.92         Current admission status: Inpatient       Preferred Pharmacy:   [facility] [id number] [place] [address]  Phone: [phone] Fax: [phone]    [address] [address]  Phone: [phone] Fax: [phone]    Primary Care Provider: Jackeline Holland MD    Primary Insurance: [facility]  Secondary Insurance:     ASSESSMENT:  [facility] Proxies    There are no active Health Care Proxies on file.                       Patient Information  Admitted from[de-identified] Home  Mental Status: Alert  During Assessment patient was accompanied by: Spouse  Assessment information provided by[de-identified] Patient  Primary Caregiver: Self  Support Systems: Self, Spouse/significant other  Living Arrangements: Lives w/ Spouse/significant other    Activities of Daily Living Prior to Admission  Functional Status: Independent  Completes ADLs independently?: Yes         Patient Information Continued  Does patient have prescription coverage?: Yes         Means of Transportation  Means of Transport to Appts[de-identified] Drives Self        DISCHARGE DETAILS:    Discharge planning discussed with[de-identified] patient and spouse at bedside; cardiology CRNP        CM contacted family/caregiver?: Yes  Were Treatment Team discharge recommendations reviewed with patient/caregiver?: Yes  Did patient/caregiver verbalize understanding of patient care needs?: Yes  Were patient/caregiver advised of the risks associated with not following Treatment Team discharge recommendations?: Yes    Contacts  Patient Contacts: Talon Hermosillo, spouse  Relationship to Patient[de-identified] Family  Contact Method: In Person  Reason/Outcome: Continuity of Care, Emergency Contact, Referral, Discharge [address]         Is the patient interested in [address] [facility] at discharge?: No    DME Referral Provided  Referral made for DME?: No    Other Referral/Resources/Interventions Provided:  Interventions: Prescription Price Check  Referral Comments: Patient admitted due to wide-complex tachycardia - cardiology NP inquiring about price check for Tikosyn. Call made to [facility] and spoke with pharmacist who relays it requires prior auth. Provided contact number for auth department: [phone]. Call made to [facility] to initiate same. Initially was provided with key code [facility]) to enter prior auth on [employer], but unable to process EPA online per website response. Call made again to [facility] and was informed that medication needs to go through speciality medication department (p: [phone]). Call transferred to them and clinical information reviewed with Tsering Covington for prior auth to be processed.  [employer] confirmed prior auth able to be approved for the generic drug for the next 12 months - auth number: [id number]. Same relayed to cardiology CRNP via TT.     Would you like to participate in our [address] service program?  : No - Declined    Treatment Team Recommendation: Home  Discharge Destination Plan[de-identified] Home  Transport at Discharge : Family

## 2023-12-06 NOTE — QUICK NOTE
Desi Alarcon, who is here under observation for a wide complex tachycardia noted during his cardiac rehabilitation. Pt is understandably frustrated to be here for a cardiac issue after all his mental and physical work post CABGx 5 on 11AUG23. He asked we message his cardiologist Dr. Katarzyna Dorsey which was completed via epic. We explained to the patient that while he is asymptomatic wide complex tachycardia is a very serious arrhythmia that needs to be worked up and followed by cardiology.

## 2023-12-07 ENCOUNTER — APPOINTMENT (OUTPATIENT)
Dept: CARDIAC REHAB | Facility: CLINIC | Age: 63
End: 2023-12-07
Payer: COMMERCIAL

## 2023-12-07 LAB
ANION GAP SERPL CALCULATED.3IONS-SCNC: 9 MMOL/L
ATRIAL RATE: 69 BPM
ATRIAL RATE: 75 BPM
BASOPHILS # BLD AUTO: 0.12 THOUSANDS/ÂΜL (ref 0–0.1)
BASOPHILS NFR BLD AUTO: 2 % (ref 0–1)
BUN SERPL-MCNC: 25 MG/DL (ref 5–25)
CALCIUM SERPL-MCNC: 8.9 MG/DL (ref 8.4–10.2)
CHLORIDE SERPL-SCNC: 104 MMOL/L (ref 96–108)
CO2 SERPL-SCNC: 24 MMOL/L (ref 21–32)
CREAT SERPL-MCNC: 0.91 MG/DL (ref 0.6–1.3)
EOSINOPHIL # BLD AUTO: 0.47 THOUSAND/ÂΜL (ref 0–0.61)
EOSINOPHIL NFR BLD AUTO: 6 % (ref 0–6)
ERYTHROCYTE [DISTWIDTH] IN BLOOD BY AUTOMATED COUNT: 13.6 % (ref 11.6–15.1)
GFR SERPL CREATININE-BSD FRML MDRD: 89 ML/MIN/1.73SQ M
GLUCOSE SERPL-MCNC: 110 MG/DL (ref 65–140)
GLUCOSE SERPL-MCNC: 114 MG/DL (ref 65–140)
GLUCOSE SERPL-MCNC: 129 MG/DL (ref 65–140)
GLUCOSE SERPL-MCNC: 139 MG/DL (ref 65–140)
GLUCOSE SERPL-MCNC: 190 MG/DL (ref 65–140)
HCT VFR BLD AUTO: 41.8 % (ref 36.5–49.3)
HGB BLD-MCNC: 13 G/DL (ref 12–17)
IMM GRANULOCYTES # BLD AUTO: 0.02 THOUSAND/UL (ref 0–0.2)
IMM GRANULOCYTES NFR BLD AUTO: 0 % (ref 0–2)
LYMPHOCYTES # BLD AUTO: 2.45 THOUSANDS/ÂΜL (ref 0.6–4.47)
LYMPHOCYTES NFR BLD AUTO: 30 % (ref 14–44)
MAGNESIUM SERPL-MCNC: 2 MG/DL (ref 1.9–2.7)
MCH RBC QN AUTO: 26.9 PG (ref 26.8–34.3)
MCHC RBC AUTO-ENTMCNC: 31.1 G/DL (ref 31.4–37.4)
MCV RBC AUTO: 87 FL (ref 82–98)
MONOCYTES # BLD AUTO: 1.02 THOUSAND/ÂΜL (ref 0.17–1.22)
MONOCYTES NFR BLD AUTO: 13 % (ref 4–12)
NEUTROPHILS # BLD AUTO: 4.02 THOUSANDS/ÂΜL (ref 1.85–7.62)
NEUTS SEG NFR BLD AUTO: 49 % (ref 43–75)
NRBC BLD AUTO-RTO: 0 /100 WBCS
P AXIS: 25 DEGREES
P AXIS: 33 DEGREES
PLATELET # BLD AUTO: 193 THOUSANDS/UL (ref 149–390)
PMV BLD AUTO: 10.4 FL (ref 8.9–12.7)
POTASSIUM SERPL-SCNC: 3.7 MMOL/L (ref 3.5–5.3)
PR INTERVAL: 206 MS
PR INTERVAL: 224 MS
QRS AXIS: 48 DEGREES
QRS AXIS: 56 DEGREES
QRSD INTERVAL: 102 MS
QRSD INTERVAL: 108 MS
QT INTERVAL: 420 MS
QT INTERVAL: 456 MS
QTC INTERVAL: 469 MS
QTC INTERVAL: 488 MS
RBC # BLD AUTO: 4.83 MILLION/UL (ref 3.88–5.62)
SODIUM SERPL-SCNC: 137 MMOL/L (ref 135–147)
T WAVE AXIS: 40 DEGREES
T WAVE AXIS: 56 DEGREES
VENTRICULAR RATE: 69 BPM
VENTRICULAR RATE: 75 BPM
WBC # BLD AUTO: 8.1 THOUSAND/UL (ref 4.31–10.16)

## 2023-12-07 PROCEDURE — 85025 COMPLETE CBC W/AUTO DIFF WBC: CPT

## 2023-12-07 PROCEDURE — 93005 ELECTROCARDIOGRAM TRACING: CPT

## 2023-12-07 PROCEDURE — 93010 ELECTROCARDIOGRAM REPORT: CPT | Performed by: INTERNAL MEDICINE

## 2023-12-07 PROCEDURE — 82948 REAGENT STRIP/BLOOD GLUCOSE: CPT

## 2023-12-07 PROCEDURE — 80048 BASIC METABOLIC PNL TOTAL CA: CPT | Performed by: NURSE PRACTITIONER

## 2023-12-07 PROCEDURE — 99232 SBSQ HOSP IP/OBS MODERATE 35: CPT | Performed by: INTERNAL MEDICINE

## 2023-12-07 PROCEDURE — 83735 ASSAY OF MAGNESIUM: CPT | Performed by: NURSE PRACTITIONER

## 2023-12-07 RX ORDER — POLYETHYLENE GLYCOL 3350 17 G/17G
17 POWDER, FOR SOLUTION ORAL DAILY PRN
Status: DISCONTINUED | OUTPATIENT
Start: 2023-12-07 | End: 2023-12-08 | Stop reason: HOSPADM

## 2023-12-07 RX ORDER — POTASSIUM CHLORIDE 20 MEQ/1
20 TABLET, EXTENDED RELEASE ORAL ONCE
Status: COMPLETED | OUTPATIENT
Start: 2023-12-07 | End: 2023-12-07

## 2023-12-07 RX ADMIN — POTASSIUM CHLORIDE 20 MEQ: 1500 TABLET, EXTENDED RELEASE ORAL at 12:35

## 2023-12-07 RX ADMIN — GABAPENTIN 300 MG: 300 CAPSULE ORAL at 22:59

## 2023-12-07 RX ADMIN — DOCUSATE SODIUM 100 MG: 100 CAPSULE, LIQUID FILLED ORAL at 08:45

## 2023-12-07 RX ADMIN — INSULIN LISPRO 2 UNITS: 100 INJECTION, SOLUTION INTRAVENOUS; SUBCUTANEOUS at 23:03

## 2023-12-07 RX ADMIN — METOPROLOL TARTRATE 25 MG: 25 TABLET, FILM COATED ORAL at 08:45

## 2023-12-07 RX ADMIN — DOFETILIDE 500 MCG: 0.5 CAPSULE ORAL at 04:16

## 2023-12-07 RX ADMIN — ASPIRIN 325 MG ORAL TABLET 325 MG: 325 PILL ORAL at 08:45

## 2023-12-07 RX ADMIN — DOFETILIDE 500 MCG: 0.5 CAPSULE ORAL at 15:55

## 2023-12-07 RX ADMIN — LORAZEPAM 0.5 MG: 0.5 TABLET ORAL at 22:59

## 2023-12-07 RX ADMIN — SERTRALINE HYDROCHLORIDE 50 MG: 50 TABLET ORAL at 08:45

## 2023-12-07 RX ADMIN — METOPROLOL TARTRATE 25 MG: 25 TABLET, FILM COATED ORAL at 22:59

## 2023-12-07 RX ADMIN — GABAPENTIN 100 MG: 100 CAPSULE ORAL at 08:45

## 2023-12-07 RX ADMIN — DOCUSATE SODIUM 100 MG: 100 CAPSULE, LIQUID FILLED ORAL at 18:42

## 2023-12-07 RX ADMIN — PRAVASTATIN SODIUM 80 MG: 80 TABLET ORAL at 15:55

## 2023-12-07 NOTE — UTILIZATION REVIEW
NOTIFICATION OF INPATIENT ADMISSION   AUTHORIZATION REQUEST   SERVICING FACILITY:   [address] [place] [address]  Tax ID: [id number]  NPI: [id number]   ATTENDING PROVIDER:  Attending Name and NPI#: Rush Mckeon [[id number]]  Address: [address] [place] [address]  Phone: [phone]     ADMISSION INFORMATION:  [facility] [address] [facility]   Place of Service Code: 21  Inpatient Admission Date/Time: 12/6/23  3:41 PM  Discharge Date/Time: No discharge date for patient encounter. Admitting Diagnosis Code/Description:  Tachycardia [R00.0]  Abnormal EKG [R94.31]  S/P CABG x 5 [Z95.1]     UTILIZATION REVIEW CONTACT:  Claire Delgado Utilization   Network Utilization Review Department  Phone: [phone]  Fax: [phone]  Email: [email or web address]  Contact for approvals/pending authorizations, clinical reviews, and discharge. PHYSICIAN ADVISORY SERVICES:  Medical Necessity Denial & [facility]-[id number][facility]Pw[facility] Review  Phone: [phone]  Fax: [phone]  Email: [email or web address]     DISCHARGE SUPPORT TEAM:  For Patients Discharge Needs & Updates  Phone: [phone] opt. 2 Fax: [phone]  Email: [email or web address]

## 2023-12-07 NOTE — ASSESSMENT & PLAN NOTE
Lab Results   Component Value Date    HGBA1C 6.9 (H) 12/05/2023       Recent Labs     12/06/23  1113 12/06/23  1300 12/06/23  1508 12/06/23 2127   POCGLU 167* 122 159* 154*         Blood Sugar Average: Last 72 hrs:  (P) 136Hold home regimen of Ozempic, Jardiance, and metformin.  -Start sliding scale insulin while inpatient

## 2023-12-07 NOTE — PROGRESS NOTES
[id number] [facility]  Progress Note  Name: Sony Lucero  MRN: [id number]  Unit/Bed#: S -01 I Date of Admission: 12/5/2023   Date of Service: 12/7/2023 I Hospital Day: 1    Assessment/Plan   * Wide-complex tachycardia  Assessment & Plan  While exercising in cardiac rehab, an emergency was called for the patient as the cardiac monitor revealed him to be in wide-complex tachycardia. Patient stated he remained asymptomatic, was only tired because he was lifting heavier weights. In the ED, EKG showed sinus tachycardia with first-degree block. Labs and chest x-ray were unremarkable. Cardiologist recommended monitoring patient overnight.  -No events on telemetry overnight, currently in NSR  -States that he experiences brief intermittent chest flutters but is completely asymptomatic otherwise. Plan:  -Cardiology recommends patient be placed on Tikosyn which requires monitoring of Qtc  - Qtc to be check q3h  -24-hour telemetry  -Monitor for any symptoms of chest pains, shortness of breath, irregular heartbeats    Diabetic polyneuropathy associated with type 2 diabetes mellitus [facility])  Assessment & Plan  Lab Results   Component Value Date    HGBA1C 6.9 (H) 12/05/2023       Recent Labs     12/06/23  1113 12/06/23  1300 12/06/23  1508 12/06/23  2127   POCGLU 167* 122 159* 154*         Blood Sugar Average: Last 72 hrs:  (P) 136Continue gabapentin 100 mg in the morning and 300 mg at night. Depressive disorder  Assessment & Plan  Continue Zoloft 50 mg daily  Ativan 1.5 mg daily at bedtime for sleep    S/P CABG x 5  Assessment & Plan  Patient is status post CABG x5 back in August 2023. Patient has been attending cardiac rehab, and states that this is the best he is ever felt. He is able to walk over a mile and a half each day, and has remained very active at home.   Patient is experienced no other symptoms of concerns after the surgery, the only event to occur was in his most recent cardiac rehab session with the wide-complex tachycardia.  -Continue aspirin 325 mg daily      Hyperlipidemia LDL goal <100  Assessment & Plan  Continue home statin regimen    Type 2 diabetes mellitus with hyperglycemia, without long-term current use of insulin [facility])  Assessment & Plan  Lab Results   Component Value Date    HGBA1C 6.9 (H) 2023       Recent Labs     23  1113 23  1300 23  1508 23  2127   POCGLU 167* 122 159* 154*         Blood Sugar Average: Last 72 hrs:  (P) 136Hold home regimen of Ozempic, Jardiance, and metformin.  -Start sliding scale insulin while inpatient      HTN (hypertension), benign  Assessment & Plan  Continue 12.5 milligrams of Lopressor twice daily             VTE Pharmacologic Prophylaxis: VTE Score: 3 Moderate Risk (Score 3-4) - Pharmacological DVT Prophylaxis Ordered: enoxaparin (Lovenox). Mobility:   Basic Mobility Inpatient Raw Score: 24  JH-HLM Goal: 8: Walk 250 feet or more  JH-HLM Achieved: 8: Walk 250 feet ot more  HLM Goal achieved. Continue to encourage appropriate mobility. Patient Centered Rounds: I performed bedside rounds with nursing staff today. Discussions with Specialists or Other Care Team Provider: Cardiology, EP    Education and Discussions with Family / Patient: Updated  (wife) at bedside. Current Length of Stay: 1 day(s)  Current Patient Status: Inpatient   Discharge Plan: Anticipate discharge in 24-48 hrs to home. Code Status: Level 1 - Full Code    Subjective:   No acute events overnight. Denies headache, chest pain, SOB, palpitations, light headedness, abd pain, n/v/d. Objective:     Vitals:   Temp (24hrs), Av.3 °F (36.8 °C), Min:98.3 °F (36.8 °C), Max:98.3 °F (36.8 °C)    Temp:  [98.3 °F (36.8 °C)] 98.3 °F (36.8 °C)  HR:  [69-78] 69  Resp:  [18] 18  BP: (112-120)/(61-76) 120/76  SpO2:  [95 %-97 %] 97 %  There is no height or weight on file to calculate BMI.      Input and Output Summary (last 24 hours): No intake or output data in the 24 hours ending 12/07/23 1329    Physical Exam:   Physical Exam  Vitals and nursing note reviewed. Constitutional:       General: He is not in acute distress. Appearance: Normal appearance. He is well-developed. HENT:      Head: Normocephalic and atraumatic. Right Ear: External ear normal.      Left Ear: External ear normal.      Nose: Nose normal. No congestion. Mouth/Throat:      Mouth: Mucous membranes are moist.      Pharynx: Oropharynx is clear. Eyes:      Conjunctiva/sclera: Conjunctivae normal.   Cardiovascular:      Rate and Rhythm: Normal rate and regular rhythm. Heart sounds: No murmur heard. Pulmonary:      Effort: Pulmonary effort is normal. No respiratory distress. Breath sounds: Normal breath sounds. Abdominal:      General: There is no distension. Palpations: Abdomen is soft. Tenderness: There is no abdominal tenderness. Musculoskeletal:         General: No swelling or tenderness. Cervical back: Normal range of motion and neck supple. Skin:     General: Skin is warm and dry. Neurological:      Mental Status: He is alert and oriented to person, place, and time.    Psychiatric:         Mood and Affect: Mood normal.          Additional Data:     Labs:  Results from last 7 days   Lab Units 12/07/23  0504   WBC Thousand/uL 8.10   HEMOGLOBIN g/dL 13.0   HEMATOCRIT % 41.8   PLATELETS Thousands/uL 193   NEUTROS PCT % 49   LYMPHS PCT % 30   MONOS PCT % 13*   EOS PCT % 6     Results from last 7 days   Lab Units 12/07/23  0504 12/06/23  0443 12/05/23  1709   SODIUM mmol/L 137   < > 135   POTASSIUM mmol/L 3.7   < > 4.7   CHLORIDE mmol/L 104   < > 100   CO2 mmol/L 24   < > 23   BUN mg/dL 25   < > 24   CREATININE mg/dL 0.91   < > 1.04   ANION GAP mmol/L 9   < > 12   CALCIUM mg/dL 8.9   < > 9.7   ALBUMIN g/dL  --   --  4.5   TOTAL BILIRUBIN mg/dL  --   --  0.87   ALK PHOS U/L  --   --  71   ALT U/L  --   --  22   AST U/L  --   -- 24   GLUCOSE RANDOM mg/dL 110   < > 129    < > = values in this interval not displayed. Results from last 7 days   Lab Units 12/07/23  1200 12/07/23  0742 12/06/23  2127 12/06/23  1508 12/06/23  1300 12/06/23  1113 12/06/23  0743 12/05/23  2047   POC GLUCOSE mg/dl 129 114 154* 159* 122 167* 121 93     Results from last 7 days   Lab Units 12/05/23  1709   HEMOGLOBIN A1C % 6.9*           Lines/Drains:  Invasive Devices       Peripheral Intravenous Line  Duration             Peripheral IV 12/05/23 Left Antecubital 1 day                      Telemetry:  Telemetry Orders (From admission, onward)               24 Hour Telemetry Monitoring  (ED Bridging Orders Panel)  Continuous x 24 Hours (Telem)        Question:  Reason for 24 Hour Telemetry  Answer:  Arrhythmias requiring acute medical intervention / PPM or ICD malfunction                     Telemetry Reviewed: Normal Sinus Rhythm  Indication for Continued Telemetry Use: Arrthymias requiring medical therapy             Imaging: No pertinent imaging reviewed.     Recent Cultures (last 7 days):         Last 24 Hours Medication List:   Current Facility-Administered Medications   Medication Dose Route Frequency Provider Last Rate    acetaminophen  650 mg Oral Q6H PRN Nery Salinas MD      aspirin  325 mg Oral Daily Ameena Bennett MD      docusate sodium  100 mg Oral BID Nery Salinas MD      dofetilide  500 mcg Oral Q12H [address] Ruiz Kwok MD      enoxaparin  40 mg Subcutaneous Daily Nery Salinas MD      gabapentin  100 mg Oral Daily Ameena Bennett MD      gabapentin  300 mg Oral HS Nery Salinas MD      insulin lispro  1-6 Units Subcutaneous 4x Daily (AC & HS) Nery Salinas MD      LORazepam  0.5 mg Oral HS Nery Salinas MD      metoprolol tartrate  25 mg Oral Q12H [address] MARLENY Iniguez      ondansetron  4 mg Oral Q6H PRN Nery Salinas MD      perflutren lipid microsphere  0.4 mL/min Intravenous Once in imaging Mackenzie Dan MD      pravastatin  80 mg Oral Daily With Jenna Carlton MD      sertraline  50 mg Oral Daily Tacho Hubbard MD      sodium chloride (PF)  3 mL Intravenous Q1H PRN Armando Alpers, DO          Today, Patient Was Seen By: Helga Huerta DO    **Please Note: This note may have been constructed using a voice recognition system. **

## 2023-12-07 NOTE — ASSESSMENT & PLAN NOTE
While exercising in cardiac rehab, an emergency was called for the patient as the cardiac monitor revealed him to be in wide-complex tachycardia. Patient stated he remained asymptomatic, was only tired because he was lifting heavier weights. In the ED, EKG showed sinus tachycardia with first-degree block. Labs and chest x-ray were unremarkable. Cardiologist recommended monitoring patient overnight.  -No events on telemetry overnight, currently in NSR  -States that he experiences brief intermittent chest flutters but is completely asymptomatic otherwise.     Plan:  - Cardiology recommends patient be placed on Tikosyn which requires monitoring of Qtc for four doses  - Qtc to be check q3h  - Follow up MRI read

## 2023-12-07 NOTE — PROGRESS NOTES
General Cardiology   Progress Note -  Team One   Jeff Gentile 61 y.o. male MRN: [id number]  Unit/Bed#: S -01 Encounter: [id number]    Assessment     Ventricular Tachycardia  WCT noted while on NuStep during cardiac rehab  Review of strips shows PVC prior to onset; likely VT  Asymptomatic, BP stable   ECG in ED showed sinus tachycardia with 1st degree AV block. Tele without events overnight  K 4.7, Mg 2.1, TSH WNL  No evidence of heart failure, no anginal symptoms reported  Echo unchanged from prior- LVEF normal  Plan discussed with EP yesterday, initiate Tikosyn 500 mcg BID- first dose 12/6 at 1600  Metoprolol tartrate increased to 25 mg BID     CAD s/p CABG x 5 08/2023 (LIMA to LAD, SVG to D1, Y graft to ramus and OM1, and SVG to RPDA)  [facility] 08/2023 (performed after abnormal nuclear stress test)- pLAD 70%, mLAD 80%, distal LAD 90%, D1 90%, D2 80% (small vessel), ramus 80%, mid to distal LCx 90%, occluded RCA with collateral flow to RPDA  Doing well at cardiac rehab, has been increasing activity level/exercise  On ASA, statin, and beta blocker     HTN- controlled on metoprolol 25 mg BID, avg /67     HLD- , HDL 42,  in June 2023. Updated lipid panel yesterday showed TC 90, TG 99, HDL 39, LDL 31. On rosuvastatin 40 mg daily at home. Substituted with pravastatin 40 mg daily in hospital.     Type 2 DM- A1c 6.9%     Plan  Continue Tikosyn, 500 mcg twice daily. QTc 470 ms after second dose  Will need to stay inpatient until at least after 4th dose to ensure QTc stable  Continue to monitor on telemetry  Repeat BMP and Mg in the morning   Continue higher metoprolol dose of 25 mg BID    Subjective  Review of Systems   Constitutional: Negative for chills, malaise/fatigue and weight gain. Cardiovascular:  Negative for chest pain, dyspnea on exertion, leg swelling, orthopnea, palpitations and syncope.    Respiratory:  Negative for cough, shortness of breath, sleep disturbances due to breathing and sputum production. Endocrine: Negative. Hematologic/Lymphatic: Negative. Gastrointestinal:  Negative for bloating and nausea. Genitourinary: Negative. Neurological:  Negative for dizziness, light-headedness and weakness. Psychiatric/Behavioral:  Negative for altered mental status. All other systems reviewed and are negative. Objective:   Vitals: Blood pressure 120/76, pulse 69, temperature 98.3 °F (36.8 °C), temperature source Oral, resp. rate 18, SpO2 97 %. , There is no height or weight on file to calculate BMI.,     Systolic (99XIE), UBZ:589 , Min:112 , XCR:449     Diastolic (17AFZ), SWI:28, Min:61, Max:76      No intake or output data in the 24 hours ending 12/07/23 1006  Wt Readings from Last 3 Encounters:   10/27/23 92.9 kg (204 lb 11.2 oz)   09/14/23 97.1 kg (214 lb)   08/30/23 99.4 kg (219 lb 3.2 oz)     Telemetry Review: No significant arrhythmias seen on telemetry review. NSR with first degree AV Block    EKG personally reviewed by MARLENY Rocha. NSR, first degree AV block. QTc 472msec    Physical Exam  Vitals reviewed. Constitutional:       General: He is not in acute distress. Appearance: Normal appearance. Neck:      Vascular: No hepatojugular reflux or JVD. Cardiovascular:      Rate and Rhythm: Normal rate and regular rhythm. Pulses: Normal pulses. Heart sounds: Normal heart sounds. No murmur heard. No friction rub. No gallop. Pulmonary:      Effort: Pulmonary effort is normal. No respiratory distress. Breath sounds: Normal breath sounds. No rales. Abdominal:      General: Bowel sounds are normal. There is no distension. Palpations: Abdomen is soft. Tenderness: There is no abdominal tenderness. Musculoskeletal:         General: No tenderness. Normal range of motion. Cervical back: Neck supple. Right lower leg: No edema. Left lower leg: No edema. Skin:     General: Skin is warm and dry.       Capillary Refill: Capillary refill takes less than 2 seconds. Findings: No erythema. Neurological:      Mental Status: He is alert and oriented to person, place, and time.    Psychiatric:         Mood and Affect: Mood normal.         LABORATORY RESULTS      CBC with diff:   Results from last 7 days   Lab Units 12/07/23  0504 12/06/23 0443 12/05/23 2152 12/05/23  1709   WBC Thousand/uL 8.10 8.76  --  9.75   HEMOGLOBIN g/dL 13.0 13.3  --  13.4   HEMATOCRIT % 41.8 42.4  --  43.9   MCV fL 87 87  --  88   PLATELETS Thousands/uL 193 215 238 246   RBC Million/uL 4.83 4.89  --  5.00   MCH pg 26.9 27.2  --  26.8   MCHC g/dL 31.1* 31.4  --  30.5*   RDW % 13.6 13.6  --  13.7   MPV fL 10.4 9.9 10.9 10.5   NRBC AUTO /100 WBCs 0  --   --  0     CMP:  Results from last 7 days   Lab Units 12/07/23  0504 12/06/23 0443 12/05/23  1709   POTASSIUM mmol/L 3.7 4.1 4.7   CHLORIDE mmol/L 104 103 100   CO2 mmol/L 24 28 23   BUN mg/dL 25 21 24   CREATININE mg/dL 0.91 0.97 1.04   CALCIUM mg/dL 8.9 9.4 9.7   AST U/L  --   --  24   ALT U/L  --   --  22   ALK PHOS U/L  --   --  71   EGFR ml/min/1.73sq m 89 82 76     BMP:  Results from last 7 days   Lab Units 12/07/23  0504 12/06/23 0443 12/05/23  1709   POTASSIUM mmol/L 3.7 4.1 4.7   CHLORIDE mmol/L 104 103 100   CO2 mmol/L 24 28 23   BUN mg/dL 25 21 24   CREATININE mg/dL 0.91 0.97 1.04   CALCIUM mg/dL 8.9 9.4 9.7     Lab Results   Component Value Date    CREATININE 0.91 12/07/2023    CREATININE 0.97 12/06/2023    CREATININE 1.04 12/05/2023       No results found for: "NTBNP"        Results from last 7 days   Lab Units 12/07/23  0504 12/06/23  0443 12/05/23  1709   MAGNESIUM mg/dL 2.0 2.1 2.1          Results from last 7 days   Lab Units 12/05/23  1709   HEMOGLOBIN A1C % 6.9*          Results from last 7 days   Lab Units 12/05/23  1709   TSH 3RD GENERATON uIU/mL 1.814             Lipid Profile:   No results found for: "CHOL"  Lab Results   Component Value Date    HDL 39 (L) 12/06/2023     Lab Results Component Value Date    LDLCALC 31 2023     Lab Results   Component Value Date    TRIG 99 2023       Cardiac testing:   No results found for this or any previous visit. No results found for this or any previous visit. No results found for this or any previous visit. No valid procedures specified. Results for orders placed during the hospital encounter of 23    NM myocardial perfusion spect (stress and/or rest)    Interpretation Summary    Stress ECG: A Johnny protocol stress test was performed. The patient reached stage 2.0 of the protocol after exercising for 7 min and 17 sec and had a maximal HR of 139 bpm (88 % of MPHR) and 7.0 METS. The patient experienced non-limiting angina during the test. The test was stopped because the patient experienced dyspnea. The patient achieved the target heart rate. The patient reported dyspnea during the stress test. Symptoms began during stress and ended during recovery. Blood pressure demonstrated a blunted response and heart rate demonstrated a normal response to stress. The patient's heart rate recovery was normal.    Stress EC.0 mm of ST elevation (aVR) and Horizontal ST depression of 2.0 mm (II, III, aVF, V5 and V6) is noted. Improvement of ST depression within 2 minutes of recovery. However, there are still persistent ST-T changes even after 4 minutes into recovery. Arrhythmias during stress: rare PVCs. There were no arrhythmias during recovery. The ECG was positive for ischemia. The stress ECG is consistent with ischemia after maximal exercise, with reproduction of symptoms. Perfusion Defect Conclusion: The stress/rest perfusion ratio is 1.11 . There is no evidence of transient ischemic dilation (TID). Stress Function: Left ventricular function post-stress is normal. Stress ejection fraction is 55 %. There is a defect in the mid to apical inferior location(s). The defect has mildly reduced function. Stress Combined Conclusion:  The ECG and SPECT imaging portions of the stress study are concordant with findings concerning for stress induced myocardial ischemia. Perfusion: There is a left ventricular perfusion defect that is large in size with severe reduction in uptake present in the entire inferior location(s) that is reversible suggestive of inferior wall ischemia. Abnormal study  Exercise 7 minutes with blunted BP response. Appropriate HR response and he met 85% of his [facility]  He did develop chest pain with exercise  1.0 mm of ST elevation (aVR) and Horizontal ST depression of 2.0 mm (II, III, aVF, V5 and V6) is noted. Improvement of ST depression within 2 minutes of recovery. However, there are still persistent ST-T changes even after 4 minutes into recovery. Minimal Ectopy  There is a left ventricular perfusion defect that is large in size with severe reduction in uptake present in the entire inferior location(s) that is reversible suggestive of inferior wall ischemia. Results were discussed with the patient and referring provider and he will be scheduled for coronary angiography.   EF normal at 55% but mid-apical inferior wall motion  No TID    Meds/Allergies   all current active meds have been reviewed and current meds:   Current Facility-Administered Medications   Medication Dose Route Frequency    acetaminophen (TYLENOL) tablet 650 mg  650 mg Oral Q6H PRN    aspirin tablet 325 mg  325 mg Oral Daily    docusate sodium (COLACE) capsule 100 mg  100 mg Oral BID    dofetilide (TIKOSYN) capsule 500 mcg  500 mcg Oral Q12H BECKY    enoxaparin (LOVENOX) subcutaneous injection 40 mg  40 mg Subcutaneous Daily    gabapentin (NEURONTIN) capsule 100 mg  100 mg Oral Daily    gabapentin (NEURONTIN) capsule 300 mg  300 mg Oral HS    insulin lispro (HumaLOG) 100 units/mL subcutaneous injection 1-6 Units  1-6 Units Subcutaneous 4x Daily (AC & HS)    LORazepam (ATIVAN) tablet 0.5 mg  0.5 mg Oral HS    metoprolol tartrate (LOPRESSOR) tablet 25 mg  25 mg Oral Q12H [address]    ondansetron (ZOFRAN-ODT) dispersible tablet 4 mg  4 mg Oral Q6H PRN    perflutren lipid microsphere (DEFINITY) injection  0.4 mL/min Intravenous Once in imaging    pravastatin (PRAVACHOL) tablet 80 mg  80 mg Oral Daily With Dinner    sertraline (ZOLOFT) tablet 50 mg  50 mg Oral Daily    sodium chloride (PF) 0.9 % injection 3 mL  3 mL Intravenous Q1H PRN     Medications Prior to Admission   Medication    acetaminophen (TYLENOL) 325 mg tablet    aspirin 325 mg tablet    cholecalciferol (VITAMIN D3) 25 mcg (1,000 units) tablet    Continuous Blood Gluc Sensor (FreeStyle Ayanna 2 Sensor) MISC    docusate sodium (COLACE) 100 mg capsule    gabapentin (NEURONTIN) 100 mg capsule    Jardiance 25 MG TABS    LORazepam (ATIVAN) 0.5 mg tablet    Magnesium Gluconate 250 MG TABS    metFORMIN (GLUCOPHAGE-XR) 500 mg 24 hr tablet    metoprolol tartrate (LOPRESSOR) 25 mg tablet    Ozempic, 2 MG/DOSE, 8 MG/3ML injection pen    rosuvastatin (CRESTOR) 20 MG tablet    sertraline (Zoloft) 50 mg tablet    tadalafil (CIALIS) 20 MG tablet    glipiZIDE (GLUCOTROL XL) 5 mg 24 hr tablet    ondansetron (ZOFRAN) 4 mg tablet    oxyCODONE (ROXICODONE) 5 immediate release tablet    pantoprazole (PROTONIX) 40 mg tablet    polyethylene glycol (MIRALAX) 17 g packet    torsemide (DEMADEX) 20 mg tablet     Counseling / Coordination of Care  Total floor / unit time spent today 20 minutes. Greater than 50% of total time was spent with the patient and / or family counseling and / or coordination of care. ** Please Note: Dragon 360 Dictation voice to text software may have been used in the creation of this document.  **

## 2023-12-07 NOTE — ASSESSMENT & PLAN NOTE
Lab Results   Component Value Date    HGBA1C 6.9 (H) 12/05/2023       Recent Labs     12/06/23  1113 12/06/23  1300 12/06/23  1508 12/06/23 2127   POCGLU 167* 122 159* 154*         Blood Sugar Average: Last 72 hrs:  (P) 136Continue gabapentin 100 mg in the morning and 300 mg at night.

## 2023-12-08 ENCOUNTER — APPOINTMENT (OUTPATIENT)
Dept: CARDIAC REHAB | Facility: CLINIC | Age: 63
End: 2023-12-08
Payer: COMMERCIAL

## 2023-12-08 ENCOUNTER — APPOINTMENT (INPATIENT)
Dept: MRI IMAGING | Facility: HOSPITAL | Age: 63
DRG: 310 | End: 2023-12-08
Payer: COMMERCIAL

## 2023-12-08 ENCOUNTER — APPOINTMENT (INPATIENT)
Dept: RADIOLOGY | Facility: HOSPITAL | Age: 63
DRG: 310 | End: 2023-12-08
Payer: COMMERCIAL

## 2023-12-08 VITALS
RESPIRATION RATE: 18 BRPM | TEMPERATURE: 97.7 F | SYSTOLIC BLOOD PRESSURE: 105 MMHG | DIASTOLIC BLOOD PRESSURE: 64 MMHG | HEART RATE: 68 BPM | OXYGEN SATURATION: 97 %

## 2023-12-08 LAB
ANION GAP SERPL CALCULATED.3IONS-SCNC: 7 MMOL/L
ATRIAL RATE: 69 BPM
ATRIAL RATE: 71 BPM
ATRIAL RATE: 72 BPM
ATRIAL RATE: 74 BPM
ATRIAL RATE: 75 BPM
BUN SERPL-MCNC: 24 MG/DL (ref 5–25)
CALCIUM SERPL-MCNC: 8.9 MG/DL (ref 8.4–10.2)
CHLORIDE SERPL-SCNC: 106 MMOL/L (ref 96–108)
CO2 SERPL-SCNC: 25 MMOL/L (ref 21–32)
CREAT SERPL-MCNC: 0.83 MG/DL (ref 0.6–1.3)
GFR SERPL CREATININE-BSD FRML MDRD: 93 ML/MIN/1.73SQ M
GLUCOSE SERPL-MCNC: 123 MG/DL (ref 65–140)
GLUCOSE SERPL-MCNC: 131 MG/DL (ref 65–140)
GLUCOSE SERPL-MCNC: 136 MG/DL (ref 65–140)
GLUCOSE SERPL-MCNC: 161 MG/DL (ref 65–140)
MAGNESIUM SERPL-MCNC: 2 MG/DL (ref 1.9–2.7)
P AXIS: 19 DEGREES
P AXIS: 19 DEGREES
P AXIS: 22 DEGREES
P AXIS: 29 DEGREES
P AXIS: 34 DEGREES
POTASSIUM SERPL-SCNC: 3.9 MMOL/L (ref 3.5–5.3)
PR INTERVAL: 200 MS
PR INTERVAL: 202 MS
PR INTERVAL: 204 MS
PR INTERVAL: 206 MS
PR INTERVAL: 218 MS
QRS AXIS: 41 DEGREES
QRS AXIS: 41 DEGREES
QRS AXIS: 47 DEGREES
QRS AXIS: 58 DEGREES
QRS AXIS: 60 DEGREES
QRSD INTERVAL: 102 MS
QRSD INTERVAL: 104 MS
QRSD INTERVAL: 94 MS
QRSD INTERVAL: 96 MS
QRSD INTERVAL: 98 MS
QT INTERVAL: 380 MS
QT INTERVAL: 424 MS
QT INTERVAL: 440 MS
QT INTERVAL: 444 MS
QT INTERVAL: 446 MS
QTC INTERVAL: 421 MS
QTC INTERVAL: 473 MS
QTC INTERVAL: 477 MS
QTC INTERVAL: 478 MS
QTC INTERVAL: 486 MS
SODIUM SERPL-SCNC: 138 MMOL/L (ref 135–147)
T WAVE AXIS: 45 DEGREES
T WAVE AXIS: 50 DEGREES
T WAVE AXIS: 54 DEGREES
T WAVE AXIS: 55 DEGREES
T WAVE AXIS: 57 DEGREES
VENTRICULAR RATE: 69 BPM
VENTRICULAR RATE: 71 BPM
VENTRICULAR RATE: 72 BPM
VENTRICULAR RATE: 74 BPM
VENTRICULAR RATE: 75 BPM

## 2023-12-08 PROCEDURE — 75561 CARDIAC MRI FOR MORPH W/DYE: CPT

## 2023-12-08 PROCEDURE — 93010 ELECTROCARDIOGRAM REPORT: CPT | Performed by: INTERNAL MEDICINE

## 2023-12-08 PROCEDURE — 99239 HOSP IP/OBS DSCHRG MGMT >30: CPT | Performed by: INTERNAL MEDICINE

## 2023-12-08 PROCEDURE — 83735 ASSAY OF MAGNESIUM: CPT | Performed by: NURSE PRACTITIONER

## 2023-12-08 PROCEDURE — 80048 BASIC METABOLIC PNL TOTAL CA: CPT | Performed by: NURSE PRACTITIONER

## 2023-12-08 PROCEDURE — 99232 SBSQ HOSP IP/OBS MODERATE 35: CPT | Performed by: INTERNAL MEDICINE

## 2023-12-08 PROCEDURE — 82948 REAGENT STRIP/BLOOD GLUCOSE: CPT

## 2023-12-08 PROCEDURE — 93005 ELECTROCARDIOGRAM TRACING: CPT

## 2023-12-08 PROCEDURE — A9585 GADOBUTROL INJECTION: HCPCS | Performed by: INTERNAL MEDICINE

## 2023-12-08 RX ORDER — PRAVASTATIN SODIUM 80 MG/1
80 TABLET ORAL
Qty: 30 TABLET | Refills: 0 | Status: CANCELLED | OUTPATIENT
Start: 2023-12-08

## 2023-12-08 RX ORDER — POTASSIUM CHLORIDE 20 MEQ/1
20 TABLET, EXTENDED RELEASE ORAL ONCE
Status: COMPLETED | OUTPATIENT
Start: 2023-12-08 | End: 2023-12-08

## 2023-12-08 RX ORDER — GADOBUTROL 604.72 MG/ML
18 INJECTION INTRAVENOUS
Status: COMPLETED | OUTPATIENT
Start: 2023-12-08 | End: 2023-12-08

## 2023-12-08 RX ORDER — POTASSIUM CHLORIDE 750 MG/1
10 TABLET, EXTENDED RELEASE ORAL DAILY
Qty: 30 TABLET | Refills: 0 | Status: SHIPPED | OUTPATIENT
Start: 2023-12-08

## 2023-12-08 RX ADMIN — METOPROLOL TARTRATE 25 MG: 25 TABLET, FILM COATED ORAL at 08:01

## 2023-12-08 RX ADMIN — GABAPENTIN 100 MG: 100 CAPSULE ORAL at 08:01

## 2023-12-08 RX ADMIN — DOFETILIDE 500 MCG: 0.5 CAPSULE ORAL at 16:23

## 2023-12-08 RX ADMIN — GADOBUTROL 18 ML: 604.72 INJECTION INTRAVENOUS at 10:08

## 2023-12-08 RX ADMIN — DOCUSATE SODIUM 100 MG: 100 CAPSULE, LIQUID FILLED ORAL at 08:01

## 2023-12-08 RX ADMIN — DOFETILIDE 500 MCG: 0.5 CAPSULE ORAL at 05:03

## 2023-12-08 RX ADMIN — INSULIN LISPRO 1 UNITS: 100 INJECTION, SOLUTION INTRAVENOUS; SUBCUTANEOUS at 12:21

## 2023-12-08 RX ADMIN — POTASSIUM CHLORIDE 20 MEQ: 1500 TABLET, EXTENDED RELEASE ORAL at 06:58

## 2023-12-08 RX ADMIN — ASPIRIN 325 MG ORAL TABLET 325 MG: 325 PILL ORAL at 08:01

## 2023-12-08 RX ADMIN — PRAVASTATIN SODIUM 80 MG: 80 TABLET ORAL at 16:23

## 2023-12-08 RX ADMIN — SERTRALINE HYDROCHLORIDE 50 MG: 50 TABLET ORAL at 08:01

## 2023-12-08 NOTE — DISCHARGE SUMMARY
[address] [facility]  Discharge- [facility] 1960, 61 y.o. male MRN: [id number]  Unit/Bed#: S -01 Encounter: [id number]  Primary Care Provider: Mike Hughes MD   Date and time admitted to hospital: 12/5/2023  4:48 PM    * Wide-complex tachycardia  Assessment & Plan  While exercising in cardiac rehab, an emergency was called for the patient as the cardiac monitor revealed him to be in wide-complex tachycardia. Patient stated he remained asymptomatic, was only tired because he was lifting heavier weights. In the ED, EKG showed sinus tachycardia with first-degree block. Labs and chest x-ray were unremarkable. Cardiologist recommended monitoring patient overnight.  -No events on telemetry overnight, currently in NSR  -States that he experiences brief intermittent chest flutters but is completely asymptomatic otherwise. Plan:  - Cardiology recommends patient be placed on Tikosyn which requires monitoring of Qtc for four doses  - Qtc to be check q3h  - Follow up MRI read      Diabetic polyneuropathy associated with type 2 diabetes mellitus [facility])  Assessment & Plan  Lab Results   Component Value Date    HGBA1C 6.9 (H) 12/05/2023       Recent Labs     12/06/23  1113 12/06/23  1300 12/06/23  1508 12/06/23  2127   POCGLU 167* 122 159* 154*         Blood Sugar Average: Last 72 hrs:  (P) 136Continue gabapentin 100 mg in the morning and 300 mg at night. Depressive disorder  Assessment & Plan  Continue Zoloft 50 mg daily  Ativan 1.5 mg daily at bedtime for sleep    S/P CABG x 5  Assessment & Plan  Patient is status post CABG x5 back in August 2023. Patient has been attending cardiac rehab, and states that this is the best he is ever felt. He is able to walk over a mile and a half each day, and has remained very active at home.   Patient is experienced no other symptoms of concerns after the surgery, the only event to occur was in his most recent cardiac rehab session with the wide-complex tachycardia.  -Continue aspirin 325 mg daily      Hyperlipidemia LDL goal <100  Assessment & Plan  Continue home statin regimen    Type 2 diabetes mellitus with hyperglycemia, without long-term current use of insulin [facility])  Assessment & Plan  Lab Results   Component Value Date    HGBA1C 6.9 (H) 12/05/2023       Recent Labs     12/06/23  1113 12/06/23  1300 12/06/23  1508 12/06/23  2127   POCGLU 167* 122 159* 154*         Blood Sugar Average: Last 72 hrs:  (P) 136Hold home regimen of Ozempic, Jardiance, and metformin.  -Start sliding scale insulin while inpatient      HTN (hypertension), benign  Assessment & Plan  Continue 12.5 milligrams of Lopressor twice daily      Medical Problems       Resolved Problems  Date Reviewed: 12/7/2023   None       Discharging Resident: Tressa Wright DO  Discharging Attending: Mackenzie Dan MD  PCP: Maryjo Hearn MD  Admission Date:   Admission Orders (From admission, onward)       Ordered        12/06/23 1540  Inpatient Admission  Once            12/05/23 1838  Place in Observation  Once                          Discharge Date: 12/08/23    Consultations During Hospital Stay:  Cardiology    Procedures Performed:   None    Significant Findings / Test Results:   XR follow up   Final Result by Giovani Lambert MD (12/08 4021)      No acute cardiopulmonary disease. No retained epicardial pacer wires. Workstation performed: [id number]         X-ray chest 1 view portable   ED Interpretation by Jay Acevedo MD (12/05 3604)   Interpreted by ED Resident: No evidence of pneumothorax, pneumonia or pleural effusions. No cardiomegaly      Final Result by Kulwinder Castro MD (12/06 7328)      No acute cardiopulmonary disease.                   Workstation performed: [id number]         MRI cardiac  w wo contrast    (Results Pending)         Incidental Findings:   None      Test Results Pending at Discharge (will require follow up):  MRI cardiac grade     Outpatient Tests Requested:  BMP in 1 week  Zio patch    Complications: None    Reason for Admission: Sustained V. tach while at cardiac rehab    Hospital Course:   Early Crigler is a 61 y.o. male patient who originally presented to the hospital on 12/5/2023 due to sustained V. tach noted on the monitor while at cardiac rehab. Patient remained asymptomatic at that time and was asymptomatic throughout his hospital stay. Cardiology was consulted and recommended Tikosyn which required QTc monitoring through the fourth dose. He also got a cardiac MRI and the read will need to be followed up on. His potassium was persistently below 4 and he will go on 10 mg of K-Dur daily. BMP to follow-up in 1 week to evaluate electrolytes. Please see above list of diagnoses and related plan for additional information. Condition at Discharge: stable    Discharge Day Visit / Exam:   Subjective: No acute events overnight. Patient eager to leave the hospital.  Denies headache, chest pain, shortness of breath, abdominal pain, N/V/D. Vitals: Blood Pressure: 115/74 (12/08/23 0711)  Pulse: 70 (12/07/23 2259)  Temperature: 97.7 °F (36.5 °C) (12/08/23 0711)  Temp Source: Oral (12/07/23 0739)  Respirations: 18 (12/08/23 0711)  SpO2: 97 % (12/07/23 0739)  Exam:   Physical Exam  Vitals and nursing note reviewed. Constitutional:       General: He is not in acute distress. Appearance: Normal appearance. He is well-developed. HENT:      Head: Normocephalic and atraumatic. Right Ear: External ear normal.      Left Ear: External ear normal.      Nose: Nose normal. No congestion. Mouth/Throat:      Mouth: Mucous membranes are moist.      Pharynx: Oropharynx is clear. Eyes:      Conjunctiva/sclera: Conjunctivae normal.   Cardiovascular:      Rate and Rhythm: Normal rate and regular rhythm. Heart sounds: No murmur heard. Pulmonary:      Effort: Pulmonary effort is normal. No respiratory distress. Breath sounds: Normal breath sounds. Abdominal:      General: There is no distension. Palpations: Abdomen is soft. Tenderness: There is no abdominal tenderness. Musculoskeletal:         General: No swelling or tenderness. Cervical back: Normal range of motion and neck supple. Skin:     General: Skin is warm and dry. Neurological:      Mental Status: He is alert and oriented to person, place, and time. Psychiatric:         Mood and Affect: Mood normal.          Discussion with Family: Updated  (wife) at bedside. Discharge instructions/Information to patient and family:   See after visit summary for information provided to patient and family. Provisions for Follow-Up Care:  See after visit summary for information related to follow-up care and any pertinent home health orders. Mobility at time of Discharge:   Basic Mobility Inpatient Raw Score: 24  JH-HLM Goal: 8: Walk 250 feet or more  JH-HLM Achieved: 8: Walk 250 feet ot more  HLM Goal achieved. Continue to encourage appropriate mobility. Disposition:   Home    Planned Readmission: None    Discharge Medications:  See after visit summary for reconciled discharge medications provided to patient and/or family.       **Please Note: This note may have been constructed using a voice recognition system**

## 2023-12-08 NOTE — PLAN OF CARE
Problem: Potential for Falls  Goal: Patient will remain free of falls  Description: INTERVENTIONS:  - Educate patient/family on patient safety including physical limitations  - Instruct patient to call for assistance with activity   - Consult OT/PT to assist with strengthening/mobility   - Keep Call bell within reach  - Keep bed low and locked with side rails adjusted as appropriate  - Keep care items and personal belongings within reach  - Initiate and maintain comfort rounds  - Make Fall Risk Sign visible to staff  - Offer Toileting every 2 Hours, in advance of need  - Initiate/Maintain alarm  - Obtain necessary fall risk management equipment:   - Apply yellow socks and bracelet for high fall risk patients  - Consider moving patient to room near nurses station  Outcome: Progressing     Problem: Nutrition/Hydration-ADULT  Goal: Nutrient/Hydration intake appropriate for improving, restoring or maintaining nutritional needs  Description: Monitor and assess patient's nutrition/hydration status for malnutrition. Collaborate with interdisciplinary team and initiate plan and interventions as ordered. Monitor patient's weight and dietary intake as ordered or per policy. Utilize nutrition screening tool and intervene as necessary. Determine patient's food preferences and provide high-protein, high-caloric foods as appropriate.      INTERVENTIONS:  - Monitor oral intake, urinary output, labs, and treatment plans  - Assess nutrition and hydration status and recommend course of action  - Evaluate amount of meals eaten  - Assist patient with eating if necessary   - Allow adequate time for meals  - Recommend/ encourage appropriate diets, oral nutritional supplements, and vitamin/mineral supplements  - Order, calculate, and assess calorie counts as needed  - Recommend, monitor, and adjust tube feedings and TPN/PPN based on assessed needs  - Assess need for intravenous fluids  - Provide specific nutrition/hydration education as appropriate  - Include patient/family/caregiver in decisions related to nutrition  Outcome: Progressing     Problem: CARDIOVASCULAR - ADULT  Goal: Maintains optimal cardiac output and hemodynamic stability  Description: INTERVENTIONS:  - Monitor I/O, vital signs and rhythm  - Monitor for S/S and trends of decreased cardiac output  - Administer and titrate ordered vasoactive medications to optimize hemodynamic stability  - Assess quality of pulses, skin color and temperature  - Assess for signs of decreased coronary artery perfusion  - Instruct patient to report change in severity of symptoms  Outcome: Progressing  Goal: Absence of cardiac dysrhythmias or at baseline rhythm  Description: INTERVENTIONS:  - Continuous cardiac monitoring, vital signs, obtain 12 lead EKG if ordered  - Administer antiarrhythmic and heart rate control medications as ordered  - Monitor electrolytes and administer replacement therapy as ordered  Outcome: Progressing

## 2023-12-08 NOTE — PROGRESS NOTES
General Cardiology   Progress Note -  Team One   Gabrielle Chavez 61 y.o. male MRN: [id number]  Unit/Bed#: S -01 Encounter: [id number]    Assessment     Ventricular Tachycardia  WCT noted while on NuStep during cardiac rehab  Review of strips shows PVC prior to onset; likely VT  Asymptomatic, BP stable   ECG in ED showed sinus tachycardia with 1st degree AV block. Tele without events overnight  K 4.7, Mg 2.1, TSH WNL  No evidence of heart failure, no anginal symptoms reported  Echo unchanged from prior- LVEF normal  Plan discussed with EP yesterday, initiate Tikosyn 500 mcg BID- first dose 12/6 at 1600  Metoprolol tartrate increased to 25 mg BID     CAD s/p CABG x 5 08/2023 (LIMA to LAD, SVG to D1, Y graft to ramus and OM1, and SVG to RPDA)  [facility] 08/2023 (performed after abnormal nuclear stress test)- pLAD 70%, mLAD 80%, distal LAD 90%, D1 90%, D2 80% (small vessel), ramus 80%, mid to distal LCx 90%, occluded RCA with collateral flow to RPDA  Doing well at cardiac rehab, has been increasing activity level/exercise  On ASA, statin, and beta blocker     HTN- controlled on metoprolol 25 mg BID, avg /67     HLD- , HDL 42,  in June 2023. Updated lipid panel yesterday showed TC 90, TG 99, HDL 39, LDL 31. On rosuvastatin 40 mg daily at home. Substituted with pravastatin 40 mg daily in hospital.     Type 2 DM- A1c 6.9%     Plan  Continue Tikosyn, 500 mcg twice daily. QTc 472 ms after 4th dose  Stable for d/c home if QTc remains stable after 5th dose this afternoon  Continue to monitor on telemetry until discharge  Repeat BMP and Mg next week  Continue higher metoprolol dose of 25 mg BID  Zio patch being mailed to patient's home  Cardiac MRI completed to evaluate for scar. Results pending. Outpatient follow up arranged    Subjective  Review of Systems   Constitutional: Negative for chills, malaise/fatigue and weight gain.    Cardiovascular:  Negative for chest pain, dyspnea on exertion, leg swelling, orthopnea, palpitations and syncope. Respiratory:  Negative for cough, shortness of breath, sleep disturbances due to breathing and sputum production. Gastrointestinal:  Negative for bloating, nausea and vomiting. Neurological:  Negative for dizziness, light-headedness and weakness. Psychiatric/Behavioral:  Negative for altered mental status. All other systems reviewed and are negative. Objective:   Vitals: Blood pressure 115/74, pulse 70, temperature 97.7 °F (36.5 °C), resp. rate 18, SpO2 97 %. , There is no height or weight on file to calculate BMI.,     Systolic (92SKY), EHJ:268 , Min:113 , QHD:347     Diastolic (65DLJ), RGH:30, Min:65, Max:74      Intake/Output Summary (Last 24 hours) at 12/8/2023 1038  Last data filed at 12/7/2023 2100  Gross per 24 hour   Intake 0 ml   Output --   Net 0 ml     Wt Readings from Last 3 Encounters:   10/27/23 92.9 kg (204 lb 11.2 oz)   09/14/23 97.1 kg (214 lb)   08/30/23 99.4 kg (219 lb 3.2 oz)     Telemetry Review: NSR with first degree AV block    Physical Exam  Vitals reviewed. Constitutional:       General: He is not in acute distress. Neck:      Vascular: No hepatojugular reflux or JVD. Cardiovascular:      Rate and Rhythm: Normal rate and regular rhythm. Heart sounds: Normal heart sounds. No murmur heard. No friction rub. No gallop. Pulmonary:      Effort: Pulmonary effort is normal. No respiratory distress. Breath sounds: No rales. Abdominal:      General: Bowel sounds are normal. There is no distension. Palpations: Abdomen is soft. Tenderness: There is no abdominal tenderness. Musculoskeletal:         General: No tenderness. Normal range of motion. Cervical back: Neck supple. Right lower leg: No edema. Left lower leg: No edema. Skin:     General: Skin is warm and dry. Capillary Refill: Capillary refill takes less than 2 seconds. Findings: No erythema.    Neurological:      Mental Status: He is alert and oriented to person, place, and time.    Psychiatric:         Mood and Affect: Mood normal.         LABORATORY RESULTS      CBC with diff:   Results from last 7 days   Lab Units 12/07/23  0504 12/06/23 0443 12/05/23 2152 12/05/23  1709   WBC Thousand/uL 8.10 8.76  --  9.75   HEMOGLOBIN g/dL 13.0 13.3  --  13.4   HEMATOCRIT % 41.8 42.4  --  43.9   MCV fL 87 87  --  88   PLATELETS Thousands/uL 193 215 238 246   RBC Million/uL 4.83 4.89  --  5.00   MCH pg 26.9 27.2  --  26.8   MCHC g/dL 31.1* 31.4  --  30.5*   RDW % 13.6 13.6  --  13.7   MPV fL 10.4 9.9 10.9 10.5   NRBC AUTO /100 WBCs 0  --   --  0     CMP:  Results from last 7 days   Lab Units 12/08/23  0510 12/07/23  0504 12/06/23 0443 12/05/23  1709   POTASSIUM mmol/L 3.9 3.7 4.1 4.7   CHLORIDE mmol/L 106 104 103 100   CO2 mmol/L 25 24 28 23   BUN mg/dL 24 25 21 24   CREATININE mg/dL 0.83 0.91 0.97 1.04   CALCIUM mg/dL 8.9 8.9 9.4 9.7   AST U/L  --   --   --  24   ALT U/L  --   --   --  22   ALK PHOS U/L  --   --   --  71   EGFR ml/min/1.73sq m 93 89 82 76     BMP:  Results from last 7 days   Lab Units 12/08/23  0510 12/07/23  0504 12/06/23 0443 12/05/23  1709   POTASSIUM mmol/L 3.9 3.7 4.1 4.7   CHLORIDE mmol/L 106 104 103 100   CO2 mmol/L 25 24 28 23   BUN mg/dL 24 25 21 24   CREATININE mg/dL 0.83 0.91 0.97 1.04   CALCIUM mg/dL 8.9 8.9 9.4 9.7     Lab Results   Component Value Date    CREATININE 0.83 12/08/2023    CREATININE 0.91 12/07/2023    CREATININE 0.97 12/06/2023      Results from last 7 days   Lab Units 12/08/23  0510 12/07/23  0504 12/06/23  0443 12/05/23  1709   MAGNESIUM mg/dL 2.0 2.0 2.1 2.1      Results from last 7 days   Lab Units 12/05/23  1709   HEMOGLOBIN A1C % 6.9*      Results from last 7 days   Lab Units 12/05/23  1709   TSH 3RD GENERATON uIU/mL 1.814   Lipid Profile:   No results found for: "CHOL"  Lab Results   Component Value Date    HDL 39 (L) 12/06/2023     Lab Results   Component Value Date    LDLCALC 31 12/06/2023     Lab Results   Component Value Date    TRIG 99 2023       Cardiac testing:   No results found for this or any previous visit. No results found for this or any previous visit. No results found for this or any previous visit. No valid procedures specified. Results for orders placed during the hospital encounter of 23    NM myocardial perfusion spect (stress and/or rest)    Interpretation Summary    Stress ECG: A Johnny protocol stress test was performed. The patient reached stage 2.0 of the protocol after exercising for 7 min and 17 sec and had a maximal HR of 139 bpm (88 % of MPHR) and 7.0 METS. The patient experienced non-limiting angina during the test. The test was stopped because the patient experienced dyspnea. The patient achieved the target heart rate. The patient reported dyspnea during the stress test. Symptoms began during stress and ended during recovery. Blood pressure demonstrated a blunted response and heart rate demonstrated a normal response to stress. The patient's heart rate recovery was normal.    Stress EC.0 mm of ST elevation (aVR) and Horizontal ST depression of 2.0 mm (II, III, aVF, V5 and V6) is noted. Improvement of ST depression within 2 minutes of recovery. However, there are still persistent ST-T changes even after 4 minutes into recovery. Arrhythmias during stress: rare PVCs. There were no arrhythmias during recovery. The ECG was positive for ischemia. The stress ECG is consistent with ischemia after maximal exercise, with reproduction of symptoms. Perfusion Defect Conclusion: The stress/rest perfusion ratio is 1.11 . There is no evidence of transient ischemic dilation (TID). Stress Function: Left ventricular function post-stress is normal. Stress ejection fraction is 55 %. There is a defect in the mid to apical inferior location(s). The defect has mildly reduced function.     Stress Combined Conclusion: The ECG and SPECT imaging portions of the stress study are concordant with findings concerning for stress induced myocardial ischemia. Perfusion: There is a left ventricular perfusion defect that is large in size with severe reduction in uptake present in the entire inferior location(s) that is reversible suggestive of inferior wall ischemia. Abnormal study  Exercise 7 minutes with blunted BP response. Appropriate HR response and he met 85% of his [facility]  He did develop chest pain with exercise  1.0 mm of ST elevation (aVR) and Horizontal ST depression of 2.0 mm (II, III, aVF, V5 and V6) is noted. Improvement of ST depression within 2 minutes of recovery. However, there are still persistent ST-T changes even after 4 minutes into recovery. Minimal Ectopy  There is a left ventricular perfusion defect that is large in size with severe reduction in uptake present in the entire inferior location(s) that is reversible suggestive of inferior wall ischemia. Results were discussed with the patient and referring provider and he will be scheduled for coronary angiography.   EF normal at 55% but mid-apical inferior wall motion  No TID      Meds/Allergies   all current active meds have been reviewed and current meds:   Current Facility-Administered Medications   Medication Dose Route Frequency    acetaminophen (TYLENOL) tablet 650 mg  650 mg Oral Q6H PRN    aspirin tablet 325 mg  325 mg Oral Daily    docusate sodium (COLACE) capsule 100 mg  100 mg Oral BID    dofetilide (TIKOSYN) capsule 500 mcg  500 mcg Oral Q12H BECKY    enoxaparin (LOVENOX) subcutaneous injection 40 mg  40 mg Subcutaneous Daily    gabapentin (NEURONTIN) capsule 100 mg  100 mg Oral Daily    gabapentin (NEURONTIN) capsule 300 mg  300 mg Oral HS    insulin lispro (HumaLOG) 100 units/mL subcutaneous injection 1-6 Units  1-6 Units Subcutaneous 4x Daily (AC & HS)    LORazepam (ATIVAN) tablet 0.5 mg  0.5 mg Oral HS    metoprolol tartrate (LOPRESSOR) tablet 25 mg  25 mg Oral Q12H BECKY    perflutren lipid microsphere (DEFINITY) injection  0.4 mL/min Intravenous Once in imaging    polyethylene glycol (MIRALAX) packet 17 g  17 g Oral Daily PRN    pravastatin (PRAVACHOL) tablet 80 mg  80 mg Oral Daily With Dinner    sertraline (ZOLOFT) tablet 50 mg  50 mg Oral Daily    sodium chloride (PF) 0.9 % injection 3 mL  3 mL Intravenous Q1H PRN     Medications Prior to Admission   Medication    acetaminophen (TYLENOL) 325 mg tablet    aspirin 325 mg tablet    cholecalciferol (VITAMIN D3) 25 mcg (1,000 units) tablet    Continuous Blood Gluc Sensor (FreeStyle Ayanna 2 Sensor) MISC    docusate sodium (COLACE) 100 mg capsule    gabapentin (NEURONTIN) 100 mg capsule    Jardiance 25 MG TABS    LORazepam (ATIVAN) 0.5 mg tablet    Magnesium Gluconate 250 MG TABS    metFORMIN (GLUCOPHAGE-XR) 500 mg 24 hr tablet    metoprolol tartrate (LOPRESSOR) 25 mg tablet    Ozempic, 2 MG/DOSE, 8 MG/3ML injection pen    rosuvastatin (CRESTOR) 20 MG tablet    sertraline (Zoloft) 50 mg tablet    tadalafil (CIALIS) 20 MG tablet    glipiZIDE (GLUCOTROL XL) 5 mg 24 hr tablet    ondansetron (ZOFRAN) 4 mg tablet    oxyCODONE (ROXICODONE) 5 immediate release tablet    pantoprazole (PROTONIX) 40 mg tablet    polyethylene glycol (MIRALAX) 17 g packet    torsemide (DEMADEX) 20 mg tablet     Counseling / Coordination of Care  Total floor / unit time spent today 20 minutes. Greater than 50% of total time was spent with the patient and / or family counseling and / or coordination of care. ** Please Note: Dragon 360 Dictation voice to text software may have been used in the creation of this document.  **

## 2023-12-08 NOTE — NURSING NOTE
Patient's final EKG showed QT/QTc 424/473 respectively. Patient is able to be discharged per cardiology's note.       Yoan Kaplan

## 2023-12-08 NOTE — DISCHARGE INSTR - AVS FIRST PAGE
Dear Gurpreet Chopra,     It was our pleasure to care for you here at [facility]. It is our hope that we were always able to exceed the expected standards for your care during your stay. You were hospitalized due to [facility]. You were cared for on the 3rd floor by Charlie Mims DO under the service of Stephanie Roldan MD with the [facility] who covers for your primary care physician (PCP), Jim Stockton MD, while you were hospitalized. If you have any questions or concerns related to this hospitalization, you may contact us at [phone]. For follow up as well as any medication refills, we recommend that you follow up with your primary care physician. A registered nurse will reach out to you by phone within a few days after your discharge to answer any additional questions that you may have after going home. However, at this time we provide for you here, the most important instructions / recommendations at discharge:     Notable Medication Adjustments -   Tikosyn 500mg every 12 hours  Kdur 10mg daily  Take all other medications as previously prescribed   Testing Required after Discharge -   Zio patch   BMP one week after discharge  Important follow up information -   Please follow up with cardiology after discharge  Please follow up with your primary care physician after discharge  Other Instructions -   Should your symptoms return, or you develop new concerning symptoms, please call your doctor and/or go to your nearest Emergency Department. Please review this entire after visit summary as additional general instructions including medication list, appointments, activity, diet, any pertinent wound care, and other additional recommendations from your care team that may be provided for you.       Sincerely,     Charlie Mims DO

## 2023-12-08 NOTE — PLAN OF CARE
Problem: Potential for Falls  Goal: Patient will remain free of falls  Description: INTERVENTIONS:  - Educate patient/family on patient safety including physical limitations  - Instruct patient to call for assistance with activity   - Consult OT/PT to assist with strengthening/mobility   - Keep Call bell within reach  - Keep bed low and locked with side rails adjusted as appropriate  - Keep care items and personal belongings within reach  - Initiate and maintain comfort rounds  - Make Fall Risk Sign visible to staff  - Offer Toileting every 2 Hours, in advance of need  - Initiate/Maintain bed/chair alarm  - Obtain necessary fall risk management equipment  - Apply yellow socks and bracelet for high fall risk patients  - Consider moving patient to room near nurses station  Outcome: Progressing     Problem: Nutrition/Hydration-ADULT  Goal: Nutrient/Hydration intake appropriate for improving, restoring or maintaining nutritional needs  Description: Monitor and assess patient's nutrition/hydration status for malnutrition. Collaborate with interdisciplinary team and initiate plan and interventions as ordered. Monitor patient's weight and dietary intake as ordered or per policy. Utilize nutrition screening tool and intervene as necessary. Determine patient's food preferences and provide high-protein, high-caloric foods as appropriate.      INTERVENTIONS:  - Monitor oral intake, urinary output, labs, and treatment plans  - Assess nutrition and hydration status and recommend course of action  - Evaluate amount of meals eaten  - Assist patient with eating if necessary   - Allow adequate time for meals  - Recommend/ encourage appropriate diets, oral nutritional supplements, and vitamin/mineral supplements  - Order, calculate, and assess calorie counts as needed  - Recommend, monitor, and adjust tube feedings and TPN/PPN based on assessed needs  - Assess need for intravenous fluids  - Provide specific nutrition/hydration education as appropriate  - Include patient/family/caregiver in decisions related to nutrition  Outcome: Progressing     Problem: CARDIOVASCULAR - ADULT  Goal: Maintains optimal cardiac output and hemodynamic stability  Description: INTERVENTIONS:  - Monitor I/O, vital signs and rhythm  - Monitor for S/S and trends of decreased cardiac output  - Administer and titrate ordered vasoactive medications to optimize hemodynamic stability  - Assess quality of pulses, skin color and temperature  - Assess for signs of decreased coronary artery perfusion  - Instruct patient to report change in severity of symptoms  Outcome: Progressing  Goal: Absence of cardiac dysrhythmias or at baseline rhythm  Description: INTERVENTIONS:  - Continuous cardiac monitoring, vital signs, obtain 12 lead EKG if ordered  - Administer antiarrhythmic and heart rate control medications as ordered  - Monitor electrolytes and administer replacement therapy as ordered  Outcome: Progressing

## 2023-12-12 ENCOUNTER — CLINICAL SUPPORT (OUTPATIENT)
Dept: CARDIAC REHAB | Facility: CLINIC | Age: 63
End: 2023-12-12
Payer: COMMERCIAL

## 2023-12-12 DIAGNOSIS — Z95.1 S/P CABG X 5: Primary | ICD-10-CM

## 2023-12-12 PROCEDURE — 93798 PHYS/QHP OP CAR RHAB W/ECG: CPT

## 2023-12-12 NOTE — PROGRESS NOTES
Cardiac Rehabilitation Plan of Care   90 Day Reassessment          Today's date: 2023   # of Exercise Sessions Completed: 33  Patient name: Doreen Edouard      : 1960  Age: 61 y.o. MRN: [id number]  Referring Physician: Miguel Ángel Shepherd PA-C  Cardiologist: Jamshid Warner DO   Provider: Francis Romano  Clinician: Jemma Moctezuma MS, CEP    Dx:   Encounter Diagnosis   Name Primary? S/P CABG x 5 Yes     Date of onset: 2023      SUMMARY OF PROGRESS: Stephanie Shaw is compliant attending cardiac rehab exercise sessions 3x/wk post CABGx5. He was experiencing chest tightness/SOB/sweating with exertion. Nuclear stress test was done which was abnormal. [facility] found stenosis: pLAD 70%, mLAD 80%, dLAD 90%, 1st diag 90% & 80%, Ramus 80%, mCx 90%, collateral to RPDA from dLAD. On  Stephanie Shaw was sent to the ED with sudden onset of Wide complex Vtach in rehab. He was on the Nustep when he went into this arrhythmia. Stephanie Shaw remained stable as a medical emergency was called. He reported no symptoms. Stephanie Shaw converted out of this rhythm by the time he was evaluated in the ED. Meds were recently changed due to this and additional testing has been ordered to assess as to why Stephanie Shaw went into this rhythm to begin with. He is to return to rehab on  and has 3 sessions left. He tolerates 45-55 mins at 2.3 - 4.77 METs. A light strength training component has been added to their exercise program. He is tolerating progression of intensity levels to maintain RPE 4-6. Resting /64 - 120/80 with Normal response to exercise reaching 118/62 - 148/72. NSR on tele with MF couplet, PVC noted observed. Wide complex V tach noted on . No other occurrence noted in rehab. RHR 72 - 92 with Normal response to exercise reaching 111 - 129. He has added home exercise 5-7 days/wk which includes walking for 1 mile, 7d/wk in the morning and 5x/wk twice a day, in the morning and then in the evening. No cardiac complaints.  He is progressing toward wt loss goals with a loss of 15 pounds. Patient has been working on dietary modifications with the goal of rare red/processed meats, low fat dairy, reduced added sugars and refined flours. The patient has T2D and monitors BG 3 times per day reporting an avg. FBG of 120. The patient is a non-smoker. PHQ-9 and ABBY-7 were not reassessed due to his absence for the week since being in the ED. Most recent assessment found PHQ-9 at a 10 suggesting 10-14 = Moderate Depression and ABBY-7 at a 3 suggesting 0-4  = Not anxious. When addressed, the patient admits to depression/anxiety. Patient reports excellent social/emotional support from his wife. Patient attends group educational classes on cardiac risk factor modification. His exercise program will be progressed as tolerated to maintain RPE 4-6. The patient has the following personal goals he hopes to achieved by discharge: be able to cut grass, increase stamina strength, walk his dog alone and continue making changes towards a heart healthy diet. They will continue to be educated on lifestyle modification and encouraged to supplement with a home exercise program as tolerated to reach the following goals in the next 30 days: increase overall strength, stamina, and strength.       Medication compliance: Yes   Comments: Pt reports to be compliant with medications  Fall Risk: Low   Comments: Ambulates with a steady gait with no assist device and Denies a fall in the past 6 months    EKG Interpretation: GISEL MURGUIA couplet noted       EXERCISE ASSESSMENT and PLAN    Exercise Prescription:      Frequency: 3 days/week   Supplement with home exercise 2+ days/wk as tolerated       Minutes: 45-55         METS: 2.3-4.77           HR: 111-129   RPE: 3-5         Modalities: Treadmill, UBE, NuStep and Recumbent bike      30 Day Goals for Exercise Progression:    Frequency: 3 days/week of cardiac rehab       Supplement with home exercise 2+ days/wk as tolerated    Minutes: 40-45 >150 mins/wk of moderate intensity exercise   METS: 4-6   HR: resting + 30     RPE: 4-6   Modalities: Treadmill, Airdyne bike, UBE, Lifecycle, Elliptical, NuStep and Recumbent bike    Strength trainin-3 days / week  12-15 repetitions  1-2 sets per modality    Modalities: Leg Press, Chest Press, Pull Downs, Arm Curl and Seated Row    Home Exercise: Type: walking , Frequency: 5-6 days/week, Distance 0.5 miles     Goals: 10% improvement in functional capacity - based on max METs achieved in fitness assessment, Reduced dyspnea with physical activity  0-1/10, improved DASI score by 10%, Increase in exercise capacity by 40% - based on peak METs tolerated in cardiac rehab exercise session, Exercise 5 days/wk, >150mins/wk of moderate intensity exercise, Resume ADLs with increased strength, Return to work unrestricted, Attend Rehab regularly, Decrease sitting time and Start a walking program    Progression Toward Goals:  Pt is progressing and showing improvement  toward the following goals:  attending rehab regularly, increasing exercise intensities as tolerated . , Patient will exercise with [address] when he finishes rehab in the next 30 days, Patient will be encouraged to focus on lifestyle modifications following discharge.     Education: benefit of exercise for CAD risk factors, home exercise guidelines, AHA guidelines to achieve >150 mins/wk of moderate exercise, RPE scale, class: Risk Factors for Heart Disease, exercise instructions/guidelines for discharge , and physical activity/exercise in extreme weather conditions   Plan:education on home exercise guidelines  Readiness to change: Maintenance: (Maintaining the behavior change)      NUTRITION ASSESSMENT AND PLAN    Weight control:    Starting weight: 213.6 lbs    Current weight:   198.8 lbs    Diabetes: T2D  A1c: 7.4    last measured: 2023    Lipid management: Discussed diet and lipid management and Last lipid profile 2023 Chol 193    HDL 42      Goals:reduced BMI to < 25, LDL <100, decreased body fat% <25%   (M), reduced waist circumference <40 inches (M), eat 3 or more servings of whole grains a day, Eat 4-5 cups of fruits and vegetables daily and seldom eat or choose low fat ice-cream, fruit juice bars or frozen yogurt     Measurable goals were based Rate Your Plate Dietary Self-Assessment. These are the areas in which the patient could score higher on the assessment. Goals include recommendations for a heart healthy diet based on American Heart Association. Progression Toward Goals: Will continue to educate and progress as tolerated., Goals met: weight loss, good BG regulation, maintaining heart healthy eating., Patient will be encouraged to focus on lifestyle modifications following discharge.     Education: heart healthy eating  low sodium diet  hydration  nutrition for  lipid management  target goal for A1c <7.0  exercise and diabetes management   wt. loss   healthy choices while dining out  education class: Heart Healthy Eating  education class:  Label Reading  Plan: increase daily intake of fruits and vegetables, increase daily intake of low fat dairy, cook without fats or oils, use "light" tub margarine, eat healthy snacks like fruit, pretzels, and low fat crackers, and choose desserts such as fruit, sue food cake, low-fat or fat-free sweets  Readiness to change: Maintenance: (Maintaining the behavior change)      PSYCHOSOCIAL ASSESSMENT AND PLAN    Emotional:  Depression assessment:  PHQ-9 = 10   10-14 = Moderate Depression            Anxiety measure:  ABBY-7 = 3  0-4  = Not anxious  Self-reported stress level:  4  Social support: Excellent and Patient reports excellent emotional/social support from wife     Goals:  Reduce perceived stress to 1-3/10, Feelings in [facility] Score < 3, Physical Fitness in [facility] Score < 3, Overall Health in [facility] Score < 3 and Quality of Life in [facility] Score < 3 Progression Toward Goals: Pt is progressing and showing improvement  toward the following goals:  managing stress with good support.  , Patient will work on increasing confidence with return to ADLs and work in the next 30 days, no update since absence due to hospitalization    Education: signs/sxs of depression, benefits of a positive support system, stress management techniques, depression and CAD, class:  Relaxation, and class:  Stress and Your Health   Plan: Practice relaxation techniques, Exercise, and enjoy a hobby  Readiness to change: Action:  (Changing behavior)      OTHER CORE COMPONENTS     Tobacco:   Social History     Tobacco Use   Smoking Status Never   Smokeless Tobacco Never       Tobacco Use Intervention:   N/A:  Patient is a non-smoker     Anginal Symptoms:  None   NTG use: No prescription    Blood pressure:    Restin/64 - 120/80    Exercise: 118/62 - 146/72     Goals: consistent BP < 130/80, reduced dietary sodium <2300mg, moderate intensity exercise >150 mins/wk, medication compliance and reduce number of medications  needed for BP control    Progression Toward Goals: Pt is progressing and showing improvement  toward the following goals:  watching sodium intake and hydrating .  , Patient will maintain BP control and medication compliance in the next 30 days, Will continue to educate and progress as tolerated.     Education:  understanding high blood pressure and it's relationship to CAD, low sodium diet and HTN, Education class:  Common Heart Medications, and Education class: Understanding Heart Disease  Plan: medication compliance, engage in regular exercise, and monitor home BP  Readiness to change: Action:  (Changing behavior)

## 2023-12-14 ENCOUNTER — CLINICAL SUPPORT (OUTPATIENT)
Dept: CARDIAC REHAB | Facility: CLINIC | Age: 63
End: 2023-12-14
Payer: COMMERCIAL

## 2023-12-14 DIAGNOSIS — Z95.1 S/P CABG X 5: Primary | ICD-10-CM

## 2023-12-14 PROCEDURE — 93798 PHYS/QHP OP CAR RHAB W/ECG: CPT

## 2023-12-15 ENCOUNTER — TELEPHONE (OUTPATIENT)
Dept: INTERNAL MEDICINE CLINIC | Facility: CLINIC | Age: 63
End: 2023-12-15

## 2023-12-15 ENCOUNTER — CLINICAL SUPPORT (OUTPATIENT)
Dept: CARDIAC REHAB | Facility: CLINIC | Age: 63
End: 2023-12-15
Payer: COMMERCIAL

## 2023-12-15 ENCOUNTER — APPOINTMENT (OUTPATIENT)
Dept: LAB | Facility: CLINIC | Age: 63
End: 2023-12-15
Payer: COMMERCIAL

## 2023-12-15 DIAGNOSIS — Z95.1 S/P CABG X 5: Primary | ICD-10-CM

## 2023-12-15 DIAGNOSIS — E11.65 TYPE 2 DIABETES MELLITUS WITH HYPERGLYCEMIA, WITHOUT LONG-TERM CURRENT USE OF INSULIN (HCC): ICD-10-CM

## 2023-12-15 DIAGNOSIS — I10 HTN (HYPERTENSION), BENIGN: ICD-10-CM

## 2023-12-15 DIAGNOSIS — I10 HTN (HYPERTENSION), BENIGN: Primary | ICD-10-CM

## 2023-12-15 LAB
ANION GAP SERPL CALCULATED.3IONS-SCNC: 10 MMOL/L
BUN SERPL-MCNC: 23 MG/DL (ref 5–25)
CALCIUM SERPL-MCNC: 9.1 MG/DL (ref 8.4–10.2)
CHLORIDE SERPL-SCNC: 100 MMOL/L (ref 96–108)
CO2 SERPL-SCNC: 26 MMOL/L (ref 21–32)
CREAT SERPL-MCNC: 1.02 MG/DL (ref 0.6–1.3)
GFR SERPL CREATININE-BSD FRML MDRD: 77 ML/MIN/1.73SQ M
GLUCOSE SERPL-MCNC: 104 MG/DL (ref 65–140)
POTASSIUM SERPL-SCNC: 4.5 MMOL/L (ref 3.5–5.3)
SODIUM SERPL-SCNC: 136 MMOL/L (ref 135–147)

## 2023-12-15 PROCEDURE — 80048 BASIC METABOLIC PNL TOTAL CA: CPT

## 2023-12-15 PROCEDURE — 36415 COLL VENOUS BLD VENIPUNCTURE: CPT

## 2023-12-15 PROCEDURE — 93798 PHYS/QHP OP CAR RHAB W/ECG: CPT

## 2023-12-15 NOTE — UTILIZATION REVIEW
Please note, this request was sent to you on 12/7/23  Can we have an updated determination faxed to [phone]:   [facility]. [facility]), [address] [place] [address]  Tax ID: [id number]  NPI: [id number]   ATTENDING PROVIDER:  Attending Name and NPI#: Carolynn Adame, [id number] [place] [address] [[id number]]  Address: [address] [place] [address]), [address] [place] [address]  Phone: [phone]     ADMISSION INFORMATION:  Place of Service: Inpatient [address] Code: 21  Inpatient Admission Date/Time: 12/6/23  3:41 PM  Discharge Date/Time: 12/8/2023  6:52 PM  Admitting Diagnosis Code/Description:  Tachycardia [R00.0]  Abnormal EKG [R94.31]  S/P CABG x 5 [Z95.1]     UTILIZATION REVIEW CONTACT:  Kristina Alvarez, Utilization   Network Utilization Review Department  Phone: [phone]  Fax: [phone]  Email: [email or web address]  Contact for approvals/pending authorizations, clinical reviews, and discharge. PHYSICIAN ADVISORY SERVICES:  Medical Necessity Denial & [facility] Review  Phone: [phone]  Fax: [phone]  Email: [email or web address]     DISCHARGE SUPPORT TEAM:  For Patients Discharge Needs & Updates  Phone: [phone] opt. 2 Fax: [phone]  Email: [email or web address]        Cj Yao RN  Registered Nurse  Specialty: Utilization Review     Utilization Review     Addendum     Date of Service: 12/6/2023  9:05 AM     Expand All Collapse All    Initial Clinical Review     Pt initially admitted as Observation on 12/5. Changed to Inpatient on 12/6. Pt requiring continued stay d/t initiation of Tikosyn.      Admission: Date/Time/Statement:   Admission Orders (From admission, onward)          Ordered         12/06/23 1540   Inpatient Admission  Once             12/05/23 1838   Place in Observation  Once                                     Orders Placed This Encounter   Procedures    Inpatient Admission       Standing Status:   Standing       Number of Occurrences:   1       Order Specific Question:   Level of Care       Answer:   Med Surg [16]       Order Specific Question:   Estimated length of stay       Answer:   More than 2 Midnights       Order Specific Question:   Certification       Answer:   I certify that inpatient services are medically necessary for this patient for a duration of greater than two midnights. See H&P and MD Progress Notes for additional information about the patient's course of treatment. ED Arrival Information         Expected   -    Arrival   12/5/2023 16:48    Acuity   Urgent                 Means of arrival   Wheelchair    Escorted by   Spouse    Service   Hospitalist    Admission type   Emergency                 Arrival complaint   Tachycardia                     Chief Complaint   Patient presents with    Abnormal ECG       Pt was medical emergency while in cardiac rehab on exercise bike and went into "wide qrs tachycardia" pt denies cp/sob/dizziness. Hx bypass         Initial Presentation: 61 y.o. male who presented self from home to [address] [place] [address] ED. Admitted in observation status for evaluation and treatment of wide-complex tachycardic. PMHx: CAD, T2DM, HLD, HTN. Presented from cardiac rehab appointment w/ wide-complex tachycardia, but remained asymptomatic. EKG showed sinus tachycardia w/ first-degree block. On exam, unremarkable. Plan: telemetry, continue  mg daily, continue other PTA meds, SSI w/ BG checks ACHS, supportive care, Trend labs, replete electrolytes as needed. Cardiology consulted. 12/06/23 Changed to Inpatient Status: No events on telemetry overnight, in NSR. Plan: start Tikosyn w/ EKGs w/ dosing for QTc monitoring per cardiology, continue telemetry, continue current meds, SSI w/ BG checks ACHS. Trend labs, replete electrolytes as needed. Cardiology: Pt w/ probably re-entry VT.  Start [employer] 500 mg BID. Close monitoring of QTc. Trend labs, replete electrolytes as needed for K>4, Mag>2. Telemetry for 48 hours. Check echo. Increase metoprolol tartrate to 25 mg BID. 12/07/23          Day 2: Telemetry w/ no events overnight. Plan: Continue Tikosyn 500 mcg BID, QTc 470 after second dose. Must remain in hospital for telemetry and EKG monitoring for at least 4 doses to ensure stability of QTc. Trend labs, replete electrolytes as needed. Continue metoprolol 25 mg BID. Supportive care. ED Triage Vitals [12/05/23 1650]   Temperature Pulse Respirations Blood Pressure SpO2   98.6 °F (37 °C) (!) 107 18 138/74 98 %       Temp Source Heart Rate Source Patient Position - Orthostatic VS BP Location FiO2 (%)   Oral Monitor Sitting Right arm --       Pain Score           No Pain                  Wt Readings from Last 1 Encounters:   10/27/23 92.9 kg (204 lb 11.2 oz)      Additional Vital Signs:   Date/Time Temp Pulse Resp BP MAP (mmHg) SpO2 O2 Device   12/07/23 0739 98.3 °F (36.8 °C) 69 -- 120/76 93 97 % None (Room air)   12/06/23 2127 -- 74 -- 112/61 -- -- --   12/06/23 1430 -- 78 18 114/64 83 95 % None (Room air)      Date/Time Pulse Resp BP MAP (mmHg) SpO2 O2 Device   12/06/23 0430 87 -- 109/65 83 94 % None (Room air)   12/06/23 0300 80 18 111/59 80 93 % None (Room air)   12/06/23 0258 98 18 120/62 -- 95 % None (Room air)   12/05/23 2100 80 18 124/62 87 98 % None (Room air)   12/05/23 1900 79 18 112/58 79 97 % None (Room air)   12/05/23 1700 102 -- 138/74 100 97 % --      Pertinent Labs/Diagnostic Test Results:   12/5 - EKG  Sinus tachycardia with 1st degree A-V block  Poor R Wave Progression  Nonspecific ST abnormality     12/6 - Echo    Left Ventricle: Left ventricular cavity size is normal. Wall thickness is mildly increased. There is concentric remodeling. The left ventricular ejection fraction is 60%.  Systolic function is normal. Wall motion is normal.    IVS: There is abnormal septal motion consistent with post-operative status. Right Ventricle: Right ventricular cavity size is normal. Systolic function is normal.    Mitral Valve: There is mild annular calcification. 12/6 - EKG  Sinus rhythm with 1st degree A-V block  Baseline Artifact     12/6 - EKG 2  Normal sinus rhythm with 1st degree A-V block         X-ray chest 1 view portable   ED Interpretation by Andrea Lujan MD (12/05 1750)   Interpreted by ED Resident: No evidence of pneumothorax, pneumonia or pleural effusions. No cardiomegaly       Final Result by Fran Stewart MD (12/06 1059)       No acute cardiopulmonary disease.                        Workstation performed: [id number]                          Results from last 7 days   Lab Units 12/07/23  0504 12/06/23  0443 12/05/23  2152 12/05/23  1709   WBC Thousand/uL 8.10 8.76  --  9.75   HEMOGLOBIN g/dL 13.0 13.3  --  13.4   HEMATOCRIT % 41.8 42.4  --  43.9   PLATELETS Thousands/uL 193 215 238 246   NEUTROS ABS Thousands/µL 4.02  --   --  4.92                 Results from last 7 days   Lab Units 12/07/23  0504 12/06/23  0443 12/05/23  1709   SODIUM mmol/L 137 138 135   POTASSIUM mmol/L 3.7 4.1 4.7   CHLORIDE mmol/L 104 103 100   CO2 mmol/L 24 28 23   ANION GAP mmol/L 9 7 12   BUN mg/dL 25 21 24   CREATININE mg/dL 0.91 0.97 1.04   EGFR ml/min/1.73sq m 89 82 76   CALCIUM mg/dL 8.9 9.4 9.7   MAGNESIUM mg/dL 2.0 2.1 2.1           Results from last 7 days   Lab Units 12/05/23  1709   AST U/L 24   ALT U/L 22   ALK PHOS U/L 71   TOTAL PROTEIN g/dL 7.9   ALBUMIN g/dL 4.5   TOTAL BILIRUBIN mg/dL 0.87                 Results from last 7 days   Lab Units 12/07/23  0742 12/06/23  2127 12/06/23  1508 12/06/23  1300 12/06/23  1113 12/06/23  0743 12/05/23  2047   POC GLUCOSE mg/dl 114 154* 159* 122 167* 121 93             Results from last 7 days   Lab Units 12/07/23  0504 12/06/23  0443 12/05/23  1709   GLUCOSE RANDOM mg/dL 110 96 129               Results from last 7 days   Lab Units 12/05/23  1709   HEMOGLOBIN A1C % 6.9*   EAG mg/dl 151                 Results from last 7 days   Lab Units 12/05/23  2152 12/05/23  1901 12/05/23  1709   HS TNI 0HR ng/L  --   --  3   HS TNI 2HR ng/L  --  3  --    HSTNI D2 ng/L  --  0  --    HS TNI 4HR ng/L 3  --   --    HSTNI D4 ng/L 0  --   --                    Results from last 7 days   Lab Units 12/05/23  1709   TSH 3RD GENERATON uIU/mL 1.814             ED Treatment:   Medication Administration from 12/05/2023 1648 to 12/06/2023 2737           Date/Time Order Dose Route Action       12/05/2023 2145 EST docusate sodium (COLACE) capsule 100 mg 100 mg Oral Given       12/05/2023 2145 EST gabapentin (NEURONTIN) capsule 300 mg 300 mg Oral Given       12/05/2023 2220 EST LORazepam (ATIVAN) tablet 0.5 mg 0.5 mg Oral Given       12/05/2023 2145 EST metoprolol tartrate (LOPRESSOR) tablet 12.5 mg 12.5 mg Oral Given             Medical History        Past Medical History:   Diagnosis Date    Coronary artery disease      Diabetes mellitus ([address])      Hyperlipidemia      Hypertension           Present on Admission:   Type 2 diabetes mellitus with hyperglycemia, without long-term current use of insulin (HCC)   Hyperlipidemia LDL goal <100   Diabetic polyneuropathy associated with type 2 diabetes mellitus (HCC)   HTN (hypertension), benign   Depressive disorder        Admitting Diagnosis: Tachycardia [R00.0]  Abnormal EKG [R94.31]  S/P CABG x 5 [Z95.1]  Age/Sex: 61 y.o. male  Admission Orders:  Regular Diet. BG checks ACHS. Telemetry. SCDs. EKG for QTc monitoring 2-3 hours post Tikosyn administration.      Scheduled Medications:  aspirin, 325 mg, Oral, Daily  docusate sodium, 100 mg, Oral, BID  dofetilide, 500 mcg, Oral, Q12H BECKY  enoxaparin, 40 mg, Subcutaneous, Daily  gabapentin, 100 mg, Oral, Daily  gabapentin, 300 mg, Oral, HS  insulin lispro, 1-6 Units, Subcutaneous, 4x Daily (AC & HS)  LORazepam, 0.5 mg, Oral, HS  metoprolol tartrate, 25 mg, Oral, Q12H [address]  pravastatin, 80 mg, Oral, Daily With Dinner  sertraline, 50 mg, Oral, Daily     Continuous IV Infusions: none     PRN Meds:  acetaminophen, 650 mg, Oral, Q6H PRN  ondansetron, 4 mg, Oral, Q6H PRN  perflutren lipid microsphere, 0.4 mL/min, Intravenous, Once in imaging  sodium chloride (PF), 3 mL, Intravenous, Q1H PRN     metoprolol tartrate (LOPRESSOR) tablet 12.5 mg  Dose: 12.5 mg  Freq: Every 12 hours scheduled Route: PO  Start: 12/05/23 2100 End: 12/06/23 0945         IP CONSULT TO CARDIOLOGY     Network Utilization Review Department  ATTENTION: Please call with any questions or concerns to [phone] and carefully listen to the prompts so that you are directed to the right person. All voicemails are confidential.   For Discharge needs, contact Care Management DC Support Team at [phone] opt. 2  Send all requests for admission clinical reviews, approved or denied determinations and any other requests to dedicated fax number below belonging to the campus where the patient is receiving treatment.  List of dedicated fax numbers for the Facilities:  [place] DENIALS (Administrative/Medical Necessity) [phone]   DISCHARGE SUPPORT TEAM (NETWORK) [id number] (Maternity/NICU/Pediatrics) [phone]   [address] [place] [address] [address] [facility] [facility] [phone]   [address] [facility] [address] [facility] [address] [facility] [address] [address] [facility] [phone]   [address] [facility] [phone]   [address] [place] [address] [address] [phone]                  Revision History

## 2023-12-15 NOTE — TELEPHONE ENCOUNTER
Seen by Dr. Tomer Murray at [facility], has appointment to come to office on Wednesday but needs to get a BMP and thought it was already ordered but when went to lab today it was not ordered. Can you order please.

## 2023-12-15 NOTE — TELEPHONE ENCOUNTER
I called patient and told him that Dr. Nallely Clinton had ordered the [facility] and can go to lab at his convenience. Patient voiced understanding.

## 2023-12-15 NOTE — PROGRESS NOTES
Cardiac Rehabilitation Plan of Care   Discharge          Today's date: 12/15/2023   # of Exercise Sessions Completed: 36  Patient name: Spencer Lacy      : 1960  Age: 61 y.o. MRN: [id number]  Referring Physician: No ref. provider found  Cardiologist: Areli Benito DO   Provider: [facility]  Clinician: Majo Soliman MS, CEP    Dx:   Encounter Diagnosis   Name Primary? S/P CABG x 5 Yes     Date of onset: 2023      SUMMARY OF PROGRESS: Discharge note for Angelique Martinez. He attended rehab post CABGx5. He was experiencing chest tightness/SOB/sweating with exertion. Nuclear stress test was done which was abnormal. [facility] found stenosis: pLAD 70%, mLAD 80%, dLAD 90%, 1st diag 90% & 80%, Ramus 80%, mCx 90%, collateral to RPDA from dLAD. On 23 Angelique Martinez was sent to the ED during rehab for sudden onset of wide complex Vtach during exercise. He was asymptomatic and further testing is being completed for etiology of rhythm. Angelique Martinez is wearing a Zio patch in rehab as well. Final Exercise testing showed He had 29.3% improvement in functional capacity increasing His max METs in the submaximal TM ETT  from 5.8 to 7.5 with test termination of RHR +30. His exercise tolerance (max METs in tolerated in cardiac rehab) increased by 45.5%. He had a 12.8% improvement in the DUKE activity estimated MET level with ADLs and physical activity. His [facility] QOL improved by 15.8%. PHQ-9 score decreased from 8 to 4 suggesting minimal depression. ABBY-7 decreased from 4 to 2 suggesting no anxiety. His weight decreased by 16 pounds. Rate Your Plate score improved from 68 to 75. Angelique Martinez has been supplementing CR sessions with home exercise which includes walking for 1 mile 7days/wk. He reports increased stamina, strength and reduced SOB with activity and no complaints at his incision site. Huan tolerates 40 mins at 3.2 - 4.8 METs plus wt training. Varying P-P intervals noted on telemetry since starting tikosyn post hospitalization.  See telemetry strips. No other noted ectopy since Wide complex Vtach. RHR 67 - 92, ExHR 100 - 129. Resting /70 - 124/70 with appropriate response to exercise reaching 118/70 - 136/62. All group education classes on cardiac risk factor modification were attended by the patient. Discharge plans include continuing to exercise with [facility]. Encouraged Pt to continue exercise. Frequency: 4-6 days/wk, Intensity: RPE 4-5, Time: 40-50 mins daily, 150-200 mins/wk. Pt was encouraged to continue eating heart healthy. Pt was encouraged to remain compliant with medications and f/u with cardiologist with any cardiac symptoms, medication management and updated lipid profile.        Medication compliance: Yes   Comments: Pt reports to be compliant with medications  Fall Risk: Low   Comments: Ambulates with a steady gait with no assist device and Denies a fall in the past 6 months    EKG Interpretation: Varying P-P intervals, wide complex Vtach sustained on 23 (hospitalized)      EXERCISE ASSESSMENT and PLAN    Exercise Prescription:      Frequency: 3 days/week   Supplement with home exercise 2+ days/wk as tolerated       Minutes: 40-45         METS: 3.2 - 4.8           HR: 100-129   RPE: 3-4         Modalities: Treadmill, UBE, NuStep and Recumbent bike      Exercise Progression after Discharge:    Frequency: 3-5 days of gym or home exercise   Minutes: 30-50  >150 mins/wk of moderate intensity exercise   METS: 3 - 5   HR: 100 - 130    RPE: 4-6   Modalities: Treadmill, UBE, NuStep, and Recumbent bike    Strength trainin-3 days / week  12-15 repetitions  1-2 sets per modality    Modalities: Leg Press, Chest Press, Pull Downs, Arm Curl and Seated Row    Home Exercise: Type: walking , Frequency: 6-7 days/week, Distance 1 miles     Goals: 10% improvement in functional capacity - based on max METs achieved in fitness assessment, Reduced dyspnea with physical activity  0-1/10, improved DASI score by 10%, Increase in exercise capacity by 40% - based on peak METs tolerated in cardiac rehab exercise session, Exercise 5 days/wk, >150mins/wk of moderate intensity exercise, Resume ADLs with increased strength, Return to work unrestricted, Attend Rehab regularly, Decrease sitting time and Start a walking program    Progression Toward Goals: Will continue to educate and progress as tolerated., Goals met: going to continue to exercise with fitness center, increased max and peak METs, improved ADLs, returned to most activities. , Patient will be encouraged to focus on lifestyle modifications following discharge. Education: benefit of exercise for CAD risk factors, home exercise guidelines, AHA guidelines to achieve >150 mins/wk of moderate exercise, RPE scale, class: Risk Factors for Heart Disease, exercise instructions/guidelines for discharge , and physical activity/exercise in extreme weather conditions   Plan:education on home exercise guidelines  Readiness to change: Maintenance: (Maintaining the behavior change)      NUTRITION ASSESSMENT AND PLAN    Weight control:    Starting weight: 213.6 lbs    Current weight:   197.8 lbs    Diabetes: T2D  A1c: 7.4    last measured: 6/8/2023    Lipid management: Discussed diet and lipid management and Last lipid profile 6/8/2023  Chol 193    HDL 42      Goals:reduced BMI to < 25, LDL <100, decreased body fat% <25%   (M), reduced waist circumference <40 inches (M), eat 3 or more servings of whole grains a day, Eat 4-5 cups of fruits and vegetables daily and seldom eat or choose low fat ice-cream, fruit juice bars or frozen yogurt     Measurable goals were based Rate Your Plate Dietary Self-Assessment. These are the areas in which the patient could score higher on the assessment. Goals include recommendations for a heart healthy diet based on American Heart Association. Progression Toward Goals:  Will continue to educate and progress as tolerated., Goals met: weight loss, good BG regulation, improved lipid panel, improved A1C, maintaining heart healthy eating., Patient will be encouraged to focus on lifestyle modifications following discharge. Education: heart healthy eating  low sodium diet  hydration  nutrition for  lipid management  target goal for A1c <7.0  exercise and diabetes management   wt. loss   healthy choices while dining out  education class: Heart Healthy Eating  education class:  Label Reading  Plan: increase intake of whole grains, increase daily intake of fruits and vegetables, increase daily intake of low fat dairy, limit meat intake to less than 6oz per day, choose healthy meals while dining out, and choose low sodium canned, frozen, packaged foods or rarely eat these foods  Readiness to change: Maintenance: (Maintaining the behavior change)      PSYCHOSOCIAL ASSESSMENT AND PLAN    Emotional:  Depression assessment:  PHQ-9 = 4  1-4 = Minimal Depression            Anxiety measure:  ABBY-7 = 2  0-4  = Not anxious  Self-reported stress level:  4  Social support: Excellent and Patient reports excellent emotional/social support from wife     Goals:  Reduce perceived stress to 1-3/10, Feelings in [facility] Score < 3, Physical Fitness in [facility] Score < 3, Overall Health in [facility] Score < 3 and Quality of Life in [facility] hospital Score < 3     Progression Toward Goals: Will continue to educate and progress as tolerated., Goals met: improved PHQ-9 and ABBY-7., Patient will be encouraged to focus on lifestyle modifications following discharge.     Education: signs/sxs of depression, benefits of a positive support system, stress management techniques, depression and CAD, class:  Relaxation, and class:  Stress and Your Health   Plan: Practice relaxation techniques, Exercise, and enjoy a hobby  Readiness to change: Action:  (Changing behavior)      OTHER CORE COMPONENTS     Tobacco:   Social History     Tobacco Use   Smoking Status Never   Smokeless Tobacco Never Tobacco Use Intervention:   N/A:  Patient is a non-smoker     Anginal Symptoms:  None   NTG use: No prescription    Blood pressure:    Restin/70 - 124/70   Exercise: 118/70 - 136/62    Goals: consistent BP < 130/80, reduced dietary sodium <2300mg, moderate intensity exercise >150 mins/wk, medication compliance and reduce number of medications  needed for BP control    Progression Toward Goals: Will continue to educate and progress as tolerated., Goals met: BP under control, medication compliance, added all educations . , Patient will be encouraged to focus on lifestyle modifications following discharge.     Education:  understanding high blood pressure and it's relationship to CAD, low sodium diet and HTN, Education class:  Common Heart Medications, and Education class: Understanding Heart Disease  Plan: medication compliance, engage in regular exercise, and monitor home BP  Readiness to change: Maintenance: (Maintaining the behavior change)

## 2023-12-19 NOTE — PROGRESS NOTES
Outpatient Cardiology Note - John Obleschuk 63 y.o. male MRN: [id number]    @ Encounter: [id number]        Patient Active Problem List    Diagnosis Date Noted    Wide-complex tachycardia 12/05/2023    Diabetic polyneuropathy associated with type 2 diabetes mellitus (HCC) 08/30/2023    S/P CABG x 5 08/11/2023    Coronary artery disease involving native coronary artery 08/02/2023    Peripheral angiopathy due to type 2 diabetes mellitus ([facility]) 01/16/2023    Sleep apnea     HTN (hypertension), benign 03/29/2018    Type 2 diabetes mellitus with hyperglycemia, without long-term current use of insulin ([facility]) 03/29/2018    Hyperlipidemia LDL goal <100 03/29/2018    Right knee pain 09/23/2014    Myalgia and myositis 06/27/2014    Vitamin D deficiency 06/27/2014    Depressive disorder 05/14/2012       Assessment:  # Re-entry Ventricular tachycardia  Asymptomatic- as slower rate,   Rhythm: Tikosyn 500 mg BID    EKG QTc: 480 msec    # CAD s/p CABG x 5 on 8/11/23- Dr Rema HAINES to LAD, SVG to D1, SVG Y to RI and OM1 and SVG to right PDA  Rx: metoprolol 25 mg BID, Crestor 40 mg daily, asa 325 mg daily, Jardiance    Studies- personally reviewed by me  EKG:  bpm, no ischemic changes    CMR 12/8/23:  LVEF: 55%   Subendocardial scarring of the basal to mid inferior wall suggestive of an ischemic etiology.  Intramyocardial fibrosis within the mid inferolateral wall suggestive of myocarditis, but cannot exclude cardiac sarcoidosis     Echo 12/6/23:  LVEF: 60%  RV: normal    LHC 8/2/23:   Left Anterior Descending   Prox LAD lesion is 70% stenosed.   Mid LAD lesion is 80% stenosed.   Dist LAD lesion is 90% stenosed.      First Diagonal Branch   1st Diag-1 lesion is 90% stenosed.   1st Diag-2 lesion is 80% stenosed.      Ramus Intermedius   Ramus lesion is 80% stenosed.      Left Circumflex   Mid Cx to Dist Cx lesion is 90% stenosed.      Right Coronary Artery      Right Posterior Descending Artery   Collaterals   RPDA filled by  collaterals from Dist LAD.          NM stress 7/31/23: 7 min 17 sec, 7 METs, ischemic EKG changes. Ischemic perfusion defects in inferior segments    Echo 7/27/23:  LVEF: 60%  RV: normal  PASP: normal    Stress echo 4/11/18: 9 min 30 sec, 10.7 METs, negative for ischemia    # HTN- metoprolol 25 mg BID  # DM2, last A1C 7.4%- glipizide, kombiglyze XR 2.5- 1000 mg    # Obesity, BMI 32.6%- down to 28  # hyperlipidemia-   6/8 /23: , HDL 42  2/21/18: , HDL 43, Tri 132  # carotid artery dz  VAS 8/8/23: known occlusion on right, < 50% on left  # Mild sleep apnea. Stops breathing at night  # depression    Today's Plan:  Continue cardiac rehab  EKG today QTc: 480 msec  Continue metoprolol 25 mg BID- getting fatigue from it  Follow up with EP- may be able to ablate since re-entry  Zio Patch  May be able to do loop recorder    HPI:     64 yo male with hx DM2, last A1C over 9, obesity, hyperlipidemia, HTN, right carotid aneurysm who presents to establish CV care. Occasionally getting a little chest discomfort, intermittent. Walks about a mile or two every day. Does not seem exertional. Wife states he stops breathing at night.     Given concerning anginal symptoms had stress test which was positive and sent for [facility] leading to recommendation of CABG.     Interim:  Pt at cardiac rehab and looked to have re-entry VT, aymptomatic as slow rate  Seen by EP in hospital  Started on Tikosyn    CMR 12/8/23:  LVEF: 55%   Subendocardial scarring of the basal to mid inferior wall suggestive of an ischemic etiology.  Intramyocardial fibrosis within the mid inferolateral wall suggestive of myocarditis, but cannot exclude cardiac sarcoidosis     EKG today: QTC: 480 msec    Past Medical History:   Diagnosis Date    Coronary artery disease     Diabetes mellitus (HCC)     Hyperlipidemia     Hypertension        Review of Systems   Constitutional:  Negative for activity change, appetite change, fatigue and unexpected weight change.  "  HENT:  Negative for congestion and nosebleeds.    Eyes: Negative.    Respiratory:  Negative for cough, chest tightness and shortness of breath.    Cardiovascular:  Negative for chest pain, palpitations and leg swelling.   Gastrointestinal:  Negative for abdominal distention.   Endocrine: Negative.    Genitourinary: Negative.    Musculoskeletal: Negative.    Skin: Negative.    Neurological:  Negative for dizziness, syncope and weakness.   Hematological: Negative.    Psychiatric/Behavioral: Negative.           Allergies   Allergen Reactions    Atorvastatin Other (See Comments)     felt flu-like    Rosiglitazone Other (See Comments)    Trazodone Other (See Comments)     Felt \"hung over\"     .    Current Outpatient Medications:     acetaminophen (TYLENOL) 325 mg tablet, Take 1-2 tablets Q4-6 hours PRN pain. Do not take more than 4grams in 24 hours., Disp: , Rfl: 0    aspirin 325 mg tablet, Take 1 tablet (325 mg total) by mouth daily, Disp: 30 tablet, Rfl: 2    cholecalciferol (VITAMIN D3) 25 mcg (1,000 units) tablet, Take 1,000 Units by mouth 2 (two) times a day, Disp: , Rfl:     Continuous Blood Gluc Sensor (FreeStyle Ayanna 2 Sensor) MISC, APPLY AND CHANGE EVERY 14 DAYS, Disp: , Rfl:     docusate sodium (COLACE) 100 mg capsule, Take 1 capsule (100 mg total) by mouth 2 (two) times a day Hold for soft stools., Disp: 60 capsule, Rfl: 0    dofetilide (TIKOSYN) 500 mcg capsule, Take 1 capsule (500 mcg total) by mouth every 12 (twelve) hours, Disp: 60 capsule, Rfl: 1    gabapentin (NEURONTIN) 100 mg capsule, TAKE ONE CAPSULE DAILY IN THE MORNING, 3 CAPSULES IN THE EVENING, Disp: , Rfl:     Jardiance 25 MG TABS, Take 25 mg by mouth in the morning, Disp: , Rfl:     LORazepam (ATIVAN) 0.5 mg tablet, Take 0.5 mg by mouth daily at bedtime, Disp: , Rfl:     Magnesium Gluconate 250 MG TABS, Take by mouth daily at bedtime, Disp: , Rfl:     metFORMIN (GLUCOPHAGE-XR) 500 mg 24 hr tablet, Take 2 tablets (1,000 mg total) by mouth 2 " (two) times a day Do not start before August 3, 2023., Disp: , Rfl: 0    metoprolol tartrate (LOPRESSOR) 25 mg tablet, Take 0.5 tablets (12.5 mg total) by mouth every 12 (twelve) hours, Disp: 30 tablet, Rfl: 2    ondansetron (ZOFRAN) 4 mg tablet, Take 1 tablet (4 mg total) by mouth every 8 (eight) hours as needed for nausea or vomiting, Disp: 20 tablet, Rfl: 0    Ozempic, 2 MG/DOSE, 8 MG/3ML injection pen, INJECT 2MG UNDER THE SKIN ONCE A WEEK, Disp: , Rfl:     polyethylene glycol (MIRALAX) 17 g packet, Take 17 g by mouth daily Hold for soft stools. Do not start before August 15, 2023., Disp: 30 each, Rfl: 0    potassium chloride (K-DUR,KLOR-CON) 10 mEq tablet, Take 1 tablet (10 mEq total) by mouth daily, Disp: 30 tablet, Rfl: 0    rosuvastatin (CRESTOR) 20 MG tablet, Take 2 tablets (40 mg total) by mouth daily, Disp: 90 tablet, Rfl: 3    sertraline (Zoloft) 50 mg tablet, Take 1 tablet (50 mg total) by mouth daily, Disp: 30 tablet, Rfl: 5    tadalafil (CIALIS) 20 MG tablet, Take 1 tablet (20 mg total) by mouth daily as needed for erectile dysfunction, Disp: 10 tablet, Rfl: 11    Social History     Socioeconomic History    Marital status: /Civil Union     Spouse name: Not on file    Number of children: Not on file    Years of education: Not on file    Highest education level: Not on file   Occupational History    Not on file   Tobacco Use    Smoking status: Never    Smokeless tobacco: Never   Substance and Sexual Activity    Alcohol use: No    Drug use: No    Sexual activity: Yes   Other Topics Concern    Not on file   Social History Narrative    Not on file     Social Determinants of Health     Financial Resource Strain: Unknown (8/24/2023)    Received from [facility]    Overall Financial Resource Strain (CARDIA)     Difficulty of Paying Living Expenses: Patient refused   Food Insecurity: Unknown (8/24/2023)    Received from [facility]    Hunger Vital Sign     Worried About  Running Out of Food in the Last Year: Patient refused     Ran Out of Food in the Last Year: Patient refused   Transportation Needs: No Transportation Needs (8/24/2023)    Received from [facility]    PRAPARE - Transportation     Lack of Transportation (Medical): No     Lack of Transportation (Non-Medical): No   Physical Activity: Unknown (8/24/2023)    Received from [facility]    Exercise Vital Sign     Days of Exercise per Week: Patient refused     Minutes of Exercise per Session: 40 min   Stress: Unknown (8/24/2023)    Received from [facility]    Citizen of [place] of Occupational Health - Occupational Stress Questionnaire     Feeling of Stress : Patient refused   Social Connections: Unknown (8/24/2023)    Received from [facility]    Social Connection and Isolation Panel [NHANES]     Frequency of Communication with Friends and Family: Patient refused     Frequency of Social Gatherings with Friends and Family: Patient refused     Attends Synagogue Services: Patient refused     Active Member of Clubs or Organizations: Patient refused     Attends Club or Organization Meetings: Patient refused     Marital Status:    Intimate Partner Violence: Not At Risk (8/24/2023)    Received from [facility]    Humiliation, Afraid, Rape, and Kick questionnaire     Fear of Current or Ex-Partner: No     Emotionally Abused: No     Physically Abused: No     Sexually Abused: No   Housing Stability: Unknown (8/24/2023)    Received from [facility]    Housing Stability Vital Sign     Unable to Pay for Housing in the Last Year: Patient refused     Number of Places Lived in the Last Year: Not on file     Unstable Housing in the Last Year: Patient refused       Family History   Problem Relation Age of Onset    Leukemia Mother     Heart attack Father 52       Physical Exam:    Vitals: There were no vitals taken for this visit., There  "is no height or weight on file to calculate BMI.,   Wt Readings from Last 3 Encounters:   10/27/23 92.9 kg (204 lb 11.2 oz)   09/14/23 97.1 kg (214 lb)   08/30/23 99.4 kg (219 lb 3.2 oz)       Physical Exam:  There were no vitals filed for this visit.      GEN: John Obleschuk appears well, alert and oriented x 3, pleasant and cooperative   HEENT: pupils equal, round, and reactive to light; extraocular muscles intact  NECK: supple, no carotid bruits   HEART: regular rhythm, normal S1 and S2, no murmurs, clicks, gallops or rubs, JVP is    LUNGS: clear to auscultation bilaterally; no wheezes, rales, or rhonchi   ABDOMEN: normal bowel sounds, soft, no tenderness, no distention  EXTREMITIES: peripheral pulses normal; no clubbing, cyanosis, or edema  NEURO: no focal findings   SKIN: normal without suspicious lesions on exposed skin    Labs & Results:    Lab Results   Component Value Date    WBC 8.10 12/07/2023    HGB 13.0 12/07/2023    HCT 41.8 12/07/2023    MCV 87 12/07/2023     12/07/2023     Lab Results   Component Value Date    GLUCOSE 184 (H) 08/11/2023    CALCIUM 9.1 12/15/2023    K 4.5 12/15/2023    CO2 26 12/15/2023     12/15/2023    BUN 23 12/15/2023    CREATININE 1.02 12/15/2023     Lab Results   Component Value Date    BNP 17 07/24/2023      No results found for: \"CHOL\"  Lab Results   Component Value Date    HDL 39 (L) 12/06/2023     Lab Results   Component Value Date    LDLCALC 31 12/06/2023     Lab Results   Component Value Date    TRIG 99 12/06/2023     No results found for: \"CHOLHDL\"      EKG personally reviewed by Bright Chow.     Counseling / Coordination of Care  Total floor / unit time spent today 25 minutes.  Greater than 50% of total time was spent with the patient and / or family counseling and / or coordination of care.  A description of the counseling / coordination of care: 15 min.      Thank you for the opportunity to participate in the care of this patient.    Bright Chow " CIRO.  Director of Advanced Heart Failure Program  Medical Director of LVAD Program  [facility]

## 2023-12-20 ENCOUNTER — OFFICE VISIT (OUTPATIENT)
Dept: CARDIOLOGY CLINIC | Facility: CLINIC | Age: 63
End: 2023-12-20
Payer: COMMERCIAL

## 2023-12-20 ENCOUNTER — OFFICE VISIT (OUTPATIENT)
Dept: INTERNAL MEDICINE CLINIC | Facility: CLINIC | Age: 63
End: 2023-12-20
Payer: COMMERCIAL

## 2023-12-20 VITALS
SYSTOLIC BLOOD PRESSURE: 100 MMHG | HEART RATE: 71 BPM | OXYGEN SATURATION: 97 % | HEIGHT: 70 IN | BODY MASS INDEX: 28.12 KG/M2 | WEIGHT: 196.4 LBS | DIASTOLIC BLOOD PRESSURE: 80 MMHG | TEMPERATURE: 96.9 F

## 2023-12-20 VITALS
DIASTOLIC BLOOD PRESSURE: 62 MMHG | HEART RATE: 71 BPM | WEIGHT: 195 LBS | BODY MASS INDEX: 27.92 KG/M2 | OXYGEN SATURATION: 98 % | SYSTOLIC BLOOD PRESSURE: 110 MMHG | HEIGHT: 70 IN

## 2023-12-20 DIAGNOSIS — I47.0 RE-ENTRY VENTRICULAR ARRHYTHMIA (HCC): ICD-10-CM

## 2023-12-20 DIAGNOSIS — G47.33 OBSTRUCTIVE SLEEP APNEA SYNDROME: ICD-10-CM

## 2023-12-20 DIAGNOSIS — I25.10 CORONARY ARTERY DISEASE INVOLVING NATIVE CORONARY ARTERY: ICD-10-CM

## 2023-12-20 DIAGNOSIS — I10 HTN (HYPERTENSION), BENIGN: ICD-10-CM

## 2023-12-20 DIAGNOSIS — E78.5 HYPERLIPIDEMIA LDL GOAL <100: ICD-10-CM

## 2023-12-20 DIAGNOSIS — E11.65 TYPE 2 DIABETES MELLITUS WITH HYPERGLYCEMIA, WITHOUT LONG-TERM CURRENT USE OF INSULIN (HCC): Primary | ICD-10-CM

## 2023-12-20 DIAGNOSIS — I25.10 CORONARY ARTERY DISEASE INVOLVING NATIVE CORONARY ARTERY OF NATIVE HEART WITHOUT ANGINA PECTORIS: ICD-10-CM

## 2023-12-20 DIAGNOSIS — R00.0 WIDE-COMPLEX TACHYCARDIA: ICD-10-CM

## 2023-12-20 DIAGNOSIS — I10 HTN (HYPERTENSION), BENIGN: Primary | ICD-10-CM

## 2023-12-20 PROBLEM — E11.51 PERIPHERAL ANGIOPATHY DUE TO TYPE 2 DIABETES MELLITUS (HCC): Status: RESOLVED | Noted: 2023-01-16 | Resolved: 2023-12-20

## 2023-12-20 PROCEDURE — 99214 OFFICE O/P EST MOD 30 MIN: CPT | Performed by: INTERNAL MEDICINE

## 2023-12-20 PROCEDURE — 99213 OFFICE O/P EST LOW 20 MIN: CPT

## 2023-12-20 NOTE — PROGRESS NOTES
Name: John Obleschuk      : 1960      MRN: [id number]  Encounter Provider: Pati Lagunas MD  Encounter Date: 2023   Encounter department: [facility] INTERNAL MEDICINE [place]    Assessment & Plan     1. Type 2 diabetes mellitus with hyperglycemia, without long-term current use of insulin (HCC)  Assessment & Plan:    Lab Results   Component Value Date    HGBA1C 6.9 (H) 2023   Was seeing [facility] Endocrinology bu recently establish care with [facility] Endocrinologist Dr. Almazan.   Currently taking metformin  500 mg BID, Ozempic and Jardiance 25mg daily.  Continue to follow up with endocrinology  Will need diabetic foot exam at next follow up      Orders:  -     POCT ECG    2. Wide-complex tachycardia  Assessment & Plan:  Recently hospitalized for sustained V tach on . Discharged on Tikosyn 500mcg  Qtc prior to discharge was 421. EKG in office showed Qtc of 480  Given that the patient will be seeing Dr. Chow this evening, no changes were made to his medication.  Dr. Chow was made aware via tiger text that his Qtc was prolonged.  F//U Cardiology recommendation.     Orders:  -     Ambulatory Referral to Internal Medicine  -     POCT ECG    3. Coronary artery disease involving native coronary artery of native heart without angina pectoris  Assessment & Plan:  S/P CABG .  Completed cardiac rehab on 12/15/23  Follows with [facility] Cardiologist Dr. Chow. Has an appointment today at 4:20 pm.   Currently taking Aspirin 325 mg daily, Crestor 40 mg daily, metoprolol tartrate 25 mg daily.  Discussed with patient that his recent fatigue may be secondary from the recent increase in his metoprolol from 12.5 to 25 mg daily.   Discussed reducing the dose however the patient will like to hold off until he see's his Cardiologist this afternoon.  I also discussed this with Dr. Chow via tiger text.     Orders:  -     POCT ECG    4. Obstructive sleep apnea syndrome    5. HTN (hypertension),  benign  -     POCT ECG           Subjective      HPI  Huan Jung presents as new patient. He has a past medical history of CAD s/p CABG 08/23, DM, HTN, hyperlipidemia, diabetic neuropathy and depression. He was recently hospitalized on 12/05/23 for sustained V tach that was noted on a cardiac monitor while at cardiac rehab. While hospitalized he was started on Tikosyn. He underwent Cardiac MRI that revealed subendocardial scarring of the basal to mid inferior wall suggestive of an ischemic etiology. He was seen by outpatient cardiology and recommended to follow up with EP and to continue metoprolol 25 mg BID.   He reports that since increasing the dose of metoprolol, he has been experiencing fatigue and often has to take an afternoon nap.   Review of Systems   Constitutional:  Positive for fatigue. Negative for chills and fever.   HENT:  Negative for ear pain and sore throat.    Eyes:  Negative for pain and visual disturbance.   Respiratory:  Negative for cough and shortness of breath.    Cardiovascular:  Negative for chest pain and palpitations.   Gastrointestinal:  Negative for abdominal pain and vomiting.   Genitourinary:  Negative for dysuria and hematuria.   Musculoskeletal:  Negative for arthralgias and back pain.   Skin:  Negative for color change and rash.   Neurological:  Negative for seizures and syncope.   All other systems reviewed and are negative.      Current Outpatient Medications on File Prior to Visit   Medication Sig    acetaminophen (TYLENOL) 325 mg tablet Take 1-2 tablets Q4-6 hours PRN pain. Do not take more than 4grams in 24 hours.    ascorbic acid (VITAMIN C) 250 MG CHEW Chew 250 mg daily    aspirin 325 mg tablet Take 1 tablet (325 mg total) by mouth daily    cholecalciferol (VITAMIN D3) 25 mcg (1,000 units) tablet Take 1,000 Units by mouth 2 (two) times a day    Continuous Blood Gluc Sensor (FreeStyle Ayanna 2 Sensor) MISC APPLY AND CHANGE EVERY 14 DAYS    docusate sodium (COLACE) 100 mg  "capsule Take 1 capsule (100 mg total) by mouth 2 (two) times a day Hold for soft stools.    dofetilide (TIKOSYN) 500 mcg capsule Take 1 capsule (500 mcg total) by mouth every 12 (twelve) hours    gabapentin (NEURONTIN) 100 mg capsule TAKE ONE CAPSULE DAILY IN THE MORNING, 3 CAPSULES IN THE EVENING    Jardiance 25 MG TABS Take 25 mg by mouth in the morning    LORazepam (ATIVAN) 0.5 mg tablet Take 0.5 mg by mouth daily at bedtime    Magnesium Gluconate 250 MG TABS Take by mouth daily at bedtime    metFORMIN (GLUCOPHAGE-XR) 500 mg 24 hr tablet Take 2 tablets (1,000 mg total) by mouth 2 (two) times a day Do not start before August 3, 2023.    metoprolol tartrate (LOPRESSOR) 25 mg tablet Take 0.5 tablets (12.5 mg total) by mouth every 12 (twelve) hours (Patient taking differently: Take 25 mg by mouth every 12 (twelve) hours)    Ozempic, 2 MG/DOSE, 8 MG/3ML injection pen INJECT 2MG UNDER THE SKIN ONCE A WEEK    potassium chloride (K-DUR,KLOR-CON) 10 mEq tablet Take 1 tablet (10 mEq total) by mouth daily    rosuvastatin (CRESTOR) 20 MG tablet Take 2 tablets (40 mg total) by mouth daily    sertraline (Zoloft) 50 mg tablet Take 1 tablet (50 mg total) by mouth daily    tadalafil (CIALIS) 20 MG tablet Take 1 tablet (20 mg total) by mouth daily as needed for erectile dysfunction       Objective     /80 (BP Location: Right arm, Patient Position: Sitting, Cuff Size: Large)   Pulse 71   Temp (!) 96.9 °F (36.1 °C) (Tympanic)   Ht 5' 10\" (1.778 m)   Wt 89.1 kg (196 lb 6.4 oz)   SpO2 97%   BMI 28.18 kg/m²     Physical Exam  Constitutional:       General: He is not in acute distress.     Appearance: He is not ill-appearing, toxic-appearing or diaphoretic.   HENT:      Head: Normocephalic and atraumatic.   Cardiovascular:      Rate and Rhythm: Normal rate and regular rhythm.      Pulses: Normal pulses.   Pulmonary:      Effort: Pulmonary effort is normal.      Breath sounds: Normal breath sounds.   Abdominal:      General: " Bowel sounds are normal. There is no distension.      Palpations: Abdomen is soft. There is no mass.      Hernia: No hernia is present.   Musculoskeletal:         General: Normal range of motion.   Skin:     General: Skin is warm.      Capillary Refill: Capillary refill takes less than 2 seconds.   Neurological:      General: No focal deficit present.      Mental Status: He is alert and oriented to person, place, and time.   Psychiatric:         Mood and Affect: Mood normal.         Behavior: Behavior normal.         Thought Content: Thought content normal.       Pati Lagunas MD

## 2023-12-21 NOTE — ASSESSMENT & PLAN NOTE
Lab Results   Component Value Date    HGBA1C 6.9 (H) 12/05/2023   Continue gabapentin 100 mg in the morning and 300 mg in the evening

## 2023-12-21 NOTE — ASSESSMENT & PLAN NOTE
S/P CABG 08/203.  Completed cardiac rehab on 12/15/23  Follows with [facility]'s Cardiologist Dr. hCow. Has an appointment today at 4:20 pm.   Currently taking Aspirin 325 mg daily, Crestor 40 mg daily, metoprolol tartrate 25 mg daily.  Discussed with patient that his recent fatigue may be secondary from the recent increase in his metoprolol from 12.5 to 25 mg daily.   Discussed reducing the dose however the patient will like to hold off until he see's his Cardiologist this afternoon.  I also discussed this with Dr. Chow via tiger text.

## 2023-12-21 NOTE — ASSESSMENT & PLAN NOTE
Lab Results   Component Value Date    HGBA1C 6.9 (H) 12/05/2023   Was seeing [facility] Endocrinology bu recently establish care with [facility] Endocrinologist Dr. Almazan.   Currently taking metformin  500 mg BID, Ozempic and Jardiance 25mg daily.  Continue to follow up with endocrinology  Will need diabetic foot exam at next follow up

## 2023-12-21 NOTE — ASSESSMENT & PLAN NOTE
Recently hospitalized for sustained V tach on 12/05. Discharged on Tikosyn 500mcg  Qtc prior to discharge was 421. EKG in office showed Qtc of 480  Given that the patient will be seeing Dr. Chow this evening, no changes were made to his medication.  Dr. Chow was made aware via tiger text that his Qtc was prolonged.  F//U Cardiology recommendation.

## 2023-12-22 NOTE — UTILIZATION REVIEW
Please note, this request was sent to you on 12/7/23  Can we have an updated determination faxed to [phone]    NOTIFICATION OF INPATIENT ADMISSION   AUTHORIZATION REQUEST   SERVICING FACILITY:   [place] [address]  Tax ID: [id number]  NPI: [id number]   ATTENDING PROVIDER:  Attending Name and NPI#: Armaan Jasso Md [[id number]]  Address: [address] [place] [address]  Phone: [phone]     ADMISSION INFORMATION:  Place of Service: Inpatient [facility]  Place of Service Code: 21  Inpatient Admission Date/Time: 12/6/23  3:41 PM  Discharge Date/Time: 12/8/2023  6:52 PM  Admitting Diagnosis Code/Description:  Tachycardia [R00.0]  Abnormal EKG [R94.31]  S/P CABG x 5 [Z95.1]     UTILIZATION REVIEW CONTACT:  Jorge Dougherty Utilization   Network Utilization Review Department  Phone: [phone]  Fax: [phone]  Email: [email or web address]  Contact for approvals/pending authorizations, clinical reviews, and discharge.     PHYSICIAN ADVISORY SERVICES:  Medical Necessity Denial & [facility] Review  Phone: [phone]  Fax: [phone]  Email: [email or web address]     DISCHARGE SUPPORT TEAM:  For Patients Discharge Needs & Updates  Phone: [phone] opt. [phone] Fax: [phone]  Email: [email or web address]        Ivone Maloney RN  Registered Nurse  Specialty: Utilization Review     Utilization Review     Addendum     Date of Service: 12/6/2023  9:05 AM     Expand All Collapse All    Initial Clinical Review     Pt initially admitted as Observation on 12/5. Changed to Inpatient on 12/6. Pt requiring continued stay d/t initiation of Tikosyn.     Admission: Date/Time/Statement:   Admission Orders (From admission, onward)          Ordered         12/06/23 1540   Inpatient Admission  Once             12/05/23 1838   Place in Observation  Once                                     Orders Placed This  "Encounter   Procedures    Inpatient Admission       Standing Status:   Standing       Number of Occurrences:   1       Order Specific Question:   Level of Care       Answer:   Med Surg [16]       Order Specific Question:   Estimated length of stay       Answer:   More than 2 Midnights       Order Specific Question:   Certification       Answer:   I certify that inpatient services are medically necessary for this patient for a duration of greater than two midnights. See H&P and MD Progress Notes for additional information about the patient's course of treatment.      ED Arrival Information         Expected   -    Arrival   12/5/2023 16:48    Acuity   Urgent                 Means of arrival   Wheelchair    Escorted by   Spouse    Service   Hospitalist    Admission type   Emergency                 Arrival complaint   Tachycardia                     Chief Complaint   Patient presents with    Abnormal ECG       Pt was medical emergency while in cardiac rehab on exercise bike and went into \"wide qrs tachycardia\" pt denies cp/sob/dizziness. Hx bypass         Initial Presentation: 63 y.o. male who presented self from home to [facility] ED. Admitted in observation status for evaluation and treatment of wide-complex tachycardic. PMHx: CAD, T2DM, HLD, HTN. Presented from cardiac rehab appointment w/ wide-complex tachycardia, but remained asymptomatic. EKG showed sinus tachycardia w/ first-degree block. On exam, unremarkable. Plan: telemetry, continue  mg daily, continue other PTA meds, SSI w/ BG checks ACHS, supportive care, Trend labs, replete electrolytes as needed. Cardiology consulted.        12/06/23 Changed to Inpatient Status: No events on telemetry overnight, in NSR. Plan: start Tikosyn w/ EKGs w/ dosing for QTc monitoring per cardiology, continue telemetry, continue current meds, SSI w/ BG checks ACHS. Trend labs, replete electrolytes as needed.     Cardiology: Pt w/ probably re-entry VT. Start Tikosyn " 500 mg BID. Close monitoring of QTc. Trend labs, replete electrolytes as needed for K>4, Mag>2. Telemetry for 48 hours. Check echo. Increase metoprolol tartrate to 25 mg BID.         12/07/23          Day 2: Telemetry w/ no events overnight. Plan: Continue Tikosyn 500 mcg BID, QTc 470 after second dose. Must remain in hospital for telemetry and EKG monitoring for at least 4 doses to ensure stability of QTc. Trend labs, replete electrolytes as needed. Continue metoprolol 25 mg BID. Supportive care.                ED Triage Vitals [12/05/23 1650]   Temperature Pulse Respirations Blood Pressure SpO2   98.6 °F (37 °C) (!) 107 18 138/74 98 %       Temp Source Heart Rate Source Patient Position - Orthostatic VS BP Location FiO2 (%)   Oral Monitor Sitting Right arm --       Pain Score           No Pain                  Wt Readings from Last 1 Encounters:   10/27/23 92.9 kg (204 lb 11.2 oz)      Additional Vital Signs:   Date/Time Temp Pulse Resp BP MAP (mmHg) SpO2 O2 Device   12/07/23 0739 98.3 °F (36.8 °C) 69 -- 120/76 93 97 % None (Room air)   12/06/23 2127 -- 74 -- 112/61 -- -- --   12/06/23 1430 -- 78 18 114/64 83 95 % None (Room air)      Date/Time Pulse Resp BP MAP (mmHg) SpO2 O2 Device   12/06/23 0430 87 -- 109/65 83 94 % None (Room air)   12/06/23 0300 80 18 111/59 80 93 % None (Room air)   12/06/23 0258 98 18 120/62 -- 95 % None (Room air)   12/05/23 2100 80 18 124/62 87 98 % None (Room air)   12/05/23 1900 79 18 112/58 79 97 % None (Room air)   12/05/23 1700 102 -- 138/74 100 97 % --      Pertinent Labs/Diagnostic Test Results:   12/5 - EKG  Sinus tachycardia with 1st degree A-V block  Poor R Wave Progression  Nonspecific ST abnormality     12/6 - Echo    Left Ventricle: Left ventricular cavity size is normal. Wall thickness is mildly increased. There is concentric remodeling. The left ventricular ejection fraction is 60%. Systolic function is normal. Wall motion is normal.    IVS: There is abnormal septal motion  consistent with post-operative status.    Right Ventricle: Right ventricular cavity size is normal. Systolic function is normal.    Mitral Valve: There is mild annular calcification.     12/6 - EKG  Sinus rhythm with 1st degree A-V block  Baseline Artifact     12/6 - EKG 2  Normal sinus rhythm with 1st degree A-V block         X-ray chest 1 view portable   ED Interpretation by Gianluca Escalera MD (12/05 1750)   Interpreted by ED Resident: No evidence of pneumothorax, pneumonia or pleural effusions. No cardiomegaly       Final Result by Luisito Eden MD (12/06 1059)       No acute cardiopulmonary disease.                       Workstation performed: [id number]                          Results from last 7 days   Lab Units 12/07/23  0504 12/06/23  0443 12/05/23  2152 12/05/23  1709   WBC Thousand/uL 8.10 8.76  --  9.75   HEMOGLOBIN g/dL 13.0 13.3  --  13.4   HEMATOCRIT % 41.8 42.4  --  43.9   PLATELETS Thousands/uL 193 215 238 246   NEUTROS ABS Thousands/µL 4.02  --   --  4.92                 Results from last 7 days   Lab Units 12/07/23  0504 12/06/23  0443 12/05/23  1709   SODIUM mmol/L 137 138 135   POTASSIUM mmol/L 3.7 4.1 4.7   CHLORIDE mmol/L 104 103 100   CO2 mmol/L 24 28 23   ANION GAP mmol/L 9 7 12   BUN mg/dL 25 21 24   CREATININE mg/dL 0.91 0.97 1.04   EGFR ml/min/1.73sq m 89 82 76   CALCIUM mg/dL 8.9 9.4 9.7   MAGNESIUM mg/dL 2.0 2.1 2.1           Results from last 7 days   Lab Units 12/05/23  1709   AST U/L 24   ALT U/L 22   ALK PHOS U/L 71   TOTAL PROTEIN g/dL 7.9   ALBUMIN g/dL 4.5   TOTAL BILIRUBIN mg/dL 0.87                 Results from last 7 days   Lab Units 12/07/23  0742 12/06/23  2127 12/06/23  1508 12/06/23  1300 12/06/23  1113 12/06/23  0743 12/05/23  2047   POC GLUCOSE mg/dl 114 154* 159* 122 167* 121 93             Results from last 7 days   Lab Units 12/07/23  0504 12/06/23  0443 12/05/23  1709   GLUCOSE RANDOM mg/dL 110 96 129               Results from last 7 days   Lab Units  12/05/23  1709   HEMOGLOBIN A1C % 6.9*   EAG mg/dl 151                 Results from last 7 days   Lab Units 12/05/23  2152 12/05/23  1901 12/05/23  1709   HS TNI 0HR ng/L  --   --  3   HS TNI 2HR ng/L  --  3  --    HSTNI D2 ng/L  --  0  --    HS TNI 4HR ng/L 3  --   --    HSTNI D4 ng/L 0  --   --                    Results from last 7 days   Lab Units 12/05/23  1709   TSH 3RD GENERATON uIU/mL 1.814             ED Treatment:   Medication Administration from 12/05/2023 1648 to 12/06/2023 0905           Date/Time Order Dose Route Action       12/05/2023 2145 EST docusate sodium (COLACE) capsule 100 mg 100 mg Oral Given       12/05/2023 2145 EST gabapentin (NEURONTIN) capsule 300 mg 300 mg Oral Given       12/05/2023 2220 EST LORazepam (ATIVAN) tablet 0.5 mg 0.5 mg Oral Given       12/05/2023 2145 EST metoprolol tartrate (LOPRESSOR) tablet 12.5 mg 12.5 mg Oral Given             Medical History        Past Medical History:   Diagnosis Date    Coronary artery disease      Diabetes mellitus (HCC)      Hyperlipidemia      Hypertension           Present on Admission:   Type 2 diabetes mellitus with hyperglycemia, without long-term current use of insulin (HCC)   Hyperlipidemia LDL goal <100   Diabetic polyneuropathy associated with type 2 diabetes mellitus (HCC)   HTN (hypertension), benign   Depressive disorder        Admitting Diagnosis: Tachycardia [R00.0]  Abnormal EKG [R94.31]  S/P CABG x 5 [Z95.1]  Age/Sex: 63 y.o. male  Admission Orders:  Regular Diet.  BG checks ACHS.  Telemetry.       SCDs.  EKG for QTc monitoring 2-3 hours post Tikosyn administration.     Scheduled Medications:  aspirin, 325 mg, Oral, Daily  docusate sodium, 100 mg, Oral, BID  dofetilide, 500 mcg, Oral, Q12H BECKY  enoxaparin, 40 mg, Subcutaneous, Daily  gabapentin, 100 mg, Oral, Daily  gabapentin, 300 mg, Oral, HS  insulin lispro, 1-6 Units, Subcutaneous, 4x Daily (AC & HS)  LORazepam, 0.5 mg, Oral, HS  metoprolol tartrate, 25 mg, Oral, Q12H  BECKY  pravastatin, 80 mg, Oral, Daily With Dinner  sertraline, 50 mg, Oral, Daily     Continuous IV Infusions: none     PRN Meds:  acetaminophen, 650 mg, Oral, Q6H PRN  ondansetron, 4 mg, Oral, Q6H PRN  perflutren lipid microsphere, 0.4 mL/min, Intravenous, Once in imaging  sodium chloride (PF), 3 mL, Intravenous, Q1H PRN     metoprolol tartrate (LOPRESSOR) tablet 12.5 mg  Dose: 12.5 mg  Freq: Every 12 hours scheduled Route: PO  Start: 12/05/23 2100 End: 12/06/23 0945         IP CONSULT TO CARDIOLOGY     Network Utilization Review Department  ATTENTION: Please call with any questions or concerns to [phone] and carefully listen to the prompts so that you are directed to the right person. All voicemails are confidential.   For Discharge needs, contact Care Management DC Support Team at [phone] opt. 2  Send all requests for admission clinical reviews, approved or denied determinations and any other requests to dedicated fax number below belonging to the [place] where the patient is receiving treatment. List of dedicated fax numbers for the Facilities:  FACILITY NAME UR FAX NUMBER   ADMISSION DENIALS (Administrative/Medical Necessity) [phone]   DISCHARGE SUPPORT TEAM (NETWORK) [phone]   PARENT CHILD HEALTH (Maternity/NICU/Pediatrics) [phone]   [facility] [phone]   [facility] [phone]   [facility] [phone]   [facility] [phone]   Critical access hospital [phone]   [facility] [phone]   [facility] [phone]   [facility] [phone]   [facility] [phone]   [facility] [phone]   [facility] [phone]                  Revision History

## 2023-12-25 PROCEDURE — 93000 ELECTROCARDIOGRAM COMPLETE: CPT

## 2023-12-29 DIAGNOSIS — I25.10 CORONARY ARTERY DISEASE INVOLVING NATIVE CORONARY ARTERY: ICD-10-CM

## 2024-01-02 RX ORDER — METFORMIN HYDROCHLORIDE 500 MG/1
1000 TABLET, EXTENDED RELEASE ORAL 2 TIMES DAILY
Qty: 90 TABLET | Refills: 1 | Status: SHIPPED | OUTPATIENT
Start: 2024-01-02

## 2024-01-04 NOTE — PROGRESS NOTES
Consultation - Electrophysiology-Cardiology (EP)   John Obleschuk 63 y.o. male MRN: [id number]  Unit/Bed#:  Encounter: [id number]      1. Wide-complex tachycardia  POCT ECG    potassium chloride (K-DUR,KLOR-CON) 10 mEq tablet      2. Type 2 diabetes mellitus with hyperglycemia, without long-term current use of insulin (HCC)        3. Vitamin D deficiency        4. Diabetic polyneuropathy associated with type 2 diabetes mellitus (HCC)        5. Obstructive sleep apnea syndrome        6. Essential hypertension        7. Coronary artery disease involving native coronary artery of native heart without angina pectoris  metoprolol succinate (TOPROL-XL) 25 mg 24 hr tablet      8. Mixed hyperlipidemia        9. S/P CABG x 5              Consults  Physician Requesting Consult: PCP  Reason for Consult / Principal Problem: Hospital follow up wide complex tachycardia       Summary of my recommendation for the patient  The patient does have a history of VT at 130 bpm  He is feeling fatigued on metoprolol and dofetilide    Proceed with implantation of Assert  6-year device  Reduce metoprolol to 25 mg once daily  Change to metoprolol succinate as tartrate is  ideally used 3 times a day    Refill patient's potassium    Depending upon finding of monitor will decide on further therapy  At this time no symptomatic episodes, EF has recovered, no other episodes of  VT        Clinical conditions  Wide-complex tachycardia, 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 Marrufo  Patient not complaining of any angina      Hypertension  Blood pressure is 114/68  Patient currently on metoprolol      Diabetes mellitus type 2, hemoglobin A1c 7.4  Continue with aggressive management of blood sugars  Currently on metformin, Jardiance       Obesity/overweight-BMI down from 32.6-28.4  Advised aggressive management of body weight      Carotid artery disease  Continue with metoprolol, Jardiance, aspirin, rosuvastatin      Mixed hyperlipidemia  Patient is on rosuvastatin  No myositis or myalgia      Sleep apnea  As per primary      History of Present Illness   HPI: John Obleschuk is a 63 y.o. 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 Marrufo  Hypertension  Diabetes mellitus type 2, hemoglobin A1c 7.4  Obesity/overweight-BMI down from 32.6-28.4  Carotid artery disease  Sleep apnea    The patient is not complaining of anginal-like chest pain or pressure  There is no new worsening of orthopnea or PND  Patient is not complaining of palpitations, presyncope or syncope  Exertional tolerance is also improved    The patient does not abuse tobacco alcohol or energy drinks    Historical Information   Past Medical History:   Diagnosis Date    Coronary artery disease     Diabetes mellitus (HCC)     Hyperlipidemia     Hypertension      Past Surgical  History:   Procedure Laterality Date    ARTERIAL ANEURYSM REPAIR      Right Carotid Aneurysm - age 15.    CARDIAC CATHETERIZATION Left 08/02/2023    Procedure: Cardiac Left Heart Cath;  Surgeon: Luisito Inman DO;  Location: [facility];  Service: Cardiology    CARDIAC CATHETERIZATION N/A 08/02/2023    Procedure: Cardiac Coronary Angiogram;  Surgeon: Luisito Inman DO;  Location: [facility];  Service: Cardiology    CARDIAC CATHETERIZATION  08/02/2023    Procedure: Cardiac catheterization;  Surgeon: Luisito Inman DO;  Location: [facility];  Service: Cardiology    HERNIA REPAIR      HI CORONARY ARTERY BYP W/VEIN & ARTERY GRAFT 4 VEIN N/A 8/11/2023    Procedure: CORONARY ARTERY BYPASS GRAFT (CABG) X-5 VESSELS ; SVG to PDA, Ramus, Diagonal and OM. LIMA --> LAD EVH  W/ JOHN;  Surgeon: Soham Hodgson MD;  Location: [facility];  Service: Cardiac Surgery    VASECTOMY       Social History     Substance and Sexual Activity   Alcohol Use No     Social History     Substance and Sexual Activity   Drug Use No     Social History     Tobacco Use   Smoking Status Never   Smokeless Tobacco Never     Social History     Socioeconomic History    Marital status: /Civil Union     Spouse name: Not on file    Number of children: Not on file    Years of education: Not on file    Highest education level: Not on file   Occupational History    Not on file   Tobacco Use    Smoking status: Never    Smokeless tobacco: Never   Vaping Use    Vaping status: Never Used   Substance and Sexual Activity    Alcohol use: No    Drug use: No    Sexual activity: Yes   Other Topics Concern    Not on file   Social History Narrative    Not on file     Social Determinants of Health     Financial Resource Strain: Unknown (8/24/2023)    Received from [facility]    Overall Financial Resource Strain (CARDIA)     Difficulty of Paying Living Expenses: Patient refused   Food Insecurity: Unknown  (8/24/2023)    Received from [facility]    Hunger Vital Sign     Worried About Running Out of Food in the Last Year: Patient refused     Ran Out of Food in the Last Year: Patient refused   Transportation Needs: No Transportation Needs (8/24/2023)    Received from [facility]    PRAPARE - Transportation     Lack of Transportation (Medical): No     Lack of Transportation (Non-Medical): No   Physical Activity: Sufficiently Active (12/20/2023)    Exercise Vital Sign     Days of Exercise per Week: 3 days     Minutes of Exercise per Session: 60 min   Stress: Stress Concern Present (12/20/2023)    [employer] [place] [employer] - Occupational Stress Questionnaire     Feeling of Stress : Rather much   Social Connections: Unknown (12/20/2023)    Social Connection and Isolation Panel [NHANES]     Frequency of Communication with Friends and Family: More than three times a week     Frequency of Social Gatherings with Friends and Family: More than three times a week     Attends Hinduism Services: Patient declined     Active Member of Clubs or Organizations: Patient declined     Attends Club or Organization Meetings: Patient declined     Marital Status:    Intimate Partner Violence: Not At Risk (12/20/2023)    Humiliation, Afraid, Rape, and Kick questionnaire     Fear of Current or Ex-Partner: No     Emotionally Abused: No     Physically Abused: No     Sexually Abused: No   Housing Stability: Unknown (8/24/2023)    Received from [facility]    Housing Stability Vital Sign     Unable to Pay for Housing in the Last Year: Patient refused     Number of Places Lived in the Last Year: Not on file     Unstable Housing in the Last Year: Patient refused     .  Family History:  Family History   Problem Relation Age of Onset    Leukemia Mother     Heart attack Father 52         Meds/Allergies      No current facility-administered medications for this visit.        (Not in  "a hospital admission)      Allergies   Allergen Reactions    Atorvastatin Other (See Comments)     felt flu-like    Rosiglitazone Other (See Comments)    Trazodone Other (See Comments)     Felt \"hung over\"           Objective   Vitals: Visit Vitals  /68 (BP Location: Left arm, Patient Position: Sitting, Cuff Size: Large)   Pulse 66   Ht 5' 10\" (1.778 m)   Wt 89.6 kg (197 lb 8 oz)   SpO2 97%   BMI 28.34 kg/m²   Smoking Status Never   BSA 2.08 m²      Vitals:    01/09/24 1320   Weight: 89.6 kg (197 lb 8 oz)   [unfilled]    Invasive Devices       None                     ROS  Review of Systems   All other systems reviewed and are negative.  As described in my history of present illness        PHYSICAL EXAM  Physical Exam  Constitutional:       General: He is not in acute distress.     Appearance: Normal appearance. He is not ill-appearing.   HENT:      Head: Normocephalic and atraumatic.      Right Ear: External ear normal.      Left Ear: External ear normal.      Nose: Nose normal.      Mouth/Throat:      Pharynx: Oropharynx is clear.   Eyes:      General: No scleral icterus.     Extraocular Movements: Extraocular movements intact.      Conjunctiva/sclera: Conjunctivae normal.      Pupils: Pupils are equal, round, and reactive to light.   Cardiovascular:      Rate and Rhythm: Normal rate and regular rhythm.      Heart sounds: Normal heart sounds. No murmur heard.     No gallop.   Pulmonary:      Effort: No respiratory distress.      Breath sounds: Normal breath sounds. No wheezing or rales.   Abdominal:      General: Bowel sounds are normal. There is no distension.      Palpations: Abdomen is soft.   Musculoskeletal:         General: No swelling or deformity.      Cervical back: Neck supple. No rigidity.   Skin:     Coloration: Skin is not jaundiced.      Findings: No bruising or lesion.   Neurological:      Mental Status: He is alert and oriented to person, place, and time. Mental status is at baseline.      " Motor: No weakness.   Psychiatric:         Mood and Affect: Mood normal.         Behavior: Behavior normal.         Thought Content: Thought content normal.         Judgment: Judgment normal.               LAB RESULTS:    CBC:  WBC   Date Value Ref Range Status   12/07/2023 8.10 4.31 - 10.16 Thousand/uL Final     Hemoglobin   Date Value Ref Range Status   12/07/2023 13.0 12.0 - 17.0 g/dL Final     Hematocrit   Date Value Ref Range Status   12/07/2023 41.8 36.5 - 49.3 % Final     MCV   Date Value Ref Range Status   12/07/2023 87 82 - 98 fL Final     Platelets   Date Value Ref Range Status   12/07/2023 193 149 - 390 Thousands/uL Final     RBC   Date Value Ref Range Status   12/07/2023 4.83 3.88 - 5.62 Million/uL Final     MCH   Date Value Ref Range Status   12/07/2023 26.9 26.8 - 34.3 pg Final     MCHC   Date Value Ref Range Status   12/07/2023 31.1 (L) 31.4 - 37.4 g/dL Final     RDW   Date Value Ref Range Status   12/07/2023 13.6 11.6 - 15.1 % Final     MPV   Date Value Ref Range Status   12/07/2023 10.4 8.9 - 12.7 fL Final     nRBC   Date Value Ref Range Status   12/07/2023 0 /100 WBCs Final       CMP:  Potassium   Date Value Ref Range Status   12/15/2023 4.5 3.5 - 5.3 mmol/L Final     Chloride   Date Value Ref Range Status   12/15/2023 100 96 - 108 mmol/L Final     CO2   Date Value Ref Range Status   12/15/2023 26 21 - 32 mmol/L Final     CO2, i-STAT   Date Value Ref Range Status   08/11/2023 23 21 - 32 mmol/L Final     BUN   Date Value Ref Range Status   12/15/2023 23 5 - 25 mg/dL Final     Creatinine   Date Value Ref Range Status   12/15/2023 1.02 0.60 - 1.30 mg/dL Final     Comment:     Standardized to IDMS reference method     Glucose, i-STAT   Date Value Ref Range Status   08/11/2023 184 (H) 65 - 140 mg/dl Final     Calcium   Date Value Ref Range Status   12/15/2023 9.1 8.4 - 10.2 mg/dL Final     AST   Date Value Ref Range Status   12/05/2023 24 13 - 39 U/L Final     ALT   Date Value Ref Range Status    12/05/2023 22 7 - 52 U/L Final     Comment:     Specimen collection should occur prior to Sulfasalazine administration due to the potential for falsely depressed results.      Alkaline Phosphatase   Date Value Ref Range Status   12/05/2023 71 34 - 104 U/L Final     eGFR   Date Value Ref Range Status   12/15/2023 77 ml/min/1.73sq m Final        Magnesium:   Magnesium   Date Value Ref Range Status   12/08/2023 2.0 1.9 - 2.7 mg/dL Final        A1C:  Hemoglobin A1C   Date Value Ref Range Status   12/05/2023 6.9 (H) Normal 4.0-5.6%; PreDiabetic 5.7-6.4%; Diabetic >=6.5%; Glycemic control for adults with diabetes <7.0% % Final   06/08/2023 7.4 (H) <5.7 % Final     Comment:     Reference Range  Non-diabetic                     <5.7  Pre-diabetic                     5.7-6.4  Diabetic                         >=6.5  ADA target for diabetic control  <=7        TSH:  TSH 3RD GENERATON   Date Value Ref Range Status   12/05/2023 1.814 0.450 - 4.500 uIU/mL Final     Comment:     Adult TSH (3rd generation) reference range follows the recommended guidelines of the American Thyroid Association, January, 2020.        PT/INR:  Protime   Date Value Ref Range Status   08/03/2023 13.1 11.6 - 14.5 seconds Final     INR   Date Value Ref Range Status   08/03/2023 0.97 0.84 - 1.19 Final       Lipid Panel:  Cholesterol   Date Value Ref Range Status   12/06/2023 90 See Comment mg/dL Final     Comment:     Cholesterol:         Pediatric <18 Years        Desirable          <170 mg/dL      Borderline High    170-199 mg/dL      High               >=200 mg/dL        Adult >=18 Years            Desirable        <200 mg/dL      Borderline High  200-239 mg/dL      High             >239 mg/dL       Triglycerides   Date Value Ref Range Status   12/06/2023 99 See Comment mg/dL Final     Comment:     Triglyceride:     0-9Y            <75mg/dL     10Y-17Y         <90 mg/dL       >=18Y     Normal          <150 mg/dL     Borderline High 150-199 mg/dL      "High            200-499 mg/dL        Very High       >499 mg/dL    Specimen collection should occur prior to Metamizole administration due to the potential for falsely depressed results.     HDL, Direct   Date Value Ref Range Status   2023 39 (L) >=40 mg/dL Final       Troponin:  No results found for: \"TROPONINI\"      Imaging:    [id number]      JOHN:  No results found for this or any previous visit.      Echocardiogram:  Results for orders placed during the hospital encounter of 23    Echo complete w/ contrast if indicated    Interpretation Summary    Left Ventricle: Left ventricular cavity size is normal. Wall thickness is upper normal. The left ventricular ejection fraction is 60%. Systolic function is normal. Wall motion is normal. Diastolic function is mildly abnormal, consistent with grade I (abnormal) relaxation.    Mitral Valve: There is mild annular calcification. There is trace regurgitation.    Aorta: The aortic root is normal in size. The ascending aorta is mildly dilated. The ascending aorta is 3.9 cm.    Results for orders placed during the hospital encounter of 23    Echo follow up/limited w/ contrast if indicated    Interpretation Summary    Left Ventricle: Left ventricular cavity size is normal. Wall thickness is mildly increased. There is concentric remodeling. The left ventricular ejection fraction is 60%. Systolic function is normal. Wall motion is normal.    IVS: There is abnormal septal motion consistent with post-operative status.    Right Ventricle: Right ventricular cavity size is normal. Systolic function is normal.    Mitral Valve: There is mild annular calcification.      Stress Test:   Results for orders placed during the hospital encounter of 23    NM myocardial perfusion spect (stress and/or rest)    Interpretation Summary    Stress ECG: A Johnny protocol stress test was performed. The patient reached stage 2.0 of the protocol after exercising for 7 min " and 17 sec and had a maximal HR of 139 bpm (88 % of MPHR) and 7.0 METS. The patient experienced non-limiting angina during the test. The test was stopped because the patient experienced dyspnea. The patient achieved the target heart rate. The patient reported dyspnea during the stress test. Symptoms began during stress and ended during recovery. Blood pressure demonstrated a blunted response and heart rate demonstrated a normal response to stress. The patient's heart rate recovery was normal.    Stress EC.0 mm of ST elevation (aVR) and Horizontal ST depression of 2.0 mm (II, III, aVF, V5 and V6) is noted. Improvement of ST depression within 2 minutes of recovery. However, there are still persistent ST-T changes even after 4 minutes into recovery. Arrhythmias during stress: rare PVCs. There were no arrhythmias during recovery. The ECG was positive for ischemia. The stress ECG is consistent with ischemia after maximal exercise, with reproduction of symptoms.    Perfusion Defect Conclusion: The stress/rest perfusion ratio is 1.11 . There is no evidence of transient ischemic dilation (TID).    Stress Function: Left ventricular function post-stress is normal. Stress ejection fraction is 55 %. There is a defect in the mid to apical inferior location(s). The defect has mildly reduced function.    Stress Combined Conclusion: The ECG and SPECT imaging portions of the stress study are concordant with findings concerning for stress induced myocardial ischemia.    Perfusion: There is a left ventricular perfusion defect that is large in size with severe reduction in uptake present in the entire inferior location(s) that is reversible suggestive of inferior wall ischemia.    Abnormal study  Exercise 7 minutes with blunted BP response.  Appropriate HR response and he met 85% of his MPHR  He did develop chest pain with exercise  1.0 mm of ST elevation (aVR) and Horizontal ST depression of 2.0 mm (II, III, aVF, V5 and V6) is  noted. Improvement of ST depression within 2 minutes of recovery. However, there are still persistent ST-T changes even after 4 minutes into recovery.  Minimal Ectopy  There is a left ventricular perfusion defect that is large in size with severe reduction in uptake present in the entire inferior location(s) that is reversible suggestive of inferior wall ischemia. Results were discussed with the patient and referring provider and he will be scheduled for coronary angiography.  EF normal at 55% but mid-apical inferior wall motion  No TID      Cardiac Catheterization:    Cardiac Coronary Angiogram, Left Heart Cath  08/02/2023    Coronary Findings    Diagnostic  Dominance: Right    Left Anterior Descending   Prox LAD lesion is 70% stenosed.   Mid LAD lesion is 80% stenosed.   Dist LAD lesion is 90% stenosed.      First Diagonal Branch   1st Diag-1 lesion is 90% stenosed.   1st Diag-2 lesion is 80% stenosed.      Ramus Intermedius   Ramus lesion is 80% stenosed.      Left Circumflex   Mid Cx to Dist Cx lesion is 90% stenosed.      Right Coronary Artery      Right Posterior Descending Artery   Collaterals   RPDA filled by collaterals from Dist LAD.          HOLTER MONITOR: 24 HOUR/48 HOUR MONITORS  No results found for this or any previous visit.      AMB Extended Holter Monitor: Zio XT/AT or BioTel  No results found for this or any previous visit.      Cardiac MRI:    MRI Cardiac w wo Contrast  12/08/2023    Findings:  1. Technically difficult study due to presence of ectopy.  Unable to accurately measure cardiac chamber sizes or ejection fraction.  2. The left ventricle is normal in size with normal wall thickness.  Left ventricular systolic function is normal with an estimated ejection fraction of 55%.  There is mild hypokinesis of the basal inferior wall.  3. The right ventricle is normal in size with normal right ventricular systolic function.  4. The aortic, mitral, and tricuspid valves open without restriction.   There is no significant valvular regurgitation, however cine MRI is inaccurate in the qualitative assessment of valvular regurgitation.  5. The left atrium is mildly dilated. The aortic root is mildly dilated.  5. Delayed post-gadolinium imaging demonstrates subendocardial hyperenhancement of the basal to mid inferior wall and intramyocardial hyperenhancement of the mid inferolateral wall.     IMPRESSION:  Impression:  1. Normal left ventricular size and systolic function with mild basal inferior hypokinesis.  2. Normal right ventricular size and systolic function.  3. No significant valvular abnormalities.  4. Subendocardial scarring of the basal to mid inferior wall suggestive of an ischemic etiology.  Intramyocardial fibrosis within the mid inferolateral wall suggestive of myocarditis, but cannot exclude cardiac sarcoidosis.         DEVICE CHECK:     No results found for this or any previous visit.        Code Status: [unfilled]  Advance Directive and Living Will:      Power of :    POLST:      Counseling / Coordination of Care  Reviewed in detail with patient with regards to wide-complex tachycardia      Luke Siu MD

## 2024-01-05 ENCOUNTER — CLINICAL SUPPORT (OUTPATIENT)
Dept: CARDIOLOGY CLINIC | Facility: CLINIC | Age: 64
End: 2024-01-05
Payer: COMMERCIAL

## 2024-01-05 DIAGNOSIS — R00.0 WIDE-COMPLEX TACHYCARDIA: ICD-10-CM

## 2024-01-05 PROCEDURE — 93248 EXT ECG>7D<15D REV&INTERPJ: CPT | Performed by: INTERNAL MEDICINE

## 2024-01-09 ENCOUNTER — CONSULT (OUTPATIENT)
Dept: CARDIOLOGY CLINIC | Facility: CLINIC | Age: 64
End: 2024-01-09
Payer: COMMERCIAL

## 2024-01-09 ENCOUNTER — TELEPHONE (OUTPATIENT)
Dept: CARDIOLOGY CLINIC | Facility: CLINIC | Age: 64
End: 2024-01-09

## 2024-01-09 VITALS
HEART RATE: 66 BPM | WEIGHT: 197.5 LBS | OXYGEN SATURATION: 97 % | BODY MASS INDEX: 28.27 KG/M2 | HEIGHT: 70 IN | DIASTOLIC BLOOD PRESSURE: 68 MMHG | SYSTOLIC BLOOD PRESSURE: 114 MMHG

## 2024-01-09 DIAGNOSIS — I10 ESSENTIAL HYPERTENSION: ICD-10-CM

## 2024-01-09 DIAGNOSIS — I25.10 CORONARY ARTERY DISEASE INVOLVING NATIVE CORONARY ARTERY OF NATIVE HEART WITHOUT ANGINA PECTORIS: ICD-10-CM

## 2024-01-09 DIAGNOSIS — Z95.1 S/P CABG X 5: ICD-10-CM

## 2024-01-09 DIAGNOSIS — G47.33 OBSTRUCTIVE SLEEP APNEA SYNDROME: ICD-10-CM

## 2024-01-09 DIAGNOSIS — E11.42 DIABETIC POLYNEUROPATHY ASSOCIATED WITH TYPE 2 DIABETES MELLITUS (HCC): ICD-10-CM

## 2024-01-09 DIAGNOSIS — E78.2 MIXED HYPERLIPIDEMIA: ICD-10-CM

## 2024-01-09 DIAGNOSIS — R00.0 WIDE-COMPLEX TACHYCARDIA: ICD-10-CM

## 2024-01-09 DIAGNOSIS — E11.65 TYPE 2 DIABETES MELLITUS WITH HYPERGLYCEMIA, WITHOUT LONG-TERM CURRENT USE OF INSULIN (HCC): ICD-10-CM

## 2024-01-09 DIAGNOSIS — E55.9 VITAMIN D DEFICIENCY: ICD-10-CM

## 2024-01-09 PROCEDURE — 93000 ELECTROCARDIOGRAM COMPLETE: CPT | Performed by: INTERNAL MEDICINE

## 2024-01-09 PROCEDURE — 99214 OFFICE O/P EST MOD 30 MIN: CPT | Performed by: INTERNAL MEDICINE

## 2024-01-09 RX ORDER — METOPROLOL SUCCINATE 25 MG/1
25 TABLET, EXTENDED RELEASE ORAL DAILY
Qty: 90 TABLET | Refills: 3 | Status: SHIPPED | OUTPATIENT
Start: 2024-01-09

## 2024-01-09 RX ORDER — POTASSIUM CHLORIDE 750 MG/1
10 TABLET, EXTENDED RELEASE ORAL DAILY
Qty: 90 TABLET | Refills: 3 | Status: SHIPPED | OUTPATIENT
Start: 2024-01-09

## 2024-01-09 NOTE — LETTER
January 10, 2024     Pati Lagunas MD  [address] [place] [id number]    Patient: John Obleschuk   YOB: 1960   Date of Visit: 1/9/2024       Dear Dr. Lagunas:    Thank you for referring John Obleschuk to me for evaluation. Below are my notes for this consultation.    If you have questions, please do not hesitate to call me. I look forward to following your patient along with you.         Sincerely,        Luke Siu MD        CC: John Obleschuk  CARDIOLOGY SURGERY COORDINATOR    Luke Siu MD  1/10/2024  1:34 PM  Sign when Signing Visit   Consultation - Electrophysiology-Cardiology (EP)   John Obleschuk 63 y.o. male MRN: [id number]  Unit/Bed#:  Encounter: [id number]      1. Wide-complex tachycardia  POCT ECG    potassium chloride (K-DUR,KLOR-CON) 10 mEq tablet      2. Type 2 diabetes mellitus with hyperglycemia, without long-term current use of insulin (HCC)        3. Vitamin D deficiency        4. Diabetic polyneuropathy associated with type 2 diabetes mellitus (HCC)        5. Obstructive sleep apnea syndrome        6. Essential hypertension        7. Coronary artery disease involving native coronary artery of native heart without angina pectoris  metoprolol succinate (TOPROL-XL) 25 mg 24 hr tablet      8. Mixed hyperlipidemia        9. S/P CABG x 5              Consults  Physician Requesting Consult: PCP  Reason for Consult / Principal Problem: Hospital follow up wide complex tachycardia       Summary of my recommendation for the patient  The patient does have a history of VT at 130 bpm  He is feeling fatigued on metoprolol and dofetilide    Proceed with implantation of Assert  6-year device  Reduce metoprolol to 25 mg once daily  Change to metoprolol succinate as tartrate is  ideally used 3 times a day    Refill patient's potassium    Depending upon finding of monitor will decide on further therapy  At this time no symptomatic episodes, EF has recovered, no other episodes  of  VT        Clinical conditions  Wide-complex tachycardia, 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 Marrufo  Patient not complaining of any angina      Hypertension  Blood pressure is 114/68  Patient currently on metoprolol      Diabetes mellitus type 2, hemoglobin A1c 7.4  Continue with aggressive management of blood sugars  Currently on metformin, Jardiance       Obesity/overweight-BMI down from 32.6-28.4  Advised aggressive management of body weight      Carotid artery disease  Continue with metoprolol, Jardiance, aspirin, rosuvastatin      Mixed hyperlipidemia  Patient is on rosuvastatin  No myositis or myalgia      Sleep apnea  As per primary      History of Present Illness   HPI: John Obleschuk is a 63 y.o. 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 Marrufo  Hypertension  Diabetes mellitus  type 2, hemoglobin A1c 7.4  Obesity/overweight-BMI down from 32.6-28.4  Carotid artery disease  Sleep apnea    The patient is not complaining of anginal-like chest pain or pressure  There is no new worsening of orthopnea or PND  Patient is not complaining of palpitations, presyncope or syncope  Exertional tolerance is also improved    The patient does not abuse tobacco alcohol or energy drinks    Historical Information   Past Medical History:   Diagnosis Date   • Coronary artery disease    • Diabetes mellitus (HCC)    • Hyperlipidemia    • Hypertension      Past Surgical History:   Procedure Laterality Date   • ARTERIAL ANEURYSM REPAIR      Right Carotid Aneurysm - age 15.   • CARDIAC CATHETERIZATION Left 08/02/2023    Procedure: Cardiac Left Heart Cath;  Surgeon: Luisito Inman DO;  Location: [facility];  Service: Cardiology   • CARDIAC CATHETERIZATION N/A 08/02/2023    Procedure: Cardiac Coronary Angiogram;  Surgeon: Luisito Inman DO;  Location: [facility];  Service: Cardiology   • CARDIAC CATHETERIZATION  08/02/2023    Procedure: Cardiac catheterization;  Surgeon: Luisito Inman DO;  Location: [facility];  Service: Cardiology   • HERNIA REPAIR     • UT CORONARY ARTERY BYP W/VEIN & ARTERY GRAFT 4 VEIN N/A 8/11/2023    Procedure: CORONARY ARTERY BYPASS GRAFT (CABG) X-5 VESSELS ; SVG to PDA, Ramus, Diagonal and OM. LIMA --> LAD EVH  W/ JOHN;  Surgeon: Soham Hodgson MD;  Location: [facility];  Service: Cardiac Surgery   • VASECTOMY       Social History     Substance and Sexual Activity   Alcohol Use No     Social History     Substance and Sexual Activity   Drug Use No     Social History     Tobacco Use   Smoking Status Never   Smokeless Tobacco Never     Social History     Socioeconomic History   • Marital status: /Civil Union     Spouse name: Not on file   • Number of children: Not on file   • Years of education: Not on file   • Highest education level: Not on  file   Occupational History   • Not on file   Tobacco Use   • Smoking status: Never   • Smokeless tobacco: Never   Vaping Use   • Vaping status: Never Used   Substance and Sexual Activity   • Alcohol use: No   • Drug use: No   • Sexual activity: Yes   Other Topics Concern   • Not on file   Social History Narrative   • Not on file     Social Determinants of Health     Financial Resource Strain: Unknown (8/24/2023)    Received from [facility]    Overall Financial Resource Strain (CARDIA)    • Difficulty of Paying Living Expenses: Patient refused   Food Insecurity: Unknown (8/24/2023)    Received from [facility]    Hunger Vital Sign    • Worried About Running Out of Food in the Last Year: Patient refused    • Ran Out of Food in the Last Year: Patient refused   Transportation Needs: No Transportation Needs (8/24/2023)    Received from [facility]    PRAPARE - Transportation    • Lack of Transportation (Medical): No    • Lack of Transportation (Non-Medical): No   Physical Activity: Sufficiently Active (12/20/2023)    Exercise Vital Sign    • Days of Exercise per Week: 3 days    • Minutes of Exercise per Session: 60 min   Stress: Stress Concern Present (12/20/2023)    [employer] - Occupational Stress Questionnaire    • Feeling of Stress : Rather much   Social Connections: Unknown (12/20/2023)    Social Connection and Isolation Panel [NHANES]    • Frequency of Communication with Friends and Family: More than three times a week    • Frequency of Social Gatherings with Friends and Family: More than three times a week    • Attends Yarsani Services: Patient declined    • Active Member of Clubs or Organizations: Patient declined    • Attends Club or Organization Meetings: Patient declined    • Marital Status:    Intimate Partner Violence: Not At Risk (12/20/2023)    Humiliation, Afraid, Rape, and Kick questionnaire    • Fear of Current  "or Ex-Partner: No    • Emotionally Abused: No    • Physically Abused: No    • Sexually Abused: No   Housing Stability: Unknown (8/24/2023)    Received from [facility]    Housing Stability Vital Sign    • Unable to Pay for Housing in the Last Year: Patient refused    • Number of Places Lived in the Last Year: Not on file    • Unstable Housing in the Last Year: Patient refused     .  Family History:  Family History   Problem Relation Age of Onset   • Leukemia Mother    • Heart attack Father 52         Meds/Allergies      No current facility-administered medications for this visit.        (Not in a hospital admission)      Allergies   Allergen Reactions   • Atorvastatin Other (See Comments)     felt flu-like   • Rosiglitazone Other (See Comments)   • Trazodone Other (See Comments)     Felt \"hung over\"           Objective   Vitals: Visit Vitals  /68 (BP Location: Left arm, Patient Position: Sitting, Cuff Size: Large)   Pulse 66   Ht 5' 10\" (1.778 m)   Wt 89.6 kg (197 lb 8 oz)   SpO2 97%   BMI 28.34 kg/m²   Smoking Status Never   BSA 2.08 m²      Vitals:    01/09/24 1320   Weight: 89.6 kg (197 lb 8 oz)   [unfilled]    Invasive Devices       None                     ROS  Review of Systems   All other systems reviewed and are negative.  As described in my history of present illness        PHYSICAL EXAM  Physical Exam  Constitutional:       General: He is not in acute distress.     Appearance: Normal appearance. He is not ill-appearing.   HENT:      Head: Normocephalic and atraumatic.      Right Ear: External ear normal.      Left Ear: External ear normal.      Nose: Nose normal.      Mouth/Throat:      Pharynx: Oropharynx is clear.   Eyes:      General: No scleral icterus.     Extraocular Movements: Extraocular movements intact.      Conjunctiva/sclera: Conjunctivae normal.      Pupils: Pupils are equal, round, and reactive to light.   Cardiovascular:      Rate and Rhythm: Normal rate and regular " rhythm.      Heart sounds: Normal heart sounds. No murmur heard.     No gallop.   Pulmonary:      Effort: No respiratory distress.      Breath sounds: Normal breath sounds. No wheezing or rales.   Abdominal:      General: Bowel sounds are normal. There is no distension.      Palpations: Abdomen is soft.   Musculoskeletal:         General: No swelling or deformity.      Cervical back: Neck supple. No rigidity.   Skin:     Coloration: Skin is not jaundiced.      Findings: No bruising or lesion.   Neurological:      Mental Status: He is alert and oriented to person, place, and time. Mental status is at baseline.      Motor: No weakness.   Psychiatric:         Mood and Affect: Mood normal.         Behavior: Behavior normal.         Thought Content: Thought content normal.         Judgment: Judgment normal.               LAB RESULTS:    CBC:  WBC   Date Value Ref Range Status   12/07/2023 8.10 4.31 - 10.16 Thousand/uL Final     Hemoglobin   Date Value Ref Range Status   12/07/2023 13.0 12.0 - 17.0 g/dL Final     Hematocrit   Date Value Ref Range Status   12/07/2023 41.8 36.5 - 49.3 % Final     MCV   Date Value Ref Range Status   12/07/2023 87 82 - 98 fL Final     Platelets   Date Value Ref Range Status   12/07/2023 193 149 - 390 Thousands/uL Final     RBC   Date Value Ref Range Status   12/07/2023 4.83 3.88 - 5.62 Million/uL Final     MCH   Date Value Ref Range Status   12/07/2023 26.9 26.8 - 34.3 pg Final     MCHC   Date Value Ref Range Status   12/07/2023 31.1 (L) 31.4 - 37.4 g/dL Final     RDW   Date Value Ref Range Status   12/07/2023 13.6 11.6 - 15.1 % Final     MPV   Date Value Ref Range Status   12/07/2023 10.4 8.9 - 12.7 fL Final     nRBC   Date Value Ref Range Status   12/07/2023 0 /100 WBCs Final       CMP:  Potassium   Date Value Ref Range Status   12/15/2023 4.5 3.5 - 5.3 mmol/L Final     Chloride   Date Value Ref Range Status   12/15/2023 100 96 - 108 mmol/L Final     CO2   Date Value Ref Range Status    12/15/2023 26 21 - 32 mmol/L Final     CO2, i-STAT   Date Value Ref Range Status   08/11/2023 23 21 - 32 mmol/L Final     BUN   Date Value Ref Range Status   12/15/2023 23 5 - 25 mg/dL Final     Creatinine   Date Value Ref Range Status   12/15/2023 1.02 0.60 - 1.30 mg/dL Final     Comment:     Standardized to IDMS reference method     Glucose, i-STAT   Date Value Ref Range Status   08/11/2023 184 (H) 65 - 140 mg/dl Final     Calcium   Date Value Ref Range Status   12/15/2023 9.1 8.4 - 10.2 mg/dL Final     AST   Date Value Ref Range Status   12/05/2023 24 13 - 39 U/L Final     ALT   Date Value Ref Range Status   12/05/2023 22 7 - 52 U/L Final     Comment:     Specimen collection should occur prior to Sulfasalazine administration due to the potential for falsely depressed results.      Alkaline Phosphatase   Date Value Ref Range Status   12/05/2023 71 34 - 104 U/L Final     eGFR   Date Value Ref Range Status   12/15/2023 77 ml/min/1.73sq m Final        Magnesium:   Magnesium   Date Value Ref Range Status   12/08/2023 2.0 1.9 - 2.7 mg/dL Final        A1C:  Hemoglobin A1C   Date Value Ref Range Status   12/05/2023 6.9 (H) Normal 4.0-5.6%; PreDiabetic 5.7-6.4%; Diabetic >=6.5%; Glycemic control for adults with diabetes <7.0% % Final   06/08/2023 7.4 (H) <5.7 % Final     Comment:     Reference Range  Non-diabetic                     <5.7  Pre-diabetic                     5.7-6.4  Diabetic                         >=6.5  ADA target for diabetic control  <=7        TSH:  TSH 3RD GENERATON   Date Value Ref Range Status   12/05/2023 1.814 0.450 - 4.500 uIU/mL Final     Comment:     Adult TSH (3rd generation) reference range follows the recommended guidelines of the American Thyroid Association, January, 2020.        PT/INR:  Protime   Date Value Ref Range Status   08/03/2023 13.1 11.6 - 14.5 seconds Final     INR   Date Value Ref Range Status   08/03/2023 0.97 0.84 - 1.19 Final       Lipid Panel:  Cholesterol   Date Value  "Ref Range Status   2023 90 See Comment mg/dL Final     Comment:     Cholesterol:         Pediatric <18 Years        Desirable          <170 mg/dL      Borderline High    170-199 mg/dL      High               >=200 mg/dL        Adult >=18 Years            Desirable        <200 mg/dL      Borderline High  200-239 mg/dL      High             >239 mg/dL       Triglycerides   Date Value Ref Range Status   2023 99 See Comment mg/dL Final     Comment:     Triglyceride:     0-9Y            <75mg/dL     10Y-17Y         <90 mg/dL       >=18Y     Normal          <150 mg/dL     Borderline High 150-199 mg/dL     High            200-499 mg/dL        Very High       >499 mg/dL    Specimen collection should occur prior to Metamizole administration due to the potential for falsely depressed results.     HDL, Direct   Date Value Ref Range Status   2023 39 (L) >=40 mg/dL Final       Troponin:  No results found for: \"TROPONINI\"      Imaging:    [id number]      JOHN:  No results found for this or any previous visit.      Echocardiogram:  Results for orders placed during the hospital encounter of 23    Echo complete w/ contrast if indicated    Interpretation Summary  •  Left Ventricle: Left ventricular cavity size is normal. Wall thickness is upper normal. The left ventricular ejection fraction is 60%. Systolic function is normal. Wall motion is normal. Diastolic function is mildly abnormal, consistent with grade I (abnormal) relaxation.  •  Mitral Valve: There is mild annular calcification. There is trace regurgitation.  •  Aorta: The aortic root is normal in size. The ascending aorta is mildly dilated. The ascending aorta is 3.9 cm.    Results for orders placed during the hospital encounter of 23    Echo follow up/limited w/ contrast if indicated    Interpretation Summary  •  Left Ventricle: Left ventricular cavity size is normal. Wall thickness is mildly increased. There is concentric remodeling. " The left ventricular ejection fraction is 60%. Systolic function is normal. Wall motion is normal.  •  IVS: There is abnormal septal motion consistent with post-operative status.  •  Right Ventricle: Right ventricular cavity size is normal. Systolic function is normal.  •  Mitral Valve: There is mild annular calcification.      Stress Test:   Results for orders placed during the hospital encounter of 23    NM myocardial perfusion spect (stress and/or rest)    Interpretation Summary  •  Stress ECG: A Johnny protocol stress test was performed. The patient reached stage 2.0 of the protocol after exercising for 7 min and 17 sec and had a maximal HR of 139 bpm (88 % of MPHR) and 7.0 METS. The patient experienced non-limiting angina during the test. The test was stopped because the patient experienced dyspnea. The patient achieved the target heart rate. The patient reported dyspnea during the stress test. Symptoms began during stress and ended during recovery. Blood pressure demonstrated a blunted response and heart rate demonstrated a normal response to stress. The patient's heart rate recovery was normal.  •  Stress EC.0 mm of ST elevation (aVR) and Horizontal ST depression of 2.0 mm (II, III, aVF, V5 and V6) is noted. Improvement of ST depression within 2 minutes of recovery. However, there are still persistent ST-T changes even after 4 minutes into recovery. Arrhythmias during stress: rare PVCs. There were no arrhythmias during recovery. The ECG was positive for ischemia. The stress ECG is consistent with ischemia after maximal exercise, with reproduction of symptoms.  •  Perfusion Defect Conclusion: The stress/rest perfusion ratio is 1.11 . There is no evidence of transient ischemic dilation (TID).  •  Stress Function: Left ventricular function post-stress is normal. Stress ejection fraction is 55 %. There is a defect in the mid to apical inferior location(s). The defect has mildly reduced function.  •   Stress Combined Conclusion: The ECG and SPECT imaging portions of the stress study are concordant with findings concerning for stress induced myocardial ischemia.  •  Perfusion: There is a left ventricular perfusion defect that is large in size with severe reduction in uptake present in the entire inferior location(s) that is reversible suggestive of inferior wall ischemia.    Abnormal study  Exercise 7 minutes with blunted BP response.  Appropriate HR response and he met 85% of his MPHR  He did develop chest pain with exercise  1.0 mm of ST elevation (aVR) and Horizontal ST depression of 2.0 mm (II, III, aVF, V5 and V6) is noted. Improvement of ST depression within 2 minutes of recovery. However, there are still persistent ST-T changes even after 4 minutes into recovery.  Minimal Ectopy  There is a left ventricular perfusion defect that is large in size with severe reduction in uptake present in the entire inferior location(s) that is reversible suggestive of inferior wall ischemia. Results were discussed with the patient and referring provider and he will be scheduled for coronary angiography.  EF normal at 55% but mid-apical inferior wall motion  No TID      Cardiac Catheterization:    Cardiac Coronary Angiogram, Left Heart Cath  08/02/2023    Coronary Findings    Diagnostic  Dominance: Right    Left Anterior Descending   Prox LAD lesion is 70% stenosed.   Mid LAD lesion is 80% stenosed.   Dist LAD lesion is 90% stenosed.      First Diagonal Branch   1st Diag-1 lesion is 90% stenosed.   1st Diag-2 lesion is 80% stenosed.      Ramus Intermedius   Ramus lesion is 80% stenosed.      Left Circumflex   Mid Cx to Dist Cx lesion is 90% stenosed.      Right Coronary Artery      Right Posterior Descending Artery   Collaterals   RPDA filled by collaterals from Dist LAD.          HOLTER MONITOR: 24 HOUR/48 HOUR MONITORS  No results found for this or any previous visit.      AMB Extended Holter Monitor: Zio XT/AT or  BioTel  No results found for this or any previous visit.      Cardiac MRI:    MRI Cardiac w wo Contrast  12/08/2023    Findings:  1. Technically difficult study due to presence of ectopy.  Unable to accurately measure cardiac chamber sizes or ejection fraction.  2. The left ventricle is normal in size with normal wall thickness.  Left ventricular systolic function is normal with an estimated ejection fraction of 55%.  There is mild hypokinesis of the basal inferior wall.  3. The right ventricle is normal in size with normal right ventricular systolic function.  4. The aortic, mitral, and tricuspid valves open without restriction.  There is no significant valvular regurgitation, however cine MRI is inaccurate in the qualitative assessment of valvular regurgitation.  5. The left atrium is mildly dilated. The aortic root is mildly dilated.  5. Delayed post-gadolinium imaging demonstrates subendocardial hyperenhancement of the basal to mid inferior wall and intramyocardial hyperenhancement of the mid inferolateral wall.     IMPRESSION:  Impression:  1. Normal left ventricular size and systolic function with mild basal inferior hypokinesis.  2. Normal right ventricular size and systolic function.  3. No significant valvular abnormalities.  4. Subendocardial scarring of the basal to mid inferior wall suggestive of an ischemic etiology.  Intramyocardial fibrosis within the mid inferolateral wall suggestive of myocarditis, but cannot exclude cardiac sarcoidosis.         DEVICE CHECK:     No results found for this or any previous visit.        Code Status: [unfilled]  Advance Directive and Living Will:      Power of :    POLST:      Counseling / Coordination of Care  Reviewed in detail with patient with regards to wide-complex tachycardia      Luke Siu MD

## 2024-01-09 NOTE — TELEPHONE ENCOUNTER
Please advise, patient had questions at checkout regarding procedure advised today as well as the reason he was switched from metoprolol tartrate to metoprolol succinate.

## 2024-01-09 NOTE — TELEPHONE ENCOUNTER
Patient was seen in office today, forgot to ask about the name of the procedure he is having done and needs clarification on the metoprolol medication type. Patient requested a response through [employer].

## 2024-01-09 NOTE — PATIENT INSTRUCTIONS
"Anesthesia Transfer of Care Note    Patient: Lucia Barlow    Procedure(s) Performed: Procedure(s) (LRB):  EGD (ESOPHAGOGASTRODUODENOSCOPY) (N/A)  COLONOSCOPY (N/A)    Patient location: PACU    Anesthesia Type: MAC    Transport from OR: Transported from OR on room air with adequate spontaneous ventilation    Post pain: adequate analgesia    Post assessment: no apparent anesthetic complications    Post vital signs: stable    Level of consciousness: awake    Nausea/Vomiting: no nausea/vomiting    Complications: none    Transfer of care protocol was followed      Last vitals:   Visit Vitals  /64 (BP Location: Left arm, Patient Position: Lying)   Pulse 95   Temp 36.8 °C (98.2 °F)   Resp 18   Ht 5' 1" (1.549 m)   Wt 59.9 kg (132 lb)   SpO2 98%   Breastfeeding No   BMI 24.94 kg/m²     " 1) CHANGING Metoprolol Tartrate to Metoprolol Succinate 25 mg, 1 tablet daily; prescription sent to pharmacy    2) implant ASSERT for long term monitoring

## 2024-01-10 NOTE — RESULT ENCOUNTER NOTE
[facility]    Event Recorder Results    Duration: 14 days    Indication: VT    Findings:    Patient had a min HR of 61 bpm, max HR of 150 bpm, and avg HR of 80  bpm. Predominant underlying rhythm was Sinus Rhythm. 2 Ventricular runs occurred, the run with the fastest interval lasting 5 beats with a max rate of 150 bpm, the longest lasting 4 beats with an avg rate of 117 bpm. 1 run of Supraventricular Tachycardia occurred lasting 13 beats with a max rate of 136 bpm (avg 126 bpm). Isolated SVEs were occasional (2.3%, [id number]), SVE Couplets were rare (<1.0%, 6347), and SVE Triplets were rare (<1.0%, 2818). Isolated VEs were rare (<1.0%, 513), VE Couplets were rare (<1.0%, 16), and VE Triplets were rare (<1.0%, 3).    Triggered episodes: 4 symptomatic episodes correlated with sinus rhythm or mild SV ectopy.    Conclusions:  No sustained ventricular tachycardia noted.    Overall these test results are reassuring.   Please call patient with test results. Continue current medications as advised by Dr. Siu.

## 2024-01-10 NOTE — RESULT ENCOUNTER NOTE
Patient contacted and made aware of the results of his monitor. He did have questions about the loop recorder discussed with Dr. Siu at yesterday's office visit. Patient was advised that the scheduling team would reaching out to him with more information regarding loop recorder implantation.

## 2024-01-10 NOTE — TELEPHONE ENCOUNTER
"Patient called and made aware of reason for medication change as well as the name of the proposed loop recorder, \"Assert.\" He had no further questions.  "

## 2024-01-19 ENCOUNTER — TELEPHONE (OUTPATIENT)
Dept: CARDIOLOGY CLINIC | Facility: CLINIC | Age: 64
End: 2024-01-19

## 2024-01-19 ENCOUNTER — PREP FOR PROCEDURE (OUTPATIENT)
Dept: CARDIOLOGY CLINIC | Facility: CLINIC | Age: 64
End: 2024-01-19

## 2024-01-19 DIAGNOSIS — R00.0 WIDE-COMPLEX TACHYCARDIA: Primary | ICD-10-CM

## 2024-01-19 NOTE — TELEPHONE ENCOUNTER
"Patient is scheduled for LOOP Recorder Implant on 2/21/24 at [facility] with .     Patient aware of all general instructions.    Instructions sent to patient through CUPS.      Medication holds:   N/A    Blood work to be done on:  N/A  (Patient did BMP on 12/15/23 and CBC on 12/7/23)    Insurance: [facility]     Please obtain auth.     2W ZELDAO completed - 14 days - 12/10/23 - 12/24/23.    Thank you,  Kathleen \"Joan\" Fernando      "

## 2024-01-19 NOTE — TELEPHONE ENCOUNTER
Progress Notes by Luke Siu MD 1/9/2024    He is feeling fatigued on metoprolol and dofetilide    Proceed with implantation of Assert 6-year device

## 2024-01-22 DIAGNOSIS — E11.65 TYPE 2 DIABETES MELLITUS WITH HYPERGLYCEMIA, WITHOUT LONG-TERM CURRENT USE OF INSULIN (HCC): Primary | ICD-10-CM

## 2024-01-22 DIAGNOSIS — E11.42 DIABETIC POLYNEUROPATHY ASSOCIATED WITH TYPE 2 DIABETES MELLITUS (HCC): Primary | ICD-10-CM

## 2024-01-22 DIAGNOSIS — E11.42 DIABETIC POLYNEUROPATHY ASSOCIATED WITH TYPE 2 DIABETES MELLITUS (HCC): ICD-10-CM

## 2024-01-22 RX ORDER — GABAPENTIN 100 MG/1
CAPSULE ORAL
Qty: 120 CAPSULE | Refills: 2 | Status: SHIPPED | OUTPATIENT
Start: 2024-01-22

## 2024-01-27 DIAGNOSIS — F32.0 CURRENT MILD EPISODE OF MAJOR DEPRESSIVE DISORDER WITHOUT PRIOR EPISODE (HCC): ICD-10-CM

## 2024-01-29 DIAGNOSIS — R00.0 WIDE-COMPLEX TACHYCARDIA: ICD-10-CM

## 2024-01-29 RX ORDER — DOFETILIDE 0.5 MG/1
500 CAPSULE ORAL EVERY 12 HOURS SCHEDULED
Qty: 60 CAPSULE | Refills: 0 | Status: SHIPPED | OUTPATIENT
Start: 2024-01-29

## 2024-02-09 ENCOUNTER — TELEPHONE (OUTPATIENT)
Dept: ADMINISTRATIVE | Facility: OTHER | Age: 64
End: 2024-02-09

## 2024-02-09 ENCOUNTER — OFFICE VISIT (OUTPATIENT)
Dept: ENDOCRINOLOGY | Facility: HOSPITAL | Age: 64
End: 2024-02-09
Payer: COMMERCIAL

## 2024-02-09 VITALS
SYSTOLIC BLOOD PRESSURE: 118 MMHG | HEIGHT: 70 IN | WEIGHT: 193 LBS | OXYGEN SATURATION: 99 % | DIASTOLIC BLOOD PRESSURE: 78 MMHG | HEART RATE: 96 BPM | BODY MASS INDEX: 27.63 KG/M2

## 2024-02-09 DIAGNOSIS — E11.42 DIABETIC POLYNEUROPATHY ASSOCIATED WITH TYPE 2 DIABETES MELLITUS (HCC): ICD-10-CM

## 2024-02-09 DIAGNOSIS — E11.65 TYPE 2 DIABETES MELLITUS WITH HYPERGLYCEMIA, WITHOUT LONG-TERM CURRENT USE OF INSULIN (HCC): Primary | ICD-10-CM

## 2024-02-09 DIAGNOSIS — I10 ESSENTIAL HYPERTENSION: ICD-10-CM

## 2024-02-09 DIAGNOSIS — E78.2 MIXED HYPERLIPIDEMIA: ICD-10-CM

## 2024-02-09 DIAGNOSIS — I25.10 CORONARY ARTERY DISEASE INVOLVING NATIVE CORONARY ARTERY: ICD-10-CM

## 2024-02-09 PROCEDURE — 95251 CONT GLUC MNTR ANALYSIS I&R: CPT | Performed by: INTERNAL MEDICINE

## 2024-02-09 PROCEDURE — 99214 OFFICE O/P EST MOD 30 MIN: CPT | Performed by: INTERNAL MEDICINE

## 2024-02-09 RX ORDER — SEMAGLUTIDE 2.68 MG/ML
2 INJECTION, SOLUTION SUBCUTANEOUS
Qty: 9 ML | Refills: 3 | Status: SHIPPED | OUTPATIENT
Start: 2024-02-09

## 2024-02-09 RX ORDER — EMPAGLIFLOZIN 25 MG/1
25 TABLET, FILM COATED ORAL DAILY
Qty: 90 TABLET | Refills: 3 | Status: SHIPPED | OUTPATIENT
Start: 2024-02-09

## 2024-02-09 RX ORDER — METFORMIN HYDROCHLORIDE 500 MG/1
1000 TABLET, EXTENDED RELEASE ORAL 2 TIMES DAILY
Qty: 360 TABLET | Refills: 3 | Status: SHIPPED | OUTPATIENT
Start: 2024-02-09

## 2024-02-09 NOTE — LETTER
Diabetic Eye Exam Form    Date Requested: 24  Patient: John Obleschuk  Patient : 1960   Referring Provider: Armaan Jasso MD      DIABETIC Eye Exam Date _______________________________      Type of Exam MUST be documented for Diabetic Eye Exams. Please CHECK ONE.     Retinal Exam       Dilated Retinal Exam       OCT       Optomap-Iris Exam      Fundus Photography       Left Eye - Please check Retinopathy or No Retinopathy        Exam did show retinopathy    Exam did not show retinopathy       Right Eye - Please check Retinopathy or No Retinopathy       Exam did show retinopathy    Exam did not show retinopathy       Comments __________________________________________________________    Practice Providing Exam ______________________________________________    Exam Performed By (print name) _______________________________________      Provider Signature ___________________________________________________      These reports are needed for  compliance.  Please fax this completed form and a copy of the Diabetic Eye Exam report to our office located at [address] [place] [address] as soon as possible via Fax [phone] attention Esthela: Phone [phone]  We thank you for your assistance in treating our mutual patient.

## 2024-02-09 NOTE — PATIENT INSTRUCTIONS
Hgba1c is 6.9%. this is excellent.     Continue the same metformin, jardiance, and ozempic.     Continue to use the freestyle lambert.    Continue to work on diet and exercise.     Follow up in 3-4 months with blood work.

## 2024-02-09 NOTE — PROGRESS NOTES
2/9/2024    Assessment/Plan     1. Type 2 diabetes mellitus with hyperglycemia, without long-term current use of insulin (HCC)  -     Comprehensive metabolic panel; Future; Expected date: 05/06/2024  -     CBC and differential; Future; Expected date: 05/06/2024  -     TSH, 3rd generation; Future; Expected date: 05/06/2024  -     Hemoglobin A1C; Future; Expected date: 05/06/2024  -     Albumin / creatinine urine ratio; Future; Expected date: 05/06/2024  -     Ozempic, 2 MG/DOSE, 8 MG/3ML injection pen; Inject 0.75 mL (2 mg total) under the skin every 7 days  -     Jardiance 25 MG TABS; Take 1 tablet (25 mg total) by mouth in the morning    2. Diabetic polyneuropathy associated with type 2 diabetes mellitus (HCC)  -     Comprehensive metabolic panel; Future; Expected date: 05/06/2024  -     CBC and differential; Future; Expected date: 05/06/2024  -     TSH, 3rd generation; Future; Expected date: 05/06/2024  -     Hemoglobin A1C; Future; Expected date: 05/06/2024  -     Albumin / creatinine urine ratio; Future; Expected date: 05/06/2024    3. Essential hypertension  -     Comprehensive metabolic panel; Future; Expected date: 05/06/2024  -     CBC and differential; Future; Expected date: 05/06/2024  -     TSH, 3rd generation; Future; Expected date: 05/06/2024  -     Hemoglobin A1C; Future; Expected date: 05/06/2024  -     Albumin / creatinine urine ratio; Future; Expected date: 05/06/2024    4. Mixed hyperlipidemia  -     Comprehensive metabolic panel; Future; Expected date: 05/06/2024  -     CBC and differential; Future; Expected date: 05/06/2024  -     TSH, 3rd generation; Future; Expected date: 05/06/2024  -     Hemoglobin A1C; Future; Expected date: 05/06/2024  -     Albumin / creatinine urine ratio; Future; Expected date: 05/06/2024    5. Coronary artery disease involving native coronary artery  -     metFORMIN (GLUCOPHAGE-XR) 500 mg 24 hr tablet; Take 2 tablets (1,000 mg total) by mouth 2 (two) times a day          1. Type 2 diabetes.  Most recent hemoglobin A1c was 6.9% demonstrating excellent control of his diabetes. His FreeStyle Ayanna shows some minor spikes in the late morning or early afternoon either from a larger breakfast or lunch, but it is a once-a-day thing. It does not happen necessarily every day and the glucose come down quite rapidly. For now, he will continue the same Jardiance, metformin, and Ozempic. He will continue to work on dietary changes and regular exercise.     2. Diabetic neuropathy.  He continues to take vitamin B12 twice a day 5000 mcg and that has improved his symptomatology. Diabetic foot exams are up to date.    3. Hypertension.  He is normotensive in the office on his current dose of metoprolol.    4. Hyperlipidemia.  Lipid profile is significantly reduced due to his current daily dosage of Rosuvastatin 40 mg, as recommended by his cardiologist.      I have asked him to follow up in 3 to 4 months with the preceding hemoglobin A1c, CMP, CBC, urine microalbumin to creatinine ratio, and TSH.      CC: Diabetes type II, blood pressure, lipid follow-up    History of Present Illness     HPI: John Obleschuk is a 63 y.o. year old male with type 2 diabetes with diabetic neuropathy and retinopathy, hypertension, and hyperlipidemia for follow-up visit.     Diabetic complications include neuropathy, retinopathy, claudication, and he has heart disease post CABG. He denies nephropathy, heart attack, or stroke.    The patient is currently on Jardiance 25 mg daily, 500 mg of metformin  mg 2 tablets in the morning, and Ozempic 2 mg weekly. He denies experiencing polyuria, polydipsia, polyphagia, or nausea. Nocturia is reported, with the patient waking up between 2 to 3 times per night to urinate. He experiences mild abdominal pain and dyspepsia, along with mild constipation. He has lost 16 pounds since his last visit, which he attributes to Ozempic. He shares that his dietary habits have improved; he  consumes 3 slices of pizza, eats half portions when dining out, and has transitioned to using smaller plates. He abstains from beef and pork, primarily consuming chicken, turkey, and salads. His paresthesia in the feet remains unchanged. He denies experiencing any blurry vision. He reports occasional hyperglycemia in the morning.     Hypoglycemic episodes: NO.    Blood Sugar/Glucometer/Pump/CGM review: He is using the FreeStyle Ayanna system for blood glucose monitoring.  Freestyle ayanna 2 download from 1/27/2024 through 2/9/2024 was reviewed in the office today.  CGM was active 79% of the time.  Average glucose is 142 mg/dL with a glucose variability of 22.8%.  90% of blood sugars are in target range, 9% high, 1% very high, 0% low, and 0% very low.    He was hospitalized in 12/2023 for ventricular tachycardia and was subsequently started on Tikosyn. Since then, he has observed a 20-point increase in his blood glucose levels. He was also prescribed metoprolol 25 mg, which he reports causes afternoon fatigue. He has increased his exercise regimen, including weightlifting on Tuesdays and Saturdays, and cardiovascular exercises at cardiac rehabilitation. However, he feels his recovery time has lengthened. He denies experiencing chest pain or dyspnea. He expresses dissatisfaction with his muscle mass, inquiring if metformin inhibits muscle growth. He has a history suggestive of a myocardial infarction, but this has not been definitively confirmed.    He is on rosuvastatin 40 mg twice a day for hyperlipidemia.    The patient's last eye exam was last summer, revealing mild diabetic retinopathy.     The patient's last foot exam was during his last visit around 09/2023.        Last A1C was   Lab Results   Component Value Date    HGBA1C 6.9 (H) 12/05/2023       Review of Systems  The pertinent positive and negative findings are as noted in the HPI.    Historical Information   Past Medical History:   Diagnosis Date    Coronary  artery disease     Diabetes mellitus (HCC)     Hyperlipidemia     Hypertension      Past Surgical History:   Procedure Laterality Date    ARTERIAL ANEURYSM REPAIR      Right Carotid Aneurysm - age 15.    CARDIAC CATHETERIZATION Left 08/02/2023    Procedure: Cardiac Left Heart Cath;  Surgeon: Luisito Inman DO;  [facility];  Service: Cardiology    CARDIAC CATHETERIZATION N/A 08/02/2023    Procedure: Cardiac Coronary Angiogram;  Surgeon: Luisito Inman DO;  [facility];  Service: Cardiology    CARDIAC CATHETERIZATION  08/02/2023    Procedure: Cardiac catheterization;  Surgeon: Luisito Inman DO;  [facility];  Service: Cardiology    HERNIA REPAIR      NC CORONARY ARTERY BYP W/VEIN & ARTERY GRAFT 4 VEIN N/A 8/11/2023    Procedure: CORONARY ARTERY BYPASS GRAFT (CABG) X-5 VESSELS ; SVG to PDA, Ramus, Diagonal and OM. LIMA --> LAD EVH  W/ JOHN;  Surgeon: Soham Hodgson MD;  Location: [facility];  Service: Cardiac Surgery    VASECTOMY       Social History   Social History     Substance and Sexual Activity   Alcohol Use No     Social History     Substance and Sexual Activity   Drug Use No     Social History     Tobacco Use   Smoking Status Never   Smokeless Tobacco Never     Family History:   Family History   Problem Relation Age of Onset    Leukemia Mother     Heart attack Father 52       Meds/Allergies   Current Outpatient Medications   Medication Sig Dispense Refill    acetaminophen (TYLENOL) 325 mg tablet Take 1-2 tablets Q4-6 hours PRN pain. Do not take more than 4grams in 24 hours.  0    aspirin 325 mg tablet Take 1 tablet (325 mg total) by mouth daily 30 tablet 2    cholecalciferol (VITAMIN D3) 25 mcg (1,000 units) tablet Take 1,000 Units by mouth 2 (two) times a day      Continuous Blood Gluc Sensor (FreeStyle Ayanna 2 Sensor) MISC Change sensor every 14 days 2 each 3    dofetilide (TIKOSYN) 500 mcg capsule Take 1 capsule (500 mcg total) by  "mouth every 12 (twelve) hours 60 capsule 0    gabapentin (NEURONTIN) 100 mg capsule Take 1 capsule in the AM and 3 capsule in the evening 120 capsule 2    Jardiance 25 MG TABS Take 1 tablet (25 mg total) by mouth in the morning 90 tablet 3    LORazepam (ATIVAN) 0.5 mg tablet Take 0.5 mg by mouth daily at bedtime      Magnesium Gluconate 250 MG TABS Take by mouth daily at bedtime      metFORMIN (GLUCOPHAGE-XR) 500 mg 24 hr tablet Take 2 tablets (1,000 mg total) by mouth 2 (two) times a day 360 tablet 3    metoprolol succinate (TOPROL-XL) 25 mg 24 hr tablet Take 1 tablet (25 mg total) by mouth daily 90 tablet 3    Ozempic, 2 MG/DOSE, 8 MG/3ML injection pen Inject 0.75 mL (2 mg total) under the skin every 7 days 9 mL 3    potassium chloride (K-DUR,KLOR-CON) 10 mEq tablet Take 1 tablet (10 mEq total) by mouth daily 90 tablet 3    rosuvastatin (CRESTOR) 20 MG tablet Take 2 tablets (40 mg total) by mouth daily 90 tablet 3    sertraline (ZOLOFT) 50 mg tablet TAKE ONE TABLET BY MOUTH EVERY DAY 30 tablet 5    tadalafil (CIALIS) 20 MG tablet Take 1 tablet (20 mg total) by mouth daily as needed for erectile dysfunction 10 tablet 11    ascorbic acid (VITAMIN C) 250 MG CHEW Chew 250 mg daily (Patient not taking: Reported on 1/9/2024)      docusate sodium (COLACE) 100 mg capsule Take 1 capsule (100 mg total) by mouth 2 (two) times a day Hold for soft stools. 60 capsule 0     No current facility-administered medications for this visit.     Allergies   Allergen Reactions    Atorvastatin Other (See Comments)     felt flu-like    Rosiglitazone Other (See Comments)    Trazodone Other (See Comments)     Felt \"hung over\"       Objective   Vitals: Blood pressure 118/78, pulse 96, height 5' 10\" (1.778 m), weight 87.5 kg (193 lb), SpO2 99%.  Invasive Devices       None                   Physical Exam  Physical exam normal with pertinent positives and negatives.   ENT: Thyroid normal in size.   Respiratory: Lungs are clear. "   Cardiovascular: Heart is regular. No murmurs.   Extremities: No lower extremity edema.    The history was obtained from the review of the chart and from the patient.    Lab Results:    Most recent Alc is  Lab Results   Component Value Date    HGBA1C 6.9 (H) 12/05/2023               Lab Results   Component Value Date    CREATININE 1.02 12/15/2023    CREATININE 0.83 12/08/2023    CREATININE 0.91 12/07/2023    BUN 23 12/15/2023    K 4.5 12/15/2023     12/15/2023    CO2 26 12/15/2023     GFR, Calculated   Date Value Ref Range Status   06/05/2020 91 >60 mL/min/1.73m2 Final     Comment:     mL/min per 1.73 square meters                                            Normal Function or Mild Renal    Disease (if clinically at risk):  >or=60  Moderately Decreased:                30-59  Severely Decreased:                  15-29  Renal Failure:                         <15                                            -American GFR: multiply reported GFR by 1.16    Please note that the eGFR is based on the CKD-EPI calculation, and is not intended to be used for drug dosing.                                            Note: Calculated GFR may not be an accurate indicator of renal function if the patient's renal function is not in a steady state.     eGFRcr   Date Value Ref Range Status   06/08/2023 72 >59 Final     eGFR   Date Value Ref Range Status   12/15/2023 77 ml/min/1.73sq m Final         Lab Results   Component Value Date    HDL 39 (L) 12/06/2023    TRIG 99 12/06/2023       Lab Results   Component Value Date    ALT 22 12/05/2023    AST 24 12/05/2023    ALKPHOS 71 12/05/2023         Blood work performed on 12/05/2023 showed a hemoglobin A1c of 6.9% and a TSH of 1.814. BMP performed on 12/15/2023 was normal with a glucose of 104. CBC on 12/07/2023 was normal. Blood work on 12/06/2023 showed a total cholesterol of 90, triglyceride 99, HDL 39, LDL 31.    Future Appointments   Date Time Provider Department Center    3/6/2024 11:00 AM Rudy Vázquez MD [facility]   5/31/2024  8:40 AM Darlin Almazan MD [facility]       Transcribed for Darlin Almazan MD, by Rory Gallagher on 02/09/24 at 2:50 PM. Powered by Dragon Ambient [employer].

## 2024-02-09 NOTE — TELEPHONE ENCOUNTER
Upon review of the In Basket request and the patient's chart, initial outreach has been made via fax to facility. Please see Contacts section for details.     Thank you  Esthela Krishnan

## 2024-02-14 NOTE — TELEPHONE ENCOUNTER
As a follow-up, a second attempt has been made for outreach via fax to facility. Please see Contacts section for details.    Thank you  Esthela Krishnan

## 2024-02-15 NOTE — TELEPHONE ENCOUNTER
Upon review of the In Basket request we were able to locate, review, and update the patient chart as requested for Diabetic Eye Exam.    Any additional questions or concerns should be emailed to the Practice Liaisons via the appropriate education email address, please do not reply via In Basket.    Thank you  Esthela Krishnan

## 2024-02-16 ENCOUNTER — OFFICE VISIT (OUTPATIENT)
Dept: INTERNAL MEDICINE CLINIC | Facility: CLINIC | Age: 64
End: 2024-02-16
Payer: COMMERCIAL

## 2024-02-16 VITALS
WEIGHT: 193 LBS | SYSTOLIC BLOOD PRESSURE: 130 MMHG | HEART RATE: 75 BPM | RESPIRATION RATE: 14 BRPM | BODY MASS INDEX: 27.63 KG/M2 | OXYGEN SATURATION: 99 % | DIASTOLIC BLOOD PRESSURE: 74 MMHG | HEIGHT: 70 IN | TEMPERATURE: 97.7 F

## 2024-02-16 DIAGNOSIS — H61.21 IMPACTED CERUMEN OF RIGHT EAR: Primary | ICD-10-CM

## 2024-02-16 PROCEDURE — 99213 OFFICE O/P EST LOW 20 MIN: CPT

## 2024-02-16 PROCEDURE — 69209 REMOVE IMPACTED EAR WAX UNI: CPT

## 2024-02-16 RX ORDER — NEOMYCIN SULFATE, POLYMYXIN B SULFATE AND HYDROCORTISONE 10; 3.5; 1 MG/ML; MG/ML; [USP'U]/ML
3 SUSPENSION/ DROPS AURICULAR (OTIC) 4 TIMES DAILY
Qty: 3 ML | Refills: 0 | Status: SHIPPED | OUTPATIENT
Start: 2024-02-16 | End: 2024-02-19 | Stop reason: SDUPTHER

## 2024-02-16 NOTE — PROGRESS NOTES
Subjective:      John Obleschuk is a 63 y.o. male who presents to clinic this morning for evaluation of diminished hearing in the right ear for the past 2 days. There is a prior history of cerumen impaction. The patient has been using ear drops to loosen wax immediately prior to this visit. The patient complains of ear pain.    The following portions of the patient's history were reviewed and updated as appropriate: allergies, current medications, past medical history, past surgical history, and problem list.    Review of Systems  A comprehensive review of systems was negative except for: Ears, nose, mouth, throat, and face: positive for hearing loss and ear pain in the R ear.      Objective:      Auditory canal(s) of the right ear are completely obstructed with cerumen.     Cerumen was removed using gentle irrigation. Tympanic membranes are intact following the procedure.  Right auditory canal is inflamed.     Ear cerumen removal    Date/Time: 2/16/2024 9:00 AM    Performed by: Pineda Dalton MD  Authorized by: Pineda Dalton MD  Universal Protocol:  Procedure performed by: (Dr. Keila Irwin)  Consent: Verbal consent obtained. Written consent not obtained.  Consent given by: patient  Patient understanding: patient states understanding of the procedure being performed  Patient consent: the patient's understanding of the procedure matches consent given  Patient identity confirmed: verbally with patient    Patient location:  Clinic  Indications / Diagnosis:  Cerumen impaction of the R auditory canal  Procedure details:     Local anesthetic:  None    Location:  R ear    Procedure type: irrigation only      Approach:  Natural orifice    Visualization (free text):  Otoscope    Equipment used:  Rhino Ear Washer system  Post-procedure details:     Complication:  Macerated skin    Hearing quality:  Normal    Patient tolerance of procedure:  Tolerated well, no immediate complications        Assessment:      Cerumen  Impaction without otitis externa.      Plan:      1. Care instructions given.  2. Home treatment: Cortisporin.  3. Follow-up as needed.

## 2024-02-19 DIAGNOSIS — H61.21 IMPACTED CERUMEN OF RIGHT EAR: ICD-10-CM

## 2024-02-19 RX ORDER — NEOMYCIN SULFATE, POLYMYXIN B SULFATE AND HYDROCORTISONE 10; 3.5; 1 MG/ML; MG/ML; [USP'U]/ML
3 SUSPENSION/ DROPS AURICULAR (OTIC) 4 TIMES DAILY
Qty: 3 ML | Refills: 0 | Status: SHIPPED | OUTPATIENT
Start: 2024-02-19 | End: 2024-02-24

## 2024-02-21 ENCOUNTER — HOSPITAL ENCOUNTER (OUTPATIENT)
Facility: HOSPITAL | Age: 64
Setting detail: OUTPATIENT SURGERY
Discharge: HOME | End: 2024-02-21
Attending: INTERNAL MEDICINE | Admitting: INTERNAL MEDICINE
Payer: COMMERCIAL

## 2024-02-21 VITALS
TEMPERATURE: 97.1 F | HEIGHT: 70 IN | HEART RATE: 94 BPM | SYSTOLIC BLOOD PRESSURE: 102 MMHG | OXYGEN SATURATION: 100 % | DIASTOLIC BLOOD PRESSURE: 59 MMHG | WEIGHT: 185 LBS | RESPIRATION RATE: 18 BRPM | BODY MASS INDEX: 26.48 KG/M2

## 2024-02-21 DIAGNOSIS — R00.0 WIDE-COMPLEX TACHYCARDIA: ICD-10-CM

## 2024-02-21 PROCEDURE — C1764 EVENT RECORDER, CARDIAC: HCPCS | Performed by: INTERNAL MEDICINE

## 2024-02-21 PROCEDURE — 33285 INSJ SUBQ CAR RHYTHM MNTR: CPT | Performed by: INTERNAL MEDICINE

## 2024-02-21 PROCEDURE — NC001 PR NO CHARGE: Performed by: PHYSICIAN ASSISTANT

## 2024-02-21 DEVICE — MONITOR INSERTABLE CARDIAC ASSERT IQ EL+: Type: IMPLANTABLE DEVICE | Site: CHEST | Status: FUNCTIONAL

## 2024-02-21 RX ORDER — LIDOCAINE HYDROCHLORIDE 10 MG/ML
INJECTION, SOLUTION EPIDURAL; INFILTRATION; INTRACAUDAL; PERINEURAL CODE/TRAUMA/SEDATION MEDICATION
Status: DISCONTINUED | OUTPATIENT
Start: 2024-02-21 | End: 2024-02-21 | Stop reason: HOSPADM

## 2024-02-21 NOTE — H&P
H&P Exam - Cardiology   John Obleschuk 63 y.o. male MRN: [id number]  Unit/Bed#: [facility] RO[facility] Encounter: [id number]    Assessment/Plan     Assessment/Plan:  Per prior note by Dr. Siu:    Summary of my recommendation for the patient  The patient does have a history of VT at 130 bpm  He is feeling fatigued on metoprolol and dofetilide     Proceed with implantation of Assert  6-year device  Reduce metoprolol to 25 mg once daily  Change to metoprolol succinate as tartrate is  ideally used 3 times a day     Refill patient's potassium     Depending upon finding of monitor will decide on further therapy  At this time no symptomatic episodes, EF has recovered, no other episodes of  VT           Clinical conditions  Wide-complex tachycardia, 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 Marrufo  Hypertension  Diabetes mellitus type 2, hemoglobin A1c 7.4  Obesity/overweight-BMI down from 32.6-28.4  Carotid artery disease  Sleep apnea      History of Present Illness   HPI:  John Obleschuk is a 63 y.o. year old male with history as noted above. He presents today for loop recorder implantation, no significant changes over the recent past.       Review of Systems  ROS as noted above, otherwise 12 point review of systems was performed and is negative.       Historical Information   Past Medical History:   Diagnosis Date    Coronary artery disease     Diabetes mellitus (HCC)     Hyperlipidemia     Hypertension      Past Surgical History:   Procedure Laterality Date    ARTERIAL ANEURYSM REPAIR      Right Carotid Aneurysm - age 15.    CARDIAC CATHETERIZATION Left 08/02/2023    Procedure: Cardiac Left Heart Cath;  Surgeon: Luisito Inman DO;  [facility];  Service: Cardiology    CARDIAC CATHETERIZATION N/A 08/02/2023    Procedure: Cardiac Coronary Angiogram;  Surgeon: Luisito Inman DO;  [facility];  Service:  Cardiology    CARDIAC CATHETERIZATION  08/02/2023    Procedure: Cardiac catheterization;  Surgeon: Luisito Inman DO;  [facility];  Service: Cardiology    HERNIA REPAIR      [place] CORONARY ARTERY BYP W/VEIN & ARTERY GRAFT 4 VEIN N/A 8/11/2023    Procedure: CORONARY ARTERY BYPASS GRAFT (CABG) X-5 VESSELS ; SVG to PDA, Ramus, Diagonal and OM. LIMA --> LAD EVH  W/ JOHN;  Surgeon: Soham Hodgson MD;  Location[facility];  Service: Cardiac Surgery    VASECTOMY       Family History:   Family History   Problem Relation Age of Onset    Leukemia Mother     Heart attack Father 52       Social History   Social History     Substance and Sexual Activity   Alcohol Use No     Social History     Substance and Sexual Activity   Drug Use No     Social History     Tobacco Use   Smoking Status Never   Smokeless Tobacco Never         Meds/Allergies   all medications and allergies reviewed  Home Medications:   Medications Prior to Admission   Medication    aspirin 325 mg tablet    dofetilide (TIKOSYN) 500 mcg capsule    Jardiance 25 MG TABS    metFORMIN (GLUCOPHAGE-XR) 500 mg 24 hr tablet    metoprolol succinate (TOPROL-XL) 25 mg 24 hr tablet    acetaminophen (TYLENOL) 325 mg tablet    cholecalciferol (VITAMIN D3) 25 mcg (1,000 units) tablet    Continuous Blood Gluc Sensor (FreeStyle Ayanna 2 Sensor) MISC    docusate sodium (COLACE) 100 mg capsule    gabapentin (NEURONTIN) 100 mg capsule    LORazepam (ATIVAN) 0.5 mg tablet    Magnesium Gluconate 250 MG TABS    neomycin-polymyxin-hydrocortisone (CORTISPORIN) 0.35%-10,000 units/mL-1% otic suspension    Ozempic, 2 MG/DOSE, 8 MG/3ML injection pen    potassium chloride (K-DUR,KLOR-CON) 10 mEq tablet    rosuvastatin (CRESTOR) 20 MG tablet    sertraline (ZOLOFT) 50 mg tablet    tadalafil (CIALIS) 20 MG tablet       Allergies   Allergen Reactions    Atorvastatin Other (See Comments)     felt flu-like    Rosiglitazone Other (See Comments)    Trazodone Other (See Comments)  "    Felt \"hung over\"       Objective   Vitals: There were no vitals taken for this visit.      No intake or output data in the 24 hours ending 02/21/24 0756    Invasive Devices       None                   Physical Exam  GEN: NAD, alert and oriented x 3, well appearing  SKIN: dry without significant lesions or rashes  HEENT: NCAT, PERRL, EOMs intact  NECK: No JVD appreciated  CARDIOVASCULAR: RRR, normal S1, S2 without murmurs, rubs, or gallops appreciated  LUNGS: Clear to auscultation bilaterally without wheezes, rhonchi, or rales  ABDOMEN: Soft, nontender, nondistended  EXTREMITIES/VASCULAR: perfused without clubbing, cyanosis, or LE edema b/l  PSYCH: Normal mood and affect  NEURO: CN ll-Xll grossly intact      Lab Results: I have personally reviewed pertinent lab results.              Invalid input(s): \"LABGLOM\"              Imaging: I have personally reviewed pertinent reports.      ECHO: No results found for this or any previous visit.      Results for orders placed during the hospital encounter of 07/27/23    Echo complete w/ contrast if indicated    Interpretation Summary    Left Ventricle: Left ventricular cavity size is normal. Wall thickness is upper normal. The left ventricular ejection fraction is 60%. Systolic function is normal. Wall motion is normal. Diastolic function is mildly abnormal, consistent with grade I (abnormal) relaxation.    Mitral Valve: There is mild annular calcification. There is trace regurgitation.    Aorta: The aortic root is normal in size. The ascending aorta is mildly dilated. The ascending aorta is 3.9 cm.      Code Status: Prior    "

## 2024-02-21 NOTE — DISCHARGE INSTR - AVS FIRST PAGE
Keep loop recorder incision dry for one week. If site is glued, then you may shower (as the glue is waterproof). If not, please keep the area covered and dry while bathing.     Do not use lotions/powders/creams on incision. Remove outer bandage 48 hours after procedure. Any sutures present will be removed at your 2 week follow up appointment. Please call the office if you notice redness, swelling, bleeding, or drainage from incision or if you develop fevers - [phone].    Cardiac Loop Recorder Insertion      WHAT YOU SHOULD KNOW:    A cardiac loop recorder is a device used to diagnose heart rhythm problems, such as a fast or irregular heartbeat. It is implanted in your left chest, just under the skin. The device records a pattern of your heart's rhythm, called an EKG. Your device records automatic EKGs, depending on how your caregiver programs it. You may also receive a handheld controller. You press a button on the controller when you have symptoms, such as dizziness, lightheadedness, or palpitations. The device will record an EKG at that moment. The recording can help your caregiver see if your symptoms may be caused by heart rhythm problems. Your caregiver will remove the device after it has collected enough data. You may need the device for up to 3 years. The procedure to remove the device is similar to the procedure used to implant it.      AFTER YOU LEAVE:    Follow up with your cardiologist as directed: You will need to return in 1 to 2 weeks. Your cardiologist will check your incision. He may also program your device settings again. He will retrieve data from the device every 1 to 3 months with a monitor held over your skin. You may be able to transmit data from your device from home as well. You will do this by calling a number provided by your cardiologist, or as they have instructed you. Ask for information about this process. Write down your questions so you remember to ask them during your visits.       Wound care: Keep loop recorder incision dry for one week. Do not use lotions/powders/creams on incision. Remove outer bandage 48 hours after procedure - leave underlying steri-strips in place, they will either fall off on their own or will be removed at 2 week follow up appointment. Please call the office if you notice redness, swelling, bleeding, or drainage from incision or if you develop fevers. After that first week, carefully wash your incision with soap and water. Keep the area clean and dry until it heals.      Return to activity: If you received anesthesia, you will not be able to drive for 24 hours. Otherwise, most people can return to normal activities soon after the procedure. Your cardiologist may want to know if your work involves electrical current or high-voltage equipment. Ask about other electrical items that could interfere with your cardiac loop recorder.      Contact your cardiologist if:   You have a fever or chills.    Your wound is red, swollen, or draining pus.    You have questions or concerns about your condition or care.    Seek care immediately or call 911 if:   You feel weak, dizzy, or faint.    You lose consciousness.      © 2014 [employer]. Information is for End User's use only and may not be sold, redistributed or otherwise used for commercial purposes. All illustrations and images included in CareNotes® are the copyrighted property of [employer].D.A[employer]. or [employer].    The above information is an  only. It is not intended as medical advice for individual conditions or treatments. Talk to your doctor, nurse or pharmacist before following any medical regimen to see if it is safe and effective for you.

## 2024-02-22 ENCOUNTER — CLINICAL SUPPORT (OUTPATIENT)
Dept: CARDIOLOGY CLINIC | Facility: CLINIC | Age: 64
End: 2024-02-22

## 2024-02-22 ENCOUNTER — DOCUMENTATION (OUTPATIENT)
Dept: NON INVASIVE DIAGNOSTICS | Facility: HOSPITAL | Age: 64
End: 2024-02-22

## 2024-02-22 ENCOUNTER — TELEPHONE (OUTPATIENT)
Dept: CARDIOLOGY CLINIC | Facility: CLINIC | Age: 64
End: 2024-02-22

## 2024-02-22 DIAGNOSIS — Z95.818 STATUS POST PLACEMENT OF IMPLANTABLE LOOP RECORDER: Primary | ICD-10-CM

## 2024-02-22 NOTE — PROGRESS NOTES
Pt presents in the office today for a site check. He had a loop recorder implanted on 2/21/24. Pt has slight pain when pressure is applied on the site, itching, clotting and active bleeding. Site reviewed by Dr. Siu, pt was advised to report to the cath lab for cauterization.

## 2024-02-22 NOTE — TELEPHONE ENCOUNTER
Patient is post-2/21 loop implant. He woke up this morning with his bandage saturated with blood. Only minimal bleeding on the bandage yesterday. No fevers/chills. He is on aspirin 325mg.    Photo attached to this encounter.    Patient is coming in for a site check today at 10:30AM.

## 2024-02-22 NOTE — PROGRESS NOTES
EP Service    Patient directed to hospital from office due to bleeding at loop implant site.     Aquacell dressing was removed (which had been applied in the office) with minimal bleeding on bandage. Loop site C/D/I, no active bleeding, sutures in place.     Discussed with Dr. Siu. Site was cleaned and glue was applied. Telfa and Tegaderm bandage placed.     He will follow up with device clinic in 2 weeks for site check. If he has any recurrent bleeding he will call the office. Discharged from cath lab.

## 2024-03-04 DIAGNOSIS — R00.0 WIDE-COMPLEX TACHYCARDIA: ICD-10-CM

## 2024-03-04 RX ORDER — DOFETILIDE 0.5 MG/1
500 CAPSULE ORAL EVERY 12 HOURS SCHEDULED
Qty: 60 CAPSULE | Refills: 0 | Status: SHIPPED | OUTPATIENT
Start: 2024-03-04

## 2024-03-06 ENCOUNTER — IN-CLINIC DEVICE VISIT (OUTPATIENT)
Dept: CARDIOLOGY CLINIC | Facility: CLINIC | Age: 64
End: 2024-03-06
Payer: COMMERCIAL

## 2024-03-06 ENCOUNTER — OFFICE VISIT (OUTPATIENT)
Dept: INTERNAL MEDICINE CLINIC | Facility: CLINIC | Age: 64
End: 2024-03-06
Payer: COMMERCIAL

## 2024-03-06 VITALS
RESPIRATION RATE: 16 BRPM | DIASTOLIC BLOOD PRESSURE: 60 MMHG | SYSTOLIC BLOOD PRESSURE: 100 MMHG | BODY MASS INDEX: 27.35 KG/M2 | WEIGHT: 191 LBS | TEMPERATURE: 96.8 F | OXYGEN SATURATION: 98 % | HEART RATE: 88 BPM | HEIGHT: 70 IN

## 2024-03-06 DIAGNOSIS — Z11.59 ENCOUNTER FOR HCV SCREENING TEST FOR LOW RISK PATIENT: ICD-10-CM

## 2024-03-06 DIAGNOSIS — Z00.00 ENCOUNTER FOR ANNUAL PHYSICAL EXAM: Primary | ICD-10-CM

## 2024-03-06 DIAGNOSIS — Z11.4 SCREENING FOR HIV (HUMAN IMMUNODEFICIENCY VIRUS): ICD-10-CM

## 2024-03-06 DIAGNOSIS — Z12.5 SCREENING FOR PROSTATE CANCER: ICD-10-CM

## 2024-03-06 DIAGNOSIS — Z95.818 PRESENCE OF CARDIAC DEVICE: Primary | ICD-10-CM

## 2024-03-06 PROCEDURE — 99396 PREV VISIT EST AGE 40-64: CPT

## 2024-03-06 PROCEDURE — 93291 INTERROG DEV EVAL SCRMS IP: CPT | Performed by: INTERNAL MEDICINE

## 2024-03-06 NOTE — PROGRESS NOTES
Results for orders placed or performed in visit on 03/06/24   Cardiac EP device report    Narrative    VAUGHAN ASSERT 5500 ICM/ACTIVE SYSTEM IS MRI CONDITIONAL  DEVICE INTERROGATED IN THE [place] OFFICE. BATTERY VOLTAGE ADEQUATE. NO PATIENT OR DEVICE ACTIVATED EPISODES. NORMAL DEVICE FUNCTION. WOUND CHECK: INCISION CLEAN AND DRY WITH EDGES APPROXIMATED; SURTURES REMOVED; WOUND CARE AND RESTRICTIONS REVIEWED WITH PATIENT. -- RAMON

## 2024-03-06 NOTE — PROGRESS NOTES
INTERNAL MEDICINE HEALTH MAINTENANCE OFFICE VISIT  [facility] - [facility] INTERNAL MEDICINE [place]    NAME: John Obleschuk  AGE: 63 y.o. SEX: male  : 1960     DATE: 3/6/2024     Assessment and Plan:     1. Encounter for annual physical exam  Assessment & Plan:  Patient any acute complaints at this time.  On exam, normal findings  Patient currently declining colonoscopy and other colon cancer screening  Patient okay with HIV, hepatitis C screening  Patient is up-to-date with vaccinations    Plan:  Discussed Mediterranean diet, diabetic diet, DASH diet  Will screen for HIV and hepatitis C  Will add PSA for prostate cancer screening  Follow-up in 6 months      2. Screening for HIV (human immunodeficiency virus)  -     HIV 1/2 AG/AB w Reflex [facility] for 2 yr old and above; Future    3. Encounter for HCV screening test for low risk patient  -     Hepatitis C RNA, Quantitative PCR, [facility]; Future    4. Screening for prostate cancer  -     PSA, Total Screen; Future        Patient Counseling:  Nutrition: Stressed importance of moderation in sodium/caffeine intake, saturated fat, trans fat and cholesterol. Discussed portion control as well as increasing intake of fruits, vegetables, lean protein, and fish.   Exercise: Stressed the importance of regular exercise at least 150 mins/week  Sexuality: Discussed sexually transmitted diseases, partner selection, use of condoms, avoidance of unintended pregnancy  and contraceptive alternatives.  Injury prevention: Discussed safety belts, safety helmets, smoke detector, smoking near bedding or upholstery.   Dental health: Discussed importance of regular tooth brushing, flossing, and dental visits.  Immunizations reviewed.  Patient has had his pneumonia vaccine, flu vaccine, and COVID boosters  Discussed benefits of screening for hepatitis C and HIV.  Patient agrees.  Labs ordered..  Education was printed for patient    Discussed the patient's BMI with him.  The BMI  is above average; BMI management plan is completed      Depression Screening and Follow-up Plan: Clincally patient does not have depression. No treatment is required.         Return in about 6 months (around 9/6/2024) for Next scheduled follow up.     Chief Complaint:     Chief Complaint   Patient presents with    Physical Exam        History of Present Illness:     Well Adult Physical   Patient here for a comprehensive physical exam.The patient reports no problems    Diet and Physical Activity  Diet: well balanced diet  Weight concerns: Patient is overweight (BMI 25.0-29.9)  Exercise: occasionally      Depression Screen  Negative for depression with PHQ2 score of 0.     General Health  Hearing: Normal:  bilateral  Vision: no vision problems  Dental: regular dental visits    Reproductive Health      The following portions of the patient's history were reviewed and updated as appropriate: allergies, current medications, past family history, past medical history, past social history, past surgical history and problem list.     Review of Systems:     Review of Systems   Constitutional:  Negative for chills and fever.   HENT:  Negative for ear pain and sore throat.    Eyes:  Negative for pain and visual disturbance.   Respiratory:  Negative for cough and shortness of breath.    Cardiovascular:  Negative for chest pain and palpitations.   Gastrointestinal:  Negative for abdominal pain and vomiting.   Genitourinary:  Negative for dysuria and hematuria.   Musculoskeletal:  Negative for arthralgias and back pain.   Skin:  Negative for color change and rash.   Neurological:  Negative for seizures and syncope.   All other systems reviewed and are negative.       Past Medical History:     Past Medical History:   Diagnosis Date    Coronary artery disease     Diabetes mellitus (HCC)     Hyperlipidemia     Hypertension         Past Surgical History:     Past Surgical History:   Procedure Laterality Date    ARTERIAL ANEURYSM  REPAIR      Right Carotid Aneurysm - age 15.    CARDIAC CATHETERIZATION Left 08/02/2023    Procedure: Cardiac Left Heart Cath;  Surgeon: Luisito Inman DO;  Location: [facility];  Service: Cardiology    CARDIAC CATHETERIZATION N/A 08/02/2023    Procedure: Cardiac Coronary Angiogram;  Surgeon: Luisito Inman DO;  Location: [facility];  Service: Cardiology    CARDIAC CATHETERIZATION  08/02/2023    Procedure: Cardiac catheterization;  Surgeon: Luisito Inman DO;  Location: [facility];  Service: Cardiology    CARDIAC ELECTROPHYSIOLOGY PROCEDURE N/A 2/21/2024    Procedure: Cardiac loop recorder implant;  Surgeon: Luke Siu MD;  Location: [facility];  Service: Cardiology    HERNIA REPAIR      ND CORONARY ARTERY BYP W/VEIN & ARTERY GRAFT 4 VEIN N/A 8/11/2023    Procedure: CORONARY ARTERY BYPASS GRAFT (CABG) X-5 VESSELS ; SVG to PDA, Ramus, Diagonal and OM. LIMA --> LAD EVH  W/ JOHN;  Surgeon: Soham Hodgson MD;  Location: [facility];  Service: Cardiac Surgery    VASECTOMY          Social History:     Social History     Socioeconomic History    Marital status: /Civil Union     Spouse name: None    Number of children: None    Years of education: None    Highest education level: None   Occupational History    None   Tobacco Use    Smoking status: Never    Smokeless tobacco: Never   Vaping Use    Vaping status: Never Used   Substance and Sexual Activity    Alcohol use: No    Drug use: No    Sexual activity: Yes     Partners: Female   Other Topics Concern    None   Social History Narrative    None     Social Determinants of Health     Financial Resource Strain: Patient Declined (8/24/2023)    Received from [facility]    Overall Financial Resource Strain (CARDIA)     Difficulty of Paying Living Expenses: Patient declined   Food Insecurity: Patient Declined (8/24/2023)    Received from [facility]    Hunger Vital Sign     Worried  About Running Out of Food in the Last Year: Patient declined     Ran Out of Food in the Last Year: Patient declined   Transportation Needs: No Transportation Needs (8/24/2023)    Received from [facility]    PRAPARE - Transportation     Lack of Transportation (Medical): No     Lack of Transportation (Non-Medical): No   Physical Activity: Sufficiently Active (12/20/2023)    Exercise Vital Sign     Days of Exercise per Week: 3 days     Minutes of Exercise per Session: 60 min   Stress: Stress Concern Present (12/20/2023)    [employer] - Occupational Stress Questionnaire     Feeling of Stress : Rather much   Social Connections: Unknown (12/20/2023)    Social Connection and Isolation Panel [NHANES]     Frequency of Communication with Friends and Family: More than three times a week     Frequency of Social Gatherings with Friends and Family: More than three times a week     Attends Sabianism Services: Patient declined     Active Member of Clubs or Organizations: Patient declined     Attends Club or Organization Meetings: Patient declined     Marital Status:    Intimate Partner Violence: Not At Risk (12/20/2023)    Humiliation, Afraid, Rape, and Kick questionnaire     Fear of Current or Ex-Partner: No     Emotionally Abused: No     Physically Abused: No     Sexually Abused: No   Housing Stability: Unknown (8/24/2023)    Received from [facility]    Housing Stability Vital Sign     Unable to Pay for Housing in the Last Year: Patient refused     Number of Places Lived in the Last Year: Not on file     Unstable Housing in the Last Year: Patient refused        Family History:     Family History   Problem Relation Age of Onset    Leukemia Mother     Heart attack Father 52        Current Medications:     Outpatient Medications Prior to Visit   Medication Sig Dispense Refill    acetaminophen (TYLENOL) 325 mg tablet Take 1-2 tablets Q4-6 hours PRN pain. Do not  take more than 4grams in 24 hours.  0    aspirin 325 mg tablet Take 1 tablet (325 mg total) by mouth daily 30 tablet 2    cholecalciferol (VITAMIN D3) 25 mcg (1,000 units) tablet Take 1,000 Units by mouth 2 (two) times a day      Continuous Blood Gluc Sensor (FreeStyle Ayanna 2 Sensor) MISC Change sensor every 14 days 2 each 3    dofetilide (TIKOSYN) 500 mcg capsule Take 1 capsule (500 mcg total) by mouth every 12 (twelve) hours 60 capsule 0    gabapentin (NEURONTIN) 100 mg capsule Take 1 capsule in the AM and 3 capsule in the evening 120 capsule 2    Jardiance 25 MG TABS Take 1 tablet (25 mg total) by mouth in the morning 90 tablet 3    LORazepam (ATIVAN) 0.5 mg tablet Take 0.5 mg by mouth daily at bedtime      Magnesium Gluconate 250 MG TABS Take by mouth daily at bedtime      metFORMIN (GLUCOPHAGE-XR) 500 mg 24 hr tablet Take 2 tablets (1,000 mg total) by mouth 2 (two) times a day 360 tablet 3    metoprolol succinate (TOPROL-XL) 25 mg 24 hr tablet Take 1 tablet (25 mg total) by mouth daily 90 tablet 3    Ozempic, 2 MG/DOSE, 8 MG/3ML injection pen Inject 0.75 mL (2 mg total) under the skin every 7 days 9 mL 3    potassium chloride (K-DUR,KLOR-CON) 10 mEq tablet Take 1 tablet (10 mEq total) by mouth daily 90 tablet 3    rosuvastatin (CRESTOR) 20 MG tablet Take 2 tablets (40 mg total) by mouth daily 90 tablet 3    sertraline (ZOLOFT) 50 mg tablet TAKE ONE TABLET BY MOUTH EVERY DAY 30 tablet 5    tadalafil (CIALIS) 20 MG tablet Take 1 tablet (20 mg total) by mouth daily as needed for erectile dysfunction 10 tablet 11    docusate sodium (COLACE) 100 mg capsule Take 1 capsule (100 mg total) by mouth 2 (two) times a day Hold for soft stools. 60 capsule 0    neomycin-polymyxin-hydrocortisone (CORTISPORIN) 0.35%-10,000 units/mL-1% otic suspension Administer 3 drops to the right ear 4 (four) times a day for 5 days 3 mL 0     No facility-administered medications prior to visit.         Allergies:     Allergies   Allergen  "Reactions    Atorvastatin Other (See Comments)     felt flu-like    Rosiglitazone Other (See Comments)    Trazodone Other (See Comments)     Felt \"hung over\"        Objective:     Physical Exam:  /60 (BP Location: Right arm, Patient Position: Sitting, Cuff Size: Large)   Pulse 88   Temp (!) 96.8 °F (36 °C) (Tympanic)   Resp 16   Ht 5' 10\" (1.778 m)   Wt 86.6 kg (191 lb)   SpO2 98%   BMI 27.41 kg/m²     General Appearance:    Alert, cooperative, no distress, appears stated age   Head:    Normocephalic, without obvious abnormality, atraumatic   Eyes:    PERRL, conjunctiva/corneas clear, EOM's intact, fundi     benign, both eyes        Ears:    Normal TM's and external ear canals, both ears   Nose:   Nares normal, septum midline, mucosa normal, no drainage    or sinus tenderness   Throat:   Lips, mucosa, and tongue normal; teeth and gums normal   Neck:   Supple, symmetrical, trachea midline, no adenopathy;        thyroid:  No enlargement/tenderness/nodules; no carotid    bruit or JVD   Back:     Symmetric, no curvature, ROM normal, no CVA tenderness   Lungs:     Clear to auscultation bilaterally, respirations unlabored   Chest wall:    No tenderness or deformity   Heart:    Regular rate and rhythm, S1 and S2 normal, no murmur, rub   or gallop   Abdomen:     Soft, non-tender, bowel sounds active all four quadrants,     no masses, no organomegaly   Genitalia:    Normal male without lesion, discharge or tenderness   Rectal:    Normal tone, normal prostate, no masses or tenderness;    guaiac negative stool   Extremities:   Extremities normal, atraumatic, no cyanosis or edema   Pulses:   2+ and symmetric all extremities   Skin:   Skin color, texture, turgor normal, no rashes or lesions   Lymph nodes:   Cervical, supraclavicular, and axillary nodes normal   Neurologic:   CNII-XII intact. Normal strength, sensation and reflexes       throughout       Data:    Laboratory Results: I have personally reviewed the " pertinent laboratory results/reports   Radiology/Other Diagnostic Testing Results: I have personally reviewed pertinent reports.       Health Maintenance:     Health Maintenance   Topic Date Due    Hepatitis C Screening  Never done    HIV Screening  Never done    Annual Physical  Never done    Zoster Vaccine (1 of 2) Never done    Influenza Vaccine (1) 09/01/2023    COVID-19 Vaccine (6 - 2023-24 season) 12/01/2023    HEMOGLOBIN A1C  06/05/2024    Colorectal Cancer Screening  08/30/2024 (Originally 5/3/2005)    DM Eye Exam  02/09/2025 (Originally 3/29/2024)    Kidney Health Evaluation: Albumin/Creatinine Ratio  06/08/2024    Pneumococcal Vaccine: Pediatrics (0 to 5 Years) and At-Risk Patients (6 to 64 Years) (2 of 2 - PCV) 08/30/2024    Diabetic Foot Exam  08/30/2024    Kidney Health Evaluation: GFR  12/15/2024    Depression Screening  12/20/2024    DTaP,Tdap,and Td Vaccines (2 - Td or Tdap) 01/24/2026    HIB Vaccine  Aged Out    IPV Vaccine  Aged Out    Hepatitis A Vaccine  Aged Out    Meningococcal ACWY Vaccine  Aged Out    HPV Vaccine  Aged Out     Immunization History   Administered Date(s) Administered    COVID-19 PFIZER VACCINE 0.3 ML IM 03/05/2021, 03/29/2021, 10/12/2021    COVID-19 Pfizer mRNA vacc PF johnathon-sucrose 12 yr and older (Comirnaty) 10/06/2023    COVID-19 Pfizer vac (Johnathon-sucrose, gray cap) 12 yr+ IM 06/07/2022    INFLUENZA 10/05/2012, 09/27/2013, 01/09/2015, 11/30/2015, 10/11/2017, 11/20/2018, 09/23/2019, 10/14/2020, 11/16/2021, 12/06/2022    Pneumococcal Polysaccharide PPV23 10/05/2012, 08/30/2023    Tdap 01/24/2016     Nutrition Assessment and Intervention:     Reviewed food recall journal    Recommended completion of food recall journal    Ordered nutritional assessment labs      Physical Activity Assessment and Intervention:    Activity journal reviewed    Activity journal completion recommended      Emotional and Mental Well-being, Sleep, Connectedness Assessment and  Intervention:    Sleep/stress assessment performed    Depression and anxiety screening performed and reviewed    PHQ-9 or GAD7 performed for initial evaluation or follow-up      Tobacco and Toxic Substance Assessment and Intervention:     Tobacco use screening performed    Alcohol and drug use screening performed        Rudy Vázquez MD  [facility] INTERNAL MEDICINE [place]

## 2024-03-06 NOTE — ASSESSMENT & PLAN NOTE
Patient any acute complaints at this time.  On exam, normal findings  Patient currently declining colonoscopy and other colon cancer screening  Patient okay with HIV, hepatitis C screening  Patient is up-to-date with vaccinations    Plan:  Discussed Mediterranean diet, diabetic diet, DASH diet  Will screen for HIV and hepatitis C  Will add PSA for prostate cancer screening  Follow-up in 6 months

## 2024-03-27 DIAGNOSIS — I25.10 CORONARY ARTERY DISEASE INVOLVING NATIVE CORONARY ARTERY: ICD-10-CM

## 2024-03-27 RX ORDER — ROSUVASTATIN CALCIUM 20 MG/1
40 TABLET, COATED ORAL DAILY
Qty: 90 TABLET | Refills: 3 | Status: SHIPPED | OUTPATIENT
Start: 2024-03-27

## 2024-03-28 DIAGNOSIS — R00.0 WIDE-COMPLEX TACHYCARDIA: ICD-10-CM

## 2024-03-28 RX ORDER — DOFETILIDE 0.5 MG/1
500 CAPSULE ORAL EVERY 12 HOURS SCHEDULED
Qty: 60 CAPSULE | Refills: 0 | Status: SHIPPED | OUTPATIENT
Start: 2024-03-28

## 2024-03-30 DIAGNOSIS — E11.42 DIABETIC POLYNEUROPATHY ASSOCIATED WITH TYPE 2 DIABETES MELLITUS (HCC): ICD-10-CM

## 2024-04-01 RX ORDER — GABAPENTIN 100 MG/1
CAPSULE ORAL
Qty: 120 CAPSULE | Refills: 2 | Status: SHIPPED | OUTPATIENT
Start: 2024-04-01

## 2024-04-01 NOTE — PROGRESS NOTES
Outpatient Cardiology Note - John Obleschuk 63 y.o. male MRN: [id number]    @ Encounter: [id number]        Patient Active Problem List    Diagnosis Date Noted    Encounter for annual physical exam 03/06/2024    Wide-complex tachycardia 12/05/2023    Diabetic polyneuropathy associated with type 2 diabetes mellitus (HCC) 08/30/2023    S/P CABG x 5 08/11/2023    Coronary artery disease involving native coronary artery 08/02/2023    Sleep apnea     Type 2 diabetes mellitus with hyperglycemia, without long-term current use of insulin (HCC) 03/29/2018    Right knee pain 09/23/2014    Myalgia and myositis 06/27/2014    Vitamin D deficiency 06/27/2014    Depressive disorder 05/14/2012    Essential hypertension 05/11/2012    Mixed hyperlipidemia 12/12/2011       Assessment:  # Re-entry Ventricular tachycardia  Asymptomatic- as slower rate,   Rhythm: Tikosyn 500 mg BID    EKG QTc: 480 msec    # CAD s/p CABG x 5 on 8/11/23- Dr Rema HAINES to LAD, SVG to D1, SVG Y to RI and OM1 and SVG to right PDA  Rx: metoprolol 25 mg BID, Crestor 40 mg daily, asa 325 mg daily, Jardiance    Studies- personally reviewed by me  EKG:  bpm, no ischemic changes    CMR 12/8/23:  LVEF: 55%   Subendocardial scarring of the basal to mid inferior wall suggestive of an ischemic etiology.  Intramyocardial fibrosis within the mid inferolateral wall suggestive of myocarditis, but cannot exclude cardiac sarcoidosis     Echo 12/6/23:  LVEF: 60%  RV: normal    LHC 8/2/23:   Left Anterior Descending   Prox LAD lesion is 70% stenosed.   Mid LAD lesion is 80% stenosed.   Dist LAD lesion is 90% stenosed.      First Diagonal Branch   1st Diag-1 lesion is 90% stenosed.   1st Diag-2 lesion is 80% stenosed.      Ramus Intermedius   Ramus lesion is 80% stenosed.      Left Circumflex   Mid Cx to Dist Cx lesion is 90% stenosed.      Right Coronary Artery      Right Posterior Descending Artery   Collaterals   RPDA filled by collaterals from Dist LAD.          NM  stress 7/31/23: 7 min 17 sec, 7 METs, ischemic EKG changes. Ischemic perfusion defects in inferior segments    Echo 7/27/23:  LVEF: 60%  RV: normal  PASP: normal    Stress echo 4/11/18: 9 min 30 sec, 10.7 METs, negative for ischemia    # HTN- metoprolol 25 mg BID  # DM2, last A1C 7.4%- glipizide, kombiglyze XR 2.5- 1000 mg, ozempic  # Wide complex tachycardia  # Assert 6 year device loop recorder implanted 2/21/24  Interrogation 2/26/24: ST  # Obesity, BMI 32.6%- down to 27  # hyperlipidemia- rosuvastatin 40 mg daily  6/8 /23: , HDL 42  2/21/18: , HDL 43, Tri 132  # carotid artery dz  VAS 8/8/23: known occlusion on right, < 50% on left  # Mild sleep apnea. Stops breathing at night  # depression    Today's Plan:  Continue cardiac rehab  Continue metoprolol 25 mg daily- getting fatigue from it- take it at night  Assert 6 year device  Per EP: Depending upon finding of monitor will decide on further therapy  At this time no symptomatic episodes, EF has recovered, no other episodes of  VT    HPI:     62 yo male with hx DM2, last A1C over 9, obesity, hyperlipidemia, HTN, right carotid aneurysm who presents to establish CV care. Occasionally getting a little chest discomfort, intermittent. Walks about a mile or two every day. Does not seem exertional. Wife states he stops breathing at night.     Given concerning anginal symptoms had stress test which was positive and sent for [facility] leading to recommendation of CABG.      Pt at cardiac rehab and looked to have re-entry VT, aymptomatic as slow rate. Seen by EP in hospital. Started on Tikosyn    Interim:  Assert 6 year device recorder implanted 2/21/24  2 interrogations, both negative  Past Medical History:   Diagnosis Date    Coronary artery disease     Diabetes mellitus (HCC)     Hyperlipidemia     Hypertension        Review of Systems   Constitutional:  Negative for activity change, appetite change, fatigue and unexpected weight change.   HENT:  Negative for  "congestion and nosebleeds.    Eyes: Negative.    Respiratory:  Negative for cough, chest tightness and shortness of breath.    Cardiovascular:  Negative for chest pain, palpitations and leg swelling.   Gastrointestinal:  Negative for abdominal distention.   Endocrine: Negative.    Genitourinary: Negative.    Musculoskeletal: Negative.    Skin: Negative.    Neurological:  Negative for dizziness, syncope and weakness.   Hematological: Negative.    Psychiatric/Behavioral: Negative.           Allergies   Allergen Reactions    Atorvastatin Other (See Comments)     felt flu-like    Rosiglitazone Other (See Comments)    Trazodone Other (See Comments)     Felt \"hung over\"     .    Current Outpatient Medications:     acetaminophen (TYLENOL) 325 mg tablet, Take 1-2 tablets Q4-6 hours PRN pain. Do not take more than 4grams in 24 hours., Disp: , Rfl: 0    aspirin 325 mg tablet, Take 1 tablet (325 mg total) by mouth daily, Disp: 30 tablet, Rfl: 2    cholecalciferol (VITAMIN D3) 25 mcg (1,000 units) tablet, Take 1,000 Units by mouth 2 (two) times a day, Disp: , Rfl:     Continuous Blood Gluc Sensor (FreeStyle Ayanna 2 Sensor) MISC, Change sensor every 14 days, Disp: 2 each, Rfl: 3    dofetilide (TIKOSYN) 500 mcg capsule, Take 1 capsule (500 mcg total) by mouth every 12 (twelve) hours, Disp: 60 capsule, Rfl: 0    gabapentin (NEURONTIN) 100 mg capsule, TAKE 1 CAPSULE BY MOUTH IN THE MORNING AND 3 CAPSULES IN THE EVENING, Disp: 120 capsule, Rfl: 2    Jardiance 25 MG TABS, Take 1 tablet (25 mg total) by mouth in the morning, Disp: 90 tablet, Rfl: 3    LORazepam (ATIVAN) 0.5 mg tablet, Take 0.5 mg by mouth daily at bedtime, Disp: , Rfl:     Magnesium Gluconate 250 MG TABS, Take by mouth daily at bedtime, Disp: , Rfl:     metFORMIN (GLUCOPHAGE-XR) 500 mg 24 hr tablet, Take 2 tablets (1,000 mg total) by mouth 2 (two) times a day, Disp: 360 tablet, Rfl: 3    metoprolol succinate (TOPROL-XL) 25 mg 24 hr tablet, Take 1 tablet (25 mg total) " by mouth daily, Disp: 90 tablet, Rfl: 3    Ozempic, 2 MG/DOSE, 8 MG/3ML injection pen, Inject 0.75 mL (2 mg total) under the skin every 7 days, Disp: 9 mL, Rfl: 3    potassium chloride (K-DUR,KLOR-CON) 10 mEq tablet, Take 1 tablet (10 mEq total) by mouth daily, Disp: 90 tablet, Rfl: 3    rosuvastatin (CRESTOR) 20 MG tablet, TAKE TWO TABLETS BY MOUTH EVERY DAY, Disp: 90 tablet, Rfl: 3    sertraline (ZOLOFT) 50 mg tablet, TAKE ONE TABLET BY MOUTH EVERY DAY, Disp: 30 tablet, Rfl: 5    tadalafil (CIALIS) 20 MG tablet, Take 1 tablet (20 mg total) by mouth daily as needed for erectile dysfunction, Disp: 10 tablet, Rfl: 11    docusate sodium (COLACE) 100 mg capsule, Take 1 capsule (100 mg total) by mouth 2 (two) times a day Hold for soft stools., Disp: 60 capsule, Rfl: 0    neomycin-polymyxin-hydrocortisone (CORTISPORIN) 0.35%-10,000 units/mL-1% otic suspension, Administer 3 drops to the right ear 4 (four) times a day for 5 days, Disp: 3 mL, Rfl: 0    Social History     Socioeconomic History    Marital status: /Civil Union     Spouse name: Not on file    Number of children: Not on file    Years of education: Not on file    Highest education level: Not on file   Occupational History    Not on file   Tobacco Use    Smoking status: Never    Smokeless tobacco: Never   Vaping Use    Vaping status: Never Used   Substance and Sexual Activity    Alcohol use: No    Drug use: No    Sexual activity: Yes     Partners: Female   Other Topics Concern    Not on file   Social History Narrative    Not on file     Social Determinants of Health     Financial Resource Strain: Patient Declined (8/24/2023)    Received from [facility]    Overall Financial Resource Strain (CARDIA)     Difficulty of Paying Living Expenses: Patient declined   Food Insecurity: Patient Declined (8/24/2023)    Received from [facility]    Hunger Vital Sign     Worried About Running Out of Food in the Last Year: Patient declined  "    Ran Out of Food in the Last Year: Patient declined   Transportation Needs: No Transportation Needs (8/24/2023)    Received from [facility]    PRAPARE - Transportation     Lack of Transportation (Medical): No     Lack of Transportation (Non-Medical): No   Physical Activity: Sufficiently Active (12/20/2023)    Exercise Vital Sign     Days of Exercise per Week: 3 days     Minutes of Exercise per Session: 60 min   Stress: Stress Concern Present (12/20/2023)    [place] [employer] - Occupational Stress Questionnaire     Feeling of Stress : Rather much   Social Connections: Unknown (12/20/2023)    Social Connection and Isolation Panel [NHANES]     Frequency of Communication with Friends and Family: More than three times a week     Frequency of Social Gatherings with Friends and Family: More than three times a week     Attends Latter day Services: Patient declined     Active Member of Clubs or Organizations: Patient declined     Attends Club or Organization Meetings: Patient declined     Marital Status:    Intimate Partner Violence: Not At Risk (12/20/2023)    Humiliation, Afraid, Rape, and Kick questionnaire     Fear of Current or Ex-Partner: No     Emotionally Abused: No     Physically Abused: No     Sexually Abused: No   Housing Stability: Unknown (8/24/2023)    Received from [facility]    Housing Stability Vital Sign     Unable to Pay for Housing in the Last Year: Patient refused     Number of Places Lived in the Last Year: Not on file     Unstable Housing in the Last Year: Patient refused       Family History   Problem Relation Age of Onset    Leukemia Mother     Heart attack Father 52       Physical Exam:    Vitals: Blood pressure 110/62, pulse 99, height 5' 10\" (1.778 m), weight 85.7 kg (189 lb), SpO2 97%., Body mass index is 27.12 kg/m².,   Wt Readings from Last 3 Encounters:   04/04/24 85.7 kg (189 lb)   03/06/24 86.6 kg (191 lb)   02/21/24 83.9 kg " "(185 lb)       Physical Exam:  Vitals:    04/04/24 0812   BP: 110/62   BP Location: Right arm   Patient Position: Sitting   Cuff Size: Standard   Pulse: 99   SpO2: 97%   Weight: 85.7 kg (189 lb)   Height: 5' 10\" (1.778 m)         GEN: John Obleschuk appears well, alert and oriented x 3, pleasant and cooperative   HEENT: pupils equal, round, and reactive to light; extraocular muscles intact  NECK: supple, no carotid bruits   HEART: regular rhythm, normal S1 and S2, no murmurs, clicks, gallops or rubs, JVP is    LUNGS: clear to auscultation bilaterally; no wheezes, rales, or rhonchi   ABDOMEN: normal bowel sounds, soft, no tenderness, no distention  EXTREMITIES: peripheral pulses normal; no clubbing, cyanosis, or edema  NEURO: no focal findings   SKIN: normal without suspicious lesions on exposed skin    Labs & Results:    Lab Results   Component Value Date    WBC 8.10 12/07/2023    HGB 13.0 12/07/2023    HCT 41.8 12/07/2023    MCV 87 12/07/2023     12/07/2023     Lab Results   Component Value Date    GLUCOSE 184 (H) 08/11/2023    CALCIUM 9.1 12/15/2023    K 4.5 12/15/2023    CO2 26 12/15/2023     12/15/2023    BUN 23 12/15/2023    CREATININE 1.02 12/15/2023     Lab Results   Component Value Date    BNP 17 07/24/2023      No results found for: \"CHOL\"  Lab Results   Component Value Date    HDL 39 (L) 12/06/2023     Lab Results   Component Value Date    LDLCALC 31 12/06/2023     Lab Results   Component Value Date    TRIG 99 12/06/2023     No results found for: \"CHOLHDL\"      EKG personally reviewed by Bright Chow.     Counseling / Coordination of Care  Total floor / unit time spent today 25 minutes.  Greater than 50% of total time was spent with the patient and / or family counseling and / or coordination of care.  A description of the counseling / coordination of care: 15 min.      Thank you for the opportunity to participate in the care of this patient.    Bright Chow D.O.  Director of Advanced Heart " Failure Program  Medical Director of LVAD Program  [facility]

## 2024-04-04 ENCOUNTER — OFFICE VISIT (OUTPATIENT)
Dept: CARDIOLOGY CLINIC | Facility: CLINIC | Age: 64
End: 2024-04-04
Payer: COMMERCIAL

## 2024-04-04 VITALS
DIASTOLIC BLOOD PRESSURE: 62 MMHG | HEART RATE: 99 BPM | OXYGEN SATURATION: 97 % | BODY MASS INDEX: 27.06 KG/M2 | WEIGHT: 189 LBS | HEIGHT: 70 IN | SYSTOLIC BLOOD PRESSURE: 110 MMHG

## 2024-04-04 DIAGNOSIS — I10 ESSENTIAL HYPERTENSION: ICD-10-CM

## 2024-04-04 DIAGNOSIS — I25.10 CORONARY ARTERY DISEASE INVOLVING NATIVE CORONARY ARTERY OF NATIVE HEART WITHOUT ANGINA PECTORIS: ICD-10-CM

## 2024-04-04 DIAGNOSIS — Z95.1 S/P CABG X 5: Primary | ICD-10-CM

## 2024-04-04 DIAGNOSIS — R00.0 WIDE-COMPLEX TACHYCARDIA: ICD-10-CM

## 2024-04-04 PROCEDURE — 99214 OFFICE O/P EST MOD 30 MIN: CPT | Performed by: INTERNAL MEDICINE

## 2024-04-04 RX ORDER — DOFETILIDE 0.5 MG/1
500 CAPSULE ORAL EVERY 12 HOURS SCHEDULED
Start: 2024-04-04

## 2024-05-10 DIAGNOSIS — R00.0 WIDE-COMPLEX TACHYCARDIA: ICD-10-CM

## 2024-05-10 NOTE — TELEPHONE ENCOUNTER
Refill must be reviewed and completed by the office or provider. The refill is unable to be approved or denied by the medication management team.    Possible duplicate.

## 2024-05-13 RX ORDER — DOFETILIDE 0.5 MG/1
500 CAPSULE ORAL EVERY 12 HOURS SCHEDULED
Qty: 180 CAPSULE | Refills: 3 | Status: SHIPPED | OUTPATIENT
Start: 2024-05-13

## 2024-05-13 NOTE — TELEPHONE ENCOUNTER
Requested medication(s) are due for refill today: Yes  Patient has already received a courtesy refill: No  Other reason request has been forwarded to provider: Unable to authorize per protocol.    Pt is now out of medication.

## 2024-05-16 DIAGNOSIS — E11.65 TYPE 2 DIABETES MELLITUS WITH HYPERGLYCEMIA, WITHOUT LONG-TERM CURRENT USE OF INSULIN (HCC): ICD-10-CM

## 2024-05-17 RX ORDER — LORAZEPAM 0.5 MG/1
0.5 TABLET ORAL
Refills: 0 | OUTPATIENT
Start: 2024-05-17

## 2024-05-18 ENCOUNTER — APPOINTMENT (OUTPATIENT)
Dept: LAB | Facility: HOSPITAL | Age: 64
End: 2024-05-18
Payer: COMMERCIAL

## 2024-05-18 DIAGNOSIS — I10 ESSENTIAL HYPERTENSION: ICD-10-CM

## 2024-05-18 DIAGNOSIS — E78.2 MIXED HYPERLIPIDEMIA: ICD-10-CM

## 2024-05-18 DIAGNOSIS — E11.65 TYPE 2 DIABETES MELLITUS WITH HYPERGLYCEMIA, WITHOUT LONG-TERM CURRENT USE OF INSULIN (HCC): ICD-10-CM

## 2024-05-18 DIAGNOSIS — Z11.4 SCREENING FOR HIV (HUMAN IMMUNODEFICIENCY VIRUS): ICD-10-CM

## 2024-05-18 DIAGNOSIS — E11.42 DIABETIC POLYNEUROPATHY ASSOCIATED WITH TYPE 2 DIABETES MELLITUS (HCC): ICD-10-CM

## 2024-05-18 DIAGNOSIS — Z11.59 ENCOUNTER FOR HCV SCREENING TEST FOR LOW RISK PATIENT: ICD-10-CM

## 2024-05-18 DIAGNOSIS — Z12.5 SCREENING FOR PROSTATE CANCER: ICD-10-CM

## 2024-05-18 LAB
ALBUMIN SERPL BCP-MCNC: 4.5 G/DL (ref 3.5–5)
ALP SERPL-CCNC: 65 U/L (ref 34–104)
ALT SERPL W P-5'-P-CCNC: 46 U/L (ref 7–52)
ANION GAP SERPL CALCULATED.3IONS-SCNC: 7 MMOL/L (ref 4–13)
AST SERPL W P-5'-P-CCNC: 32 U/L (ref 13–39)
BASOPHILS # BLD AUTO: 0.09 THOUSANDS/ÂΜL (ref 0–0.1)
BASOPHILS NFR BLD AUTO: 1 % (ref 0–1)
BILIRUB SERPL-MCNC: 1.22 MG/DL (ref 0.2–1)
BUN SERPL-MCNC: 19 MG/DL (ref 5–25)
CALCIUM SERPL-MCNC: 9.5 MG/DL (ref 8.4–10.2)
CHLORIDE SERPL-SCNC: 103 MMOL/L (ref 96–108)
CO2 SERPL-SCNC: 29 MMOL/L (ref 21–32)
CREAT SERPL-MCNC: 0.89 MG/DL (ref 0.6–1.3)
CREAT UR-MCNC: 52.4 MG/DL
EOSINOPHIL # BLD AUTO: 0.74 THOUSAND/ÂΜL (ref 0–0.61)
EOSINOPHIL NFR BLD AUTO: 9 % (ref 0–6)
ERYTHROCYTE [DISTWIDTH] IN BLOOD BY AUTOMATED COUNT: 13.1 % (ref 11.6–15.1)
EST. AVERAGE GLUCOSE BLD GHB EST-MCNC: 163 MG/DL
GFR SERPL CREATININE-BSD FRML MDRD: 90 ML/MIN/1.73SQ M
GLUCOSE P FAST SERPL-MCNC: 142 MG/DL (ref 65–99)
HBA1C MFR BLD: 7.3 %
HCT VFR BLD AUTO: 44.3 % (ref 36.5–49.3)
HGB BLD-MCNC: 14.4 G/DL (ref 12–17)
IMM GRANULOCYTES # BLD AUTO: 0.04 THOUSAND/UL (ref 0–0.2)
IMM GRANULOCYTES NFR BLD AUTO: 1 % (ref 0–2)
LYMPHOCYTES # BLD AUTO: 2.17 THOUSANDS/ÂΜL (ref 0.6–4.47)
LYMPHOCYTES NFR BLD AUTO: 26 % (ref 14–44)
MCH RBC QN AUTO: 29 PG (ref 26.8–34.3)
MCHC RBC AUTO-ENTMCNC: 32.5 G/DL (ref 31.4–37.4)
MCV RBC AUTO: 89 FL (ref 82–98)
MICROALBUMIN UR-MCNC: 11.9 MG/L
MICROALBUMIN/CREAT 24H UR: 23 MG/G CREATININE (ref 0–30)
MONOCYTES # BLD AUTO: 0.66 THOUSAND/ÂΜL (ref 0.17–1.22)
MONOCYTES NFR BLD AUTO: 8 % (ref 4–12)
NEUTROPHILS # BLD AUTO: 4.82 THOUSANDS/ÂΜL (ref 1.85–7.62)
NEUTS SEG NFR BLD AUTO: 55 % (ref 43–75)
NRBC BLD AUTO-RTO: 0 /100 WBCS
PLATELET # BLD AUTO: 189 THOUSANDS/UL (ref 149–390)
PMV BLD AUTO: 10.3 FL (ref 8.9–12.7)
POTASSIUM SERPL-SCNC: 4.8 MMOL/L (ref 3.5–5.3)
PROT SERPL-MCNC: 7.7 G/DL (ref 6.4–8.4)
PSA SERPL-MCNC: 1.23 NG/ML (ref 0–4)
RBC # BLD AUTO: 4.96 MILLION/UL (ref 3.88–5.62)
SODIUM SERPL-SCNC: 139 MMOL/L (ref 135–147)
TSH SERPL DL<=0.05 MIU/L-ACNC: 1.17 UIU/ML (ref 0.45–4.5)
WBC # BLD AUTO: 8.52 THOUSAND/UL (ref 4.31–10.16)

## 2024-05-18 PROCEDURE — 36415 COLL VENOUS BLD VENIPUNCTURE: CPT

## 2024-05-18 PROCEDURE — G0103 PSA SCREENING: HCPCS

## 2024-05-18 PROCEDURE — 87521 HEPATITIS C PROBE&RVRS TRNSC: CPT

## 2024-05-18 PROCEDURE — 85025 COMPLETE CBC W/AUTO DIFF WBC: CPT

## 2024-05-18 PROCEDURE — 83036 HEMOGLOBIN GLYCOSYLATED A1C: CPT

## 2024-05-18 PROCEDURE — 87389 HIV-1 AG W/HIV-1&-2 AB AG IA: CPT

## 2024-05-18 PROCEDURE — 84443 ASSAY THYROID STIM HORMONE: CPT

## 2024-05-18 PROCEDURE — 87522 HEPATITIS C REVRS TRNSCRPJ: CPT

## 2024-05-18 PROCEDURE — 80053 COMPREHEN METABOLIC PANEL: CPT

## 2024-05-18 PROCEDURE — 82043 UR ALBUMIN QUANTITATIVE: CPT

## 2024-05-18 PROCEDURE — 82570 ASSAY OF URINE CREATININE: CPT

## 2024-05-19 LAB
HIV 1+2 AB+HIV1 P24 AG SERPL QL IA: NORMAL
HIV 2 AB SERPL QL IA: NORMAL
HIV1 AB SERPL QL IA: NORMAL
HIV1 P24 AG SERPL QL IA: NORMAL

## 2024-05-20 DIAGNOSIS — F41.9 ANXIETY: Primary | ICD-10-CM

## 2024-05-20 LAB — HCV RNA SERPL NAA+PROBE-ACNC: NOT DETECTED K[IU]/ML

## 2024-05-20 RX ORDER — LORAZEPAM 0.5 MG/1
0.5 TABLET ORAL
Qty: 30 TABLET | Refills: 3 | Status: SHIPPED | OUTPATIENT
Start: 2024-05-20

## 2024-05-20 NOTE — TELEPHONE ENCOUNTER
Patient calling in to set up appt. Not appts with Dr. Jasso until July and patient states he needs sooner then that. Set up with Dr. Irwin on June 3rd. Patient verbalized understanding.

## 2024-05-23 NOTE — PROGRESS NOTES
Patient called requesting refill for lorazepam. Patient made aware medication was refilled on 05/20/24 for 30 with 3 refills to [facility] pharmacy. Patient instructed to contact the pharmacy to obtain refills of medication. Patient verbalized understanding.

## 2024-05-30 ENCOUNTER — DOCUMENTATION (OUTPATIENT)
Dept: ENDOCRINOLOGY | Facility: HOSPITAL | Age: 64
End: 2024-05-30

## 2024-05-30 NOTE — PROGRESS NOTES
JOHN OBLESCHUK (Key: [id number])  Rx #: [id number]  Ozempic (2 MG/DOSE) 8MG/3ML pen-injectors  Form  [facility] Electronic PA Form (2017 NCPDP)  Created  5 days ago  Sent to Plan  2 minutes ago  Plan Response  1 minute ago  Submit Clinical Questions  less than a minute ago  Determination  Wait for Determination  [facility] to return a determination.

## 2024-05-31 ENCOUNTER — OFFICE VISIT (OUTPATIENT)
Dept: ENDOCRINOLOGY | Facility: HOSPITAL | Age: 64
End: 2024-05-31
Payer: COMMERCIAL

## 2024-05-31 VITALS
OXYGEN SATURATION: 97 % | BODY MASS INDEX: 27.77 KG/M2 | HEART RATE: 96 BPM | HEIGHT: 70 IN | WEIGHT: 194 LBS | DIASTOLIC BLOOD PRESSURE: 62 MMHG | SYSTOLIC BLOOD PRESSURE: 112 MMHG

## 2024-05-31 DIAGNOSIS — E11.65 TYPE 2 DIABETES MELLITUS WITH HYPERGLYCEMIA, WITHOUT LONG-TERM CURRENT USE OF INSULIN (HCC): Primary | ICD-10-CM

## 2024-05-31 DIAGNOSIS — E11.42 DIABETIC POLYNEUROPATHY ASSOCIATED WITH TYPE 2 DIABETES MELLITUS (HCC): ICD-10-CM

## 2024-05-31 DIAGNOSIS — E78.2 MIXED HYPERLIPIDEMIA: ICD-10-CM

## 2024-05-31 DIAGNOSIS — I10 ESSENTIAL HYPERTENSION: ICD-10-CM

## 2024-05-31 PROCEDURE — 99214 OFFICE O/P EST MOD 30 MIN: CPT | Performed by: INTERNAL MEDICINE

## 2024-05-31 PROCEDURE — 95251 CONT GLUC MNTR ANALYSIS I&R: CPT | Performed by: INTERNAL MEDICINE

## 2024-05-31 NOTE — PROGRESS NOTES
5/31/2024    Assessment & Plan      Diagnoses and all orders for this visit:    Type 2 diabetes mellitus with hyperglycemia, without long-term current use of insulin (HCC)  -     Comprehensive metabolic panel; Future  -     Hemoglobin A1C; Future  -     Lipid Panel with Direct LDL reflex; Future    Diabetic polyneuropathy associated with type 2 diabetes mellitus (HCC)  -     Comprehensive metabolic panel; Future  -     Hemoglobin A1C; Future  -     Lipid Panel with Direct LDL reflex; Future    Essential hypertension  -     Comprehensive metabolic panel; Future  -     Hemoglobin A1C; Future  -     Lipid Panel with Direct LDL reflex; Future    Mixed hyperlipidemia  -     Comprehensive metabolic panel; Future  -     Hemoglobin A1C; Future  -     Lipid Panel with Direct LDL reflex; Future          Assessment & Plan  1. Type 2 diabetes.  The patient's most recent hemoglobin A1c level is 7.3 percent, which is slightly higher than the last visit, but still demonstrates good control. The FreeStyle Ayanna download indicates spikes in blood sugar levels post-breakfast almost daily, and the patient's diet primarily consists of carbohydrates with breakfast. The patient has been advised to reduce carbohydrate intake at breakfast and incorporate protein into his diet to mitigate the spikes. The current regimen of Ozempic, Jardiance, and metformin will be maintained. The patient will also continue with the FreeStyle Ayanna continuous glucose monitoring system.    2. Diabetic neuropathy.  The patient's neuropathic symptoms are stable. The diabetic foot exams are current.    3. Hypertension.  The patient is normotensive in the office. The patient will continue with the same metoprolol regimen.    4. Hyperlipidemia.  The patient will continue with the daily rosuvastatin 40 mg regimen. A lipid profile will be repeated during the next visit.    Follow-up  The patient is scheduled for a follow-up visit in 3 months, with pre-visit  hemoglobin A1c, CMP, and lipid panel to be conducted prior to the visit.        CC: Diabetes type II, blood pressure, lipid follow-up    History of Present Illness    HPI: John Obleschuk is a 64-year-old male with type 2 diabetes with diabetic neuropathy and retinopathy, hypertension, hyperlipidemia for follow-up visit.     Diabetes complications include neuropathy, retinopathy, claudication and heart disease post CABG. He denies nephropathy, stroke, or heart attack.    The patient is currently on a regimen of Jardiance 25 mg daily, metformin  mg 2 tablets twice daily, and Ozempic 2 mg once weekly. He reports frequent urination and nocturia. He experiences occasional polydipsia and polyphagia, but his appetite is gradually returning to a normal state. His diet primarily consists of half meals and occasional grazing throughout the day.     He denies experiencing blurred vision. His last ophthalmological examination was conducted in 03/2023.     He reports persistent numbness and tingling in his feet, which has shown slight improvement. He has purchased sneakers with socks, which he finds beneficial. He has a healing wound on his toe, which has a scab and hard skin. He has not consulted with a podiatrist. His last foot examination was conducted in 08/2023. He has a history of ingrown toenails dating back to his childhood. His toenail on his right foot has begun to peel off due to it getting caught in his socks.     He has hypertension and takes metoprolol 25 mg daily.  The patient's cardiologist has adjusted his metoprolol dosage from morning to evening, which he reports is less effective during the day. He denies experiencing chest pain or shortness of breath.    The patient is currently on rosuvastatin 40 mg daily for cholesterol management.    Blood Sugar/Glucometer/Pump/CGM review: He utilizes a freestyle lambert 2 continuous glucose monitoring system to test his blood sugars throughout the day.  Freestyle  lambert 2 download from 5/18/2024 through 5/31/2024 was reviewed in the office today.  CGM was active 77% of the time.  Average glucose is 151 mg/dL with a glucose variability of 21.6%.  86% of blood sugars are in target range, 12% high, 2% very high, 0% low, and 0% very low.      Historical Information   Past Medical History:   Diagnosis Date    Coronary artery disease     Diabetes mellitus (HCC)     Hyperlipidemia     Hypertension      Past Surgical History:   Procedure Laterality Date    ARTERIAL ANEURYSM REPAIR      Right Carotid Aneurysm - age 15.    CARDIAC CATHETERIZATION Left 08/02/2023    Procedure: Cardiac Left Heart Cath;  Surgeon: Luisito Inman DO;  Location: [facility];  Service: Cardiology    CARDIAC CATHETERIZATION N/A 08/02/2023    Procedure: Cardiac Coronary Angiogram;  Surgeon: Luisito Inman DO;  Location: [facility];  Service: Cardiology    CARDIAC CATHETERIZATION  08/02/2023    Procedure: Cardiac catheterization;  Surgeon: Luisito Inman DO;  Location: [facility];  Service: Cardiology    CARDIAC ELECTROPHYSIOLOGY PROCEDURE N/A 2/21/2024    Procedure: Cardiac loop recorder implant;  Surgeon: Luke Siu MD;  Location: [facility];  Service: Cardiology    HERNIA REPAIR      AL CORONARY ARTERY BYP W/VEIN & ARTERY GRAFT 4 VEIN N/A 8/11/2023    Procedure: CORONARY ARTERY BYPASS GRAFT (CABG) X-5 VESSELS ; SVG to PDA, Ramus, Diagonal and OM. LIMA --> LAD EVH  W/ JOHN;  Surgeon: Soham Hodgson MD;  Location: [facility];  Service: Cardiac Surgery    VASECTOMY       Social History   Social History     Substance and Sexual Activity   Alcohol Use No     Social History     Substance and Sexual Activity   Drug Use No     Social History     Tobacco Use   Smoking Status Never   Smokeless Tobacco Never     Family History:   Family History   Problem Relation Age of Onset    Leukemia Mother     Heart attack Father 52       Meds/Allergies   Current Outpatient  Medications   Medication Sig Dispense Refill    acetaminophen (TYLENOL) 325 mg tablet Take 1-2 tablets Q4-6 hours PRN pain. Do not take more than 4grams in 24 hours.  0    aspirin 325 mg tablet Take 1 tablet (325 mg total) by mouth daily 30 tablet 2    cholecalciferol (VITAMIN D3) 25 mcg (1,000 units) tablet Take 1,000 Units by mouth 2 (two) times a day      Continuous Glucose Sensor (FreeStyle Ayanna 2 Sensor) MISC CHANGE SENSOR EVERY 14 DAYS 2 each 5    dofetilide (TIKOSYN) 500 mcg capsule Take 1 capsule (500 mcg total) by mouth every 12 (twelve) hours 180 capsule 3    gabapentin (NEURONTIN) 100 mg capsule TAKE 1 CAPSULE BY MOUTH IN THE MORNING AND 3 CAPSULES IN THE EVENING 120 capsule 2    Jardiance 25 MG TABS Take 1 tablet (25 mg total) by mouth in the morning 90 tablet 3    LORazepam (ATIVAN) 0.5 mg tablet Take 1 tablet (0.5 mg total) by mouth daily at bedtime 30 tablet 3    Magnesium Gluconate 250 MG TABS Take by mouth daily at bedtime      metFORMIN (GLUCOPHAGE-XR) 500 mg 24 hr tablet Take 2 tablets (1,000 mg total) by mouth 2 (two) times a day 360 tablet 3    metoprolol succinate (TOPROL-XL) 25 mg 24 hr tablet Take 1 tablet (25 mg total) by mouth daily 90 tablet 3    Ozempic, 2 MG/DOSE, 8 MG/3ML injection pen Inject 0.75 mL (2 mg total) under the skin every 7 days 9 mL 3    potassium chloride (K-DUR,KLOR-CON) 10 mEq tablet Take 1 tablet (10 mEq total) by mouth daily 90 tablet 3    rosuvastatin (CRESTOR) 20 MG tablet TAKE TWO TABLETS BY MOUTH EVERY DAY 90 tablet 3    sertraline (ZOLOFT) 50 mg tablet TAKE ONE TABLET BY MOUTH EVERY DAY 30 tablet 5    tadalafil (CIALIS) 20 MG tablet Take 1 tablet (20 mg total) by mouth daily as needed for erectile dysfunction 10 tablet 11    docusate sodium (COLACE) 100 mg capsule Take 1 capsule (100 mg total) by mouth 2 (two) times a day Hold for soft stools. 60 capsule 0    neomycin-polymyxin-hydrocortisone (CORTISPORIN) 0.35%-10,000 units/mL-1% otic suspension Administer 3  "drops to the right ear 4 (four) times a day for 5 days 3 mL 0     No current facility-administered medications for this visit.     Allergies   Allergen Reactions    Atorvastatin Other (See Comments)     felt flu-like    Rosiglitazone Other (See Comments)    Trazodone Other (See Comments)     Felt \"hung over\"       Objective   Vitals: Blood pressure 112/62, pulse 96, height 5' 10\" (1.778 m), weight 88 kg (194 lb), SpO2 97%.  Invasive Devices       None                   Physical Exam  Physical exam normal with pertinent positives and negatives.    Thyroid is normal in size. No palpable nodules.  Lungs are clear to auscultation.  Heart has a regular rate and rhythm. No murmurs.  No lower extremity edema in the extremities.      The history was obtained from the review of the chart and from the patient.    Lab Results:    Most recent Alc is  Lab Results   Component Value Date    HGBA1C 7.3 (H) 05/18/2024           Blood work performed on 5/18/2024 showed a CMP with a glucose of 142 fasting, bilirubin 1.22, but was otherwise normal.    CBC was normal.    Urine microalbumin to creatinine ratio was 23.    TSH is 1.167.    Lab Results   Component Value Date    CREATININE 0.89 05/18/2024    CREATININE 1.02 12/15/2023    CREATININE 0.83 12/08/2023    BUN 19 05/18/2024    K 4.8 05/18/2024     05/18/2024    CO2 29 05/18/2024     GFR, Calculated   Date Value Ref Range Status   06/05/2020 91 >60 mL/min/1.73m2 Final     Comment:     mL/min per 1.73 square meters                                            Normal Function or Mild Renal    Disease (if clinically at risk):  >or=60  Moderately Decreased:                30-59  Severely Decreased:                  15-29  Renal Failure:                         <15                                            -American GFR: multiply reported GFR by 1.16    Please note that the eGFR is based on the CKD-EPI calculation, and is not intended to be used for drug dosing.              "                               Note: Calculated GFR may not be an accurate indicator of renal function if the patient's renal function is not in a steady state.     eGFRcr   Date Value Ref Range Status   06/08/2023 72 >59 Final     eGFR   Date Value Ref Range Status   05/18/2024 90 ml/min/1.73sq m Final         Lab Results   Component Value Date    HDL 39 (L) 12/06/2023    TRIG 99 12/06/2023       Lab Results   Component Value Date    ALT 46 05/18/2024    AST 32 05/18/2024    ALKPHOS 65 05/18/2024       Lab Results   Component Value Date    TSH 1.49 12/13/2021             Future Appointments   Date Time Provider Department Center   6/6/2024  7:30 AM DEVICE REMOTE [place] [facility]   9/5/2024  7:30 AM DEVICE REMOTE [place] [facility]   12/5/2024  7:30 AM DEVICE REMOTE [place] [facility]

## 2024-05-31 NOTE — PATIENT INSTRUCTIONS
Hgba1c is 7.3%. this is a bit higher but not bad.     You are eating too many carb's at breakfast. Work on adding protein to breakfast.     Continue the same diabetes medications.     Continue to use the freestyle lambert.     Follow up in 3 months with blood work.

## 2024-06-03 ENCOUNTER — TELEPHONE (OUTPATIENT)
Dept: ENDOCRINOLOGY | Facility: HOSPITAL | Age: 64
End: 2024-06-03

## 2024-06-03 NOTE — PROGRESS NOTES
Pt is calling back in to see what can be done about his medication Ozempic denial. Pt would like an appeal started, if that is needed.

## 2024-06-04 NOTE — TELEPHONE ENCOUNTER
Called [facility] to check on the prior auth. Ref: Jon MARQUEZ No prior auth is needed and they are able to process and get a paid claim. Called [facility] to verify that they are able to process the prescription and they are. No further action needed at this time.

## 2024-06-05 NOTE — PROGRESS NOTES
See other phone note. This was taken care of. No prior auth needed at this time, verified with insurance.

## 2024-06-06 ENCOUNTER — REMOTE DEVICE CLINIC VISIT (OUTPATIENT)
Dept: CARDIOLOGY CLINIC | Facility: CLINIC | Age: 64
End: 2024-06-06
Payer: COMMERCIAL

## 2024-06-06 DIAGNOSIS — R00.0 WIDE-COMPLEX TACHYCARDIA: Primary | ICD-10-CM

## 2024-06-06 PROCEDURE — 93298 REM INTERROG DEV EVAL SCRMS: CPT | Performed by: INTERNAL MEDICINE

## 2024-06-06 NOTE — PROGRESS NOTES
Results for orders placed or performed in visit on 06/06/24   Cardiac EP device report    Narrative    VAUGHAN ASSERT 5500 ICM/ACTIVE SYSTEM IS MRI CONDITIONAL  MERLIN TRANSMISSION: BATTERY VOLTAGE ADEQUATE (GOOD). NO PATIENT OR DEVICE ACTIVATED EPISODES. NORMAL DEVICE FUNCTION. AM

## 2024-06-13 ENCOUNTER — TELEPHONE (OUTPATIENT)
Age: 64
End: 2024-06-13

## 2024-06-13 NOTE — TELEPHONE ENCOUNTER
Patient states he sent a tarpipe message and would like a telephone message to be sent as well:    John Obleschuk   to [facility] (supporting Darlin Almazan MD)   6/13/24  8:26 AM  Good Morning     Since I was in to see you, I have been having a huge swing in my readings.  I have been as high as 400 and over the weekend was as low as 50.  It is not only after eating, I was 151 this morning before my walk and 198 after my walk.  I am under a bit of pressure at work, but nothing out of the ordinary.  I am eating about the same things as I have been for the past few months.       I do have some Glipizide at home.  Should I start taking that?  I need to get this under control, it is affecting my concentration at work and I am not feel well.    Please advise, thank you.

## 2024-06-13 NOTE — PROGRESS NOTES
Consultation - Electrophysiology-Cardiology (EP)   John Obleschuk 64 y.o. male MRN: [id number]  Unit/Bed#:  Encounter: [id number]      1. Wide-complex tachycardia  POCT ECG    Ambulatory Referral to Sleep Medicine      2. Essential hypertension  Ambulatory Referral to Sleep Medicine      3. Coronary artery disease involving native coronary artery of native heart without angina pectoris        4. Type 2 diabetes mellitus with hyperglycemia, without long-term current use of insulin (HCC)        5. Obstructive sleep apnea syndrome  Ambulatory Referral to Sleep Medicine      6. S/P CABG x 5        7. Mixed hyperlipidemia        8. Snoring  Ambulatory Referral to Sleep Medicine      9. Daytime sleepiness  Ambulatory Referral to Sleep Medicine              Consults  Physician Requesting Consult: PCP  Reason for Consult / Principal Problem: Hospital follow up wide complex tachycardia       Summary of my recommendation for the patient    No further episodes of palpitation/VT since patient has been started on dofetilide and metoprolol    Patient is on metoprolol 25 mg daily taken at night to reduce his fatigue -do not want to reduce the drug anymore as it will lose efficacy    Implantable Assert monitor reviewed -no wide-complex tachycardia    QTc is in normal range-460 ms, continue with potassium supplementation    Patient does have a history of snoring, morning fatigue and daytime sleepiness -evaluate for sleep apnea and consult pulmonary if positive    Depending upon finding of monitor will decide on further therapy  At this time no symptomatic episodes, EF has recovered, no other episodes of  VT          Clinical conditions  Wide-complex tachycardia, 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 Marrufo  Patient not complaining of any angina      Hypertension  Blood pressure is under 116/50  Patient currently on metoprolol      Diabetes mellitus type 2, hemoglobin A1c 7.4  Continue with aggressive management of blood sugars  Currently on metformin, Jardiance       Obesity/overweight-BMI down from 32.6-28.4  Advised aggressive management of body weight      Carotid artery disease  Continue with metoprolol, Jardiance, aspirin, rosuvastatin      Mixed hyperlipidemia  Patient is on rosuvastatin  No myositis or myalgia      Sleep apnea  As per primary      History of Present Illness   HPI: John Obleschuk is a 64 y.o. 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 Marrufo  Hypertension  Diabetes mellitus type 2, hemoglobin A1c 7.4  Obesity/overweight-BMI down from 32.6-28.4  Carotid artery disease  Sleep apnea    The patient is not complaining of anginal-like chest pain or pressure  There is no new worsening of orthopnea or PND  Patient is not complaining  of palpitations, presyncope or syncope  Exertional tolerance is also improved    The patient does not abuse tobacco alcohol or energy drinks    Historical Information   Past Medical History:   Diagnosis Date    Coronary artery disease     Diabetes mellitus (HCC)     Hyperlipidemia     Hypertension      Past Surgical History:   Procedure Laterality Date    ARTERIAL ANEURYSM REPAIR      Right Carotid Aneurysm - age 15.    CARDIAC CATHETERIZATION Left 08/02/2023    Procedure: Cardiac Left Heart Cath;  Surgeon: Luisito Inman DO;  Location: [facility];  Service: Cardiology    CARDIAC CATHETERIZATION N/A 08/02/2023    Procedure: Cardiac Coronary Angiogram;  Surgeon: Luisito Inman DO;  Location: [facility];  Service: Cardiology    CARDIAC CATHETERIZATION  08/02/2023    Procedure: Cardiac catheterization;  Surgeon: Luisito Inman DO;  Location: [facility];  Service: Cardiology    CARDIAC ELECTROPHYSIOLOGY PROCEDURE N/A 2/21/2024    Procedure: Cardiac loop recorder implant;  Surgeon: Luke Siu MD;  Location: [facility];  Service: Cardiology    HERNIA REPAIR      AK CORONARY ARTERY BYP W/VEIN & ARTERY GRAFT 4 VEIN N/A 8/11/2023    Procedure: CORONARY ARTERY BYPASS GRAFT (CABG) X-5 VESSELS ; SVG to PDA, Ramus, Diagonal and OM. LIMA --> LAD EVH  W/ JOHN;  Surgeon: Soham Hodgson MD;  Location: [facility];  Service: Cardiac Surgery    VASECTOMY       Social History     Substance and Sexual Activity   Alcohol Use No     Social History     Substance and Sexual Activity   Drug Use No     Social History     Tobacco Use   Smoking Status Never   Smokeless Tobacco Never     Social History     Socioeconomic History    Marital status: /Civil Union     Spouse name: Not on file    Number of children: Not on file    Years of education: Not on file    Highest education level: Not on file   Occupational History    Not on file   Tobacco Use    Smoking status: Never     Smokeless tobacco: Never   Vaping Use    Vaping status: Never Used   Substance and Sexual Activity    Alcohol use: No    Drug use: No    Sexual activity: Yes     Partners: Female   Other Topics Concern    Not on file   Social History Narrative    Not on file     Social Determinants of Health     Financial Resource Strain: Patient Declined (8/24/2023)    Received from [facility]    Overall Financial Resource Strain (CARDIA)     Difficulty of Paying Living Expenses: Patient declined   Food Insecurity: Patient Declined (8/24/2023)    Received from [facility]    Hunger Vital Sign     Worried About Running Out of Food in the Last Year: Patient declined     Ran Out of Food in the Last Year: Patient declined   Transportation Needs: No Transportation Needs (8/24/2023)    Received from [facility]    PRAPARE - Transportation     Lack of Transportation (Medical): No     Lack of Transportation (Non-Medical): No   Physical Activity: Sufficiently Active (12/20/2023)    Exercise Vital Sign     Days of Exercise per Week: 3 days     Minutes of Exercise per Session: 60 min   Stress: Stress Concern Present (12/20/2023)    [employer] - Occupational Stress Questionnaire     Feeling of Stress : Rather much   Social Connections: Unknown (12/20/2023)    Social Connection and Isolation Panel [NHANES]     Frequency of Communication with Friends and Family: More than three times a week     Frequency of Social Gatherings with Friends and Family: More than three times a week     Attends Temple Services: Patient declined     Active Member of Clubs or Organizations: Patient declined     Attends Club or Organization Meetings: Patient declined     Marital Status:    Intimate Partner Violence: Not At Risk (12/20/2023)    Humiliation, Afraid, Rape, and Kick  "questionnaire     Fear of Current or Ex-Partner: No     Emotionally Abused: No     Physically Abused: No     Sexually Abused: No   Housing Stability: Unknown (8/24/2023)    [facility]    Housing Stability Vital Sign     Unable to Pay for Housing in the Last Year: Patient refused     Number of Places Lived in the Last Year: Not on file     Unstable Housing in the Last Year: Patient refused     .  Family History:  Family History   Problem Relation Age of Onset    Leukemia Mother     Heart attack Father 52         Meds/Allergies      No current facility-administered medications for this visit.        Not in a hospital admission.      Allergies   Allergen Reactions    Atorvastatin Other (See Comments)     felt flu-like    Rosiglitazone Other (See Comments)    Trazodone Other (See Comments)     Felt \"hung over\"           Objective   Vitals: Visit Vitals  /50   Pulse 71   Ht 5' 10\" (1.778 m)   Wt 85.7 kg (189 lb)   BMI 27.12 kg/m²   Smoking Status Never   BSA 2.04 m²      Vitals:    06/14/24 1223   Weight: 85.7 kg (189 lb)     [unfilled]    Invasive Devices       None                     ROS  Review of Systems   All other systems reviewed and are negative.  As described in my history of present illness        PHYSICAL EXAM  Physical Exam  Constitutional:       General: He is not in acute distress.     Appearance: Normal appearance. He is not ill-appearing.   HENT:      Head: Normocephalic and atraumatic.      Right Ear: External ear normal.      Left Ear: External ear normal.      Nose: Nose normal.      Mouth/Throat:      Pharynx: Oropharynx is clear.   Eyes:      General: No scleral icterus.     Extraocular Movements: Extraocular movements intact.      Conjunctiva/sclera: Conjunctivae normal.      Pupils: Pupils are equal, round, and reactive to light.   Cardiovascular:      Rate and Rhythm: Normal rate and regular rhythm.      Heart sounds: Normal heart " sounds. No murmur heard.     No gallop.   Pulmonary:      Effort: No respiratory distress.      Breath sounds: Normal breath sounds. No wheezing or rales.   Abdominal:      General: Bowel sounds are normal. There is no distension.      Palpations: Abdomen is soft.   Musculoskeletal:         General: No swelling or deformity.      Cervical back: Neck supple. No rigidity.   Skin:     Coloration: Skin is not jaundiced.      Findings: No bruising or lesion.   Neurological:      Mental Status: He is alert and oriented to person, place, and time. Mental status is at baseline.      Motor: No weakness.   Psychiatric:         Mood and Affect: Mood normal.         Behavior: Behavior normal.         Thought Content: Thought content normal.         Judgment: Judgment normal.               LAB RESULTS:    CBC:  WBC   Date Value Ref Range Status   05/18/2024 8.52 4.31 - 10.16 Thousand/uL Final     Hemoglobin   Date Value Ref Range Status   05/18/2024 14.4 12.0 - 17.0 g/dL Final     Hematocrit   Date Value Ref Range Status   05/18/2024 44.3 36.5 - 49.3 % Final     MCV   Date Value Ref Range Status   05/18/2024 89 82 - 98 fL Final     Platelets   Date Value Ref Range Status   05/18/2024 189 149 - 390 Thousands/uL Final     RBC   Date Value Ref Range Status   05/18/2024 4.96 3.88 - 5.62 Million/uL Final     MCH   Date Value Ref Range Status   05/18/2024 29.0 26.8 - 34.3 pg Final     MCHC   Date Value Ref Range Status   05/18/2024 32.5 31.4 - 37.4 g/dL Final     RDW   Date Value Ref Range Status   05/18/2024 13.1 11.6 - 15.1 % Final     MPV   Date Value Ref Range Status   05/18/2024 10.3 8.9 - 12.7 fL Final     nRBC   Date Value Ref Range Status   05/18/2024 0 /100 WBCs Final       CMP:  Potassium   Date Value Ref Range Status   05/18/2024 4.8 3.5 - 5.3 mmol/L Final   06/08/2023 4.9 3.5 - 5.2 mmol/L Final     Chloride   Date Value Ref Range Status   05/18/2024 103 96 - 108 mmol/L Final   06/08/2023 104 100 - 109 mmol/L Final      Carbon Dioxide   Date Value Ref Range Status   06/08/2023 23 21 - 31 mmol/L Final     CO2   Date Value Ref Range Status   05/18/2024 29 21 - 32 mmol/L Final     CO2, i-STAT   Date Value Ref Range Status   08/11/2023 23 21 - 32 mmol/L Final     BUN   Date Value Ref Range Status   05/18/2024 19 5 - 25 mg/dL Final   06/08/2023 23 7 - 28 mg/dL Final     Creatinine   Date Value Ref Range Status   05/18/2024 0.89 0.60 - 1.30 mg/dL Final     Comment:     Standardized to IDMS reference method   06/08/2023 1.14 0.53 - 1.30 mg/dL Final     Glucose, i-STAT   Date Value Ref Range Status   08/11/2023 184 (H) 65 - 140 mg/dl Final     Calcium   Date Value Ref Range Status   05/18/2024 9.5 8.4 - 10.2 mg/dL Final   06/08/2023 9.4 8.5 - 10.1 mg/dL Final     AST   Date Value Ref Range Status   05/18/2024 32 13 - 39 U/L Final   06/08/2023 16 <41 U/L Final     ALT   Date Value Ref Range Status   05/18/2024 46 7 - 52 U/L Final     Comment:     Specimen collection should occur prior to Sulfasalazine administration due to the potential for falsely depressed results.    06/08/2023 18 <56 U/L Final     Alkaline Phosphatase   Date Value Ref Range Status   05/18/2024 65 34 - 104 U/L Final   06/08/2023 47 35 - 120 U/L Final     GFR, Calculated   Date Value Ref Range Status   06/05/2020 91 >60 mL/min/1.73m2 Final     Comment:     mL/min per 1.73 square meters                                            Normal Function or Mild Renal    Disease (if clinically at risk):  >or=60  Moderately Decreased:                30-59  Severely Decreased:                  15-29  Renal Failure:                         <15                                            -American GFR: multiply reported GFR by 1.16    Please note that the eGFR is based on the CKD-EPI calculation, and is not intended to be used for drug dosing.                                            Note: Calculated GFR may not be an accurate indicator of renal function if the patient's  renal function is not in a steady state.     eGFRcr   Date Value Ref Range Status   06/08/2023 72 >59 Final     eGFR   Date Value Ref Range Status   05/18/2024 90 ml/min/1.73sq m Final        Magnesium:   Magnesium   Date Value Ref Range Status   12/08/2023 2.0 1.9 - 2.7 mg/dL Final        A1C:  Hemoglobin A1C   Date Value Ref Range Status   05/18/2024 7.3 (H) Normal 4.0-5.6%; PreDiabetic 5.7-6.4%; Diabetic >=6.5%; Glycemic control for adults with diabetes <7.0% % Final   06/08/2023 7.4 (H) <5.7 % Final     Comment:     Reference Range  Non-diabetic                     <5.7  Pre-diabetic                     5.7-6.4  Diabetic                         >=6.5  ADA target for diabetic control  <=7        TSH:  TSH   Date Value Ref Range Status   12/13/2021 1.49 0.36 - 3.74 uIU/mL Final     TSH 3RD GENERATON   Date Value Ref Range Status   05/18/2024 1.167 0.450 - 4.500 uIU/mL Final     Comment:     Adult TSH (3rd generation) reference range follows the recommended guidelines of the American Thyroid Association, January, 2020.        PT/INR:  Protime   Date Value Ref Range Status   08/03/2023 13.1 11.6 - 14.5 seconds Final     INR   Date Value Ref Range Status   08/03/2023 0.97 0.84 - 1.19 Final       Lipid Panel:  Cholesterol   Date Value Ref Range Status   12/06/2023 90 See Comment mg/dL Final     Comment:     Cholesterol:         Pediatric <18 Years        Desirable          <170 mg/dL      Borderline High    170-199 mg/dL      High               >=200 mg/dL        Adult >=18 Years            Desirable        <200 mg/dL      Borderline High  200-239 mg/dL      High             >239 mg/dL       Triglycerides   Date Value Ref Range Status   12/06/2023 99 See Comment mg/dL Final     Comment:     Triglyceride:     0-9Y            <75mg/dL     10Y-17Y         <90 mg/dL       >=18Y     Normal          <150 mg/dL     Borderline High 150-199 mg/dL     High            200-499 mg/dL        Very High       >499 mg/dL    Specimen  "collection should occur prior to Metamizole administration due to the potential for falsely depressed results.     HDL, Direct   Date Value Ref Range Status   2023 39 (L) >=40 mg/dL Final       Troponin:  No results found for: \"TROPONINI\"      Imaging:    [id number]      JOHN:  No results found for this or any previous visit.      Echocardiogram:  Results for orders placed during the hospital encounter of 23    Echo complete w/ contrast if indicated    Interpretation Summary    Left Ventricle: Left ventricular cavity size is normal. Wall thickness is upper normal. The left ventricular ejection fraction is 60%. Systolic function is normal. Wall motion is normal. Diastolic function is mildly abnormal, consistent with grade I (abnormal) relaxation.    Mitral Valve: There is mild annular calcification. There is trace regurgitation.    Aorta: The aortic root is normal in size. The ascending aorta is mildly dilated. The ascending aorta is 3.9 cm.    Results for orders placed during the hospital encounter of 23    Echo follow up/limited w/ contrast if indicated    Interpretation Summary    Left Ventricle: Left ventricular cavity size is normal. Wall thickness is mildly increased. There is concentric remodeling. The left ventricular ejection fraction is 60%. Systolic function is normal. Wall motion is normal.    IVS: There is abnormal septal motion consistent with post-operative status.    Right Ventricle: Right ventricular cavity size is normal. Systolic function is normal.    Mitral Valve: There is mild annular calcification.      Stress Test:   Results for orders placed during the hospital encounter of 23    NM myocardial perfusion spect (stress and/or rest)    Interpretation Summary    Stress ECG: A Johnny protocol stress test was performed. The patient reached stage 2.0 of the protocol after exercising for 7 min and 17 sec and had a maximal HR of 139 bpm (88 % of MPHR) and 7.0 METS. The " patient experienced non-limiting angina during the test. The test was stopped because the patient experienced dyspnea. The patient achieved the target heart rate. The patient reported dyspnea during the stress test. Symptoms began during stress and ended during recovery. Blood pressure demonstrated a blunted response and heart rate demonstrated a normal response to stress. The patient's heart rate recovery was normal.    Stress EC.0 mm of ST elevation (aVR) and Horizontal ST depression of 2.0 mm (II, III, aVF, V5 and V6) is noted. Improvement of ST depression within 2 minutes of recovery. However, there are still persistent ST-T changes even after 4 minutes into recovery. Arrhythmias during stress: rare PVCs. There were no arrhythmias during recovery. The ECG was positive for ischemia. The stress ECG is consistent with ischemia after maximal exercise, with reproduction of symptoms.    Perfusion Defect Conclusion: The stress/rest perfusion ratio is 1.11 . There is no evidence of transient ischemic dilation (TID).    Stress Function: Left ventricular function post-stress is normal. Stress ejection fraction is 55 %. There is a defect in the mid to apical inferior location(s). The defect has mildly reduced function.    Stress Combined Conclusion: The ECG and SPECT imaging portions of the stress study are concordant with findings concerning for stress induced myocardial ischemia.    Perfusion: There is a left ventricular perfusion defect that is large in size with severe reduction in uptake present in the entire inferior location(s) that is reversible suggestive of inferior wall ischemia.    Abnormal study  Exercise 7 minutes with blunted BP response.  Appropriate HR response and he met 85% of his MPHR  He did develop chest pain with exercise  1.0 mm of ST elevation (aVR) and Horizontal ST depression of 2.0 mm (II, III, aVF, V5 and V6) is noted. Improvement of ST depression within 2 minutes of recovery. However,  there are still persistent ST-T changes even after 4 minutes into recovery.  Minimal Ectopy  There is a left ventricular perfusion defect that is large in size with severe reduction in uptake present in the entire inferior location(s) that is reversible suggestive of inferior wall ischemia. Results were discussed with the patient and referring provider and he will be scheduled for coronary angiography.  EF normal at 55% but mid-apical inferior wall motion  No TID      Cardiac Catheterization:    Cardiac Coronary Angiogram, Left Heart Cath  08/02/2023    Coronary Findings    Diagnostic  Dominance: Right    Left Anterior Descending   Prox LAD lesion is 70% stenosed.   Mid LAD lesion is 80% stenosed.   Dist LAD lesion is 90% stenosed.      First Diagonal Branch   1st Diag-1 lesion is 90% stenosed.   1st Diag-2 lesion is 80% stenosed.      Ramus Intermedius   Ramus lesion is 80% stenosed.      Left Circumflex   Mid Cx to Dist Cx lesion is 90% stenosed.      Right Coronary Artery      Right Posterior Descending Artery   Collaterals   RPDA filled by collaterals from Dist LAD.          HOLTER MONITOR: 24 HOUR/48 HOUR MONITORS  No results found for this or any previous visit.      AMB Extended Holter Monitor: Zio XT/AT or BioTel  Results for orders placed in visit on 01/05/2024      AMB extended holter monitor    Zio AT  12/10/2023 - 12/24/2023   Pierce Galvan MD  1/10/2024  2:26 PM EST       [facility]     Event Recorder Results     Duration: 14 days     Indication: VT     Findings:     Patient had a min HR of 61 bpm, max HR of 150 bpm, and avg HR of 80  bpm. Predominant underlying rhythm was Sinus Rhythm. 2 Ventricular runs occurred, the run with the fastest interval lasting 5 beats with a max rate of 150 bpm, the longest lasting 4 beats with an avg rate of 117 bpm. 1 run of Supraventricular Tachycardia occurred lasting 13 beats with a max rate of 136 bpm (avg 126 bpm). Isolated SVEs were occasional  (2.3%, 81311), SVE Couplets were rare (<1.0%, 6347), and SVE Triplets were rare (<1.0%, 2818). Isolated VEs were rare (<1.0%, 513), VE Couplets were rare (<1.0%, 16), and VE Triplets were rare (<1.0%, 3).     Triggered episodes: 4 symptomatic episodes correlated with sinus rhythm or mild SV ectopy.     Conclusions:  No sustained ventricular tachycardia noted.     Overall these test results are reassuring.  Please call patient with test results. Continue current medications as advised by Dr. Siu.             Cardiac MRI:    MRI Cardiac w wo Contrast  12/08/2023    Findings:  1. Technically difficult study due to presence of ectopy.  Unable to accurately measure cardiac chamber sizes or ejection fraction.  2. The left ventricle is normal in size with normal wall thickness.  Left ventricular systolic function is normal with an estimated ejection fraction of 55%.  There is mild hypokinesis of the basal inferior wall.  3. The right ventricle is normal in size with normal right ventricular systolic function.  4. The aortic, mitral, and tricuspid valves open without restriction.  There is no significant valvular regurgitation, however cine MRI is inaccurate in the qualitative assessment of valvular regurgitation.  5. The left atrium is mildly dilated. The aortic root is mildly dilated.  5. Delayed post-gadolinium imaging demonstrates subendocardial hyperenhancement of the basal to mid inferior wall and intramyocardial hyperenhancement of the mid inferolateral wall.     IMPRESSION:  Impression:  1. Normal left ventricular size and systolic function with mild basal inferior hypokinesis.  2. Normal right ventricular size and systolic function.  3. No significant valvular abnormalities.  4. Subendocardial scarring of the basal to mid inferior wall suggestive of an ischemic etiology.  Intramyocardial fibrosis within the mid inferolateral wall suggestive of myocarditis, but cannot exclude cardiac sarcoidosis.         DEVICE  CHECK:     Results for orders placed or performed in visit on 06/06/24   Cardiac EP device report    Narrative    VAUGHAN ASSERT 5500 ICM/ACTIVE SYSTEM IS MRI CONDITIONAL  [employer] TRANSMISSION: BATTERY VOLTAGE ADEQUATE (GOOD). NO PATIENT OR DEVICE ACTIVATED EPISODES. NORMAL DEVICE FUNCTION. AM             Code Status: [unfilled]  Advance Directive and Living Will:      Power of :    POLST:      Counseling / Coordination of Care  Reviewed in detail with patient with regards to wide-complex tachycardia      Luke Siu MD

## 2024-06-14 ENCOUNTER — OFFICE VISIT (OUTPATIENT)
Dept: CARDIOLOGY CLINIC | Facility: CLINIC | Age: 64
End: 2024-06-14
Payer: COMMERCIAL

## 2024-06-14 VITALS
WEIGHT: 189 LBS | SYSTOLIC BLOOD PRESSURE: 116 MMHG | HEIGHT: 70 IN | BODY MASS INDEX: 27.06 KG/M2 | HEART RATE: 71 BPM | DIASTOLIC BLOOD PRESSURE: 50 MMHG

## 2024-06-14 DIAGNOSIS — R06.83 SNORING: ICD-10-CM

## 2024-06-14 DIAGNOSIS — Z95.1 S/P CABG X 5: ICD-10-CM

## 2024-06-14 DIAGNOSIS — R40.0 DAYTIME SLEEPINESS: ICD-10-CM

## 2024-06-14 DIAGNOSIS — G47.33 OBSTRUCTIVE SLEEP APNEA SYNDROME: ICD-10-CM

## 2024-06-14 DIAGNOSIS — R00.0 WIDE-COMPLEX TACHYCARDIA: Primary | ICD-10-CM

## 2024-06-14 DIAGNOSIS — E11.65 TYPE 2 DIABETES MELLITUS WITH HYPERGLYCEMIA, WITHOUT LONG-TERM CURRENT USE OF INSULIN (HCC): ICD-10-CM

## 2024-06-14 DIAGNOSIS — I25.10 CORONARY ARTERY DISEASE INVOLVING NATIVE CORONARY ARTERY OF NATIVE HEART WITHOUT ANGINA PECTORIS: ICD-10-CM

## 2024-06-14 DIAGNOSIS — E78.2 MIXED HYPERLIPIDEMIA: ICD-10-CM

## 2024-06-14 DIAGNOSIS — I10 ESSENTIAL HYPERTENSION: ICD-10-CM

## 2024-06-14 PROCEDURE — 99204 OFFICE O/P NEW MOD 45 MIN: CPT | Performed by: INTERNAL MEDICINE

## 2024-06-14 PROCEDURE — 93000 ELECTROCARDIOGRAM COMPLETE: CPT | Performed by: INTERNAL MEDICINE

## 2024-06-14 RX ORDER — GLIPIZIDE 5 MG/1
5 TABLET ORAL DAILY
COMMUNITY

## 2024-06-14 NOTE — LETTER
Sue 15, 2024     Armaan Jasso MD  [address] [place] [id number]    Patient: John Obleschuk   YOB: 1960   Date of Visit: 6/14/2024       Dear Dr. Jasso:    Thank you for referring John Obleschuk to me for evaluation. Below are my notes for this consultation.    If you have questions, please do not hesitate to call me. I look forward to following your patient along with you.         Sincerely,        Luke Siu MD        CC: Huan Oblesch    Luke Siu MD  6/15/2024  4:45 PM  Sign when Signing Visit   Consultation - Electrophysiology-Cardiology (EP)   John Obleschuk 64 y.o. male MRN: [id number]  Unit/Bed#:  Encounter: [id number]      1. Wide-complex tachycardia  POCT ECG    Ambulatory Referral to Sleep Medicine      2. Essential hypertension  Ambulatory Referral to Sleep Medicine      3. Coronary artery disease involving native coronary artery of native heart without angina pectoris        4. Type 2 diabetes mellitus with hyperglycemia, without long-term current use of insulin (HCC)        5. Obstructive sleep apnea syndrome  Ambulatory Referral to Sleep Medicine      6. S/P CABG x 5        7. Mixed hyperlipidemia        8. Snoring  Ambulatory Referral to Sleep Medicine      9. Daytime sleepiness  Ambulatory Referral to Sleep Medicine              Consults  Physician Requesting Consult: PCP  Reason for Consult / Principal Problem: Hospital follow up wide complex tachycardia       Summary of my recommendation for the patient    No further episodes of palpitation/VT since patient has been started on dofetilide and metoprolol    Patient is on metoprolol 25 mg daily taken at night to reduce his fatigue -do not want to reduce the drug anymore as it will lose efficacy    Implantable Assert monitor reviewed -no wide-complex tachycardia    QTc is in normal range-460 ms, continue with potassium supplementation    Patient does have a history of snoring, morning fatigue and daytime sleepiness  -evaluate for sleep apnea and consult pulmonary if positive    Depending upon finding of monitor will decide on further therapy  At this time no symptomatic episodes, EF has recovered, no other episodes of  VT          Clinical conditions  Wide-complex tachycardia, 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 Marrufo  Patient not complaining of any angina      Hypertension  Blood pressure is under 116/50  Patient currently on metoprolol      Diabetes mellitus type 2, hemoglobin A1c 7.4  Continue with aggressive management of blood sugars  Currently on metformin, Jardiance       Obesity/overweight-BMI down from 32.6-28.4  Advised aggressive management of body weight      Carotid artery disease  Continue with metoprolol, Jardiance, aspirin, rosuvastatin      Mixed hyperlipidemia  Patient is on rosuvastatin  No myositis or myalgia      Sleep apnea  As per primary      History of Present Illness  HPI: John Obleschuk is a 64 y.o. 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 Marrufo  Hypertension  Diabetes mellitus type 2, hemoglobin A1c 7.4  Obesity/overweight-BMI down from 32.6-28.4  Carotid artery disease  Sleep apnea    The patient is not complaining of anginal-like chest pain or pressure  There is no new worsening of orthopnea or PND  Patient is not complaining of palpitations, presyncope or syncope  Exertional tolerance is also improved    The patient does not abuse tobacco alcohol or energy drinks    Historical Information  Past Medical History:   Diagnosis Date   • Coronary artery disease    • Diabetes mellitus (HCC)    • Hyperlipidemia    • Hypertension      Past Surgical History:   Procedure Laterality Date   • ARTERIAL ANEURYSM REPAIR      Right Carotid Aneurysm - age 15.   • CARDIAC CATHETERIZATION Left 08/02/2023    Procedure: Cardiac Left Heart Cath;  Surgeon: Luisito Inman DO;  Location: [facility];  Service: Cardiology   • CARDIAC CATHETERIZATION N/A 08/02/2023    Procedure: Cardiac Coronary Angiogram;  Surgeon: Luisito Inman DO;  Location: [facility];  Service: Cardiology   • CARDIAC CATHETERIZATION  08/02/2023    Procedure: Cardiac catheterization;  Surgeon: Luisito Inman DO;  [facility];  Service: Cardiology   • CARDIAC ELECTROPHYSIOLOGY PROCEDURE N/A 2/21/2024    Procedure: Cardiac loop recorder implant;  Surgeon: Luke Siu MD;  Location: [facility];  Service: Cardiology   • HERNIA REPAIR     • GA CORONARY ARTERY BYP W/VEIN & ARTERY GRAFT 4 VEIN N/A 8/11/2023    Procedure: CORONARY ARTERY BYPASS GRAFT (CABG) X-5 VESSELS ; SVG to PDA, Ramus, Diagonal and OM. LIMA --> LAD EVH  W/ JOHN;  Surgeon: Soham Hodgson MD;  Location: [facility];  Service: Cardiac Surgery   •  VASECTOMY       Social History     Substance and Sexual Activity   Alcohol Use No     Social History     Substance and Sexual Activity   Drug Use No     Social History     Tobacco Use   Smoking Status Never   Smokeless Tobacco Never     Social History     Socioeconomic History   • Marital status: /Civil Union     Spouse name: Not on file   • Number of children: Not on file   • Years of education: Not on file   • Highest education level: Not on file   Occupational History   • Not on file   Tobacco Use   • Smoking status: Never   • Smokeless tobacco: Never   Vaping Use   • Vaping status: Never Used   Substance and Sexual Activity   • Alcohol use: No   • Drug use: No   • Sexual activity: Yes     Partners: Female   Other Topics Concern   • Not on file   Social History Narrative   • Not on file     Social Determinants of Health     Financial Resource Strain: Patient Declined (8/24/2023)    [facility]    Overall Financial Resource Strain (CARDIA)    • Difficulty of Paying Living Expenses: Patient declined   Food Insecurity: Patient Declined (8/24/2023)    [facility]    Hunger Vital Sign    • Worried About Running Out of Food in the Last Year: Patient declined    • Ran Out of Food in the Last Year: Patient declined   Transportation Needs: No Transportation Needs (8/24/2023)    [facility] from [facility]    PRAPARE - Transportation    • Lack of Transportation (Medical): No    • Lack of Transportation (Non-Medical): No   Physical Activity: Sufficiently Active (12/20/2023)    Exercise Vital Sign    • Days of Exercise per Week: 3 days    • Minutes of Exercise per Session: 60 min   Stress: Stress Concern Present (12/20/2023)    [employer] - Occupational Stress Questionnaire    • Feeling of Stress : Rather much   Social  "Connections: Unknown (12/20/2023)    Social Connection and Isolation Panel [NHANES]    • Frequency of Communication with Friends and Family: More than three times a week    • Frequency of Social Gatherings with Friends and Family: More than three times a week    • Attends Spiritism Services: Patient declined    • Active Member of Clubs or Organizations: Patient declined    • Attends Club or Organization Meetings: Patient declined    • Marital Status:    Intimate Partner Violence: Not At Risk (12/20/2023)    Humiliation, Afraid, Rape, and Kick questionnaire    • Fear of Current or Ex-Partner: No    • Emotionally Abused: No    • Physically Abused: No    • Sexually Abused: No   Housing Stability: Unknown (8/24/2023)    [facility], [facility]    Housing Stability Vital Sign    • Unable to Pay for Housing in the Last Year: Patient refused    • Number of Places Lived in the Last Year: Not on file    • Unstable Housing in the Last Year: Patient refused     .  Family History:  Family History   Problem Relation Age of Onset   • Leukemia Mother    • Heart attack Father 52         Meds/Allergies     No current facility-administered medications for this visit.        Not in a hospital admission.      Allergies   Allergen Reactions   • Atorvastatin Other (See Comments)     felt flu-like   • Rosiglitazone Other (See Comments)   • Trazodone Other (See Comments)     Felt \"hung over\"           Objective  Vitals: Visit Vitals  /50   Pulse 71   Ht 5' 10\" (1.778 m)   Wt 85.7 kg (189 lb)   BMI 27.12 kg/m²   Smoking Status Never   BSA 2.04 m²      Vitals:    06/14/24 1223   Weight: 85.7 kg (189 lb)     [unfilled]    Invasive Devices       None                     ROS  Review of Systems   All other systems reviewed and are negative.  As described in my history of present illness        PHYSICAL EXAM  Physical Exam  Constitutional:       General: He is not in acute distress.     " Appearance: Normal appearance. He is not ill-appearing.   HENT:      Head: Normocephalic and atraumatic.      Right Ear: External ear normal.      Left Ear: External ear normal.      Nose: Nose normal.      Mouth/Throat:      Pharynx: Oropharynx is clear.   Eyes:      General: No scleral icterus.     Extraocular Movements: Extraocular movements intact.      Conjunctiva/sclera: Conjunctivae normal.      Pupils: Pupils are equal, round, and reactive to light.   Cardiovascular:      Rate and Rhythm: Normal rate and regular rhythm.      Heart sounds: Normal heart sounds. No murmur heard.     No gallop.   Pulmonary:      Effort: No respiratory distress.      Breath sounds: Normal breath sounds. No wheezing or rales.   Abdominal:      General: Bowel sounds are normal. There is no distension.      Palpations: Abdomen is soft.   Musculoskeletal:         General: No swelling or deformity.      Cervical back: Neck supple. No rigidity.   Skin:     Coloration: Skin is not jaundiced.      Findings: No bruising or lesion.   Neurological:      Mental Status: He is alert and oriented to person, place, and time. Mental status is at baseline.      Motor: No weakness.   Psychiatric:         Mood and Affect: Mood normal.         Behavior: Behavior normal.         Thought Content: Thought content normal.         Judgment: Judgment normal.               LAB RESULTS:    CBC:  WBC   Date Value Ref Range Status   05/18/2024 8.52 4.31 - 10.16 Thousand/uL Final     Hemoglobin   Date Value Ref Range Status   05/18/2024 14.4 12.0 - 17.0 g/dL Final     Hematocrit   Date Value Ref Range Status   05/18/2024 44.3 36.5 - 49.3 % Final     MCV   Date Value Ref Range Status   05/18/2024 89 82 - 98 fL Final     Platelets   Date Value Ref Range Status   05/18/2024 189 149 - 390 Thousands/uL Final     RBC   Date Value Ref Range Status   05/18/2024 4.96 3.88 - 5.62 Million/uL Final     MCH   Date Value Ref Range Status   05/18/2024 29.0 26.8 - 34.3 pg  Final     MCHC   Date Value Ref Range Status   05/18/2024 32.5 31.4 - 37.4 g/dL Final     RDW   Date Value Ref Range Status   05/18/2024 13.1 11.6 - 15.1 % Final     MPV   Date Value Ref Range Status   05/18/2024 10.3 8.9 - 12.7 fL Final     nRBC   Date Value Ref Range Status   05/18/2024 0 /100 WBCs Final       CMP:  Potassium   Date Value Ref Range Status   05/18/2024 4.8 3.5 - 5.3 mmol/L Final   06/08/2023 4.9 3.5 - 5.2 mmol/L Final     Chloride   Date Value Ref Range Status   05/18/2024 103 96 - 108 mmol/L Final   06/08/2023 104 100 - 109 mmol/L Final     Carbon Dioxide   Date Value Ref Range Status   06/08/2023 23 21 - 31 mmol/L Final     CO2   Date Value Ref Range Status   05/18/2024 29 21 - 32 mmol/L Final     CO2, i-STAT   Date Value Ref Range Status   08/11/2023 23 21 - 32 mmol/L Final     BUN   Date Value Ref Range Status   05/18/2024 19 5 - 25 mg/dL Final   06/08/2023 23 7 - 28 mg/dL Final     Creatinine   Date Value Ref Range Status   05/18/2024 0.89 0.60 - 1.30 mg/dL Final     Comment:     Standardized to IDMS reference method   06/08/2023 1.14 0.53 - 1.30 mg/dL Final     Glucose, i-STAT   Date Value Ref Range Status   08/11/2023 184 (H) 65 - 140 mg/dl Final     Calcium   Date Value Ref Range Status   05/18/2024 9.5 8.4 - 10.2 mg/dL Final   06/08/2023 9.4 8.5 - 10.1 mg/dL Final     AST   Date Value Ref Range Status   05/18/2024 32 13 - 39 U/L Final   06/08/2023 16 <41 U/L Final     ALT   Date Value Ref Range Status   05/18/2024 46 7 - 52 U/L Final     Comment:     Specimen collection should occur prior to Sulfasalazine administration due to the potential for falsely depressed results.    06/08/2023 18 <56 U/L Final     Alkaline Phosphatase   Date Value Ref Range Status   05/18/2024 65 34 - 104 U/L Final   06/08/2023 47 35 - 120 U/L Final     GFR, Calculated   Date Value Ref Range Status   06/05/2020 91 >60 mL/min/1.73m2 Final     Comment:     mL/min per 1.73 square meters                                             Normal Function or Mild Renal    Disease (if clinically at risk):  >or=60  Moderately Decreased:                30-59  Severely Decreased:                  15-29  Renal Failure:                         <15                                            -American GFR: multiply reported GFR by 1.16    Please note that the eGFR is based on the CKD-EPI calculation, and is not intended to be used for drug dosing.                                            Note: Calculated GFR may not be an accurate indicator of renal function if the patient's renal function is not in a steady state.     eGFRcr   Date Value Ref Range Status   06/08/2023 72 >59 Final     eGFR   Date Value Ref Range Status   05/18/2024 90 ml/min/1.73sq m Final        Magnesium:   Magnesium   Date Value Ref Range Status   12/08/2023 2.0 1.9 - 2.7 mg/dL Final        A1C:  Hemoglobin A1C   Date Value Ref Range Status   05/18/2024 7.3 (H) Normal 4.0-5.6%; PreDiabetic 5.7-6.4%; Diabetic >=6.5%; Glycemic control for adults with diabetes <7.0% % Final   06/08/2023 7.4 (H) <5.7 % Final     Comment:     Reference Range  Non-diabetic                     <5.7  Pre-diabetic                     5.7-6.4  Diabetic                         >=6.5  ADA target for diabetic control  <=7        TSH:  TSH   Date Value Ref Range Status   12/13/2021 1.49 0.36 - 3.74 uIU/mL Final     TSH 3RD GENERATON   Date Value Ref Range Status   05/18/2024 1.167 0.450 - 4.500 uIU/mL Final     Comment:     Adult TSH (3rd generation) reference range follows the recommended guidelines of the American Thyroid Association, January, 2020.        PT/INR:  Protime   Date Value Ref Range Status   08/03/2023 13.1 11.6 - 14.5 seconds Final     INR   Date Value Ref Range Status   08/03/2023 0.97 0.84 - 1.19 Final       Lipid Panel:  Cholesterol   Date Value Ref Range Status   12/06/2023 90 See Comment mg/dL Final     Comment:     Cholesterol:         Pediatric <18 Years        Desirable         "  <170 mg/dL      Borderline High    170-199 mg/dL      High               >=200 mg/dL        Adult >=18 Years            Desirable        <200 mg/dL      Borderline High  200-239 mg/dL      High             >239 mg/dL       Triglycerides   Date Value Ref Range Status   2023 99 See Comment mg/dL Final     Comment:     Triglyceride:     0-9Y            <75mg/dL     10Y-17Y         <90 mg/dL       >=18Y     Normal          <150 mg/dL     Borderline High 150-199 mg/dL     High            200-499 mg/dL        Very High       >499 mg/dL    Specimen collection should occur prior to Metamizole administration due to the potential for falsely depressed results.     HDL, Direct   Date Value Ref Range Status   2023 39 (L) >=40 mg/dL Final       Troponin:  No results found for: \"TROPONINI\"      Imaging:    [id number]      JOHN:  No results found for this or any previous visit.      Echocardiogram:  Results for orders placed during the hospital encounter of 23    Echo complete w/ contrast if indicated    Interpretation Summary  •  Left Ventricle: Left ventricular cavity size is normal. Wall thickness is upper normal. The left ventricular ejection fraction is 60%. Systolic function is normal. Wall motion is normal. Diastolic function is mildly abnormal, consistent with grade I (abnormal) relaxation.  •  Mitral Valve: There is mild annular calcification. There is trace regurgitation.  •  Aorta: The aortic root is normal in size. The ascending aorta is mildly dilated. The ascending aorta is 3.9 cm.    Results for orders placed during the hospital encounter of 23    Echo follow up/limited w/ contrast if indicated    Interpretation Summary  •  Left Ventricle: Left ventricular cavity size is normal. Wall thickness is mildly increased. There is concentric remodeling. The left ventricular ejection fraction is 60%. Systolic function is normal. Wall motion is normal.  •  IVS: There is abnormal septal motion " consistent with post-operative status.  •  Right Ventricle: Right ventricular cavity size is normal. Systolic function is normal.  •  Mitral Valve: There is mild annular calcification.      Stress Test:   Results for orders placed during the hospital encounter of 23    NM myocardial perfusion spect (stress and/or rest)    Interpretation Summary  •  Stress ECG: A Johnny protocol stress test was performed. The patient reached stage 2.0 of the protocol after exercising for 7 min and 17 sec and had a maximal HR of 139 bpm (88 % of MPHR) and 7.0 METS. The patient experienced non-limiting angina during the test. The test was stopped because the patient experienced dyspnea. The patient achieved the target heart rate. The patient reported dyspnea during the stress test. Symptoms began during stress and ended during recovery. Blood pressure demonstrated a blunted response and heart rate demonstrated a normal response to stress. The patient's heart rate recovery was normal.  •  Stress EC.0 mm of ST elevation (aVR) and Horizontal ST depression of 2.0 mm (II, III, aVF, V5 and V6) is noted. Improvement of ST depression within 2 minutes of recovery. However, there are still persistent ST-T changes even after 4 minutes into recovery. Arrhythmias during stress: rare PVCs. There were no arrhythmias during recovery. The ECG was positive for ischemia. The stress ECG is consistent with ischemia after maximal exercise, with reproduction of symptoms.  •  Perfusion Defect Conclusion: The stress/rest perfusion ratio is 1.11 . There is no evidence of transient ischemic dilation (TID).  •  Stress Function: Left ventricular function post-stress is normal. Stress ejection fraction is 55 %. There is a defect in the mid to apical inferior location(s). The defect has mildly reduced function.  •  Stress Combined Conclusion: The ECG and SPECT imaging portions of the stress study are concordant with findings concerning for stress induced  myocardial ischemia.  •  Perfusion: There is a left ventricular perfusion defect that is large in size with severe reduction in uptake present in the entire inferior location(s) that is reversible suggestive of inferior wall ischemia.    Abnormal study  Exercise 7 minutes with blunted BP response.  Appropriate HR response and he met 85% of his MPHR  He did develop chest pain with exercise  1.0 mm of ST elevation (aVR) and Horizontal ST depression of 2.0 mm (II, III, aVF, V5 and V6) is noted. Improvement of ST depression within 2 minutes of recovery. However, there are still persistent ST-T changes even after 4 minutes into recovery.  Minimal Ectopy  There is a left ventricular perfusion defect that is large in size with severe reduction in uptake present in the entire inferior location(s) that is reversible suggestive of inferior wall ischemia. Results were discussed with the patient and referring provider and he will be scheduled for coronary angiography.  EF normal at 55% but mid-apical inferior wall motion  No TID      Cardiac Catheterization:    Cardiac Coronary Angiogram, Left Heart Cath  08/02/2023    Coronary Findings    Diagnostic  Dominance: Right    Left Anterior Descending   Prox LAD lesion is 70% stenosed.   Mid LAD lesion is 80% stenosed.   Dist LAD lesion is 90% stenosed.      First Diagonal Branch   1st Diag-1 lesion is 90% stenosed.   1st Diag-2 lesion is 80% stenosed.      Ramus Intermedius   Ramus lesion is 80% stenosed.      Left Circumflex   Mid Cx to Dist Cx lesion is 90% stenosed.      Right Coronary Artery      Right Posterior Descending Artery   Collaterals   RPDA filled by collaterals from Dist LAD.          HOLTER MONITOR: 24 HOUR/48 HOUR MONITORS  No results found for this or any previous visit.      AMB Extended Holter Monitor: Zio XT/AT or Sitedeskel  Results for orders placed in visit on 01/05/2024      AMB extended holter monitor    Zio AT  12/10/2023 - 12/24/2023   Pierce Galvan,  MD  1/10/2024  2:26 PM [facility]     Event Recorder Results     Duration: 14 days     Indication: VT     Findings:     Patient had a min HR of 61 bpm, max HR of 150 bpm, and avg HR of 80  bpm. Predominant underlying rhythm was Sinus Rhythm. 2 Ventricular runs occurred, the run with the fastest interval lasting 5 beats with a max rate of 150 bpm, the longest lasting 4 beats with an avg rate of 117 bpm. 1 run of Supraventricular Tachycardia occurred lasting 13 beats with a max rate of 136 bpm (avg 126 bpm). Isolated SVEs were occasional (2.3%, 26812), SVE Couplets were rare (<1.0%, 6347), and SVE Triplets were rare (<1.0%, 2818). Isolated VEs were rare (<1.0%, 513), VE Couplets were rare (<1.0%, 16), and VE Triplets were rare (<1.0%, 3).     Triggered episodes: 4 symptomatic episodes correlated with sinus rhythm or mild SV ectopy.     Conclusions:  No sustained ventricular tachycardia noted.     Overall these test results are reassuring.  Please call patient with test results. Continue current medications as advised by Dr. Siu.             Cardiac MRI:    MRI Cardiac w wo Contrast  12/08/2023    Findings:  1. Technically difficult study due to presence of ectopy.  Unable to accurately measure cardiac chamber sizes or ejection fraction.  2. The left ventricle is normal in size with normal wall thickness.  Left ventricular systolic function is normal with an estimated ejection fraction of 55%.  There is mild hypokinesis of the basal inferior wall.  3. The right ventricle is normal in size with normal right ventricular systolic function.  4. The aortic, mitral, and tricuspid valves open without restriction.  There is no significant valvular regurgitation, however cine MRI is inaccurate in the qualitative assessment of valvular regurgitation.  5. The left atrium is mildly dilated. The aortic root is mildly dilated.  5. Delayed post-gadolinium imaging demonstrates subendocardial  hyperenhancement of the basal to mid inferior wall and intramyocardial hyperenhancement of the mid inferolateral wall.     IMPRESSION:  Impression:  1. Normal left ventricular size and systolic function with mild basal inferior hypokinesis.  2. Normal right ventricular size and systolic function.  3. No significant valvular abnormalities.  4. Subendocardial scarring of the basal to mid inferior wall suggestive of an ischemic etiology.  Intramyocardial fibrosis within the mid inferolateral wall suggestive of myocarditis, but cannot exclude cardiac sarcoidosis.         DEVICE CHECK:     Results for orders placed or performed in visit on 06/06/24   Cardiac EP device report    Narrative    [facility] 5500 ICM/ACTIVE SYSTEM IS MRI CONDITIONAL  MERLIN TRANSMISSION: BATTERY VOLTAGE ADEQUATE (GOOD). NO PATIENT OR DEVICE ACTIVATED EPISODES. NORMAL DEVICE FUNCTION. AM             Code Status: [unfilled]  Advance Directive and Living Will:      Power of :    POLST:      Counseling / Coordination of Care  Reviewed in detail with patient with regards to wide-complex tachycardia      Luke Siu MD

## 2024-06-14 NOTE — LETTER
Sue 15, 2024     Armaan Jasso MD  [address] [place] [id number]    Patient: John Obleschuk   YOB: 1960   Date of Visit: 6/14/2024       Dear Dr. Jasso:    Thank you for referring John Obleschuk to me for evaluation. Below are my notes for this consultation.    If you have questions, please do not hesitate to call me. I look forward to following your patient along with you.         Sincerely,        Luke Siu MD        CC: Huan Oblesch    Luke Siu MD  6/15/2024  4:45 PM  Sign when Signing Visit   Consultation - Electrophysiology-Cardiology (EP)   John Obleschuk 64 y.o. male MRN: [id number]  Unit/Bed#:  Encounter: [id number]      1. Wide-complex tachycardia  POCT ECG    Ambulatory Referral to Sleep Medicine      2. Essential hypertension  Ambulatory Referral to Sleep Medicine      3. Coronary artery disease involving native coronary artery of native heart without angina pectoris        4. Type 2 diabetes mellitus with hyperglycemia, without long-term current use of insulin (HCC)        5. Obstructive sleep apnea syndrome  Ambulatory Referral to Sleep Medicine      6. S/P CABG x 5        7. Mixed hyperlipidemia        8. Snoring  Ambulatory Referral to Sleep Medicine      9. Daytime sleepiness  Ambulatory Referral to Sleep Medicine              Consults  Physician Requesting Consult: PCP  Reason for Consult / Principal Problem: Hospital follow up wide complex tachycardia       Summary of my recommendation for the patient    No further episodes of palpitation/VT since patient has been started on dofetilide and metoprolol    Patient is on metoprolol 25 mg daily taken at night to reduce his fatigue -do not want to reduce the drug anymore as it will lose efficacy    Implantable Assert monitor reviewed -no wide-complex tachycardia    QTc is in normal range-460 ms, continue with potassium supplementation    Patient does have a history of snoring, morning fatigue and daytime sleepiness  -evaluate for sleep apnea and consult pulmonary if positive    Depending upon finding of monitor will decide on further therapy  At this time no symptomatic episodes, EF has recovered, no other episodes of  VT          Clinical conditions  Wide-complex tachycardia, 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 Marrufo  Patient not complaining of any angina      Hypertension  Blood pressure is under 116/50  Patient currently on metoprolol      Diabetes mellitus type 2, hemoglobin A1c 7.4  Continue with aggressive management of blood sugars  Currently on metformin, Jardiance       Obesity/overweight-BMI down from 32.6-28.4  Advised aggressive management of body weight      Carotid artery disease  Continue with metoprolol, Jardiance, aspirin, rosuvastatin      Mixed hyperlipidemia  Patient is on rosuvastatin  No myositis or myalgia      Sleep apnea  As per primary      History of Present Illness  HPI: John Obleschuk is a 64 y.o. 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 Marrufo  Hypertension  Diabetes mellitus type 2, hemoglobin A1c 7.4  Obesity/overweight-BMI down from 32.6-28.4  Carotid artery disease  Sleep apnea    The patient is not complaining of anginal-like chest pain or pressure  There is no new worsening of orthopnea or PND  Patient is not complaining of palpitations, presyncope or syncope  Exertional tolerance is also improved    The patient does not abuse tobacco alcohol or energy drinks    Historical Information  Past Medical History:   Diagnosis Date   • Coronary artery disease    • Diabetes mellitus (HCC)    • Hyperlipidemia    • Hypertension      Past Surgical History:   Procedure Laterality Date   • ARTERIAL ANEURYSM REPAIR      Right Carotid Aneurysm - age 15.   • CARDIAC CATHETERIZATION Left 08/02/2023    Procedure: Cardiac Left Heart Cath;  Surgeon: Luisito Inman DO;  Location: [facility];  Service: Cardiology   • CARDIAC CATHETERIZATION N/A 08/02/2023    Procedure: Cardiac Coronary Angiogram;  Surgeon: Luisito Inman DO;  Location: [facility];  Service: Cardiology   • CARDIAC CATHETERIZATION  08/02/2023    Procedure: Cardiac catheterization;  Surgeon: Luisito Inman DO;  [facility];  Service: Cardiology   • CARDIAC ELECTROPHYSIOLOGY PROCEDURE N/A 2/21/2024    Procedure: Cardiac loop recorder implant;  Surgeon: Luke Siu MD;  Location: [facility];  Service: Cardiology   • HERNIA REPAIR     • UT CORONARY ARTERY BYP W/VEIN & ARTERY GRAFT 4 VEIN N/A 8/11/2023    Procedure: CORONARY ARTERY BYPASS GRAFT (CABG) X-5 VESSELS ; SVG to PDA, Ramus, Diagonal and OM. LIMA --> LAD EVH  W/ JOHN;  Surgeon: Soham Hodgson MD;  Location: [facility];  Service: Cardiac Surgery   •  VASECTOMY       Social History     Substance and Sexual Activity   Alcohol Use No     Social History     Substance and Sexual Activity   Drug Use No     Social History     Tobacco Use   Smoking Status Never   Smokeless Tobacco Never     Social History     Socioeconomic History   • Marital status: /Civil Union     Spouse name: Not on file   • Number of children: Not on file   • Years of education: Not on file   • Highest education level: Not on file   Occupational History   • Not on file   Tobacco Use   • Smoking status: Never   • Smokeless tobacco: Never   Vaping Use   • Vaping status: Never Used   Substance and Sexual Activity   • Alcohol use: No   • Drug use: No   • Sexual activity: Yes     Partners: Female   Other Topics Concern   • Not on file   Social History Narrative   • Not on file     Social Determinants of Health     Financial Resource Strain: Patient Declined (8/24/2023)    [facility]    Overall Financial Resource Strain (CARDIA)    • Difficulty of Paying Living Expenses: Patient declined   Food Insecurity: Patient Declined (8/24/2023)    [facility], [facility]    Hunger Vital Sign    • Worried About Running Out of Food in the Last Year: Patient declined    • Ran Out of Food in the Last Year: Patient declined   Transportation Needs: No Transportation Needs (8/24/2023)    [facility] from [facility], [facility]    PRAPARE - Transportation    • Lack of Transportation (Medical): No    • Lack of Transportation (Non-Medical): No   Physical Activity: Sufficiently Active (12/20/2023)    Exercise Vital Sign    • Days of Exercise per Week: 3 days    • Minutes of Exercise per Session: 60 min   Stress: Stress Concern Present (12/20/2023)    [employer] [place] [employer] - Occupational Stress Questionnaire    • Feeling of Stress : Rather much   Social  "Connections: Unknown (12/20/2023)    Social Connection and Isolation Panel [NHANES]    • Frequency of Communication with Friends and Family: More than three times a week    • Frequency of Social Gatherings with Friends and Family: More than three times a week    • Attends Restorationist Services: Patient declined    • Active Member of Clubs or Organizations: Patient declined    • Attends Club or Organization Meetings: Patient declined    • Marital Status:    Intimate Partner Violence: Not At Risk (12/20/2023)    Humiliation, Afraid, Rape, and Kick questionnaire    • Fear of Current or Ex-Partner: No    • Emotionally Abused: No    • Physically Abused: No    • Sexually Abused: No   Housing Stability: Unknown (8/24/2023)    [facility]    Housing Stability Vital Sign    • Unable to Pay for Housing in the Last Year: Patient refused    • Number of Places Lived in the Last Year: Not on file    • Unstable Housing in the Last Year: Patient refused     .  Family History:  Family History   Problem Relation Age of Onset   • Leukemia Mother    • Heart attack Father 52         Meds/Allergies     No current facility-administered medications for this visit.        Not in a hospital admission.      Allergies   Allergen Reactions   • Atorvastatin Other (See Comments)     felt flu-like   • Rosiglitazone Other (See Comments)   • Trazodone Other (See Comments)     Felt \"hung over\"           Objective  Vitals: Visit Vitals  /50   Pulse 71   Ht 5' 10\" (1.778 m)   Wt 85.7 kg (189 lb)   BMI 27.12 kg/m²   Smoking Status Never   BSA 2.04 m²      Vitals:    06/14/24 1223   Weight: 85.7 kg (189 lb)     [unfilled]    Invasive Devices       None                     ROS  Review of Systems   All other systems reviewed and are negative.  As described in my history of present illness        PHYSICAL EXAM  Physical Exam  Constitutional:       General: He is not in acute distress.     " Appearance: Normal appearance. He is not ill-appearing.   HENT:      Head: Normocephalic and atraumatic.      Right Ear: External ear normal.      Left Ear: External ear normal.      Nose: Nose normal.      Mouth/Throat:      Pharynx: Oropharynx is clear.   Eyes:      General: No scleral icterus.     Extraocular Movements: Extraocular movements intact.      Conjunctiva/sclera: Conjunctivae normal.      Pupils: Pupils are equal, round, and reactive to light.   Cardiovascular:      Rate and Rhythm: Normal rate and regular rhythm.      Heart sounds: Normal heart sounds. No murmur heard.     No gallop.   Pulmonary:      Effort: No respiratory distress.      Breath sounds: Normal breath sounds. No wheezing or rales.   Abdominal:      General: Bowel sounds are normal. There is no distension.      Palpations: Abdomen is soft.   Musculoskeletal:         General: No swelling or deformity.      Cervical back: Neck supple. No rigidity.   Skin:     Coloration: Skin is not jaundiced.      Findings: No bruising or lesion.   Neurological:      Mental Status: He is alert and oriented to person, place, and time. Mental status is at baseline.      Motor: No weakness.   Psychiatric:         Mood and Affect: Mood normal.         Behavior: Behavior normal.         Thought Content: Thought content normal.         Judgment: Judgment normal.               LAB RESULTS:    CBC:  WBC   Date Value Ref Range Status   05/18/2024 8.52 4.31 - 10.16 Thousand/uL Final     Hemoglobin   Date Value Ref Range Status   05/18/2024 14.4 12.0 - 17.0 g/dL Final     Hematocrit   Date Value Ref Range Status   05/18/2024 44.3 36.5 - 49.3 % Final     MCV   Date Value Ref Range Status   05/18/2024 89 82 - 98 fL Final     Platelets   Date Value Ref Range Status   05/18/2024 189 149 - 390 Thousands/uL Final     RBC   Date Value Ref Range Status   05/18/2024 4.96 3.88 - 5.62 Million/uL Final     MCH   Date Value Ref Range Status   05/18/2024 29.0 26.8 - 34.3 pg  Final     MCHC   Date Value Ref Range Status   05/18/2024 32.5 31.4 - 37.4 g/dL Final     RDW   Date Value Ref Range Status   05/18/2024 13.1 11.6 - 15.1 % Final     MPV   Date Value Ref Range Status   05/18/2024 10.3 8.9 - 12.7 fL Final     nRBC   Date Value Ref Range Status   05/18/2024 0 /100 WBCs Final       CMP:  Potassium   Date Value Ref Range Status   05/18/2024 4.8 3.5 - 5.3 mmol/L Final   06/08/2023 4.9 3.5 - 5.2 mmol/L Final     Chloride   Date Value Ref Range Status   05/18/2024 103 96 - 108 mmol/L Final   06/08/2023 104 100 - 109 mmol/L Final     Carbon Dioxide   Date Value Ref Range Status   06/08/2023 23 21 - 31 mmol/L Final     CO2   Date Value Ref Range Status   05/18/2024 29 21 - 32 mmol/L Final     CO2, i-STAT   Date Value Ref Range Status   08/11/2023 23 21 - 32 mmol/L Final     BUN   Date Value Ref Range Status   05/18/2024 19 5 - 25 mg/dL Final   06/08/2023 23 7 - 28 mg/dL Final     Creatinine   Date Value Ref Range Status   05/18/2024 0.89 0.60 - 1.30 mg/dL Final     Comment:     Standardized to IDMS reference method   06/08/2023 1.14 0.53 - 1.30 mg/dL Final     Glucose, i-STAT   Date Value Ref Range Status   08/11/2023 184 (H) 65 - 140 mg/dl Final     Calcium   Date Value Ref Range Status   05/18/2024 9.5 8.4 - 10.2 mg/dL Final   06/08/2023 9.4 8.5 - 10.1 mg/dL Final     AST   Date Value Ref Range Status   05/18/2024 32 13 - 39 U/L Final   06/08/2023 16 <41 U/L Final     ALT   Date Value Ref Range Status   05/18/2024 46 7 - 52 U/L Final     Comment:     Specimen collection should occur prior to Sulfasalazine administration due to the potential for falsely depressed results.    06/08/2023 18 <56 U/L Final     Alkaline Phosphatase   Date Value Ref Range Status   05/18/2024 65 34 - 104 U/L Final   06/08/2023 47 35 - 120 U/L Final     GFR, Calculated   Date Value Ref Range Status   06/05/2020 91 >60 mL/min/1.73m2 Final     Comment:     mL/min per 1.73 square meters                                             Normal Function or Mild Renal    Disease (if clinically at risk):  >or=60  Moderately Decreased:                30-59  Severely Decreased:                  15-29  Renal Failure:                         <15                                            -American GFR: multiply reported GFR by 1.16    Please note that the eGFR is based on the CKD-EPI calculation, and is not intended to be used for drug dosing.                                            Note: Calculated GFR may not be an accurate indicator of renal function if the patient's renal function is not in a steady state.     eGFRcr   Date Value Ref Range Status   06/08/2023 72 >59 Final     eGFR   Date Value Ref Range Status   05/18/2024 90 ml/min/1.73sq m Final        Magnesium:   Magnesium   Date Value Ref Range Status   12/08/2023 2.0 1.9 - 2.7 mg/dL Final        A1C:  Hemoglobin A1C   Date Value Ref Range Status   05/18/2024 7.3 (H) Normal 4.0-5.6%; PreDiabetic 5.7-6.4%; Diabetic >=6.5%; Glycemic control for adults with diabetes <7.0% % Final   06/08/2023 7.4 (H) <5.7 % Final     Comment:     Reference Range  Non-diabetic                     <5.7  Pre-diabetic                     5.7-6.4  Diabetic                         >=6.5  ADA target for diabetic control  <=7        TSH:  TSH   Date Value Ref Range Status   12/13/2021 1.49 0.36 - 3.74 uIU/mL Final     TSH 3RD GENERATON   Date Value Ref Range Status   05/18/2024 1.167 0.450 - 4.500 uIU/mL Final     Comment:     Adult TSH (3rd generation) reference range follows the recommended guidelines of the American Thyroid Association, January, 2020.        PT/INR:  Protime   Date Value Ref Range Status   08/03/2023 13.1 11.6 - 14.5 seconds Final     INR   Date Value Ref Range Status   08/03/2023 0.97 0.84 - 1.19 Final       Lipid Panel:  Cholesterol   Date Value Ref Range Status   12/06/2023 90 See Comment mg/dL Final     Comment:     Cholesterol:         Pediatric <18 Years        Desirable         "  <170 mg/dL      Borderline High    170-199 mg/dL      High               >=200 mg/dL        Adult >=18 Years            Desirable        <200 mg/dL      Borderline High  200-239 mg/dL      High             >239 mg/dL       Triglycerides   Date Value Ref Range Status   2023 99 See Comment mg/dL Final     Comment:     Triglyceride:     0-9Y            <75mg/dL     10Y-17Y         <90 mg/dL       >=18Y     Normal          <150 mg/dL     Borderline High 150-199 mg/dL     High            200-499 mg/dL        Very High       >499 mg/dL    Specimen collection should occur prior to Metamizole administration due to the potential for falsely depressed results.     HDL, Direct   Date Value Ref Range Status   2023 39 (L) >=40 mg/dL Final       Troponin:  No results found for: \"TROPONINI\"      Imaging:    [id number]      JOHN:  No results found for this or any previous visit.      Echocardiogram:  Results for orders placed during the hospital encounter of 23    Echo complete w/ contrast if indicated    Interpretation Summary  •  Left Ventricle: Left ventricular cavity size is normal. Wall thickness is upper normal. The left ventricular ejection fraction is 60%. Systolic function is normal. Wall motion is normal. Diastolic function is mildly abnormal, consistent with grade I (abnormal) relaxation.  •  Mitral Valve: There is mild annular calcification. There is trace regurgitation.  •  Aorta: The aortic root is normal in size. The ascending aorta is mildly dilated. The ascending aorta is 3.9 cm.    Results for orders placed during the hospital encounter of 23    Echo follow up/limited w/ contrast if indicated    Interpretation Summary  •  Left Ventricle: Left ventricular cavity size is normal. Wall thickness is mildly increased. There is concentric remodeling. The left ventricular ejection fraction is 60%. Systolic function is normal. Wall motion is normal.  •  IVS: There is abnormal septal motion " consistent with post-operative status.  •  Right Ventricle: Right ventricular cavity size is normal. Systolic function is normal.  •  Mitral Valve: There is mild annular calcification.      Stress Test:   Results for orders placed during the hospital encounter of 23    NM myocardial perfusion spect (stress and/or rest)    Interpretation Summary  •  Stress ECG: A Johnny protocol stress test was performed. The patient reached stage 2.0 of the protocol after exercising for 7 min and 17 sec and had a maximal HR of 139 bpm (88 % of MPHR) and 7.0 METS. The patient experienced non-limiting angina during the test. The test was stopped because the patient experienced dyspnea. The patient achieved the target heart rate. The patient reported dyspnea during the stress test. Symptoms began during stress and ended during recovery. Blood pressure demonstrated a blunted response and heart rate demonstrated a normal response to stress. The patient's heart rate recovery was normal.  •  Stress EC.0 mm of ST elevation (aVR) and Horizontal ST depression of 2.0 mm (II, III, aVF, V5 and V6) is noted. Improvement of ST depression within 2 minutes of recovery. However, there are still persistent ST-T changes even after 4 minutes into recovery. Arrhythmias during stress: rare PVCs. There were no arrhythmias during recovery. The ECG was positive for ischemia. The stress ECG is consistent with ischemia after maximal exercise, with reproduction of symptoms.  •  Perfusion Defect Conclusion: The stress/rest perfusion ratio is 1.11 . There is no evidence of transient ischemic dilation (TID).  •  Stress Function: Left ventricular function post-stress is normal. Stress ejection fraction is 55 %. There is a defect in the mid to apical inferior location(s). The defect has mildly reduced function.  •  Stress Combined Conclusion: The ECG and SPECT imaging portions of the stress study are concordant with findings concerning for stress induced  myocardial ischemia.  •  Perfusion: There is a left ventricular perfusion defect that is large in size with severe reduction in uptake present in the entire inferior location(s) that is reversible suggestive of inferior wall ischemia.    Abnormal study  Exercise 7 minutes with blunted BP response.  Appropriate HR response and he met 85% of his MPHR  He did develop chest pain with exercise  1.0 mm of ST elevation (aVR) and Horizontal ST depression of 2.0 mm (II, III, aVF, V5 and V6) is noted. Improvement of ST depression within 2 minutes of recovery. However, there are still persistent ST-T changes even after 4 minutes into recovery.  Minimal Ectopy  There is a left ventricular perfusion defect that is large in size with severe reduction in uptake present in the entire inferior location(s) that is reversible suggestive of inferior wall ischemia. Results were discussed with the patient and referring provider and he will be scheduled for coronary angiography.  EF normal at 55% but mid-apical inferior wall motion  No TID      Cardiac Catheterization:    Cardiac Coronary Angiogram, Left Heart Cath  08/02/2023    Coronary Findings    Diagnostic  Dominance: Right    Left Anterior Descending   Prox LAD lesion is 70% stenosed.   Mid LAD lesion is 80% stenosed.   Dist LAD lesion is 90% stenosed.      First Diagonal Branch   1st Diag-1 lesion is 90% stenosed.   1st Diag-2 lesion is 80% stenosed.      Ramus Intermedius   Ramus lesion is 80% stenosed.      Left Circumflex   Mid Cx to Dist Cx lesion is 90% stenosed.      Right Coronary Artery      Right Posterior Descending Artery   Collaterals   RPDA filled by collaterals from Dist LAD.          HOLTER MONITOR: 24 HOUR/48 HOUR MONITORS  No results found for this or any previous visit.      AMB Extended Holter Monitor: Zio XT/AT or [employer]  Results for orders placed in visit on 01/05/2024      AMB extended holter monitor    Zio AT  12/10/2023 - 12/24/2023   Pierce Galvan,  MD  1/10/2024  2:26 PM [facility]     Event Recorder Results     Duration: 14 days     Indication: VT     Findings:     Patient had a min HR of 61 bpm, max HR of 150 bpm, and avg HR of 80  bpm. Predominant underlying rhythm was Sinus Rhythm. 2 Ventricular runs occurred, the run with the fastest interval lasting 5 beats with a max rate of 150 bpm, the longest lasting 4 beats with an avg rate of 117 bpm. 1 run of Supraventricular Tachycardia occurred lasting 13 beats with a max rate of 136 bpm (avg 126 bpm). Isolated SVEs were occasional (2.3%, 63833), SVE Couplets were rare (<1.0%, 6347), and SVE Triplets were rare (<1.0%, 2818). Isolated VEs were rare (<1.0%, 513), VE Couplets were rare (<1.0%, 16), and VE Triplets were rare (<1.0%, 3).     Triggered episodes: 4 symptomatic episodes correlated with sinus rhythm or mild SV ectopy.     Conclusions:  No sustained ventricular tachycardia noted.     Overall these test results are reassuring.  Please call patient with test results. Continue current medications as advised by Dr. Siu.             Cardiac MRI:    MRI Cardiac w wo Contrast  12/08/2023    Findings:  1. Technically difficult study due to presence of ectopy.  Unable to accurately measure cardiac chamber sizes or ejection fraction.  2. The left ventricle is normal in size with normal wall thickness.  Left ventricular systolic function is normal with an estimated ejection fraction of 55%.  There is mild hypokinesis of the basal inferior wall.  3. The right ventricle is normal in size with normal right ventricular systolic function.  4. The aortic, mitral, and tricuspid valves open without restriction.  There is no significant valvular regurgitation, however cine MRI is inaccurate in the qualitative assessment of valvular regurgitation.  5. The left atrium is mildly dilated. The aortic root is mildly dilated.  5. Delayed post-gadolinium imaging demonstrates subendocardial  hyperenhancement of the basal to mid inferior wall and intramyocardial hyperenhancement of the mid inferolateral wall.     IMPRESSION:  Impression:  1. Normal left ventricular size and systolic function with mild basal inferior hypokinesis.  2. Normal right ventricular size and systolic function.  3. No significant valvular abnormalities.  4. Subendocardial scarring of the basal to mid inferior wall suggestive of an ischemic etiology.  Intramyocardial fibrosis within the mid inferolateral wall suggestive of myocarditis, but cannot exclude cardiac sarcoidosis.         DEVICE CHECK:     Results for orders placed or performed in visit on 06/06/24   Cardiac EP device report    Narrative    [facility] [employer] 5500 ICM/ACTIVE SYSTEM IS MRI CONDITIONAL  [employer] TRANSMISSION: BATTERY VOLTAGE ADEQUATE (GOOD). NO PATIENT OR DEVICE ACTIVATED EPISODES. NORMAL DEVICE FUNCTION. AM             Code Status: [unfilled]  Advance Directive and Living Will:      Power of :    POLST:      Counseling / Coordination of Care  Reviewed in detail with patient with regards to wide-complex tachycardia      Luke Siu MD

## 2024-06-19 ENCOUNTER — OFFICE VISIT (OUTPATIENT)
Dept: SLEEP CENTER | Facility: CLINIC | Age: 64
End: 2024-06-19
Payer: COMMERCIAL

## 2024-06-19 VITALS
HEART RATE: 77 BPM | RESPIRATION RATE: 16 BRPM | DIASTOLIC BLOOD PRESSURE: 70 MMHG | OXYGEN SATURATION: 99 % | BODY MASS INDEX: 27.63 KG/M2 | SYSTOLIC BLOOD PRESSURE: 120 MMHG | WEIGHT: 193 LBS | HEIGHT: 70 IN

## 2024-06-19 DIAGNOSIS — R40.0 DAYTIME SLEEPINESS: ICD-10-CM

## 2024-06-19 DIAGNOSIS — I10 ESSENTIAL HYPERTENSION: ICD-10-CM

## 2024-06-19 DIAGNOSIS — G47.33 OBSTRUCTIVE SLEEP APNEA SYNDROME: Primary | ICD-10-CM

## 2024-06-19 DIAGNOSIS — R00.0 WIDE-COMPLEX TACHYCARDIA: ICD-10-CM

## 2024-06-19 DIAGNOSIS — I25.10 CORONARY ARTERY DISEASE INVOLVING NATIVE CORONARY ARTERY OF NATIVE HEART WITHOUT ANGINA PECTORIS: ICD-10-CM

## 2024-06-19 DIAGNOSIS — F40.240 CLAUSTROPHOBIA: ICD-10-CM

## 2024-06-19 DIAGNOSIS — R06.83 SNORING: ICD-10-CM

## 2024-06-19 DIAGNOSIS — E66.3 OVERWEIGHT (BMI 25.0-29.9): ICD-10-CM

## 2024-06-19 DIAGNOSIS — E11.42 DIABETIC POLYNEUROPATHY ASSOCIATED WITH TYPE 2 DIABETES MELLITUS (HCC): ICD-10-CM

## 2024-06-19 PROCEDURE — 99204 OFFICE O/P NEW MOD 45 MIN: CPT | Performed by: INTERNAL MEDICINE

## 2024-06-19 NOTE — PATIENT INSTRUCTIONS
What is DUDLEY?   Obstructive sleep apnea is a common and serious sleep disorder that causes you to stop breathing during sleep. The airway repeatedly becomes blocked, limiting the amount of air that reaches your lungs. When this happens, you may snore loudly or making choking noises as you try to breathe. Your brain and body becomes oxygen deprived and you may wake up. This may happen a few times a night, or in more severe cases, several hundred times a night.   Sleep apnea can make you wake up in the morning feeling tired or unrefreshed even though you have had a full night of sleep. During the day, you may feel fatigued, have difficulty concentrating or you may even unintentionally fall asleep. This is because your body is waking up numerous times throughout the night, even though you might not be conscious of each awakening.  The lack of oxygen your body receives can have negative long-term consequences for your health. This includes:  High blood pressure  Heart disease  Irregular heart rhythms  Stroke  Pre-diabetes and diabetes  Depression  Testing  An objective evaluation of your sleep may be needed before your board certified sleep physician can make a diagnosis. Options include:   In-lab overnight sleep study  This type of sleep study requires you to stay overnight at a sleep center, in a bed that may resemble a hotel room. You will sleep with sensors hooked up to various parts of your body. These sensors record your brain waves, heartbeat, breathing and movement. An overnight sleep study also provides your doctor with the most complete information about your sleep. Learn more about an overnight sleep study.   Home sleep apnea test  Some patients with high risk factors for obstructive sleep apnea and no other medical disorders may be candidates for a home sleep apnea test. The testing equipment differs in that it is less complicated than what is used in an overnight sleep study. As such, does not give all the  data an in-lab will and if negative, may not mean you do not have the problem.  Treatment for sleep apnea includes using a continuous positive airway pressure (CPAP) machine to keep your airway open during sleep. A mask is placed over your nose and mouth, or just your nose. The mask is hooked to the CPAP machine that blows a gentle stream of air into the mask when you breathe. This helps keep your airway open so you can breathe more regularly. Extra oxygen may be given to you through the machine. You may be given a mouth device. It looks like a mouth guard or dental retainer and stops your tongue and mouth tissues from blocking your throat while you sleep. Surgery may be needed to remove extra tissues that block your mouth, throat, or nose.  Manage sleep apnea:   Do not smoke. Nicotine and other chemicals in cigarettes and cigars can cause lung damage. Ask your healthcare provider for information if you currently smoke and need help to quit. E-cigarettes or smokeless tobacco still contain nicotine. Talk to your healthcare provider before you use these products.  Do not drink alcohol or take sedative medicine before you go to sleep. Alcohol and sedatives can relax the muscles and tissues around your throat. This can block the airflow to your lungs.  Maintain a healthy weight. Excess tissue around your throat may restrict your breathing. Ask your healthcare provider for information if you need to lose weight.  Sleep on your side or use pillows designed to prevent sleep apnea. This prevents your tongue or other tissues from blocking your throat. You can also raise the head of your bed.  Driving Safety. Refrain from driving when drowsy.   Follow up with your healthcare provider as directed: Write down your questions so you remember to ask them during your visits.  Go to [employer]ASM website for more information: Sleepeducation.org  What is DUDLEY?   Obstructive sleep apnea is a common and serious sleep disorder that causes you to  stop breathing during sleep. The airway repeatedly becomes blocked, limiting the amount of air that reaches your lungs. When this happens, you may snore loudly or making choking noises as you try to breathe. Your brain and body becomes oxygen deprived and you may wake up. This may happen a few times a night, or in more severe cases, several hundred times a night.   Sleep apnea can make you wake up in the morning feeling tired or unrefreshed even though you have had a full night of sleep. During the day, you may feel fatigued, have difficulty concentrating or you may even unintentionally fall asleep. This is because your body is waking up numerous times throughout the night, even though you might not be conscious of each awakening.  The lack of oxygen your body receives can have negative long-term consequences for your health. This includes:  High blood pressure  Heart disease  Irregular heart rhythms  Stroke  Pre-diabetes and diabetes  Depression  Testing  An objective evaluation of your sleep may be needed before your board certified sleep physician can make a diagnosis. Options include:   In-lab overnight sleep study  This type of sleep study requires you to stay overnight at a sleep center, in a bed that may resemble a hotel room. You will sleep with sensors hooked up to various parts of your body. These sensors record your brain waves, heartbeat, breathing and movement. An overnight sleep study also provides your doctor with the most complete information about your sleep. Learn more about an overnight sleep study.   Home sleep apnea test  Some patients with high risk factors for obstructive sleep apnea and no other medical disorders may be candidates for a home sleep apnea test. The testing equipment differs in that it is less complicated than what is used in an overnight sleep study. As such, does not give all the data an in-lab will and if negative, may not mean you do not have the problem.  Treatment for  sleep apnea includes using a continuous positive airway pressure (CPAP) machine to keep your airway open during sleep. A mask is placed over your nose and mouth, or just your nose. The mask is hooked to the CPAP machine that blows a gentle stream of air into the mask when you breathe. This helps keep your airway open so you can breathe more regularly. Extra oxygen may be given to you through the machine. You may be given a mouth device. It looks like a mouth guard or dental retainer and stops your tongue and mouth tissues from blocking your throat while you sleep. Surgery may be needed to remove extra tissues that block your mouth, throat, or nose.  Manage sleep apnea:   Do not smoke. Nicotine and other chemicals in cigarettes and cigars can cause lung damage. Ask your healthcare provider for information if you currently smoke and need help to quit. E-cigarettes or smokeless tobacco still contain nicotine. Talk to your healthcare provider before you use these products.  Do not drink alcohol or take sedative medicine before you go to sleep. Alcohol and sedatives can relax the muscles and tissues around your throat. This can block the airflow to your lungs.  Maintain a healthy weight. Excess tissue around your throat may restrict your breathing. Ask your healthcare provider for information if you need to lose weight.  Sleep on your side or use pillows designed to prevent sleep apnea. This prevents your tongue or other tissues from blocking your throat. You can also raise the head of your bed.  Driving Safety. Refrain from driving when drowsy.   Follow up with your healthcare provider as directed: Write down your questions so you remember to ask them during your visits.  Go to AASM website for more information: Sleepeducation.org    Nursing Support:  When: Monday through Friday 7A-5PM except holidays  Where: Our direct line is [phone].    If you are having a true emergency please call 911.  In the event that the  line is busy or it is after hours please leave a voice message and we will return your call.  Please speak clearly, leaving your full name, birth date, best number to reach you and the reason for your call.   Medication refills: We will need the name of the medication, the dosage, the ordering provider, whether you get a 30 or 90 day refill, and the pharmacy name and address.  Medications will be ordered by the provider only.  Nurses cannot call in prescriptions.  Please allow 7 days for medication refills.  Physician requested updates: If your provider requested that you call with an update after starting medication, please be ready to provide us the medication and dosage, what time you take your medication, the time you attempt to fall asleep, time you fall asleep, when you wake up, and what time you get out of bed.  Sleep Study Results: We will contact you with sleep study results and/or next steps after the physician has reviewed your testing.

## 2024-06-19 NOTE — PROGRESS NOTES
Consultation - Sleep Center   John Obleschuk  64 y.o. male  :1960  MRN:[id number]  DOS:2024    Physician Requesting Consult: Luke Siu MD             Reason for Consult : At your kind request I saw John Obleschuk for initial sleep evaluation today.   He was diagnosed with obstructive sleep apnea in the past but not treated.  Study was done at the behest of his cardiologist.  He is now here because of escalation of his symptoms.    HST in 2018 demonstrated a respiratory event index of 13.3/h, considerably higher while supine.  Minimum oxygen saturation was 68%.    PFSH, Problem List, Medications & Allergies were reviewed in EMR.    Huan  has a past medical history of Coronary artery disease, Diabetes mellitus (HCC), Hyperlipidemia, and Hypertension.      He has a current medication list which includes the following prescription(s): acetaminophen, aspirin, cholecalciferol, freestyle lambert 2 sensor, dofetilide, gabapentin, glipizide, jardiance, lorazepam, magnesium gluconate, metformin, metoprolol succinate, ozempic (2 mg/dose), potassium chloride, rosuvastatin, sertraline, tadalafil, docusate sodium, and neomycin-polymyxin-hydrocortisone.      HPI: He has always complained of tiredness and drowsiness that have gotten much worse since he had quadruple bypass in 2023.  Wife reports snoring and he appears to be gasping seems to have breathing pauses during sleep of several years duration.  Huan is un aware of snoring or breathing difficulties during sleep but sleep is interrupted several times because of nocturia.  Other complaints: None. Restless Leg Syndrome: has typical symptoms occurring 1-2 times a week; he also reports symptoms due to diabetic peripheral neuropathy.  Parasomnia: no features reported    Sleep Routine (averaged): Typical Bedtime: 10:30 PM.  Gets OOB: 6:30 AM. TIB:8 hrs.   Sleep latency:<  30 minutes; Sleep Interruptions: 2-3, because of nocturia and able to fall back asleep.  "Awakens: Needing someone to arouse   Upon awakening: never feels rested.  He estimates getting hrs sleep.  Daytime Function:Huan reports excessive daytime sleepiness dozes off when sedentary at work and at home. He rated himself at Total score: 11 /24 on the [place] Sleepiness Scale.     Habits:   reports that he has never smoked. He has never used smokeless tobacco.;  reports no history of alcohol use.; Reports no history of drug use.;  E-Cigarette/Vaping  Never User; Caffeine use:none; Exercise routine: regular.    Occupation:     Family History: Negative for sleep disturbance.  ROS: [Significant for around 45 pounds weight reduction since his bypass.  He reported no nasal, respiratory or cardiac symptoms.      EXAM:  /70   Pulse 77   Resp 16   Ht 5' 10\" (1.778 m)   Wt 87.5 kg (193 lb)   SpO2 99%   BMI 27.69 kg/m²    General: Well groomed male, well appearing, in no apparent distress.   Neurological: Alert and orientated; cooperative; Cranial nerves intact;    Psychiatric: Speech: Clear and coherent; constricted affect  Skin: Warm and dry; Color& Hydration good; no facial rashes or lesions   HEENT:  Craniofacial anatomy: prognathia Sinuses: Non-tender. TMJ: Normal    Eyes: EOM's intact; conjunctiva/corneas clear   Ears: Appear normal     Nasal Airway: narrow nares Septum: Intact; Turbinates: Normal; Rhinorrhea: None  Mouth: Lips: Normal posture; Dentition: normal . Mucosa: Moist; Hard Palate:normal    Oropharryx: crowded and AP narrowing Tongue: Mallampati:Class IV and MobileSoft Palate:  redundant  Tonsils: absent  Neck:; neck Circumference: 15.5 \"; supple; no abnormal masses; Thyroid: Normal. Trachea: Central.    Lymph: No cervical Lymhadenopathy  Heart: S1,S2 normal; RRR; no gallop; no murmur   Lungs: Respiratory Effort: Normal. Air entry good bilaterally.  No wheezes.  No rales  Abdomen:  Soft & non-tender    Extremities: No pedal edema.  No clubbing or cyanosis.    Musculoskeletal:  Motor normal; " Gait: Normal.       Last CMP demonstrated CO2 of 29 mmol/L, elevated fasting glucose but otherwise unremarkable.  CBC was normal.    IMPRESSION: Primary/Secondary Sleep Diagnoses (to Medical or Psych conditions) & Comorbidities   1. Obstructive sleep apnea syndrome  Ambulatory Referral to Sleep Medicine    Home Study      2. Essential hypertension  Ambulatory Referral to Sleep Medicine      3. Wide-complex tachycardia  Ambulatory Referral to Sleep Medicine      4. Snoring  Ambulatory Referral to Sleep Medicine      5. Daytime sleepiness  Ambulatory Referral to Sleep Medicine    Home Study      6. Claustrophobia        7. Coronary artery disease involving native coronary artery of native heart without angina pectoris        8. Diabetic polyneuropathy associated with type 2 diabetes mellitus (HCC)        9. Overweight (BMI 25.0-29.9)             PLAN:   1. With respect to above conditions, comprehensive counseling provided on pathophysiology; effects on symptoms and comorbidities, diagnostic strategies & limitations; treatment options; risks or no treament; risks & benefits of the various therapeutic options; costs and insurance aspects.     2. I advised weight reduction, avoiding sleeping supine, using alcohol or sedating medications close to bed time and on safe driving practices.    3. Further evaluation is indicated and a sleep study will be scheduled.  Patient understands limitations of home sleep testing and in lab study may be necessary.  4.  Patient is unwilling to try Positive airway pressure therapy.  He is claustrophobic and feels he will not be able to sleep wearing a mask.   5. Follow-up to be scheduled after testing/initiation of therapy to review results, further details of treatment options and to adjust therapy.    Sincerely,      Authenticated electronically on 06/19/24   Board Certified Specialist     Portions of the record may have been created with voice recognition software. Occasional wrong word  "or \"sound a like\" substitutions may have occurred due to the inherent limitations of voice recognition software. There may also be notations and random deletions of words or characters from malfunctioning software. Read the chart carefully and recognize, using context, where substitutions/deletions have occurred.     "

## 2024-07-12 ENCOUNTER — OFFICE VISIT (OUTPATIENT)
Dept: PODIATRY | Facility: CLINIC | Age: 64
End: 2024-07-12
Payer: COMMERCIAL

## 2024-07-12 VITALS
HEIGHT: 70 IN | WEIGHT: 189 LBS | BODY MASS INDEX: 27.06 KG/M2 | SYSTOLIC BLOOD PRESSURE: 122 MMHG | DIASTOLIC BLOOD PRESSURE: 72 MMHG

## 2024-07-12 DIAGNOSIS — L97.521 DIABETIC ULCER OF TOE OF LEFT FOOT ASSOCIATED WITH TYPE 2 DIABETES MELLITUS, LIMITED TO BREAKDOWN OF SKIN (HCC): Primary | ICD-10-CM

## 2024-07-12 DIAGNOSIS — E11.621 DIABETIC ULCER OF TOE OF LEFT FOOT ASSOCIATED WITH TYPE 2 DIABETES MELLITUS, LIMITED TO BREAKDOWN OF SKIN (HCC): Primary | ICD-10-CM

## 2024-07-12 DIAGNOSIS — E11.40 TYPE 2 DIABETES MELLITUS WITH DIABETIC NEUROPATHY, WITHOUT LONG-TERM CURRENT USE OF INSULIN (HCC): ICD-10-CM

## 2024-07-12 DIAGNOSIS — B35.1 ONYCHOMYCOSIS: ICD-10-CM

## 2024-07-12 PROCEDURE — 97597 DBRDMT OPN WND 1ST 20 CM/<: CPT | Performed by: PODIATRIST

## 2024-07-12 PROCEDURE — 99203 OFFICE O/P NEW LOW 30 MIN: CPT | Performed by: PODIATRIST

## 2024-07-12 NOTE — PROGRESS NOTES
"Ambulatory Visit  Name: John Obleschuk      : 1960      MRN: [id number]  Encounter Provider: Dimitry Sotomayor DPM  Encounter Date: 2024   Encounter department: [facility] PODIATRY [place]    Assessment & Plan   1. Diabetic ulcer of toe of left foot associated with type 2 diabetes mellitus, limited to breakdown of skin (HCC)  -     Post Op Shoe  -     VAS ARTERIAL DUPLEX- LOWER LIMB BILATERAL; Future; Expected date: 2024  -     Debridement  -     Ambulatory Referral to Wound Care; Future  2. Type 2 diabetes mellitus with diabetic neuropathy, without long-term current use of insulin (HCC)  -     VAS ARTERIAL DUPLEX- LOWER LIMB BILATERAL; Future; Expected date: 2024  3. Onychomycosis    Discussed proper wound care bandaging technique with patient in detail.  Instructed to call immediately if any signs of infection are noted.  Rx postop shoe: Dispensed to appropriately alleviate friction and pressure from the tip of left great toe secondary to shoe gear irritation which may be contributing to the development of this ulceration.  Nonpalpable pedal pulses with a history of diabetes for 15 to 20 years it is recommended that patient obtain an arterial duplex to further evaluate his underlying arterial perfusion.  Has significant past medical history of cardiovascular disease.  Rx arterial duplex bilateral  Selective debridement of ulceration as noted below with 10 blade.  Dry sterile dressing with Dermagran gauze applied left great toe.  Every other day dressing change with Dermagran gauze.  F/U @ [facility]      Debridement    Universal Protocol:  Consent: Verbal consent obtained.  Risks and benefits: risks, benefits and alternatives were discussed  Consent given by: patient  Time out: Immediately prior to procedure a \"time out\" was called to verify the correct patient, procedure, equipment, support staff and site/side marked as required.  Patient understanding: patient states understanding of the " procedure being performed  Patient identity confirmed: verbally with patient    Debridement Details  Performed by: physician  Debridement type: selective  Pain control: none      Post-debridement measurements  Length (cm): 1  Width (cm): 1  Depth (cm): 0.1  Percent debrided: 100%  Surface Area (cm^2): 1  Area Debrided (cm^2): 1  Volume (cm^3): 0.1    Devitalized tissue debrided: callus and slough  Instrument(s) utilized: blade  Bleeding: small  Hemostasis obtained with: pressure  Procedural pain (0-10): insensate  Post-procedural pain: insensate   Response to treatment: procedure was tolerated well  Debridement Comments: Left great toe        History of Present Illness     John Obleschuk is a 64 y.o. male who presents left great toe ulcer which patient reports started bleeding last Thursday.  States it was open for the past 2 to 3 weeks and he has been bandaging it with Neosporin.  Denies any trauma or injury.  Past medical history of aneurysm repair, ASCVD, CABG x 5, and less than optimally controlled diabetes.  Patient also reports that his feet are numb and tingly.    Review of Systems   Constitutional: Negative.    HENT: Negative.     Eyes: Negative.    Respiratory: Negative.     Cardiovascular: Negative.    Gastrointestinal: Negative.    Endocrine: Negative.    Genitourinary: Negative.    Musculoskeletal: Negative.    Skin:  Positive for wound.        Tip of left great toe diabetic ulceration   Allergic/Immunologic: Negative.    Neurological: Negative.    Hematological: Negative.    Psychiatric/Behavioral: Negative.       Past Medical History   Past Medical History:   Diagnosis Date   • Coronary artery disease    • Diabetes mellitus (HCC)    • Hyperlipidemia    • Hypertension      Past Surgical History:   Procedure Laterality Date   • ARTERIAL ANEURYSM REPAIR      Right Carotid Aneurysm - age 15.   • CARDIAC CATHETERIZATION Left 08/02/2023    Procedure: Cardiac Left Heart Cath;  Surgeon: Luisito Inman  ;  Location: [facility];  Service: Cardiology   • CARDIAC CATHETERIZATION N/A 08/02/2023    Procedure: Cardiac Coronary Angiogram;  Surgeon: Luisito Inman DO;  Location: [facility];  Service: Cardiology   • CARDIAC CATHETERIZATION  08/02/2023    Procedure: Cardiac catheterization;  Surgeon: Luisito Inman DO;  Location: [facility];  Service: Cardiology   • CARDIAC ELECTROPHYSIOLOGY PROCEDURE N/A 2/21/2024    Procedure: Cardiac loop recorder implant;  Surgeon: Luke Siu MD;  Location: [facility];  Service: Cardiology   • HERNIA REPAIR     • AL CORONARY ARTERY BYP W/VEIN & ARTERY GRAFT 4 VEIN N/A 8/11/2023    Procedure: CORONARY ARTERY BYPASS GRAFT (CABG) X-5 VESSELS ; SVG to PDA, Ramus, Diagonal and OM. LIMA --> LAD EVH  W/ JOHN;  Surgeon: Soham Hodgson MD;  Location: [facility] MA[facility];  Service: Cardiac Surgery   • VASECTOMY       Family History   Problem Relation Age of Onset   • Leukemia Mother    • Cancer Mother    • Heart attack Father 52     Current Outpatient Medications on File Prior to Visit   Medication Sig Dispense Refill   • acetaminophen (TYLENOL) 325 mg tablet Take 1-2 tablets Q4-6 hours PRN pain. Do not take more than 4grams in 24 hours.  0   • aspirin 325 mg tablet Take 1 tablet (325 mg total) by mouth daily 30 tablet 2   • cholecalciferol (VITAMIN D3) 25 mcg (1,000 units) tablet Take 1,000 Units by mouth 2 (two) times a day     • Continuous Glucose Sensor (FreeStyle Ayanna 2 Sensor) MISC CHANGE SENSOR EVERY 14 DAYS 2 each 5   • docusate sodium (COLACE) 100 mg capsule Take 1 capsule (100 mg total) by mouth 2 (two) times a day Hold for soft stools. 60 capsule 0   • dofetilide (TIKOSYN) 500 mcg capsule Take 1 capsule (500 mcg total) by mouth every 12 (twelve) hours 180 capsule 3   • gabapentin (NEURONTIN) 100 mg capsule TAKE 1 CAPSULE BY MOUTH IN THE MORNING AND 3 CAPSULES IN THE EVENING 120 capsule 2   • glipiZIDE (GLUCOTROL) 5 mg tablet Take 5 mg by  "mouth in the morning     • Jardiance 25 MG TABS Take 1 tablet (25 mg total) by mouth in the morning 90 tablet 3   • LORazepam (ATIVAN) 0.5 mg tablet Take 1 tablet (0.5 mg total) by mouth daily at bedtime 30 tablet 3   • Magnesium Gluconate 250 MG TABS Take by mouth daily at bedtime     • metFORMIN (GLUCOPHAGE-XR) 500 mg 24 hr tablet Take 2 tablets (1,000 mg total) by mouth 2 (two) times a day 360 tablet 3   • metoprolol succinate (TOPROL-XL) 25 mg 24 hr tablet Take 1 tablet (25 mg total) by mouth daily 90 tablet 3   • Ozempic, 2 MG/DOSE, 8 MG/3ML injection pen Inject 0.75 mL (2 mg total) under the skin every 7 days 9 mL 3   • potassium chloride (K-DUR,KLOR-CON) 10 mEq tablet Take 1 tablet (10 mEq total) by mouth daily 90 tablet 3   • rosuvastatin (CRESTOR) 20 MG tablet TAKE TWO TABLETS BY MOUTH EVERY DAY 90 tablet 3   • sertraline (ZOLOFT) 50 mg tablet TAKE ONE TABLET BY MOUTH EVERY DAY 30 tablet 5   • tadalafil (CIALIS) 20 MG tablet Take 1 tablet (20 mg total) by mouth daily as needed for erectile dysfunction 10 tablet 11   • neomycin-polymyxin-hydrocortisone (CORTISPORIN) 0.35%-10,000 units/mL-1% otic suspension Administer 3 drops to the right ear 4 (four) times a day for 5 days 3 mL 0     No current facility-administered medications on file prior to visit.     Allergies   Allergen Reactions   • Atorvastatin Other (See Comments)     felt flu-like   • Rosiglitazone Other (See Comments)   • Trazodone Other (See Comments)     Felt \"hung over\"      Objective     /72 (BP Location: Left arm, Patient Position: Sitting, Cuff Size: Adult)   Ht 5' 10\" (1.778 m) Comment: STATED  Wt 85.7 kg (189 lb)   BMI 27.12 kg/m²     Physical Exam  Vitals and nursing note reviewed.   Constitutional:       General: He is not in acute distress.     Appearance: He is well-developed.   HENT:      Head: Normocephalic and atraumatic.   Eyes:      Conjunctiva/sclera: Conjunctivae normal.   Cardiovascular:      Rate and Rhythm: Normal " rate and regular rhythm.      Pulses:           Dorsalis pedis pulses are 0 on the right side and 0 on the left side.        Posterior tibial pulses are 0 on the right side and 0 on the left side.      Heart sounds: No murmur heard.  Pulmonary:      Effort: Pulmonary effort is normal. No respiratory distress.      Breath sounds: Normal breath sounds.   Abdominal:      Palpations: Abdomen is soft.      Tenderness: There is no abdominal tenderness.   Musculoskeletal:         General: No swelling.      Cervical back: Neck supple.   Feet:      Right foot:      Protective Sensation: 10 sites tested.  9 sites sensed.      Left foot:      Protective Sensation: 10 sites tested.  8 sites sensed.      Skin integrity: Ulcer (See comments:) present.      Comments: Left great toe: As above partial depth of ulceration measuring 1.0 x 1.0 x 0.1 cm.,  Predominantly fibrous slough with yellow base with  margins around the periphery.  No signs of infection.  Skin:     General: Skin is warm and dry.      Capillary Refill: Capillary refill takes less than 2 seconds.   Neurological:      Mental Status: He is alert.      Sensory: Sensory deficit present.      Comments: No vibratory sensation appreciated, monofilament intact, numb tingling paresthesias bilateral.   Psychiatric:         Mood and Affect: Mood normal.       Administrative Statements

## 2024-07-18 DIAGNOSIS — E11.42 DIABETIC POLYNEUROPATHY ASSOCIATED WITH TYPE 2 DIABETES MELLITUS (HCC): ICD-10-CM

## 2024-07-18 RX ORDER — GABAPENTIN 100 MG/1
CAPSULE ORAL
Qty: 120 CAPSULE | Refills: 2 | Status: SHIPPED | OUTPATIENT
Start: 2024-07-18

## 2024-08-01 DIAGNOSIS — F32.0 CURRENT MILD EPISODE OF MAJOR DEPRESSIVE DISORDER WITHOUT PRIOR EPISODE (HCC): ICD-10-CM

## 2024-08-05 DIAGNOSIS — I25.10 CORONARY ARTERY DISEASE INVOLVING NATIVE CORONARY ARTERY: ICD-10-CM

## 2024-08-05 RX ORDER — ROSUVASTATIN CALCIUM 20 MG/1
40 TABLET, COATED ORAL DAILY
Qty: 90 TABLET | Refills: 1 | Status: SHIPPED | OUTPATIENT
Start: 2024-08-05

## 2024-08-07 ENCOUNTER — HOSPITAL ENCOUNTER (OUTPATIENT)
Dept: SLEEP CENTER | Facility: CLINIC | Age: 64
Discharge: HOME | End: 2024-08-07
Payer: COMMERCIAL

## 2024-08-07 DIAGNOSIS — R40.0 DAYTIME SLEEPINESS: ICD-10-CM

## 2024-08-07 DIAGNOSIS — G47.33 OBSTRUCTIVE SLEEP APNEA SYNDROME: ICD-10-CM

## 2024-08-07 PROCEDURE — G0399 HOME SLEEP TEST/TYPE 3 PORTA: HCPCS

## 2024-08-13 ENCOUNTER — OFFICE VISIT (OUTPATIENT)
Dept: WOUND CARE | Facility: HOSPITAL | Age: 64
End: 2024-08-13
Payer: COMMERCIAL

## 2024-08-13 VITALS
SYSTOLIC BLOOD PRESSURE: 117 MMHG | DIASTOLIC BLOOD PRESSURE: 75 MMHG | HEIGHT: 70 IN | HEART RATE: 84 BPM | RESPIRATION RATE: 16 BRPM | WEIGHT: 192 LBS | TEMPERATURE: 97.3 F | BODY MASS INDEX: 27.49 KG/M2

## 2024-08-13 DIAGNOSIS — E11.42 DIABETIC POLYNEUROPATHY ASSOCIATED WITH TYPE 2 DIABETES MELLITUS (HCC): ICD-10-CM

## 2024-08-13 DIAGNOSIS — L97.521 DIABETIC ULCER OF TOE OF LEFT FOOT ASSOCIATED WITH TYPE 2 DIABETES MELLITUS, LIMITED TO BREAKDOWN OF SKIN (HCC): Primary | ICD-10-CM

## 2024-08-13 DIAGNOSIS — E11.621 DIABETIC ULCER OF TOE OF LEFT FOOT ASSOCIATED WITH TYPE 2 DIABETES MELLITUS, LIMITED TO BREAKDOWN OF SKIN (HCC): Primary | ICD-10-CM

## 2024-08-13 PROCEDURE — 99213 OFFICE O/P EST LOW 20 MIN: CPT | Performed by: PODIATRIST

## 2024-08-13 PROCEDURE — 11042 DBRDMT SUBQ TIS 1ST 20SQCM/<: CPT | Performed by: PODIATRIST

## 2024-08-13 RX ORDER — LIDOCAINE 40 MG/G
CREAM TOPICAL ONCE
Status: COMPLETED | OUTPATIENT
Start: 2024-08-13 | End: 2024-08-13

## 2024-08-13 RX ADMIN — LIDOCAINE: 40 CREAM TOPICAL at 08:35

## 2024-08-13 NOTE — PROGRESS NOTES
Patient ID: John Obleschuk is a 64 y.o. male Date of Birth 1960       Chief Complaint   Patient presents with    New Patient Visit     Left foot wound       Allergies  Atorvastatin, Rosiglitazone, and Trazodone    Diagnosis:  1. Diabetic ulcer of toe of left foot associated with type 2 diabetes mellitus, limited to breakdown of skin ([facility])  -     Wound cleansing and dressings Diabetic Ulcer Left Toe D1, great; Future  -     lidocaine (LMX) 4 % cream  -     Wound Procedure Treatment Diabetic Ulcer Left Toe D1, great  2. Diabetic polyneuropathy associated with type 2 diabetes mellitus ([facility])  -     lidocaine (LMX) 4 % cream      Diagnosis ICD-10-CM Associated Orders   1. Diabetic ulcer of toe of left foot associated with type 2 diabetes mellitus, limited to breakdown of skin ([facility])  E11.621 Wound cleansing and dressings Diabetic Ulcer Left Toe D1, great    L97.521 lidocaine (LMX) 4 % cream     Wound Procedure Treatment Diabetic Ulcer Left Toe D1, great      2. Diabetic polyneuropathy associated with type 2 diabetes mellitus ([facility])  E11.42 lidocaine (LMX) 4 % cream           Assessment & Plan:  Diabetic Chester grade 2 ulceration distal tip left great toe, wound was debrided through subcuticular tissues and dressed with Medihoney dry dressing, patient will do the same daily, he is to use a cast cover, do not get foot wet in shower.  Patient has a surgical shoe, he states he has difficulty with balance and walking, I recommend using a cane or wear an open toed slipper or slide or sandal, he asked about wearing his sneakers, I asked him to avoid doing that as there will be increased pressure to the area of the wound.    He states he is going to the beach next week, I explained to him it is important to use a cast cover, do not get sand in the wound, do not go in the ocean at this that increases his risk for infection.  Patient's pulses are nonpalpable, they are monophasic on Doppler, he does have lower extremity arterial  "Doppler scheduled as ordered by Dr. Sotomayor in the upcoming weeks.  I explained to him the importance of keeping that appointment.  I personally reviewed patient's medical records, recent podiatry note, PCP notes, pertinent blood work and imaging.  Today reviewed frequent elevation, low-sodium diet, proper protein intake, proper glycemic control, strict pressure and friction reduction with patient.  He understands and agrees with the plan and will follow-up in 1 week.      Debridement   Wound 08/13/24 Diabetic Ulcer Toe D1, great Left    Universal Protocol:  procedure performed by consultantConsent: Verbal consent obtained.  Risks and benefits: risks, benefits and alternatives were discussed  Consent given by: patient  Time out: Immediately prior to procedure a \"time out\" was called to verify the correct patient, procedure, equipment, support staff and site/side marked as required.  Patient understanding: patient states understanding of the procedure being performed  Patient identity confirmed: verbally with patient    Debridement Details  Performed by: physician  Debridement type: surgical  Level of debridement: subcutaneous tissue  Pain control: lidocaine 4%      Post-debridement measurements  Length (cm): 1.1  Width (cm): 1.3  Depth (cm): 0.2  Percent debrided: 100%  Surface Area (cm^2): 1.43  Area Debrided (cm^2): 1.43  Volume (cm^3): 0.29    Tissue and other material debrided: subcutaneous tissue  Devitalized tissue debrided: biofilm, callus, fibrin, necrotic debris, slough and eschar  Instrument(s) utilized: blade and forceps  Bleeding: small  Hemostasis obtained with: pressure  Procedural pain (0-10): insensate  Post-procedural pain: insensate   Response to treatment: procedure was tolerated well               Subjective:   Huan presents today upon referral from Dr. Sotomayor for care of left great toe diabetic ulceration which started about 2 weeks ago when he wore a pair of dress shoes, wound started as a " blister and then became bigger.  Current treatment is Dermagran gauze dry dressing.  Patient presents today wearing surgical shoe, he states he has been using a bag when he showers to keep the foot dry.  He states that sugars are okay, he does not complain of any nausea, vomiting, fever, chills.        The following portions of the patient's history were reviewed and updated as appropriate:   Patient Active Problem List   Diagnosis    Essential hypertension    Type 2 diabetes mellitus with hyperglycemia, without long-term current use of insulin (HCC)    Mixed hyperlipidemia    Sleep apnea    Coronary artery disease involving native coronary artery    S/P CABG x 5    Depressive disorder    Myalgia and myositis    Right knee pain    Vitamin D deficiency    Diabetic polyneuropathy associated with type 2 diabetes mellitus (HCC)    Wide-complex tachycardia    Encounter for annual physical exam    Diabetic ulcer of toe of left foot associated with type 2 diabetes mellitus, limited to breakdown of skin (HCC)     Past Medical History:   Diagnosis Date    Coronary artery disease     Diabetes mellitus (HCC)     Hyperlipidemia     Hypertension      Past Surgical History:   Procedure Laterality Date    ARTERIAL ANEURYSM REPAIR      Right Carotid Aneurysm - age 15.    CARDIAC CATHETERIZATION Left 08/02/2023    Procedure: Cardiac Left Heart Cath;  Surgeon: Luisito Inman DO;  Location: [facility];  Service: Cardiology    CARDIAC CATHETERIZATION N/A 08/02/2023    Procedure: Cardiac Coronary Angiogram;  Surgeon: Luisito Inman DO;  Location: [facility];  Service: Cardiology    CARDIAC CATHETERIZATION  08/02/2023    Procedure: Cardiac catheterization;  Surgeon: Luisito Inman DO;  Location: [facility];  Service: Cardiology    CARDIAC ELECTROPHYSIOLOGY PROCEDURE N/A 2/21/2024    Procedure: Cardiac loop recorder implant;  Surgeon: Luke Siu MD;  Location: [facility];   Service: Cardiology    HERNIA REPAIR      IA CORONARY ARTERY BYP W/VEIN & ARTERY GRAFT 4 VEIN N/A 8/11/2023    Procedure: CORONARY ARTERY BYPASS GRAFT (CABG) X-5 VESSELS ; SVG to PDA, Ramus, Diagonal and OM. LIMA --> LAD EVH  W/ JOHN;  Surgeon: Soham Hodgson MD;  Location: [facility];  Service: Cardiac Surgery    VASECTOMY       Social History     Socioeconomic History    Marital status: /Civil Union     Spouse name: None    Number of children: None    Years of education: None    Highest education level: None   Occupational History    None   Tobacco Use    Smoking status: Never    Smokeless tobacco: Never   Vaping Use    Vaping status: Never Used   Substance and Sexual Activity    Alcohol use: No    Drug use: No    Sexual activity: Yes     Partners: Female   Other Topics Concern    None   Social History Narrative    None     Social Determinants of Health     Financial Resource Strain: Patient Declined (8/24/2023)    Received from [facility]    Overall Financial Resource Strain (CARDIA)     Difficulty of Paying Living Expenses: Patient declined   Food Insecurity: Patient Declined (8/24/2023)    [facility]    Hunger Vital Sign     Worried About Running Out of Food in the Last Year: Patient declined     Ran Out of Food in the Last Year: Patient declined   Transportation Needs: No Transportation Needs (8/24/2023)    Received from [facility]    PRAPARE - Transportation     Lack of Transportation (Medical): No     Lack of Transportation (Non-Medical): No   Physical Activity: Sufficiently Active (12/20/2023)    Exercise Vital Sign     Days of Exercise per Week: 3 days     Minutes of Exercise per Session: 60 min   Stress: Stress Concern Present (12/20/2023)    [employer] - Occupational Stress Questionnaire     Feeling of Stress :  Rather much   Social Connections: Unknown (12/20/2023)    Social Connection and Isolation Panel [NHANES]     Frequency of Communication with Friends and Family: More than three times a week     Frequency of Social Gatherings with Friends and Family: More than three times a week     Attends Caodaism Services: Patient declined     Active Member of Clubs or Organizations: Patient declined     Attends Club or Organization Meetings: Patient declined     Marital Status:    Intimate Partner Violence: Not At Risk (12/20/2023)    Humiliation, Afraid, Rape, and Kick questionnaire     Fear of Current or Ex-Partner: No     Emotionally Abused: No     Physically Abused: No     Sexually Abused: No   Housing Stability: Unknown (8/24/2023)    [facility]    Housing Stability Vital Sign     Unable to Pay for Housing in the Last Year: Patient refused     Number of Places Lived in the Last Year: Not on file     Unstable Housing in the Last Year: Patient refused        Current Outpatient Medications:     acetaminophen (TYLENOL) 325 mg tablet, Take 1-2 tablets Q4-6 hours PRN pain. Do not take more than 4grams in 24 hours., Disp: , Rfl: 0    aspirin 325 mg tablet, Take 1 tablet (325 mg total) by mouth daily, Disp: 30 tablet, Rfl: 2    cholecalciferol (VITAMIN D3) 25 mcg (1,000 units) tablet, Take 1,000 Units by mouth 2 (two) times a day, Disp: , Rfl:     Continuous Glucose Sensor (FreeStyle Ayanna 2 Sensor) MISC, CHANGE SENSOR EVERY 14 DAYS, Disp: 2 each, Rfl: 5    docusate sodium (COLACE) 100 mg capsule, Take 1 capsule (100 mg total) by mouth 2 (two) times a day Hold for soft stools., Disp: 60 capsule, Rfl: 0    dofetilide (TIKOSYN) 500 mcg capsule, Take 1 capsule (500 mcg total) by mouth every 12 (twelve) hours, Disp: 180 capsule, Rfl: 3    gabapentin (NEURONTIN) 100 mg capsule, TAKE 1 CAPSULE BY MOUTH IN THE MORNING AND 3 CAPSULES IN THE EVENING, Disp: 120 capsule,  Rfl: 2    glipiZIDE (GLUCOTROL) 5 mg tablet, Take 5 mg by mouth in the morning, Disp: , Rfl:     Jardiance 25 MG TABS, Take 1 tablet (25 mg total) by mouth in the morning, Disp: 90 tablet, Rfl: 3    LORazepam (ATIVAN) 0.5 mg tablet, Take 1 tablet (0.5 mg total) by mouth daily at bedtime, Disp: 30 tablet, Rfl: 3    Magnesium Gluconate 250 MG TABS, Take by mouth daily at bedtime, Disp: , Rfl:     metFORMIN (GLUCOPHAGE-XR) 500 mg 24 hr tablet, Take 2 tablets (1,000 mg total) by mouth 2 (two) times a day, Disp: 360 tablet, Rfl: 3    metoprolol succinate (TOPROL-XL) 25 mg 24 hr tablet, Take 1 tablet (25 mg total) by mouth daily, Disp: 90 tablet, Rfl: 3    neomycin-polymyxin-hydrocortisone (CORTISPORIN) 0.35%-10,000 units/mL-1% otic suspension, Administer 3 drops to the right ear 4 (four) times a day for 5 days, Disp: 3 mL, Rfl: 0    Ozempic, 2 MG/DOSE, 8 MG/3ML injection pen, Inject 0.75 mL (2 mg total) under the skin every 7 days, Disp: 9 mL, Rfl: 3    potassium chloride (K-DUR,KLOR-CON) 10 mEq tablet, Take 1 tablet (10 mEq total) by mouth daily, Disp: 90 tablet, Rfl: 3    rosuvastatin (CRESTOR) 20 MG tablet, TAKE TWO TABLETS BY MOUTH EVERY DAY, Disp: 90 tablet, Rfl: 1    sertraline (ZOLOFT) 50 mg tablet, TAKE ONE TABLET BY MOUTH EVERY DAY, Disp: 30 tablet, Rfl: 5    tadalafil (CIALIS) 20 MG tablet, Take 1 tablet (20 mg total) by mouth daily as needed for erectile dysfunction, Disp: 10 tablet, Rfl: 11  No current facility-administered medications for this visit.  Family History   Problem Relation Age of Onset    Leukemia Mother     Cancer Mother     Heart attack Father 52       Review of Systems   Constitutional:  Negative for chills and fever.   HENT:  Negative for ear pain and sore throat.    Eyes:  Negative for pain and visual disturbance.   Respiratory:  Negative for cough and shortness of breath.    Cardiovascular:  Negative for chest pain and palpitations.   Gastrointestinal:  Negative for abdominal pain and  "vomiting.   Genitourinary:  Negative for dysuria and hematuria.   Musculoskeletal:  Positive for gait problem. Negative for arthralgias and back pain.   Skin:  Positive for color change and wound. Negative for rash.   Neurological:  Positive for numbness. Negative for seizures and syncope.   Psychiatric/Behavioral: Negative.     All other systems reviewed and are negative.      Objective:  /75   Pulse 84   Temp (!) 97.3 °F (36.3 °C)   Resp 16   Ht 5' 10\" (1.778 m)   Wt 87.1 kg (192 lb)   BMI 27.55 kg/m²     Physical Exam  Constitutional:       Appearance: Normal appearance. He is normal weight.   HENT:      Head: Normocephalic and atraumatic.      Right Ear: External ear normal.      Left Ear: External ear normal.      Nose: Nose normal.      Mouth/Throat:      Mouth: Mucous membranes are moist.      Pharynx: Oropharynx is clear.   Eyes:      Conjunctiva/sclera: Conjunctivae normal.      Pupils: Pupils are equal, round, and reactive to light.   Cardiovascular:      Pulses:           Dorsalis pedis pulses are detected w/ Doppler on the right side and detected w/ Doppler on the left side.        Posterior tibial pulses are detected w/ Doppler on the right side and detected w/ Doppler on the left side.   Pulmonary:      Effort: Pulmonary effort is normal.   Musculoskeletal:      Cervical back: Normal range of motion.      Right lower leg: No edema.      Left lower leg: No edema.   Skin:     General: Skin is warm and dry.      Capillary Refill: Capillary refill takes less than 2 seconds.   Neurological:      General: No focal deficit present.      Mental Status: He is alert and oriented to person, place, and time. Mental status is at baseline.      Sensory: Sensory deficit present.      Coordination: Coordination abnormal.      Gait: Gait abnormal.      Deep Tendon Reflexes: Reflexes abnormal.   Psychiatric:         Mood and Affect: Mood normal.         Behavior: Behavior normal.         Thought Content: " Thought content normal.         Judgment: Judgment normal.             Wound 08/13/24 Diabetic Ulcer Toe D1, great Left (Active)   Enter Chester score: Chester Grade 2: Deep ulcer extended to ligament, tendon, joint capsule, bone, or deep fascia without abscess or osteomyelitis (OM) 08/13/24 0826   Wound Image Images linked 08/13/24 0849   Wound Description Brown;Eschar;Pink;Granulation tissue 08/13/24 0826   Pat-wound Assessment Intact;Callus 08/13/24 0826   Wound Length (cm) 0.9 cm 08/13/24 0826   Wound Width (cm) 1.2 cm 08/13/24 0826   Wound Depth (cm) 0.1 cm 08/13/24 0826   Wound Surface Area (cm^2) 1.08 cm^2 08/13/24 0826   Wound Volume (cm^3) 0.108 cm^3 08/13/24 0826   Calculated Wound Volume (cm^3) 0.11 cm^3 08/13/24 0826   Drainage Amount Small 08/13/24 0826   Drainage Description Serosanguineous 08/13/24 0826   Non-staged Wound Description Full thickness 08/13/24 0826   Dressing Status Intact 08/13/24 0826                 Wound Instructions:  Orders Placed This Encounter   Procedures    Wound cleansing and dressings Diabetic Ulcer Left Toe D1, great     Wound location: Left great toe  Change dressing daily  The dressing must stay dry and intact. You may shower with dressing protected by cast cover or bag. Or you may sponge bathe. Call wound center or home health nurse if dressing becomes wet.   Cleanse the wound with mild soap and water or NSS, pat dry.   Apply Medihoney to wound bed  Cover with gauze  Secure with tape    Off-loading Instructions:  Keep weight and pressure off wound at all times.   Wear off-loading device ( open toe surgical shoe) as directed by your physician.   Put on immediately when rising in the morning and remove when going to bed.     Standing Status:   Future     Standing Expiration Date:   8/20/2024    Wound Procedure Treatment Diabetic Ulcer Left Toe D1, great     This order was created via procedure documentation         Cheryle Cotton, ELVIRA,DPM, FACFAS    Portions of the record  "may have been created with voice recognition software. Occasional wrong word or \"sound a like\" substitutions may have occurred due to the inherent limitations of voice recognition software. Read the chart carefully and recognize, using context, where substitutions have occurred.      "

## 2024-08-13 NOTE — PATIENT INSTRUCTIONS
Orders Placed This Encounter   Procedures    Wound cleansing and dressings Diabetic Ulcer Left Toe D1, great     Wound location: Left great toe  Change dressing daily  The dressing must stay dry and intact. You may shower with dressing protected by cast cover or bag. Or you may sponge bathe. Call wound center or home health nurse if dressing becomes wet.   Cleanse the wound with mild soap and water or NSS, pat dry.   Apply Medihoney to wound bed  Cover with gauze  Secure with tape    Off-loading Instructions:  Keep weight and pressure off wound at all times.   Wear off-loading device ( open toe surgical shoe) as directed by your physician.   Put on immediately when rising in the morning and remove when going to bed.     Standing Status:   Future     Standing Expiration Date:   8/20/2024

## 2024-08-13 NOTE — PROGRESS NOTES
Wound Procedure Treatment Diabetic Ulcer Left Toe D1, great    Performed by: Brittani Daniels RN  Authorized by: Cheryle Cotton DPM    Associated wounds:   Wound 08/13/24 Diabetic Ulcer Toe D1, great Left  Wound cleansed with:  NSS  Applied Topical: Medihoney gel    Applied secondary dressing:  Gauze  Dressing secured with:  Tape and Surgilast  Offloading device appllied:  Surgical shoe

## 2024-08-20 ENCOUNTER — APPOINTMENT (OUTPATIENT)
Dept: LAB | Facility: CLINIC | Age: 64
End: 2024-08-20
Payer: COMMERCIAL

## 2024-08-20 ENCOUNTER — OFFICE VISIT (OUTPATIENT)
Dept: WOUND CARE | Facility: HOSPITAL | Age: 64
End: 2024-08-20
Payer: COMMERCIAL

## 2024-08-20 ENCOUNTER — HOSPITAL ENCOUNTER (OUTPATIENT)
Dept: NON INVASIVE DIAGNOSTICS | Facility: CLINIC | Age: 64
Discharge: HOME | End: 2024-08-20
Payer: COMMERCIAL

## 2024-08-20 ENCOUNTER — HOSPITAL ENCOUNTER (OUTPATIENT)
Dept: RADIOLOGY | Facility: HOSPITAL | Age: 64
Discharge: HOME | End: 2024-08-20
Attending: PODIATRIST
Payer: COMMERCIAL

## 2024-08-20 VITALS
TEMPERATURE: 96.5 F | HEART RATE: 88 BPM | SYSTOLIC BLOOD PRESSURE: 117 MMHG | RESPIRATION RATE: 16 BRPM | DIASTOLIC BLOOD PRESSURE: 73 MMHG

## 2024-08-20 DIAGNOSIS — L97.521 DIABETIC ULCER OF TOE OF LEFT FOOT ASSOCIATED WITH TYPE 2 DIABETES MELLITUS, LIMITED TO BREAKDOWN OF SKIN (HCC): Primary | ICD-10-CM

## 2024-08-20 DIAGNOSIS — L97.521 DIABETIC ULCER OF TOE OF LEFT FOOT ASSOCIATED WITH TYPE 2 DIABETES MELLITUS, LIMITED TO BREAKDOWN OF SKIN (HCC): ICD-10-CM

## 2024-08-20 DIAGNOSIS — E11.621 DIABETIC ULCER OF TOE OF LEFT FOOT ASSOCIATED WITH TYPE 2 DIABETES MELLITUS, LIMITED TO BREAKDOWN OF SKIN (HCC): ICD-10-CM

## 2024-08-20 DIAGNOSIS — E11.65 TYPE 2 DIABETES MELLITUS WITH HYPERGLYCEMIA, WITHOUT LONG-TERM CURRENT USE OF INSULIN (HCC): ICD-10-CM

## 2024-08-20 DIAGNOSIS — E78.2 MIXED HYPERLIPIDEMIA: ICD-10-CM

## 2024-08-20 DIAGNOSIS — E11.42 DIABETIC POLYNEUROPATHY ASSOCIATED WITH TYPE 2 DIABETES MELLITUS (HCC): ICD-10-CM

## 2024-08-20 DIAGNOSIS — I10 ESSENTIAL HYPERTENSION: ICD-10-CM

## 2024-08-20 DIAGNOSIS — E11.40 TYPE 2 DIABETES MELLITUS WITH DIABETIC NEUROPATHY, WITHOUT LONG-TERM CURRENT USE OF INSULIN (HCC): ICD-10-CM

## 2024-08-20 DIAGNOSIS — E11.621 DIABETIC ULCER OF TOE OF LEFT FOOT ASSOCIATED WITH TYPE 2 DIABETES MELLITUS, LIMITED TO BREAKDOWN OF SKIN (HCC): Primary | ICD-10-CM

## 2024-08-20 LAB
ALBUMIN SERPL BCG-MCNC: 4.3 G/DL (ref 3.5–5)
ALP SERPL-CCNC: 66 U/L (ref 34–104)
ALT SERPL W P-5'-P-CCNC: 34 U/L (ref 7–52)
ANION GAP SERPL CALCULATED.3IONS-SCNC: 13 MMOL/L (ref 4–13)
AST SERPL W P-5'-P-CCNC: 26 U/L (ref 13–39)
BILIRUB SERPL-MCNC: 0.94 MG/DL (ref 0.2–1)
BUN SERPL-MCNC: 22 MG/DL (ref 5–25)
CALCIUM SERPL-MCNC: 9.8 MG/DL (ref 8.4–10.2)
CHLORIDE SERPL-SCNC: 101 MMOL/L (ref 96–108)
CHOLEST SERPL-MCNC: 89 MG/DL
CO2 SERPL-SCNC: 25 MMOL/L (ref 21–32)
CREAT SERPL-MCNC: 0.84 MG/DL (ref 0.6–1.3)
EST. AVERAGE GLUCOSE BLD GHB EST-MCNC: 148 MG/DL
GFR SERPL CREATININE-BSD FRML MDRD: 92 ML/MIN/1.73SQ M
GLUCOSE P FAST SERPL-MCNC: 101 MG/DL (ref 65–99)
HBA1C MFR BLD: 6.8 %
HDLC SERPL-MCNC: 46 MG/DL
LDLC SERPL CALC-MCNC: 30 MG/DL (ref 0–100)
POTASSIUM SERPL-SCNC: 4.9 MMOL/L (ref 3.5–5.3)
PROT SERPL-MCNC: 7.9 G/DL (ref 6.4–8.4)
SODIUM SERPL-SCNC: 139 MMOL/L (ref 135–147)
TRIGL SERPL-MCNC: 64 MG/DL

## 2024-08-20 PROCEDURE — 93925 LOWER EXTREMITY STUDY: CPT

## 2024-08-20 PROCEDURE — 80053 COMPREHEN METABOLIC PANEL: CPT

## 2024-08-20 PROCEDURE — 11042 DBRDMT SUBQ TIS 1ST 20SQCM/<: CPT | Performed by: PODIATRIST

## 2024-08-20 PROCEDURE — 93923 UPR/LXTR ART STDY 3+ LVLS: CPT

## 2024-08-20 PROCEDURE — 73630 X-RAY EXAM OF FOOT: CPT

## 2024-08-20 PROCEDURE — 83036 HEMOGLOBIN GLYCOSYLATED A1C: CPT

## 2024-08-20 PROCEDURE — 93922 UPR/L XTREMITY ART 2 LEVELS: CPT | Performed by: SURGERY

## 2024-08-20 PROCEDURE — 99213 OFFICE O/P EST LOW 20 MIN: CPT | Performed by: PODIATRIST

## 2024-08-20 PROCEDURE — 93925 LOWER EXTREMITY STUDY: CPT | Performed by: SURGERY

## 2024-08-20 PROCEDURE — 36415 COLL VENOUS BLD VENIPUNCTURE: CPT

## 2024-08-20 PROCEDURE — 80061 LIPID PANEL: CPT

## 2024-08-20 RX ORDER — LIDOCAINE 40 MG/G
CREAM TOPICAL ONCE
Status: COMPLETED | OUTPATIENT
Start: 2024-08-20 | End: 2024-08-20

## 2024-08-20 RX ADMIN — LIDOCAINE: 40 CREAM TOPICAL at 08:24

## 2024-08-20 NOTE — PROGRESS NOTES
Wound Procedure Treatment Diabetic Ulcer Left Toe D1, great    Performed by: Molly Espinosa RN  Authorized by: Cheryle Cotton DPM    Associated wounds:   Wound 08/13/24 Diabetic Ulcer Toe D1, great Left  Wound cleansed with:  NSS  Applied Topical: Medihoney gel    Applied secondary dressing:  Gauze  Dressing secured with:  Tape and Surgilast

## 2024-08-20 NOTE — PATIENT INSTRUCTIONS
Orders Placed This Encounter   Procedures    Wound cleansing and dressings Diabetic Ulcer Left Toe D1, great     Wound location: Left great toe  Change dressing daily  The dressing must stay dry and intact. You may shower with dressing protected by cast cover or bag. Or you may sponge bathe. Call wound center or home health nurse if dressing becomes wet.   Cleanse the wound with Prophase, pat dry. (Prophase given today).  Apply Medihoney to wound bed.   Cover with gauze.   Secure with tape then surginet.      Off-loading Instructions:  Keep weight and pressure off wound at all times.   Wear off-loading device ( open toe surgical shoe) as directed by your physician.   Put on immediately when rising in the morning and remove when going to bed.      Dr. Cotton has ordered an X Ray of the left foot.  Please have this done prior to next appt here at [facility].     Standing Status:   Future     Standing Expiration Date:   8/27/2024

## 2024-08-20 NOTE — PROGRESS NOTES
Patient ID: John Obleschuk is a 64 y.o. male Date of Birth 1960       Chief Complaint   Patient presents with    Follow Up Wound Care Visit     Left foot wound       Allergies:  Atorvastatin, Rosiglitazone, and Trazodone    Diagnosis:  1. Diabetic ulcer of toe of left foot associated with type 2 diabetes mellitus, limited to breakdown of skin ([facility])  -     Wound cleansing and dressings Diabetic Ulcer Left Toe D1, great; Future  -     lidocaine (LMX) 4 % cream  -     XR foot 3+ vw left; Future; Expected date: 08/20/2024  2. Diabetic polyneuropathy associated with type 2 diabetes mellitus ([facility])  -     Wound cleansing and dressings Diabetic Ulcer Left Toe D1, great; Future  -     lidocaine (LMX) 4 % cream     Diagnosis ICD-10-CM Associated Orders   1. Diabetic ulcer of toe of left foot associated with type 2 diabetes mellitus, limited to breakdown of skin ([facility])  E11.621 Wound cleansing and dressings Diabetic Ulcer Left Toe D1, great    L97.521 lidocaine (LMX) 4 % cream     XR foot 3+ vw left      2. Diabetic polyneuropathy associated with type 2 diabetes mellitus ([facility])  E11.42 Wound cleansing and dressings Diabetic Ulcer Left Toe D1, great     lidocaine (LMX) 4 % cream           Assessment & Plan:  Diabetic Chester grade 2 ulceration distal tip left great toe, wound was debrided through subcuticular tissues, continue Medihoney dry dressing daily, do not get foot wet in shower, may use a cast cover.  Continue with surgical shoe or open toe slipper or slide and make sure no pressure is on the great toe.  Patient has lower extremity arterial Doppler scheduled for 1:30 PM today, he is advised to keep this appointment.  X-ray was ordered to rule out osteomyelitis given close proximity of bone to distal tip of toe.  Today reviewed frequent elevation, low-sodium diet, proper protein intake, proper glycemic control, strict pressure and friction reduction, he understands and agrees with the plan and will follow-up in 1  "week.    Debridement   Wound 08/13/24 Diabetic Ulcer Toe D1, great Left    Universal Protocol:  procedure performed by consultantConsent: Verbal consent obtained.  Risks and benefits: risks, benefits and alternatives were discussed  Consent given by: patient  Time out: Immediately prior to procedure a \"time out\" was called to verify the correct patient, procedure, equipment, support staff and site/side marked as required.  Patient understanding: patient states understanding of the procedure being performed  Patient identity confirmed: verbally with patient    Debridement Details  Performed by: physician  Debridement type: surgical  Level of debridement: subcutaneous tissue  Pain control: lidocaine 4%      Post-debridement measurements  Length (cm): 1  Width (cm): 1.4  Depth (cm): 0.3  Percent debrided: 100%  Surface Area (cm^2): 1.4  Area Debrided (cm^2): 1.4  Volume (cm^3): 0.42    Tissue and other material debrided: subcutaneous tissue  Devitalized tissue debrided: biofilm, callus, fibrin, necrotic debris and slough  Instrument(s) utilized: blade  Bleeding: small  Hemostasis obtained with: pressure  Procedural pain (0-10): insensate  Post-procedural pain: insensate   Response to treatment: procedure was tolerated well               Subjective:   Huan presents today for care of left great toe diabetic ulceration, he has been changing dressing as directed with Medihoney daily.  He has been using cast cover and surgical shoe.  He did go to beach last week but did not get his foot wet or go down to the ocean, he stated the house.  He states his blood sugar was 115 this morning.  No new complaints.          The following portions of the patient's history were reviewed and updated as appropriate:   Patient Active Problem List   Diagnosis    Essential hypertension    Type 2 diabetes mellitus with hyperglycemia, without long-term current use of insulin (HCC)    Mixed hyperlipidemia    Sleep apnea    Coronary artery " disease involving native coronary artery    S/P CABG x 5    Depressive disorder    Myalgia and myositis    Right knee pain    Vitamin D deficiency    Diabetic polyneuropathy associated with type 2 diabetes mellitus (HCC)    Wide-complex tachycardia    Encounter for annual physical exam    Diabetic ulcer of toe of left foot associated with type 2 diabetes mellitus, limited to breakdown of skin (HCC)     Past Medical History:   Diagnosis Date    Coronary artery disease     Diabetes mellitus (HCC)     Hyperlipidemia     Hypertension      Past Surgical History:   Procedure Laterality Date    ARTERIAL ANEURYSM REPAIR      Right Carotid Aneurysm - age 15.    CARDIAC CATHETERIZATION Left 08/02/2023    Procedure: Cardiac Left Heart Cath;  Surgeon: Luisito Inman DO;  Location: [facility];  Service: Cardiology    CARDIAC CATHETERIZATION N/A 08/02/2023    Procedure: Cardiac Coronary Angiogram;  Surgeon: Luisito Inman DO;  Location: [facility];  Service: Cardiology    CARDIAC CATHETERIZATION  08/02/2023    Procedure: Cardiac catheterization;  Surgeon: Luisito Inman DO;  Location: [facility];  Service: Cardiology    CARDIAC ELECTROPHYSIOLOGY PROCEDURE N/A 2/21/2024    Procedure: Cardiac loop recorder implant;  Surgeon: Luke Siu MD;  Location: [facility];  Service: Cardiology    HERNIA REPAIR      DC CORONARY ARTERY BYP W/VEIN & ARTERY GRAFT 4 VEIN N/A 8/11/2023    Procedure: CORONARY ARTERY BYPASS GRAFT (CABG) X-5 VESSELS ; SVG to PDA, Ramus, Diagonal and OM. LIMA --> LAD EVH  W/ JOHN;  Surgeon: Soham Hodgson MD;  Location: [facility];  Service: Cardiac Surgery    VASECTOMY       Social History     Socioeconomic History    Marital status: /Civil Union     Spouse name: None    Number of children: None    Years of education: None    Highest education level: None   Occupational History    None   Tobacco Use    Smoking status: Never    Smokeless tobacco: Never    Vaping Use    Vaping status: Never Used   Substance and Sexual Activity    Alcohol use: No    Drug use: No    Sexual activity: Yes     Partners: Female   Other Topics Concern    None   Social History Narrative    None     Social Determinants of Health     Financial Resource Strain: Patient Declined (8/24/2023)    [facility]    Overall Financial Resource Strain (CARDIA)     Difficulty of Paying Living Expenses: Patient declined   Food Insecurity: Patient Declined (8/24/2023)    [facility]    Hunger Vital Sign     Worried About Running Out of Food in the Last Year: Patient declined     Ran Out of Food in the Last Year: Patient declined   Transportation Needs: No Transportation Needs (8/24/2023)    [facility]    PRAPARE - Transportation     Lack of Transportation (Medical): No     Lack of Transportation (Non-Medical): No   Physical Activity: Sufficiently Active (12/20/2023)    Exercise Vital Sign     Days of Exercise per Week: 3 days     Minutes of Exercise per Session: 60 min   Stress: Stress Concern Present (12/20/2023)    [employer] [place] [employer] - Occupational Stress Questionnaire     Feeling of Stress : Rather much   Social Connections: Unknown (12/20/2023)    Social Connection and Isolation Panel [NHANES]     Frequency of Communication with Friends and Family: More than three times a week     Frequency of Social Gatherings with Friends and Family: More than three times a week     Attends Yarsanism Services: Patient declined     Active Member of Clubs or Organizations: Patient declined     Attends Club or Organization Meetings: Patient declined     Marital Status:    Intimate Partner Violence: Not At Risk (12/20/2023)    Humiliation, Afraid, Rape, and Kick questionnaire     Fear of Current or Ex-Partner: No      Emotionally Abused: No     Physically Abused: No     Sexually Abused: No   Housing Stability: Unknown (8/24/2023)    [facility]    Housing Stability Vital Sign     Unable to Pay for Housing in the Last Year: Patient refused     Number of Places Lived in the Last Year: Not on file     Unstable Housing in the Last Year: Patient refused        Current Outpatient Medications:     acetaminophen (TYLENOL) 325 mg tablet, Take 1-2 tablets Q4-6 hours PRN pain. Do not take more than 4grams in 24 hours., Disp: , Rfl: 0    aspirin 325 mg tablet, Take 1 tablet (325 mg total) by mouth daily, Disp: 30 tablet, Rfl: 2    cholecalciferol (VITAMIN D3) 25 mcg (1,000 units) tablet, Take 1,000 Units by mouth 2 (two) times a day, Disp: , Rfl:     Continuous Glucose Sensor (FreeStyle Ayanna 2 Sensor) MISC, CHANGE SENSOR EVERY 14 DAYS, Disp: 2 each, Rfl: 5    docusate sodium (COLACE) 100 mg capsule, Take 1 capsule (100 mg total) by mouth 2 (two) times a day Hold for soft stools., Disp: 60 capsule, Rfl: 0    dofetilide (TIKOSYN) 500 mcg capsule, Take 1 capsule (500 mcg total) by mouth every 12 (twelve) hours, Disp: 180 capsule, Rfl: 3    gabapentin (NEURONTIN) 100 mg capsule, TAKE 1 CAPSULE BY MOUTH IN THE MORNING AND 3 CAPSULES IN THE EVENING, Disp: 120 capsule, Rfl: 2    glipiZIDE (GLUCOTROL) 5 mg tablet, Take 5 mg by mouth in the morning, Disp: , Rfl:     Jardiance 25 MG TABS, Take 1 tablet (25 mg total) by mouth in the morning, Disp: 90 tablet, Rfl: 3    LORazepam (ATIVAN) 0.5 mg tablet, Take 1 tablet (0.5 mg total) by mouth daily at bedtime, Disp: 30 tablet, Rfl: 3    Magnesium Gluconate 250 MG TABS, Take by mouth daily at bedtime, Disp: , Rfl:     metFORMIN (GLUCOPHAGE-XR) 500 mg 24 hr tablet, Take 2 tablets (1,000 mg total) by mouth 2 (two) times a day, Disp: 360 tablet, Rfl: 3    metoprolol succinate (TOPROL-XL) 25 mg 24 hr tablet, Take 1 tablet (25 mg total) by mouth  daily, Disp: 90 tablet, Rfl: 3    neomycin-polymyxin-hydrocortisone (CORTISPORIN) 0.35%-10,000 units/mL-1% otic suspension, Administer 3 drops to the right ear 4 (four) times a day for 5 days, Disp: 3 mL, Rfl: 0    Ozempic, 2 MG/DOSE, 8 MG/3ML injection pen, Inject 0.75 mL (2 mg total) under the skin every 7 days, Disp: 9 mL, Rfl: 3    potassium chloride (K-DUR,KLOR-CON) 10 mEq tablet, Take 1 tablet (10 mEq total) by mouth daily, Disp: 90 tablet, Rfl: 3    rosuvastatin (CRESTOR) 20 MG tablet, TAKE TWO TABLETS BY MOUTH EVERY DAY, Disp: 90 tablet, Rfl: 1    sertraline (ZOLOFT) 50 mg tablet, TAKE ONE TABLET BY MOUTH EVERY DAY, Disp: 30 tablet, Rfl: 5    tadalafil (CIALIS) 20 MG tablet, Take 1 tablet (20 mg total) by mouth daily as needed for erectile dysfunction, Disp: 10 tablet, Rfl: 11  No current facility-administered medications for this visit.  Family History   Problem Relation Age of Onset    Leukemia Mother     Cancer Mother     Heart attack Father 52       Review of Systems   Constitutional:  Negative for chills and fever.   HENT:  Negative for ear pain and sore throat.    Eyes:  Negative for pain and visual disturbance.   Respiratory:  Negative for cough and shortness of breath.    Cardiovascular:  Negative for chest pain and palpitations.   Gastrointestinal:  Negative for abdominal pain and vomiting.   Genitourinary:  Negative for dysuria and hematuria.   Musculoskeletal:  Negative for arthralgias and back pain.   Skin:  Positive for color change and wound. Negative for rash.   Neurological:  Positive for numbness. Negative for seizures and syncope.   All other systems reviewed and are negative.        Objective:  /73   Pulse 88   Temp (!) 96.5 °F (35.8 °C)   Resp 16     Physical Exam  Constitutional:       Appearance: Normal appearance. He is normal weight.   HENT:      Head: Normocephalic and atraumatic.      Right Ear: External ear normal.      Left Ear: External ear normal.      Nose: Nose  normal.      Mouth/Throat:      Mouth: Mucous membranes are moist.      Pharynx: Oropharynx is clear.   Eyes:      Conjunctiva/sclera: Conjunctivae normal.      Pupils: Pupils are equal, round, and reactive to light.   Cardiovascular:      Pulses:           Dorsalis pedis pulses are detected w/ Doppler on the right side and detected w/ Doppler on the left side.        Posterior tibial pulses are detected w/ Doppler on the right side and detected w/ Doppler on the left side.   Pulmonary:      Effort: Pulmonary effort is normal.   Musculoskeletal:      Cervical back: Normal range of motion.      Right lower leg: No edema.      Left lower leg: No edema.   Skin:     General: Skin is warm and dry.      Capillary Refill: Capillary refill takes less than 2 seconds.   Neurological:      General: No focal deficit present.      Mental Status: He is alert and oriented to person, place, and time. Mental status is at baseline.      Sensory: Sensory deficit present.      Coordination: Coordination abnormal.      Gait: Gait abnormal.      Deep Tendon Reflexes: Reflexes abnormal.   Psychiatric:         Mood and Affect: Mood normal.         Behavior: Behavior normal.         Thought Content: Thought content normal.         Judgment: Judgment normal.           Wound 08/13/24 Diabetic Ulcer Toe D1, great Left (Active)   Wound Image Images linked 08/20/24 0850   Wound Description Yellow;Slough;Pink 08/20/24 0821   Pat-wound Assessment Intact;Callus 08/20/24 0821   Wound Length (cm) 0.9 cm 08/20/24 0821   Wound Width (cm) 1.3 cm 08/20/24 0821   Wound Depth (cm) 0.1 cm 08/20/24 0821   Wound Surface Area (cm^2) 1.17 cm^2 08/20/24 0821   Wound Volume (cm^3) 0.117 cm^3 08/20/24 0821   Calculated Wound Volume (cm^3) 0.12 cm^3 08/20/24 0821   Change in Wound Size % -9.09 08/20/24 0821   Drainage Amount Small 08/20/24 0821   Drainage Description Serous;Yellow 08/20/24 0821   Non-staged Wound Description Full thickness 08/20/24 0821   Dressing  "Status Intact 08/20/24 0821              Wound Instructions:  Orders Placed This Encounter   Procedures    Wound cleansing and dressings Diabetic Ulcer Left Toe D1, great     Wound location: Left great toe  Change dressing daily  The dressing must stay dry and intact. You may shower with dressing protected by cast cover or bag. Or you may sponge bathe. Call wound center or home health nurse if dressing becomes wet.   Cleanse the wound with Prophase, pat dry. (Prophase given today).  Apply Medihoney to wound bed.   Cover with gauze.   Secure with tape then surginet.      Off-loading Instructions:  Keep weight and pressure off wound at all times.   Wear off-loading device ( open toe surgical shoe) as directed by your physician.   Put on immediately when rising in the morning and remove when going to bed.      Dr. Cotton has ordered an X Ray of the left foot.  Please have this done prior to next appt here at [facility].     Standing Status:   Future     Standing Expiration Date:   8/27/2024    Debridement     This order was created via procedure documentation    XR foot 3+ vw left     Standing Status:   Future     Standing Expiration Date:   9/20/2024     Scheduling Instructions:      Bring along any outside films relating to this procedure.           Order Specific Question:   Reason for Exam:     Answer:   evaluate left great toe for osteomyelitis         Cheryle Cotton, ELVIRA, DPM, FACFAS    Portions of the record may have been created with voice recognition software. Occasional wrong word or \"sound a like\" substitutions may have occurred due to the inherent limitations of voice recognition software. Read the chart carefully and recognize, using context, where substitutions have occurred.    "

## 2024-08-21 DIAGNOSIS — M86.9 OSTEOMYELITIS OF GREAT TOE OF LEFT FOOT (HCC): Primary | ICD-10-CM

## 2024-08-21 RX ORDER — DOXYCYCLINE 100 MG/1
100 CAPSULE ORAL EVERY 12 HOURS SCHEDULED
Qty: 20 CAPSULE | Refills: 0 | Status: SHIPPED | OUTPATIENT
Start: 2024-08-21 | End: 2024-08-27 | Stop reason: SDUPTHER

## 2024-08-21 NOTE — QUICK NOTE
I called patient and reviewed his x-ray results showing osteomyelitis of the distal tuft of his left great toe, lower extremity Doppler showed diffuse disease, no stenosis, pressures are above healing.  I discussed treatment options of partial left great toe amputation versus local wound care and attempt at limb salvage.  I discussed the risk, benefits and alternatives of both.  Patient would like to attempt limb salvage with oral antibiotics, local wound care and possible hyperbaric oxygen therapy.  We will discuss this further on Tuesday and his appointment wound management center.  Patient educated on signs of furthering infection, if any of these are noted he is to go to the ED.  He is understanding and agreeing with the plan.

## 2024-08-23 ENCOUNTER — OFFICE VISIT (OUTPATIENT)
Dept: SLEEP CENTER | Facility: CLINIC | Age: 64
End: 2024-08-23
Payer: COMMERCIAL

## 2024-08-23 VITALS
HEART RATE: 85 BPM | DIASTOLIC BLOOD PRESSURE: 63 MMHG | BODY MASS INDEX: 28.49 KG/M2 | WEIGHT: 199 LBS | SYSTOLIC BLOOD PRESSURE: 113 MMHG | HEIGHT: 70 IN

## 2024-08-23 DIAGNOSIS — F40.240 CLAUSTROPHOBIA: ICD-10-CM

## 2024-08-23 DIAGNOSIS — I25.10 CORONARY ARTERY DISEASE INVOLVING NATIVE CORONARY ARTERY OF NATIVE HEART WITHOUT ANGINA PECTORIS: ICD-10-CM

## 2024-08-23 DIAGNOSIS — G47.34 SLEEP RELATED HYPOXIA: ICD-10-CM

## 2024-08-23 DIAGNOSIS — R40.0 DAYTIME SLEEPINESS: ICD-10-CM

## 2024-08-23 DIAGNOSIS — E11.42 DIABETIC POLYNEUROPATHY ASSOCIATED WITH TYPE 2 DIABETES MELLITUS (HCC): ICD-10-CM

## 2024-08-23 DIAGNOSIS — G47.33 OBSTRUCTIVE SLEEP APNEA SYNDROME: Primary | ICD-10-CM

## 2024-08-23 DIAGNOSIS — R00.0 WIDE-COMPLEX TACHYCARDIA: ICD-10-CM

## 2024-08-23 DIAGNOSIS — I10 ESSENTIAL HYPERTENSION: ICD-10-CM

## 2024-08-23 DIAGNOSIS — E66.3 OVERWEIGHT (BMI 25.0-29.9): ICD-10-CM

## 2024-08-23 PROCEDURE — 99214 OFFICE O/P EST MOD 30 MIN: CPT | Performed by: INTERNAL MEDICINE

## 2024-08-23 RX ORDER — METOPROLOL TARTRATE 25 MG/1
12.5 TABLET, FILM COATED ORAL EVERY 12 HOURS
COMMUNITY
Start: 2024-08-05 | End: 2024-08-29 | Stop reason: SDUPTHER

## 2024-08-23 NOTE — PROGRESS NOTES
Follow-Up Note - Sleep Center   John Obleschuk  64 y.o. male  :1960  MRN:[id number]  DOS:2024    CC: I saw this patient for follow-up in clinic today for sleep disordered breathing, Coexisting Sleep and Medical Problems. Interval changes: Home sleep testing was undertaken to evaluate for sleep disordered breathing and patient is here to review results and further options.     The study demonstrated a respiratory event index (KRISTY) of 11.9 .  The lowest SPO2 recorded is 78.0% and 21 minutes during the study was spent with saturations below 90%.  The snore index was 1.6%.    PFSH, Problem List, Medications & Allergies were reviewed in EMR.   He  has a past medical history of Coronary artery disease, Diabetes mellitus (HCC), Hyperlipidemia, and Hypertension.    He has a current medication list which includes the following prescription(s): acetaminophen, aspirin, cholecalciferol, freestyle lambert 2 sensor, docusate sodium, dofetilide, doxycycline hyclate, gabapentin, glipizide, jardiance, lorazepam, magnesium gluconate, metformin, metoprolol succinate, neomycin-polymyxin-hydrocortisone, ozempic (2 mg/dose), potassium chloride, rosuvastatin, sertraline, tadalafil, and metoprolol tartrate.    HPI: His ongoing symptoms as outlined in his initial report.  Sleep Routine: Huan reports getting 7 hrs sleep; he has no difficulty initiating, but reports diffculty maintaining sleep .  Sleep is interrupted 12:30 AM and he uses Ativan to fall back asleep.  He arises spontaneously and rarely feels refreshed .Huan reports excessive daytime sleepiness, takes planned naps.  For up to an hour He rated himself at Total score: (P) 8 /24 on the [place] Sleepiness Scale.   Habits: Reports that he has never smoked. He has never used smokeless tobacco.,  Reports no history of alcohol use.,  Reports no history of drug use., Caffeine use: none, Exercise routine: none being limited by foot pain.     ROS: reviewed significant for few  "pounds weight gain.   He is reporting no nasal, respiratory or cardiac symptoms.     EXAM: /63 (BP Location: Left arm, Patient Position: Sitting, Cuff Size: Large)   Pulse 85   Ht 5' 10\" (1.778 m)   Wt 90.3 kg (199 lb)   BMI 28.55 kg/m²     Wt Readings from Last 3 Encounters:   08/23/24 90.3 kg (199 lb)   08/13/24 87.1 kg (192 lb)   07/12/24 85.7 kg (189 lb)     Patient is well groomed; well appearing.  CNS: Alert, orientated, clear & coherent speech.  Psych: cooperative and in no distress. Mental state: Appears normal.  H&N: EOMI; NC/AT: No facial pressure marks, no rashes.    Skin/Extrem: col & hydration normal; no edema  Resp: Respiratory effort is normal  Physical findings otherwise essentially unchanged from previous.    IMPRESSION: Diagnoses, Problem List Items & Comorbidities Addressed this Visit   1. Obstructive sleep apnea syndrome  CPAP Study      2. Sleep related hypoxia  CPAP Study      3. Daytime sleepiness  CPAP Study      4. Essential hypertension        5. Wide-complex tachycardia        6. Coronary artery disease involving native coronary artery of native heart without angina pectoris        7. Diabetic polyneuropathy associated with type 2 diabetes mellitus (HCC)        8. Overweight (BMI 25.0-29.9)            PLAN:  1. I reviewed results of the Sleep study with the patient.   2. With respect to above conditions, I counseled on pathophysiology, diagnosis, treatment options, risks and benefits; inter-relationship and effects on symptoms and comorbidities; risks of no treatment; costs and insurance aspects.   3. Patient elected positive airway pressure therapy but reluctantly and is to be scheduled for a titration study.   4.  He was instructed to come sleep deprived and since he has Ativan may use 1 mg for the study night.  5. Follow-up to be scheduled after the study to review results and to initiate therapy.      Sincerely,     Authenticated electronically on 08/23/24   Board Certified " "Specialist     Portions of the record may have been created with voice recognition software. Occasional wrong word or \"sound a like\" substitutions may have occurred due to the inherent limitations of voice recognition software. There may also be notations and random deletions of words or characters from malfunctioning software. Read the chart carefully and recognize, using context, where substitutions/deletions have occurred.  "

## 2024-08-27 ENCOUNTER — OFFICE VISIT (OUTPATIENT)
Dept: WOUND CARE | Facility: HOSPITAL | Age: 64
End: 2024-08-27
Payer: COMMERCIAL

## 2024-08-27 ENCOUNTER — PREP FOR PROCEDURE (OUTPATIENT)
Dept: WOUND CARE | Facility: HOSPITAL | Age: 64
End: 2024-08-27

## 2024-08-27 ENCOUNTER — APPOINTMENT (OUTPATIENT)
Dept: LAB | Facility: CLINIC | Age: 64
End: 2024-08-27
Payer: COMMERCIAL

## 2024-08-27 ENCOUNTER — HOSPITAL ENCOUNTER (OUTPATIENT)
Dept: RADIOLOGY | Facility: HOSPITAL | Age: 64
Discharge: HOME | End: 2024-08-27
Payer: COMMERCIAL

## 2024-08-27 VITALS
HEART RATE: 91 BPM | TEMPERATURE: 97.7 F | RESPIRATION RATE: 18 BRPM | SYSTOLIC BLOOD PRESSURE: 125 MMHG | DIASTOLIC BLOOD PRESSURE: 69 MMHG

## 2024-08-27 DIAGNOSIS — M86.9 OSTEOMYELITIS OF GREAT TOE OF LEFT FOOT (HCC): Primary | ICD-10-CM

## 2024-08-27 DIAGNOSIS — M86.9 OSTEOMYELITIS OF GREAT TOE OF LEFT FOOT (HCC): ICD-10-CM

## 2024-08-27 DIAGNOSIS — E11.42 DIABETIC POLYNEUROPATHY ASSOCIATED WITH TYPE 2 DIABETES MELLITUS (HCC): ICD-10-CM

## 2024-08-27 DIAGNOSIS — L97.521 DIABETIC ULCER OF TOE OF LEFT FOOT ASSOCIATED WITH TYPE 2 DIABETES MELLITUS, LIMITED TO BREAKDOWN OF SKIN (HCC): Primary | ICD-10-CM

## 2024-08-27 DIAGNOSIS — E11.621 DIABETIC ULCER OF TOE OF LEFT FOOT ASSOCIATED WITH TYPE 2 DIABETES MELLITUS, LIMITED TO BREAKDOWN OF SKIN (HCC): Primary | ICD-10-CM

## 2024-08-27 LAB
ALBUMIN SERPL BCG-MCNC: 4.1 G/DL (ref 3.5–5)
ALP SERPL-CCNC: 57 U/L (ref 34–104)
ALT SERPL W P-5'-P-CCNC: 29 U/L (ref 7–52)
ANION GAP SERPL CALCULATED.3IONS-SCNC: 12 MMOL/L (ref 4–13)
AST SERPL W P-5'-P-CCNC: 23 U/L (ref 13–39)
ATRIAL RATE: 76 BPM
BASOPHILS # BLD AUTO: 0.11 THOUSANDS/ÂΜL (ref 0–0.1)
BASOPHILS NFR BLD AUTO: 1 % (ref 0–1)
BILIRUB SERPL-MCNC: 0.86 MG/DL (ref 0.2–1)
BUN SERPL-MCNC: 27 MG/DL (ref 5–25)
CALCIUM SERPL-MCNC: 9.5 MG/DL (ref 8.4–10.2)
CHLORIDE SERPL-SCNC: 103 MMOL/L (ref 96–108)
CO2 SERPL-SCNC: 26 MMOL/L (ref 21–32)
CREAT SERPL-MCNC: 0.88 MG/DL (ref 0.6–1.3)
EOSINOPHIL # BLD AUTO: 0.66 THOUSAND/ÂΜL (ref 0–0.61)
EOSINOPHIL NFR BLD AUTO: 7 % (ref 0–6)
ERYTHROCYTE [DISTWIDTH] IN BLOOD BY AUTOMATED COUNT: 13.5 % (ref 11.6–15.1)
GFR SERPL CREATININE-BSD FRML MDRD: 90 ML/MIN/1.73SQ M
GLUCOSE SERPL-MCNC: 77 MG/DL (ref 65–140)
HCT VFR BLD AUTO: 43.9 % (ref 36.5–49.3)
HGB BLD-MCNC: 13.6 G/DL (ref 12–17)
IMM GRANULOCYTES # BLD AUTO: 0.03 THOUSAND/UL (ref 0–0.2)
IMM GRANULOCYTES NFR BLD AUTO: 0 % (ref 0–2)
LYMPHOCYTES # BLD AUTO: 2.42 THOUSANDS/ÂΜL (ref 0.6–4.47)
LYMPHOCYTES NFR BLD AUTO: 26 % (ref 14–44)
MCH RBC QN AUTO: 27.5 PG (ref 26.8–34.3)
MCHC RBC AUTO-ENTMCNC: 31 G/DL (ref 31.4–37.4)
MCV RBC AUTO: 89 FL (ref 82–98)
MONOCYTES # BLD AUTO: 0.88 THOUSAND/ÂΜL (ref 0.17–1.22)
MONOCYTES NFR BLD AUTO: 10 % (ref 4–12)
NEUTROPHILS # BLD AUTO: 5.08 THOUSANDS/ÂΜL (ref 1.85–7.62)
NEUTS SEG NFR BLD AUTO: 56 % (ref 43–75)
NRBC BLD AUTO-RTO: 0 /100 WBCS
P AXIS: 55 DEGREES
PLATELET # BLD AUTO: 191 THOUSANDS/UL (ref 149–390)
PMV BLD AUTO: 10.5 FL (ref 8.9–12.7)
POTASSIUM SERPL-SCNC: 4.5 MMOL/L (ref 3.5–5.3)
PR INTERVAL: 208 MS
PROT SERPL-MCNC: 7.3 G/DL (ref 6.4–8.4)
QRS AXIS: 42 DEGREES
QRSD INTERVAL: 98 MS
QT INTERVAL: 416 MS
QTC INTERVAL: 468 MS
RBC # BLD AUTO: 4.94 MILLION/UL (ref 3.88–5.62)
SODIUM SERPL-SCNC: 141 MMOL/L (ref 135–147)
T WAVE AXIS: 42 DEGREES
VENTRICULAR RATE: 76 BPM
WBC # BLD AUTO: 9.18 THOUSAND/UL (ref 4.31–10.16)

## 2024-08-27 PROCEDURE — 80053 COMPREHEN METABOLIC PANEL: CPT | Performed by: PODIATRIST

## 2024-08-27 PROCEDURE — 36415 COLL VENOUS BLD VENIPUNCTURE: CPT | Performed by: PODIATRIST

## 2024-08-27 PROCEDURE — 85025 COMPLETE CBC W/AUTO DIFF WBC: CPT | Performed by: PODIATRIST

## 2024-08-27 PROCEDURE — 99214 OFFICE O/P EST MOD 30 MIN: CPT | Performed by: PODIATRIST

## 2024-08-27 PROCEDURE — 11042 DBRDMT SUBQ TIS 1ST 20SQCM/<: CPT | Performed by: PODIATRIST

## 2024-08-27 PROCEDURE — 93010 ELECTROCARDIOGRAM REPORT: CPT | Performed by: PODIATRIST

## 2024-08-27 PROCEDURE — 93005 ELECTROCARDIOGRAM TRACING: CPT | Performed by: PODIATRIST

## 2024-08-27 PROCEDURE — 71046 X-RAY EXAM CHEST 2 VIEWS: CPT

## 2024-08-27 RX ORDER — CHLORHEXIDINE GLUCONATE ORAL RINSE 1.2 MG/ML
15 SOLUTION DENTAL ONCE
OUTPATIENT
Start: 2024-08-27 | End: 2024-08-27

## 2024-08-27 RX ORDER — CEFAZOLIN SODIUM 2 G/50ML
2000 SOLUTION INTRAVENOUS ONCE
OUTPATIENT
Start: 2024-08-27 | End: 2024-08-27

## 2024-08-27 RX ORDER — DOXYCYCLINE 100 MG/1
100 CAPSULE ORAL EVERY 12 HOURS SCHEDULED
Qty: 20 CAPSULE | Refills: 0 | Status: SHIPPED | OUTPATIENT
Start: 2024-08-27 | End: 2024-09-06

## 2024-08-27 RX ORDER — LIDOCAINE 40 MG/G
CREAM TOPICAL ONCE
Status: COMPLETED | OUTPATIENT
Start: 2024-08-27 | End: 2024-08-27

## 2024-08-27 RX ORDER — CHLORHEXIDINE GLUCONATE 40 MG/ML
SOLUTION TOPICAL DAILY PRN
OUTPATIENT
Start: 2024-08-27

## 2024-08-27 RX ADMIN — LIDOCAINE: 40 CREAM TOPICAL at 11:31

## 2024-08-27 NOTE — PROGRESS NOTES
Patient ID: John Obleschuk is a 64 y.o. male Date of Birth 1960       Chief Complaint   Patient presents with    Follow Up Wound Care Visit     Left great toe wound       Allergies:  Atorvastatin, Rosiglitazone, and Trazodone    Diagnosis:  1. Diabetic ulcer of toe of left foot associated with type 2 diabetes mellitus, limited to breakdown of skin (HCC)  -     lidocaine (LMX) 4 % cream  -     Wound cleansing and dressings Diabetic Ulcer Left Toe D1, great; Future  -     Wound Procedure Treatment Diabetic Ulcer Left Toe D1, great  2. Diabetic polyneuropathy associated with type 2 diabetes mellitus (HCC)  -     Wound cleansing and dressings Diabetic Ulcer Left Toe D1, great; Future  -     Wound Procedure Treatment Diabetic Ulcer Left Toe D1, great  3. Osteomyelitis of great toe of left foot ([facility])  -     CBC and differential  -     Comprehensive metabolic panel  -     XR chest pa & lateral; Future; Expected date: 08/27/2024  -     ECG 12 lead  -     Wound Procedure Treatment Diabetic Ulcer Left Toe D1, great  -     doxycycline hyclate (VIBRAMYCIN) 100 mg capsule; Take 1 capsule (100 mg total) by mouth every 12 (twelve) hours for 10 days     Diagnosis ICD-10-CM Associated Orders   1. Diabetic ulcer of toe of left foot associated with type 2 diabetes mellitus, limited to breakdown of skin ([facility])  E11.621 lidocaine (LMX) 4 % cream    L97.521 Wound cleansing and dressings Diabetic Ulcer Left Toe D1, great     Wound Procedure Treatment Diabetic Ulcer Left Toe D1, great      2. Diabetic polyneuropathy associated with type 2 diabetes mellitus ([facility])  E11.42 Wound cleansing and dressings Diabetic Ulcer Left Toe D1, great     Wound Procedure Treatment Diabetic Ulcer Left Toe D1, great      3. Osteomyelitis of great toe of left foot ([facility])  M86.9 CBC and differential     Comprehensive metabolic panel     XR chest pa & lateral     ECG 12 lead     Wound Procedure Treatment Diabetic Ulcer Left Toe D1, great     doxycycline hyclate  "(VIBRAMYCIN) 100 mg capsule           Assessment & Plan:  Diabetic Chester grade 3 ulceration left great toe with osteomyelitis seen on x-ray and clinically of distal phalanx left great toe, subcuticular debridement was performed today, wound was dressed with Betadine paint Adaptic dry dressing which patient will continue to do daily.  He will use a cast cover, do not get foot wet in shower.  Continue open toe sandal to maintain strict pressure and friction reduction.  Today we discussed treatment options of osteomyelitis from local wound care and oral antibiotics, patient does not qualify for HBO as his wound has not been classified for diabetic Chester grade 3 for a full 4 weeks.  We discussed definitive treatment with amputation of left great toe, risk, benefits and alternatives were discussed, patient like to proceed with partial left great toe amputation, written consent was obtained.  Preop testing was ordered, patient will contact PCP for medical clearance.  Case request was placed, tentatively scheduled for 9/3/2024 at [facility].  Patient's last dose of Ozempic was on 8/25/2024, he is advised to hold it next Sunday.  He will take his diabetes medication on Monday but not on Tuesday.  He may take his aspirin.  I personally reviewed patient's medical records, x-ray images and lower extremity arterial Doppler results from last week.  Patient understands and agrees with the plan will follow-up in 2 weeks.    Debridement   Wound 08/13/24 Diabetic Ulcer Toe D1, great Left    Universal Protocol:  procedure performed by consultantConsent: Verbal consent obtained.  Risks and benefits: risks, benefits and alternatives were discussed  Consent given by: patient  Time out: Immediately prior to procedure a \"time out\" was called to verify the correct patient, procedure, equipment, support staff and site/side marked as required.  Patient understanding: patient states understanding of the procedure being " performed  Patient identity confirmed: verbally with patient    Debridement Details  Performed by: physician  Debridement type: surgical  Level of debridement: subcutaneous tissue  Pain control: lidocaine 4%      Post-debridement measurements  Length (cm): 0.9  Width (cm): 1.4  Depth (cm): 0.5  Percent debrided: 100%  Surface Area (cm^2): 1.26  Area Debrided (cm^2): 1.26  Volume (cm^3): 0.63    Tissue and other material debrided: subcutaneous tissue  Devitalized tissue debrided: biofilm, callus, fibrin, necrotic debris and slough  Instrument(s) utilized: blade  Bleeding: small  Hemostasis obtained with: pressure and silver nitrate  Procedural pain (0-10): insensate  Post-procedural pain: insensate   Response to treatment: procedure was tolerated well               Subjective:   Huan presents today with his wife for evaluation and care of left great toe osteomyelitis.  He presents today stating he would like to discuss treatment options including surgery/amputation.  He has been taking his antibiotics as directed, no nausea, vomiting, fever, chills.          The following portions of the patient's history were reviewed and updated as appropriate:   Patient Active Problem List   Diagnosis    Essential hypertension    Type 2 diabetes mellitus with hyperglycemia, without long-term current use of insulin (HCC)    Mixed hyperlipidemia    Sleep apnea    Coronary artery disease involving native coronary artery    S/P CABG x 5    Depressive disorder    Myalgia and myositis    Right knee pain    Vitamin D deficiency    Diabetic polyneuropathy associated with type 2 diabetes mellitus (HCC)    Wide-complex tachycardia    Encounter for annual physical exam    Diabetic ulcer of toe of left foot associated with type 2 diabetes mellitus, limited to breakdown of skin (HCC)     Past Medical History:   Diagnosis Date    Coronary artery disease     Diabetes mellitus (HCC)     Hyperlipidemia     Hypertension      Past Surgical History:    Procedure Laterality Date    ARTERIAL ANEURYSM REPAIR      Right Carotid Aneurysm - age 15.    CARDIAC CATHETERIZATION Left 08/02/2023    Procedure: Cardiac Left Heart Cath;  Surgeon: Luisito Inman DO;  Location: [facility];  Service: Cardiology    CARDIAC CATHETERIZATION N/A 08/02/2023    Procedure: Cardiac Coronary Angiogram;  Surgeon: Luisito Inman DO;  Location: [facility];  Service: Cardiology    CARDIAC CATHETERIZATION  08/02/2023    Procedure: Cardiac catheterization;  Surgeon: Luisito Inman DO;  Location: [facility];  Service: Cardiology    CARDIAC ELECTROPHYSIOLOGY PROCEDURE N/A 2/21/2024    Procedure: Cardiac loop recorder implant;  Surgeon: Luke Siu MD;  Location: [facility];  Service: Cardiology    HERNIA REPAIR      CA CORONARY ARTERY BYP W/VEIN & ARTERY GRAFT 4 VEIN N/A 8/11/2023    Procedure: CORONARY ARTERY BYPASS GRAFT (CABG) X-5 VESSELS ; SVG to PDA, Ramus, Diagonal and OM. LIMA --> LAD EVH  W/ JOHN;  Surgeon: Soham Hodgson MD;  Location: [facility];  Service: Cardiac Surgery    VASECTOMY       Social History     Socioeconomic History    Marital status: /Civil Union     Spouse name: None    Number of children: None    Years of education: None    Highest education level: None   Occupational History    None   Tobacco Use    Smoking status: Never    Smokeless tobacco: Never   Vaping Use    Vaping status: Never Used   Substance and Sexual Activity    Alcohol use: No    Drug use: No    Sexual activity: Yes     Partners: Female   Other Topics Concern    None   Social History Narrative    None     Social Determinants of Health     Financial Resource Strain: Patient Declined (8/24/2023)    Received from [facility], [facility]    Overall Financial Resource Strain (CARDIA)     Difficulty of Paying Living Expenses: Patient declined   Food Insecurity: Patient Declined (8/24/2023)    [facility] from  [facility], [facility]    Hunger Vital Sign     Worried About Running Out of Food in the Last Year: Patient declined     Ran Out of Food in the Last Year: Patient declined   Transportation Needs: No Transportation Needs (8/24/2023)    Received from [facility], [facility]    PRAPARE - Transportation     Lack of Transportation (Medical): No     Lack of Transportation (Non-Medical): No   Physical Activity: Sufficiently Active (12/20/2023)    Exercise Vital Sign     Days of Exercise per Week: 3 days     Minutes of Exercise per Session: 60 min   Stress: Stress Concern Present (12/20/2023)    [employer] [employer] - Occupational Stress Questionnaire     Feeling of Stress : Rather much   Social Connections: Unknown (12/20/2023)    Social Connection and Isolation Panel [NHANES]     Frequency of Communication with Friends and Family: More than three times a week     Frequency of Social Gatherings with Friends and Family: More than three times a week     Attends Gnosticist Services: Patient declined     Active Member of Clubs or Organizations: Patient declined     Attends Club or Organization Meetings: Patient declined     Marital Status:    Intimate Partner Violence: Not At Risk (12/20/2023)    Humiliation, Afraid, Rape, and Kick questionnaire     Fear of Current or Ex-Partner: No     Emotionally Abused: No     Physically Abused: No     Sexually Abused: No   Housing Stability: Unknown (8/24/2023)    Received from [facility], [facility]    Housing Stability Vital Sign     Unable to Pay for Housing in the Last Year: Patient refused     Number of Places Lived in the Last Year: Not on file     Unstable Housing in the Last Year: Patient refused        Current Outpatient Medications:     doxycycline hyclate (VIBRAMYCIN) 100 mg capsule, Take 1 capsule (100 mg total) by mouth every 12 (twelve) hours  for 10 days, Disp: 20 capsule, Rfl: 0    acetaminophen (TYLENOL) 325 mg tablet, Take 1-2 tablets Q4-6 hours PRN pain. Do not take more than 4grams in 24 hours., Disp: , Rfl: 0    aspirin 325 mg tablet, Take 1 tablet (325 mg total) by mouth daily, Disp: 30 tablet, Rfl: 2    cholecalciferol (VITAMIN D3) 25 mcg (1,000 units) tablet, Take 1,000 Units by mouth 2 (two) times a day, Disp: , Rfl:     Continuous Glucose Sensor (FreeStyle Ayanna 2 Sensor) MISC, CHANGE SENSOR EVERY 14 DAYS, Disp: 2 each, Rfl: 5    docusate sodium (COLACE) 100 mg capsule, Take 1 capsule (100 mg total) by mouth 2 (two) times a day Hold for soft stools., Disp: 60 capsule, Rfl: 0    dofetilide (TIKOSYN) 500 mcg capsule, Take 1 capsule (500 mcg total) by mouth every 12 (twelve) hours, Disp: 180 capsule, Rfl: 3    gabapentin (NEURONTIN) 100 mg capsule, TAKE 1 CAPSULE BY MOUTH IN THE MORNING AND 3 CAPSULES IN THE EVENING, Disp: 120 capsule, Rfl: 2    glipiZIDE (GLUCOTROL) 5 mg tablet, Take 5 mg by mouth in the morning, Disp: , Rfl:     Jardiance 25 MG TABS, Take 1 tablet (25 mg total) by mouth in the morning, Disp: 90 tablet, Rfl: 3    LORazepam (ATIVAN) 0.5 mg tablet, Take 1 tablet (0.5 mg total) by mouth daily at bedtime, Disp: 30 tablet, Rfl: 3    Magnesium Gluconate 250 MG TABS, Take by mouth daily at bedtime, Disp: , Rfl:     metFORMIN (GLUCOPHAGE-XR) 500 mg 24 hr tablet, Take 2 tablets (1,000 mg total) by mouth 2 (two) times a day, Disp: 360 tablet, Rfl: 3    metoprolol succinate (TOPROL-XL) 25 mg 24 hr tablet, Take 1 tablet (25 mg total) by mouth daily, Disp: 90 tablet, Rfl: 3    metoprolol tartrate (LOPRESSOR) 25 mg tablet, Take 12.5 mg by mouth every 12 (twelve) hours (Patient not taking: Reported on 8/23/2024), Disp: , Rfl:     neomycin-polymyxin-hydrocortisone (CORTISPORIN) 0.35%-10,000 units/mL-1% otic suspension, Administer 3 drops to the right ear 4 (four) times a day for 5 days, Disp: 3 mL, Rfl: 0    Ozempic, 2 MG/DOSE, 8 MG/3ML  injection pen, Inject 0.75 mL (2 mg total) under the skin every 7 days, Disp: 9 mL, Rfl: 3    potassium chloride (K-DUR,KLOR-CON) 10 mEq tablet, Take 1 tablet (10 mEq total) by mouth daily, Disp: 90 tablet, Rfl: 3    rosuvastatin (CRESTOR) 20 MG tablet, TAKE TWO TABLETS BY MOUTH EVERY DAY, Disp: 90 tablet, Rfl: 1    sertraline (ZOLOFT) 50 mg tablet, TAKE ONE TABLET BY MOUTH EVERY DAY, Disp: 30 tablet, Rfl: 5    tadalafil (CIALIS) 20 MG tablet, Take 1 tablet (20 mg total) by mouth daily as needed for erectile dysfunction, Disp: 10 tablet, Rfl: 11  No current facility-administered medications for this visit.  Family History   Problem Relation Age of Onset    Leukemia Mother     Cancer Mother     Heart attack Father 52       Review of Systems   Constitutional:  Negative for chills and fever.   HENT:  Negative for ear pain and sore throat.    Eyes:  Negative for pain and visual disturbance.   Respiratory:  Negative for cough and shortness of breath.    Cardiovascular:  Negative for chest pain and palpitations.   Gastrointestinal:  Negative for abdominal pain and vomiting.   Genitourinary:  Negative for dysuria and hematuria.   Musculoskeletal:  Positive for gait problem. Negative for arthralgias and back pain.   Skin:  Positive for color change and wound. Negative for rash.   Neurological:  Positive for numbness. Negative for seizures and syncope.   Psychiatric/Behavioral: Negative.     All other systems reviewed and are negative.        Objective:  /69   Pulse 91   Temp 97.7 °F (36.5 °C)   Resp 18     Physical Exam  Constitutional:       Appearance: Normal appearance. He is normal weight.   HENT:      Head: Normocephalic and atraumatic.      Right Ear: External ear normal.      Left Ear: External ear normal.      Nose: Nose normal.      Mouth/Throat:      Mouth: Mucous membranes are moist.      Pharynx: Oropharynx is clear.   Eyes:      Conjunctiva/sclera: Conjunctivae normal.      Pupils: Pupils are equal,  round, and reactive to light.   Cardiovascular:      Pulses: Normal pulses.           Dorsalis pedis pulses are 2+ on the right side and 2+ on the left side.        Posterior tibial pulses are 2+ on the right side and 2+ on the left side.   Pulmonary:      Effort: Pulmonary effort is normal.   Musculoskeletal:      Cervical back: Normal range of motion.      Right lower leg: No edema.      Left lower leg: No edema.   Skin:     General: Skin is warm and dry.      Capillary Refill: Capillary refill takes less than 2 seconds.   Neurological:      General: No focal deficit present.      Mental Status: He is alert and oriented to person, place, and time. Mental status is at baseline.      Sensory: Sensory deficit present.      Coordination: Coordination abnormal.      Gait: Gait abnormal.      Deep Tendon Reflexes: Reflexes abnormal.   Psychiatric:         Mood and Affect: Mood normal.         Behavior: Behavior normal.         Thought Content: Thought content normal.         Judgment: Judgment normal.           Wound 08/13/24 Diabetic Ulcer Toe D1, great Left (Active)   Enter Chester score: Chester Grade 2: Deep ulcer extended to ligament, tendon, joint capsule, bone, or deep fascia without abscess or osteomyelitis (OM) 08/27/24 1129   Wound Image Images linked 08/27/24 1148   Wound Description Yellow;Slough;[place] 08/27/24 1129   Pat-wound Assessment Intact;Callus 08/27/24 1129   Wound Length (cm) 0.8 cm 08/27/24 1129   Wound Width (cm) 1.3 cm 08/27/24 1129   Wound Depth (cm) 0.4 cm 08/27/24 1129   Wound Surface Area (cm^2) 1.04 cm^2 08/27/24 1129   Wound Volume (cm^3) 0.416 cm^3 08/27/24 1129   Calculated Wound Volume (cm^3) 0.42 cm^3 08/27/24 1129   Change in Wound Size % -281.82 08/27/24 1129   Drainage Amount Small 08/27/24 1129   Drainage Description Serous;Yellow 08/27/24 1129   Non-staged Wound Description Full thickness 08/27/24 1129   Dressing Status Intact 08/27/24 1129                VAS ARTERIAL DUPLEX- LOWER  LIMB BILATERAL    Result Date: 8/20/2024  Narrative:  [facility] REPORT CLINICAL: Indications: Patient presents with an ulcer on his left big toe x 1.5 months.  Patient denies any pain with walking. Operative History: 2024-02-21 Cardiac Loop Recorder 2023-08-11 CABG Cardiac cath x3 Right Internal carotid artery aneurysm repair Risk Factors The patient has history of HTN, Diabetes, Hyperlipidemia and CAD.  FINDINGS:  Segment                Right       Left                                          PSV (cm/s)  PSV (cm/s)  Common Femoral Artery          77          86  Prox Profunda                  92          56  Prox SFA                       67          54  Mid SFA                        63          67  Dist SFA                       52          54  Proximal Pop                   49          45  Distal Pop                     75          58  Dist Post Tibial               32          75  Prox. Ant. Tibial              25          30  Dist. Ant. Tibial              26          47     CONCLUSION: Impression: RIGHT LOWER LIMB: Diffuse disease noted throughout the femoral-popliteal and tibioperoneal arteries without significant focal stenosis. Ankle/Brachial index:   Unreliable due to non-compressible vessels. PVR/ PPG tracings are slightly dampened. Metatarsal pressure of 107 mmHg Great toe pressure of 83 mmHg, within the healing range  LEFT LOWER LIMB: Diffuse disease noted throughout the femoral-popliteal and tibioperoneal arteries without significant focal stenosis. Ankle/Brachial index:   Unreliable due to non-compressible vessels. PVR/ PPG tracings are slightly dampened. Metatarsal pressure of 77 mmHg Great toe pressure of 74 mmHg, within the healing range  There are no prior studies available for comparison.  SIGNATURE: Electronically Signed by: ALAN BECK on 2024-08-20 05:11:12 PM    XR foot 3+ vw left    Result Date: 8/20/2024  Narrative: LEFT FOOT INDICATION:   Type 2 diabetes mellitus with  foot ulcer. Non-pressure chronic ulcer of other part of left foot limited to breakdown of skin. COMPARISON:  None. VIEWS:  XR FOOT 3+ VW LEFT Images: 3 FINDINGS: There is no acute fracture or dislocation. No significant degenerative changes. Osseous erosion distal phalanx great toe with adjacent soft tissue ulcer consistent with osteomyelitis. There are atherosclerotic calcifications. Soft tissues are otherwise unremarkable.     Impression: Osteomyelitis distal phalanx great toe. Electronically signed: 08/20/2024 10:26 AM Tom Rawls MPH,MD    Home Study    Result Date: 8/9/2024  Narrative: Table formatting from the original result was not included. Images from the original result were not included. Respiration Report  Patient Information        Full Name: John Obleschuk Patient ID: [id number]  Height: 177.8 cm Weight: 85.7 kg BMI: 27.1  YOB: 1960 Age: 64 Gender: Male Home Sleep Testing Interpretation Report STUDY FORMAT: The patient was admitted to the sleep laboratory at the [facility] and oriented to the home sleep study testing procedure and equipment.  The home sleep testing study was performed using the [employer] Nox-T3 Recorder which is a Type III monitoring system.  A return demonstration by the patient helped to assure correct usage.  The following parameters were monitored: p-flow via nasal cannula, body position, oximetry, pulse rate and respiratory effort via thoracic and abdominal RIP belts.  The sleep study was scored following the rules established by the American Academy of Sleep Medicine (AASM).  Hypopneas are defined as a =30% drop in flow for =10 seconds that are associated with =4% desaturations.  KRISTY is the Respiratory Event Index (number of respiratory events per hour) and is a surrogate for the apnea/hypopnea index (AHI). PATIENT HISTORY: Home sleep testing was undertaken to evaluate this patient with suggestive symptoms and /or risk factors  for sleep  disordered breathing. TESTING RESULTS: The test results are from 1 Night.  The total time in bed (analysis time) was 7h 42m minutes.  The patient had a total of 90 respiratory events made up of 37 obstructive apneas, 3 central apneas,  1 mixed apneas and 49 hypopneas resulting in a respiratory event index (KRISTY) of 11.9 .  The lowest SPO2 recorded is 78.0% and 21 minutes during the study was spent with saturations below 90%.  The snore index was 1.6%.     Impression: Mild to moderate obstructive sleep apnea Significant hypoxia that is out of proportion to the KRISTY. Limitations of home sleep testing compromises accuracy.  Based on the results of this study, positive airway pressure therapy would be recommended.  He will be seen for follow-up to review results and treatment options.  Board Certified Sleep Physician Data Report  Recording Information   Recording Date: 8/7/2024 Analysis Duration (TRT): 7h 42m  Recording Tags:  Analysis Start Time (Lights out): 10:30 PM  Device Type: T3 Analysis Stop Time (Lights on): 6:12 AM   Summary  Est Total Sleep Time (TST): 7h 32m Est. Sleep Efficiency: 94 % AHI:  FLAQUITA:  Snore %:         Respiratory Parameters Total Supine Non-Supine Count   Apneas + Hypopneas (AH): 11.9 /h 20.2 /h 1.2 /h 90   Apneas: 5.4 /h 8.9 /h 0.9 /h 41    Obstructive (OA): 4.9 /h 8.5 /h 0.3 /h 37    Mixed (MA):  0.1 /h 0.2 /h 0 /h 1    Central (CA):  0.4 /h 0.2 /h 0.6 /h 3   Hypopneas:  6.5 /h 11.3 /h 0.3 /h 49    Obstructive (OH): 0 /h 0 /h 0 /h 0    Central (CH):  0 /h 0 /h 0 /h 0   Obstructive Apnea Hypopnea (OA + MA + OH): 5 /h 8.7 /h 0.3 /h 38   Central Apnea Hypopnea (CA + CH): 0.4 /h 0.2 /h 0.6 /h 3   RDI (A+H+RERAs) 11.9 /h 20.2 /h 1.2 /h 90   Hypoventilation: 0 /h 0 /h 0 /h 0   Respiration Rate (per m): 15.9 /m 16.1 /m 15.7 /m                Signal Quality Percentage     Percentage    Oximeter: 99.8 %    Abdomen RIP: 100 %    Nasal Cannula: 100 %    Thorax RIP: 100 %          Snore Total  Supine Non-Supine Duration   Snore: 1.6 % 2.1 % 0.9 % 7.3 m  Flow Limitation: 0 % 0 % 0 % 0 m  Cheyne-Jason Breathin % 0 % 0 % 0 m  Paradoxical Breathin % 0 % 0 % 0 m              Oxygen Saturation (SpO2) Total Supine Non-Supine Duration   Oxygen Desaturation Index (FLAQUITA): 10.7 /h 18.4 /h 0.9 /h    Average SpO2: 93.4 % 92.7 % 94.4 %    Minimum SpO2: 78 % 78 % 78 %    SpO2 Duration < 90% 4.6 % 8.1 % 0.2 % 21 m  SpO2 Duration = 88% 3.2 % 5.6 % 0.2 % 14.6 m  SpO2 Duration < 85% 0.7 % 1.1 % 0.1 % 3 m  Average Desat Drop: 6.5 % 6.6 % 2.5 %              Position & Analysis Time Duration Percentage   Duration Percentage  Supine (in TST): 254.8 m 56.3 %  Movement (in TST): 26.1 m 5.8 %  Non-Supine (in TST): 197.9 m 43.7 %  Invalid Data (Excluded): 0 m 0 %    Left (in TST): 98.9 m 21.8 %          Prone (in TST): 0.1 m 0 %          Right (in TST): 98.9 m 21.8 %          Unknown (in TST): 0 m 0 %        Upright (in TRT): 9.4 m 2 %                      Pulse Bpm    Minutes   Average (in TST): 89.5     Duration < 40 bpm: 0     Max (in TST): 108     Duration > 100 bpm: 3.2     Max (in TRT): 109     Duration > 90 bpm: 191.5     Min (in TST): 70                         Cardiac Events Index Count   Index Count  Bradycardia 0 /h 0   Tachycardia 14.2 /h 107   Asystole 0 /h 0   Atrial Fibrillation 0 /h 0              Trend Overview        Wound Instructions:  Orders Placed This Encounter   Procedures    Wound cleansing and dressings Diabetic Ulcer Left Toe D1, great     Wound location: Left great toe  Change dressing daily  The dressing must stay dry and intact. You may shower with dressing protected by cast cover or bag. Or you may sponge bathe. Call wound center or home health nurse if dressing becomes wet.   Cleanse wound with Prophase  Apply Betadine ( Paint around wound and on wound ) followed by Adaptic to wound bed  Cover with gauze.   Secure with tape then surginet.     Silver Nitrate was applied to wound today.  "Drainage may appear grayish/black.    Off-loading Instructions:  Keep weight and pressure off wound at all times.   Wear off-loading device ( open toe surgical shoe) as directed by your physician.   Put on immediately when rising in the morning and remove when going to bed.    Dr. Cotton is ordering pre admission bloodwork, Chest Xray, and EKG.  Please call Dr. Jasso regarding surgery of left great toe wound.     Standing Status:   Future     Standing Expiration Date:   9/10/2024    Wound Procedure Treatment Diabetic Ulcer Left Toe D1, great     This order was created via procedure documentation    XR chest pa & lateral     Standing Status:   Future     Number of Occurrences:   1     Standing Expiration Date:   8/27/2028     Scheduling Instructions:      Bring along any outside films relating to this procedure.          CBC and differential     This is a patient instruction: This test is non-fasting. Please drink two glasses of water morning of bloodwork.       Comprehensive metabolic panel     This is a patient instruction: Patient fasting for 8 hours or longer recommended.    ECG 12 lead     Scheduling Instructions:      pre-op evaluation         Cheryle Cotton, ELVIRA, DPIVONE, FACFAS    Portions of the record may have been created with voice recognition software. Occasional wrong word or \"sound a like\" substitutions may have occurred due to the inherent limitations of voice recognition software. Read the chart carefully and recognize, using context, where substitutions have occurred.    "

## 2024-08-27 NOTE — PROGRESS NOTES
Wound Procedure Treatment Diabetic Ulcer Left Toe D1, great    Performed by: Brittani Daniels RN  Authorized by: Cheryle Cotton DPM    Associated wounds:   Wound 08/13/24 Diabetic Ulcer Toe D1, great Left  Wound cleansed with:  Wound aggrssively cleansed with NSS and gauze  Applied Topical: Betadine    Applied primary dressing:  Non adherent contact layer  Applied secondary dressing:  Gauze  Dressing secured with:  Tape and Surgilast

## 2024-08-28 ENCOUNTER — TELEPHONE (OUTPATIENT)
Dept: INTERNAL MEDICINE CLINIC | Facility: CLINIC | Age: 64
End: 2024-08-28

## 2024-08-28 ENCOUNTER — CONSULT (OUTPATIENT)
Dept: INTERNAL MEDICINE CLINIC | Facility: CLINIC | Age: 64
End: 2024-08-28
Payer: COMMERCIAL

## 2024-08-28 ENCOUNTER — TELEPHONE (OUTPATIENT)
Dept: ENDOCRINOLOGY | Facility: HOSPITAL | Age: 64
End: 2024-08-28

## 2024-08-28 VITALS
HEART RATE: 92 BPM | HEIGHT: 66 IN | SYSTOLIC BLOOD PRESSURE: 120 MMHG | TEMPERATURE: 97.6 F | DIASTOLIC BLOOD PRESSURE: 68 MMHG | BODY MASS INDEX: 31.79 KG/M2 | OXYGEN SATURATION: 98 % | WEIGHT: 197.8 LBS

## 2024-08-28 DIAGNOSIS — Z01.818 PRE-OP EXAMINATION: Primary | ICD-10-CM

## 2024-08-28 PROCEDURE — 99214 OFFICE O/P EST MOD 30 MIN: CPT | Performed by: INTERNAL MEDICINE

## 2024-08-28 NOTE — TELEPHONE ENCOUNTER
Patient had a foot exam at dr webb podiatrist here at [facility] in [place], can we get that for metrics?

## 2024-08-28 NOTE — PROGRESS NOTES
INTERNAL MEDICINE PRE-OPERATIVE EVALUATION  [facility] - [facility] INTERNAL MEDICINE [place]    NAME: John Obleschuk  AGE: 64 y.o. SEX: male  : 1960     DATE: 2024     Internal Medicine Pre-Operative Evaluation:     Chief Complaint: Pre-operative Evaluation     Surgery: Left Toe amputation  Anticipated Date of Surgery:2024  Referring Provider: No ref. provider found        History of Present Illness:     John Obleschuk is a 64 y.o. male who presents to the office today for a preoperative consultation at the request of surgeon, Dr. Cotton, who plans on performing left toe amputation on 2024. Planned anesthesia is IV sedation. Patient has a bleeding risk of: no recent abnormal bleeding. Patient does not have objections to receiving blood products if needed. Current anti-platelet/anti-coagulation medications that the patient is prescribed includes: Aspirin.      Assessment of Chronic Conditions:   - Diabetes Mellitus: well controlled    - Coronary Artery Disease: stable  - Hypertension: well controlled     Assessment of Cardiac Risk:  Denies unstable or severe angina or MI in the last 6 weeks or history of stent placement in the last year   Denies decompensated heart failure (e.g. New onset heart failure, NYHA functional class IV heart failure, or worsening existing heart failure)  Reports significant arrhythmias such as high grade AV block, symptomatic ventricular arrhythmia, newly recognized ventricular tachycardia, supraventricular tachycardia with resting heart rate >100, or symptomatic bradycardia. Diagnosed with re-entry VT on . Follows with cardiology and is on Tikosyn  Denies severe heart valve disease including aortic stenosis or symptomatic mitral stenosis     Exercise Capacity:  Able to walk 4 blocks without symptoms?: Yes  Able to walk 2 flights without symptoms?: Yes    Prior Anesthesia Reactions: No     Personal history of venous thromboembolic disease?  No    History of steroid use for >2 weeks within last year? No    STOP-BANG Sleep Apnea Screening Questionnaire:      Do you SNORE loudly (louder than talking or loud enough to be heard through closed doors)? Yes = 1 point   Do you often feel TIRED, fatigued, or sleepy during daytime? Yes = 1 point   Has anyone OBSERVED you stop breathing during your sleep? No = 0 point   Do you have or are you being treated for high blood pressure? Yes = 1 point   BMI more than 35 kg/m2? No = 0 point   AGE over 50 years old? Yes = 1 point   NECK circumference > 16 inches (40 cm)? No = 0 point   Male GENDER? Yes = 1 point   TOTAL SCORE 5 = HIGH risk of DUDLEY       Review of Systems:     Review of Systems   Constitutional:  Negative for appetite change and fatigue.   Eyes:  Negative for visual disturbance.   Respiratory:  Negative for cough, chest tightness, shortness of breath and wheezing.    Cardiovascular:  Negative for chest pain, palpitations and leg swelling.   Gastrointestinal:  Negative for abdominal pain, nausea and vomiting.   Musculoskeletal:  Negative for arthralgias and joint swelling.   Skin:  Negative for rash.   Neurological:  Negative for dizziness and headaches.   Psychiatric/Behavioral:  Negative for confusion and sleep disturbance.         Problem List:     Patient Active Problem List   Diagnosis    Essential hypertension    Type 2 diabetes mellitus with hyperglycemia, without long-term current use of insulin (HCC)    Mixed hyperlipidemia    Sleep apnea    Coronary artery disease involving native coronary artery    S/P CABG x 5    Depressive disorder    Myalgia and myositis    Right knee pain    Vitamin D deficiency    Diabetic polyneuropathy associated with type 2 diabetes mellitus (HCC)    Wide-complex tachycardia    Encounter for annual physical exam    Diabetic ulcer of toe of left foot associated with type 2 diabetes mellitus, limited to breakdown of skin (HCC)    Pre-op examination        Allergies:  "    Allergies   Allergen Reactions    Atorvastatin Other (See Comments)     felt flu-like    Rosiglitazone Other (See Comments)    Trazodone Other (See Comments)     Felt \"hung over\"        Current Medications:       Current Outpatient Medications:     aspirin 325 mg tablet, Take 1 tablet (325 mg total) by mouth daily, Disp: 30 tablet, Rfl: 2    cholecalciferol (VITAMIN D3) 25 mcg (1,000 units) tablet, Take 1,000 Units by mouth 2 (two) times a day, Disp: , Rfl:     Continuous Glucose Sensor (FreeStyle Ayanna 2 Sensor) MISC, CHANGE SENSOR EVERY 14 DAYS, Disp: 2 each, Rfl: 5    docusate sodium (COLACE) 100 mg capsule, Take 1 capsule (100 mg total) by mouth 2 (two) times a day Hold for soft stools., Disp: 60 capsule, Rfl: 0    dofetilide (TIKOSYN) 500 mcg capsule, Take 1 capsule (500 mcg total) by mouth every 12 (twelve) hours, Disp: 180 capsule, Rfl: 3    doxycycline hyclate (VIBRAMYCIN) 100 mg capsule, Take 1 capsule (100 mg total) by mouth every 12 (twelve) hours for 10 days, Disp: 20 capsule, Rfl: 0    gabapentin (NEURONTIN) 100 mg capsule, TAKE 1 CAPSULE BY MOUTH IN THE MORNING AND 3 CAPSULES IN THE EVENING, Disp: 120 capsule, Rfl: 2    glipiZIDE (GLUCOTROL) 5 mg tablet, Take 5 mg by mouth in the morning, Disp: , Rfl:     Jardiance 25 MG TABS, Take 1 tablet (25 mg total) by mouth in the morning, Disp: 90 tablet, Rfl: 3    LORazepam (ATIVAN) 0.5 mg tablet, Take 1 tablet (0.5 mg total) by mouth daily at bedtime, Disp: 30 tablet, Rfl: 3    Magnesium Gluconate 250 MG TABS, Take by mouth daily at bedtime, Disp: , Rfl:     metFORMIN (GLUCOPHAGE-XR) 500 mg 24 hr tablet, Take 2 tablets (1,000 mg total) by mouth 2 (two) times a day, Disp: 360 tablet, Rfl: 3    metoprolol succinate (TOPROL-XL) 25 mg 24 hr tablet, Take 1 tablet (25 mg total) by mouth daily, Disp: 90 tablet, Rfl: 3    Ozempic, 2 MG/DOSE, 8 MG/3ML injection pen, Inject 0.75 mL (2 mg total) under the skin every 7 days, Disp: 9 mL, Rfl: 3    potassium chloride " (K-DUR,KLOR-CON) 10 mEq tablet, Take 1 tablet (10 mEq total) by mouth daily, Disp: 90 tablet, Rfl: 3    rosuvastatin (CRESTOR) 20 MG tablet, TAKE TWO TABLETS BY MOUTH EVERY DAY, Disp: 90 tablet, Rfl: 1    sertraline (ZOLOFT) 50 mg tablet, TAKE ONE TABLET BY MOUTH EVERY DAY, Disp: 30 tablet, Rfl: 5    tadalafil (CIALIS) 20 MG tablet, Take 1 tablet (20 mg total) by mouth daily as needed for erectile dysfunction, Disp: 10 tablet, Rfl: 11    acetaminophen (TYLENOL) 325 mg tablet, Take 1-2 tablets Q4-6 hours PRN pain. Do not take more than 4grams in 24 hours. (Patient not taking: Reported on 8/28/2024), Disp: , Rfl: 0    metoprolol tartrate (LOPRESSOR) 25 mg tablet, Take 12.5 mg by mouth every 12 (twelve) hours (Patient not taking: Reported on 8/23/2024), Disp: , Rfl:     neomycin-polymyxin-hydrocortisone (CORTISPORIN) 0.35%-10,000 units/mL-1% otic suspension, Administer 3 drops to the right ear 4 (four) times a day for 5 days (Patient not taking: Reported on 8/28/2024), Disp: 3 mL, Rfl: 0  No current facility-administered medications for this visit.     Past History:     Past Medical History:   Diagnosis Date    Coronary artery disease     Diabetes mellitus (HCC)     Hyperlipidemia     Hypertension         Past Surgical History:   Procedure Laterality Date    ARTERIAL ANEURYSM REPAIR      Right Carotid Aneurysm - age 15.    CARDIAC CATHETERIZATION Left 08/02/2023    Procedure: Cardiac Left Heart Cath;  Surgeon: Luisito Inman DO;  Location: [facility];  Service: Cardiology    CARDIAC CATHETERIZATION N/A 08/02/2023    Procedure: Cardiac Coronary Angiogram;  Surgeon: Luisito Inman DO;  Location: [facility];  Service: Cardiology    CARDIAC CATHETERIZATION  08/02/2023    Procedure: Cardiac catheterization;  Surgeon: Luisito Inman DO;  Location: [facility];  Service: Cardiology    CARDIAC ELECTROPHYSIOLOGY PROCEDURE N/A 2/21/2024    Procedure: Cardiac loop recorder implant;   Surgeon: Luke Siu MD;  Location: [facility];  Service: Cardiology    HERNIA REPAIR      NH CORONARY ARTERY BYP W/VEIN & ARTERY GRAFT 4 VEIN N/A 8/11/2023    Procedure: CORONARY ARTERY BYPASS GRAFT (CABG) X-5 VESSELS ; SVG to PDA, Ramus, Diagonal and OM. LIMA --> LAD EVH  W/ JOHN;  Surgeon: Soham Hodgson MD;  Location: [facility];  Service: Cardiac Surgery    VASECTOMY          Family History   Problem Relation Age of Onset    Leukemia Mother     Cancer Mother     Heart attack Father 52        Social History     Socioeconomic History    Marital status: /Civil Union     Spouse name: Not on file    Number of children: Not on file    Years of education: Not on file    Highest education level: Not on file   Occupational History    Not on file   Tobacco Use    Smoking status: Never    Smokeless tobacco: Never   Vaping Use    Vaping status: Never Used   Substance and Sexual Activity    Alcohol use: No    Drug use: No    Sexual activity: Yes     Partners: Female   Other Topics Concern    Not on file   Social History Narrative    Not on file     Social Determinants of Health     Financial Resource Strain: Patient Declined (8/24/2023)    [facility]    Overall Financial Resource Strain (CARDIA)     Difficulty of Paying Living Expenses: Patient declined   Food Insecurity: Patient Declined (8/24/2023)    [facility]    Hunger Vital Sign     Worried About Running Out of Food in the Last Year: Patient declined     Ran Out of Food in the Last Year: Patient declined   Transportation Needs: No Transportation Needs (8/24/2023)    [facility]    PRAPARE - Transportation     Lack of Transportation (Medical): No     Lack of Transportation (Non-Medical): No   Physical Activity: Sufficiently Active (12/20/2023)    Exercise Vital Sign      "Days of Exercise per Week: 3 days     Minutes of Exercise per Session: 60 min   Stress: Stress Concern Present (12/20/2023)    [employer] [place] [employer] - Occupational Stress Questionnaire     Feeling of Stress : Rather much   Social Connections: Unknown (12/20/2023)    Social Connection and Isolation Panel [NHANES]     Frequency of Communication with Friends and Family: More than three times a week     Frequency of Social Gatherings with Friends and Family: More than three times a week     Attends Voodoo Services: Patient declined     Active Member of Clubs or Organizations: Patient declined     Attends Club or Organization Meetings: Patient declined     Marital Status:    Intimate Partner Violence: Not At Risk (12/20/2023)    Humiliation, Afraid, Rape, and Kick questionnaire     Fear of Current or Ex-Partner: No     Emotionally Abused: No     Physically Abused: No     Sexually Abused: No   Housing Stability: Unknown (8/24/2023)    [facility]    Housing Stability Vital Sign     Unable to Pay for Housing in the Last Year: Patient refused     Number of Places Lived in the Last Year: Not on file     Unstable Housing in the Last Year: Patient refused        Physical Exam:      /68 (BP Location: Right arm, Patient Position: Sitting, Cuff Size: Large)   Pulse 92   Temp 97.6 °F (36.4 °C) (Tympanic)   Ht 5' 5.75\" (1.67 m)   Wt 89.7 kg (197 lb 12.8 oz)   SpO2 98%   BMI 32.17 kg/m²     Physical Exam  Vitals and nursing note reviewed.   Constitutional:       General: He is not in acute distress.     Appearance: He is well-developed. He is obese.   HENT:      Head: Normocephalic and atraumatic.      Mouth/Throat:      Mouth: Mucous membranes are moist.   Eyes:      Conjunctiva/sclera: Conjunctivae normal.      Pupils: Pupils are equal, round, and reactive to light.   Neck:      Thyroid: No thyromegaly.   Cardiovascular:      Rate " and Rhythm: Normal rate and regular rhythm.      Heart sounds: Normal heart sounds.   Pulmonary:      Effort: No respiratory distress.      Breath sounds: Normal breath sounds. No wheezing.   Abdominal:      General: Bowel sounds are normal. There is no distension.      Palpations: Abdomen is soft.      Tenderness: There is no abdominal tenderness.   Musculoskeletal:         General: Normal range of motion.      Cervical back: Normal range of motion and neck supple.   Skin:     General: Skin is warm and dry.      Capillary Refill: Capillary refill takes less than 2 seconds.   Neurological:      Mental Status: He is alert and oriented to person, place, and time.      Sensory: No sensory deficit.      Motor: No weakness or abnormal muscle tone.   Psychiatric:         Thought Content: Thought content normal.         Judgment: Judgment normal.           Data:     Pre-operative work-up    Laboratory Results: I have personally reviewed the pertinent laboratory results/reports     EKG: I have personally reviewed pertinent reports.      Previous cardiopulmonary studies within the past year:  Echocardiogram: 12/2023      Left Ventricle: Left ventricular cavity size is normal. Wall thickness is mildly increased. There is concentric remodeling. The left ventricular ejection fraction is 60%. Systolic function is normal. Wall motion is normal.    IVS: There is abnormal septal motion consistent with post-operative status.    Right Ventricle: Right ventricular cavity size is normal. Systolic function is normal.    Mitral Valve: There is mild annular calcification.         Assessment:     1. Pre-op examination             Plan:     64 y.o. male with planned surgery: Toe amputation on 09/03/2024.      Cardiac Risk Estimation: per the Revised Cardiac Risk Index (Circ. 100:1043, 1999), the patient's risk factors for cardiac complications include history of ischemic heart disease, putting him in: RCI RISK CLASS II (1 risk factor, risk  of major cardiac compl. appr. 1.3%).    1. Further preoperative workup as follows:   - None; no further preoperative work-up is required    2. Medication Management/Recommendations:   - Patient has been instructed to avoid herbs or non-directed vitamins the week prior to surgery to ensure no drug interactions with perioperative surgical and anesthetic medications.  - Patient should continue antihypertensive medications up through and including the day of surgery.   - Patient should continue beta-blocker medication up through and including the day of surgery.  - Patient should continue his statin medication up through and including the day of surgery.  - Hold metformin the morning of surgery and do not resume until 48 hours AFTER surgery to avoid risk of lactic acidosis. Do not resume if eGFR is < 30  - Hold the following sedative medications 24 hours preoperatively: ativan  - Hold Jardiance and glipizide the day of the surgery    3. Prophylaxis for cardiac events with perioperative beta-blockers: patient on beta-blocker.    4. Patient requires further consultation with: None    Clearance  Patient is medically stable for surgery without any additional cardiac testing.     Keila Irwin MD  [facility] INTERNAL MEDICINE [address] [address]  Phone#  [phone]  Fax#  [phone]

## 2024-08-28 NOTE — H&P (VIEW-ONLY)
INTERNAL MEDICINE PRE-OPERATIVE EVALUATION  [facility] PHYSICIAN GROUP - [facility] INTERNAL MEDICINE [place]    NAME: John Obleschuk  AGE: 64 y.o. SEX: male  : 1960     DATE: 2024     Internal Medicine Pre-Operative Evaluation:     Chief Complaint: Pre-operative Evaluation     Surgery: Left Toe amputation  Anticipated Date of Surgery:2024  Referring Provider: No ref. provider found        History of Present Illness:     John Obleschuk is a 64 y.o. male who presents to the office today for a preoperative consultation at the request of surgeon, Dr. Cotton, who plans on performing left toe amputation on 2024. Planned anesthesia is IV sedation. Patient has a bleeding risk of: no recent abnormal bleeding. Patient does not have objections to receiving blood products if needed. Current anti-platelet/anti-coagulation medications that the patient is prescribed includes: Aspirin.      Assessment of Chronic Conditions:   - Diabetes Mellitus: well controlled    - Coronary Artery Disease: stable  - Hypertension: well controlled     Assessment of Cardiac Risk:  Denies unstable or severe angina or MI in the last 6 weeks or history of stent placement in the last year   Denies decompensated heart failure (e.g. New onset heart failure, NYHA functional class IV heart failure, or worsening existing heart failure)  Reports significant arrhythmias such as high grade AV block, symptomatic ventricular arrhythmia, newly recognized ventricular tachycardia, supraventricular tachycardia with resting heart rate >100, or symptomatic bradycardia. Diagnosed with re-entry VT on . Follows with cardiology and is on Tikosyn  Denies severe heart valve disease including aortic stenosis or symptomatic mitral stenosis     Exercise Capacity:  Able to walk 4 blocks without symptoms?: Yes  Able to walk 2 flights without symptoms?: Yes    Prior Anesthesia Reactions: No     Personal history of venous thromboembolic disease?  No    History of steroid use for >2 weeks within last year? No    STOP-BANG Sleep Apnea Screening Questionnaire:      Do you SNORE loudly (louder than talking or loud enough to be heard through closed doors)? Yes = 1 point   Do you often feel TIRED, fatigued, or sleepy during daytime? Yes = 1 point   Has anyone OBSERVED you stop breathing during your sleep? No = 0 point   Do you have or are you being treated for high blood pressure? Yes = 1 point   BMI more than 35 kg/m2? No = 0 point   AGE over 50 years old? Yes = 1 point   NECK circumference > 16 inches (40 cm)? No = 0 point   Male GENDER? Yes = 1 point   TOTAL SCORE 5 = HIGH risk of DUDLEY       Review of Systems:     Review of Systems   Constitutional:  Negative for appetite change and fatigue.   Eyes:  Negative for visual disturbance.   Respiratory:  Negative for cough, chest tightness, shortness of breath and wheezing.    Cardiovascular:  Negative for chest pain, palpitations and leg swelling.   Gastrointestinal:  Negative for abdominal pain, nausea and vomiting.   Musculoskeletal:  Negative for arthralgias and joint swelling.   Skin:  Negative for rash.   Neurological:  Negative for dizziness and headaches.   Psychiatric/Behavioral:  Negative for confusion and sleep disturbance.         Problem List:     Patient Active Problem List   Diagnosis    Essential hypertension    Type 2 diabetes mellitus with hyperglycemia, without long-term current use of insulin (HCC)    Mixed hyperlipidemia    Sleep apnea    Coronary artery disease involving native coronary artery    S/P CABG x 5    Depressive disorder    Myalgia and myositis    Right knee pain    Vitamin D deficiency    Diabetic polyneuropathy associated with type 2 diabetes mellitus (HCC)    Wide-complex tachycardia    Encounter for annual physical exam    Diabetic ulcer of toe of left foot associated with type 2 diabetes mellitus, limited to breakdown of skin (HCC)    Pre-op examination        Allergies:  "    Allergies   Allergen Reactions    Atorvastatin Other (See Comments)     felt flu-like    Rosiglitazone Other (See Comments)    Trazodone Other (See Comments)     Felt \"hung over\"        Current Medications:       Current Outpatient Medications:     aspirin 325 mg tablet, Take 1 tablet (325 mg total) by mouth daily, Disp: 30 tablet, Rfl: 2    cholecalciferol (VITAMIN D3) 25 mcg (1,000 units) tablet, Take 1,000 Units by mouth 2 (two) times a day, Disp: , Rfl:     Continuous Glucose Sensor (FreeStyle Ayanna 2 Sensor) MISC, CHANGE SENSOR EVERY 14 DAYS, Disp: 2 each, Rfl: 5    docusate sodium (COLACE) 100 mg capsule, Take 1 capsule (100 mg total) by mouth 2 (two) times a day Hold for soft stools., Disp: 60 capsule, Rfl: 0    dofetilide (TIKOSYN) 500 mcg capsule, Take 1 capsule (500 mcg total) by mouth every 12 (twelve) hours, Disp: 180 capsule, Rfl: 3    doxycycline hyclate (VIBRAMYCIN) 100 mg capsule, Take 1 capsule (100 mg total) by mouth every 12 (twelve) hours for 10 days, Disp: 20 capsule, Rfl: 0    gabapentin (NEURONTIN) 100 mg capsule, TAKE 1 CAPSULE BY MOUTH IN THE MORNING AND 3 CAPSULES IN THE EVENING, Disp: 120 capsule, Rfl: 2    glipiZIDE (GLUCOTROL) 5 mg tablet, Take 5 mg by mouth in the morning, Disp: , Rfl:     Jardiance 25 MG TABS, Take 1 tablet (25 mg total) by mouth in the morning, Disp: 90 tablet, Rfl: 3    LORazepam (ATIVAN) 0.5 mg tablet, Take 1 tablet (0.5 mg total) by mouth daily at bedtime, Disp: 30 tablet, Rfl: 3    Magnesium Gluconate 250 MG TABS, Take by mouth daily at bedtime, Disp: , Rfl:     metFORMIN (GLUCOPHAGE-XR) 500 mg 24 hr tablet, Take 2 tablets (1,000 mg total) by mouth 2 (two) times a day, Disp: 360 tablet, Rfl: 3    metoprolol succinate (TOPROL-XL) 25 mg 24 hr tablet, Take 1 tablet (25 mg total) by mouth daily, Disp: 90 tablet, Rfl: 3    Ozempic, 2 MG/DOSE, 8 MG/3ML injection pen, Inject 0.75 mL (2 mg total) under the skin every 7 days, Disp: 9 mL, Rfl: 3    potassium chloride " (K-DUR,KLOR-CON) 10 mEq tablet, Take 1 tablet (10 mEq total) by mouth daily, Disp: 90 tablet, Rfl: 3    rosuvastatin (CRESTOR) 20 MG tablet, TAKE TWO TABLETS BY MOUTH EVERY DAY, Disp: 90 tablet, Rfl: 1    sertraline (ZOLOFT) 50 mg tablet, TAKE ONE TABLET BY MOUTH EVERY DAY, Disp: 30 tablet, Rfl: 5    tadalafil (CIALIS) 20 MG tablet, Take 1 tablet (20 mg total) by mouth daily as needed for erectile dysfunction, Disp: 10 tablet, Rfl: 11    acetaminophen (TYLENOL) 325 mg tablet, Take 1-2 tablets Q4-6 hours PRN pain. Do not take more than 4grams in 24 hours. (Patient not taking: Reported on 8/28/2024), Disp: , Rfl: 0    metoprolol tartrate (LOPRESSOR) 25 mg tablet, Take 12.5 mg by mouth every 12 (twelve) hours (Patient not taking: Reported on 8/23/2024), Disp: , Rfl:     neomycin-polymyxin-hydrocortisone (CORTISPORIN) 0.35%-10,000 units/mL-1% otic suspension, Administer 3 drops to the right ear 4 (four) times a day for 5 days (Patient not taking: Reported on 8/28/2024), Disp: 3 mL, Rfl: 0  No current facility-administered medications for this visit.     Past History:     Past Medical History:   Diagnosis Date    Coronary artery disease     Diabetes mellitus (HCC)     Hyperlipidemia     Hypertension         Past Surgical History:   Procedure Laterality Date    ARTERIAL ANEURYSM REPAIR      Right Carotid Aneurysm - age 15.    CARDIAC CATHETERIZATION Left 08/02/2023    Procedure: Cardiac Left Heart Cath;  Surgeon: Luisito Inman DO;  [facility]: [facility];  Service: Cardiology    CARDIAC CATHETERIZATION N/A 08/02/2023    Procedure: Cardiac Coronary Angiogram;  Surgeon: Luisito Inman DO;  [facility];  Service: Cardiology    CARDIAC CATHETERIZATION  08/02/2023    Procedure: Cardiac catheterization;  Surgeon: Luisito Inman DO;  [facility];  Service: Cardiology    CARDIAC ELECTROPHYSIOLOGY PROCEDURE N/A 2/21/2024    Procedure: Cardiac loop recorder implant;   Surgeon: Luke Siu MD;  [facility]: [facility];  Service: Cardiology    HERNIA REPAIR      PA CORONARY ARTERY BYP W/VEIN & ARTERY GRAFT 4 VEIN N/A 8/11/2023    Procedure: CORONARY ARTERY BYPASS GRAFT (CABG) X-5 VESSELS ; SVG to PDA, Ramus, Diagonal and OM. LIMA --> LAD EVH  W/ JOHN;  Surgeon: Soham Hodgson MD;  Location: [facility];  Service: Cardiac Surgery    VASECTOMY          Family History   Problem Relation Age of Onset    Leukemia Mother     Cancer Mother     Heart attack Father 52        Social History     Socioeconomic History    Marital status: /Civil Union     Spouse name: Not on file    Number of children: Not on file    Years of education: Not on file    Highest education level: Not on file   Occupational History    Not on file   Tobacco Use    Smoking status: Never    Smokeless tobacco: Never   Vaping Use    Vaping status: Never Used   Substance and Sexual Activity    Alcohol use: No    Drug use: No    Sexual activity: Yes     Partners: Female   Other Topics Concern    Not on file   Social History Narrative    Not on file     Social Determinants of Health     Financial Resource Strain: Patient Declined (8/24/2023)    [facility]    Overall Financial Resource Strain (CARDIA)     Difficulty of Paying Living Expenses: Patient declined   Food Insecurity: Patient Declined (8/24/2023)    [facility]    Hunger Vital Sign     Worried About Running Out of Food in the Last Year: Patient declined     Ran Out of Food in the Last Year: Patient declined   Transportation Needs: No Transportation Needs (8/24/2023)    Received from [facility], [facility]    PRAPARE - Transportation     Lack of Transportation (Medical): No     Lack of Transportation (Non-Medical): No   Physical Activity: Sufficiently Active (12/20/2023)    Exercise Vital Sign      "Days of Exercise per Week: 3 days     Minutes of Exercise per Session: 60 min   Stress: Stress Concern Present (12/20/2023)    [employer] [place] [employer] - Occupational Stress Questionnaire     Feeling of Stress : Rather much   Social Connections: Unknown (12/20/2023)    Social Connection and Isolation Panel [NHANES]     Frequency of Communication with Friends and Family: More than three times a week     Frequency of Social Gatherings with Friends and Family: More than three times a week     Attends Rastafari Services: Patient declined     Active Member of Clubs or Organizations: Patient declined     Attends Club or Organization Meetings: Patient declined     Marital Status:    Intimate Partner Violence: Not At Risk (12/20/2023)    Humiliation, Afraid, Rape, and Kick questionnaire     Fear of Current or Ex-Partner: No     Emotionally Abused: No     Physically Abused: No     Sexually Abused: No   Housing Stability: Unknown (8/24/2023)    [facility]    Housing Stability Vital Sign     Unable to Pay for Housing in the Last Year: Patient refused     Number of Places Lived in the Last Year: Not on file     Unstable Housing in the Last Year: Patient refused        Physical Exam:      /68 (BP Location: Right arm, Patient Position: Sitting, Cuff Size: Large)   Pulse 92   Temp 97.6 °F (36.4 °C) (Tympanic)   Ht 5' 5.75\" (1.67 m)   Wt 89.7 kg (197 lb 12.8 oz)   SpO2 98%   BMI 32.17 kg/m²     Physical Exam  Vitals and nursing note reviewed.   Constitutional:       General: He is not in acute distress.     Appearance: He is well-developed. He is obese.   HENT:      Head: Normocephalic and atraumatic.      Mouth/Throat:      Mouth: Mucous membranes are moist.   Eyes:      Conjunctiva/sclera: Conjunctivae normal.      Pupils: Pupils are equal, round, and reactive to light.   Neck:      Thyroid: No thyromegaly.   Cardiovascular:      Rate " and Rhythm: Normal rate and regular rhythm.      Heart sounds: Normal heart sounds.   Pulmonary:      Effort: No respiratory distress.      Breath sounds: Normal breath sounds. No wheezing.   Abdominal:      General: Bowel sounds are normal. There is no distension.      Palpations: Abdomen is soft.      Tenderness: There is no abdominal tenderness.   Musculoskeletal:         General: Normal range of motion.      Cervical back: Normal range of motion and neck supple.   Skin:     General: Skin is warm and dry.      Capillary Refill: Capillary refill takes less than 2 seconds.   Neurological:      Mental Status: He is alert and oriented to person, place, and time.      Sensory: No sensory deficit.      Motor: No weakness or abnormal muscle tone.   Psychiatric:         Thought Content: Thought content normal.         Judgment: Judgment normal.           Data:     Pre-operative work-up    Laboratory Results: I have personally reviewed the pertinent laboratory results/reports     EKG: I have personally reviewed pertinent reports.      Previous cardiopulmonary studies within the past year:  Echocardiogram: 12/2023      Left Ventricle: Left ventricular cavity size is normal. Wall thickness is mildly increased. There is concentric remodeling. The left ventricular ejection fraction is 60%. Systolic function is normal. Wall motion is normal.    IVS: There is abnormal septal motion consistent with post-operative status.    Right Ventricle: Right ventricular cavity size is normal. Systolic function is normal.    Mitral Valve: There is mild annular calcification.         Assessment:     1. Pre-op examination             Plan:     64 y.o. male with planned surgery: Toe amputation on 09/03/2024.      Cardiac Risk Estimation: per the Revised Cardiac Risk Index (Circ. 100:1043, 1999), the patient's risk factors for cardiac complications include history of ischemic heart disease, putting him in: RCI RISK CLASS II (1 risk factor, risk  of major cardiac compl. appr. 1.3%).    1. Further preoperative workup as follows:   - None; no further preoperative work-up is required    2. Medication Management/Recommendations:   - Patient has been instructed to avoid herbs or non-directed vitamins the week prior to surgery to ensure no drug interactions with perioperative surgical and anesthetic medications.  - Patient should continue antihypertensive medications up through and including the day of surgery.   - Patient should continue beta-blocker medication up through and including the day of surgery.  - Patient should continue his statin medication up through and including the day of surgery.  - Hold metformin the morning of surgery and do not resume until 48 hours AFTER surgery to avoid risk of lactic acidosis. Do not resume if eGFR is < 30  - Hold the following sedative medications 24 hours preoperatively: ativan  - Hold Jardiance and glipizide the day of the surgery    3. Prophylaxis for cardiac events with perioperative beta-blockers: patient on beta-blocker.    4. Patient requires further consultation with: None    Clearance  Patient is medically stable for surgery without any additional cardiac testing.     Keila Irwin MD  [facility] INTERNAL MEDICINE [address] [address]  Phone#  [phone]  Fax#  [phone]

## 2024-08-29 NOTE — PRE-PROCEDURE INSTRUCTIONS
Pre-Surgery Instructions:   Medication Instructions    acetaminophen (TYLENOL) 325 mg tablet Uses PRN- OK to take day of surgery    aspirin 325 mg tablet Instructions provided by MD    cholecalciferol (VITAMIN D3) 25 mcg (1,000 units) tablet Stop taking 7 days prior to surgery.    docusate sodium (COLACE) 100 mg capsule Take day of surgery.    dofetilide (TIKOSYN) 500 mcg capsule Take day of surgery.    doxycycline hyclate (VIBRAMYCIN) 100 mg capsule Instructions provided by MD    gabapentin (NEURONTIN) 100 mg capsule Take day of surgery.    glipiZIDE (GLUCOTROL) 5 mg tablet (per surgeon, if  or above is to take a gipizide DOS) Instructions provided by MD    Jardiance 25 MG TABS Hold Sun, Mon & Tues    LORazepam (ATIVAN) 0.5 mg tablet Hold night before    Magnesium Gluconate 250 MG TABS Stop taking 7 days prior to surgery.    metFORMIN (GLUCOPHAGE-XR) 500 mg 24 hr tablet Take day of surgery.    metoprolol succinate (TOPROL-XL) 25 mg 24 hr tablet Take night before surgery    NON FORMULARY Stop taking 7 days prior to surgery.    Ozempic, 2 MG/DOSE, 8 MG/3ML injection pen Stop taking 7 days prior to surgery.    potassium chloride (K-DUR,KLOR-CON) 10 mEq tablet Hold day of surgery.    rosuvastatin (CRESTOR) 20 MG tablet Take night before surgery    sertraline (ZOLOFT) 50 mg tablet Take night before surgery    tadalafil (CIALIS) 20 MG tablet Uses PRN- DO NOT take day of surgery     Pt verbalizes understanding of the following:    Please reference your “My Surgical Experience Booklet” for additional information to prepare for your upcoming surgery.      - DO NOT EAT OR DRINK ANYTHING after midnight on the evening before your procedure including coffee, tea, gum or hard candy.    - ONLY SIPS OF WATER with your medications are allowed on the morning of your procedure.  - Avoid OTC non-directed Vit/ Suppl/ Herbals 7 days prior to surgery to ensure no drug interactions with perioperative surgical/ anesthetic meds  -  Avoid NSAIDs 3 days prior. Tylenol is ok to take as needed.   - Avoid ASA containing products 5 days prior, unless otherwise instructed by your provider   - Continue statin medication up through and including the day of surgery  - Insulin Management: If on Insulin, advised to call PCP for explicit instructions  - Bring a list of meds you take at home with your last dose noted    - Follow the pre surgery showering instructions as listed in the “My Surgical Experience Booklet” or otherwise provided by your surgeon's office.  - Bathing instructions, will use dial  - No lotions, powders, sprays, deodorant, cologne, jewelry, body piercings  - Do not use a blade to shave the surgical area 1 week before surgery. It is ok to use clean electric clippers up to 24 hours before surgery. Do not shave any body parts with a razor within 24hrs.  - Do not use dry shampoo, hair spray, hair gel, or any type of hair products.     - For outpatient surgery, arrange for someone to drive you home after the procedure & stay with you until the next morning. Visitor guidelines discussed.   - Bring insurance cards & photo id    - Bring a container for your dentures  - Leave all valuables such as credit cards, money & jewelry at home  - Please bring any specially ordered equipment (sling, braces) if indicated.  - Wear causal clothing that is easy to take on and off. Consider your type of surgery.    - Notify surgeon if you develop any new illnesses, exposure, develop a rash/ open wounds or have additional questions prior to your surgery.    - Did the surgeon's office give you any other special instructions? no  - Did surgeon require any clearances?     You will receive a call one business day prior to surgery with an arrival time and hospital directions. If your surgery is scheduled on a Monday, the hospital will be calling you on the Friday prior to your surgery.     Please confirm the visitor policy for the day of your procedure when you  receive your phone call with an arrival time.

## 2024-08-30 ENCOUNTER — TELEPHONE (OUTPATIENT)
Age: 64
End: 2024-08-30

## 2024-08-30 NOTE — TELEPHONE ENCOUNTER
Caller: John Obleschuk    Doctor and/or Office: Dr. Cotton/[facility]#: [phone]    Escalation: Surgery/Sx scheduled for 9/3--his primary insurance ends 8/31/[facility]. He has a new [employer] from [place] as his new primary, and said he spoke to someone from [facility] and they took the new information, but nothing in his appt desk or chart saying who this was and insurance is not added. He does have secondary coverage through his wife, which is in Epic([employer] also), but he needs to know the Sx can proceed as planned with this new insurance. I am sending to wound care/Dr. Cotton/office and surgery coordinator, as I have no idea who is handling this issue. Please call pt back and advise. Thanks

## 2024-08-30 NOTE — TELEPHONE ENCOUNTER
Caller: John Obleschuk    [facility] and[facility]or [facility] [phone]    Escalation: Surgery Please return call to [facility] and provide the procedure code for his surgery next week. He wants to give it to his insurance company and have them answer some questions. Thank you

## 2024-09-02 ENCOUNTER — ANESTHESIA EVENT (OUTPATIENT)
Dept: PERIOP | Facility: HOSPITAL | Age: 64
End: 2024-09-02
Payer: COMMERCIAL

## 2024-09-02 NOTE — ANESTHESIA PREPROCEDURE EVALUATION
Procedure:  AMPUTATION TOE (Left: Toe)    Relevant Problems   ANESTHESIA (within normal limits)      CARDIO  Diagnosed with re-entry VT in 2023. Follows with cardiology and is on Tikosyn (EP note from [facility]: no further episodes of VT since on dofetilide and metoprolol)   (+) Coronary artery disease involving native coronary artery   (+) Essential hypertension   (+) Mixed hyperlipidemia      ENDO   (+) Type 2 diabetes mellitus with hyperglycemia, without long-term current use of insulin (HCC)      NEURO/PSYCH   (+) Depressive disorder   (+) Diabetic polyneuropathy associated with type 2 diabetes mellitus (HCC)      PULMONARY   (+) Sleep apnea (New dx; no CPAP yet)   (-) Smoking   (-) URI (upper respiratory infection)      Surgery/Wound/Pain   (+) S/P CABG x 5 (1/2023)     Physical Exam    Airway    Mallampati score: II  TM Distance: >3 FB  Neck ROM: full     Dental   Comment: 4 lower teeth, intact upper dentures and lower dentures    Cardiovascular      Pulmonary      Other Findings      Lab Results   Component Value Date    WBC 9.18 08/27/2024    HGB 13.6 08/27/2024     08/27/2024     Lab Results   Component Value Date    SODIUM 141 08/27/2024    K 4.5 08/27/2024    BUN 27 (H) 08/27/2024    CREATININE 0.88 08/27/2024    EGFR 90 08/27/2024    GLUCOSE 184 (H) 08/11/2023     Lab Results   Component Value Date    HGBA1C 6.8 (H) 08/20/2024     Anesthesia Plan  ASA Score- 3     Anesthesia Type- IV sedation with anesthesia with ASA Monitors.         Additional Monitors:     Airway Plan:     Comment: IV sedation (surgeon will perform nerve block on field), IV, antiemetics.       Plan Factors-    Chart reviewed. EKG reviewed.  Existing labs reviewed. Patient summary reviewed.    Patient is not a current smoker.              Induction- intravenous.    Postoperative Plan-         Informed Consent- Anesthetic plan and risks discussed with spouse and patient.  I personally reviewed this patient with the CRNA. Discussed  and agreed on the Anesthesia Plan with the CRNA..

## 2024-09-03 ENCOUNTER — APPOINTMENT (OUTPATIENT)
Dept: RADIOLOGY | Facility: HOSPITAL | Age: 64
End: 2024-09-03
Payer: COMMERCIAL

## 2024-09-03 ENCOUNTER — ANESTHESIA (OUTPATIENT)
Dept: PERIOP | Facility: HOSPITAL | Age: 64
End: 2024-09-03
Payer: COMMERCIAL

## 2024-09-03 ENCOUNTER — HOSPITAL ENCOUNTER (OUTPATIENT)
Facility: HOSPITAL | Age: 64
Setting detail: OUTPATIENT SURGERY
Discharge: HOME | End: 2024-09-03
Attending: PODIATRIST | Admitting: PODIATRIST
Payer: COMMERCIAL

## 2024-09-03 VITALS
WEIGHT: 196 LBS | OXYGEN SATURATION: 98 % | HEART RATE: 68 BPM | HEIGHT: 70 IN | DIASTOLIC BLOOD PRESSURE: 71 MMHG | RESPIRATION RATE: 13 BRPM | TEMPERATURE: 97.2 F | SYSTOLIC BLOOD PRESSURE: 121 MMHG | BODY MASS INDEX: 28.06 KG/M2

## 2024-09-03 DIAGNOSIS — M86.9 OSTEOMYELITIS OF GREAT TOE OF LEFT FOOT (HCC): ICD-10-CM

## 2024-09-03 DIAGNOSIS — Z98.890 POST-OPERATIVE STATE: Primary | ICD-10-CM

## 2024-09-03 LAB
GLUCOSE SERPL-MCNC: 96 MG/DL (ref 65–140)
GLUCOSE SERPL-MCNC: 98 MG/DL (ref 65–140)

## 2024-09-03 PROCEDURE — 99024 POSTOP FOLLOW-UP VISIT: CPT | Performed by: PODIATRIST

## 2024-09-03 PROCEDURE — NC001 PR NO CHARGE: Performed by: PODIATRIST

## 2024-09-03 PROCEDURE — 88311 DECALCIFY TISSUE: CPT | Performed by: PATHOLOGY

## 2024-09-03 PROCEDURE — 88305 TISSUE EXAM BY PATHOLOGIST: CPT | Performed by: PATHOLOGY

## 2024-09-03 PROCEDURE — 73630 X-RAY EXAM OF FOOT: CPT

## 2024-09-03 PROCEDURE — 28825 PARTIAL AMPUTATION OF TOE: CPT | Performed by: PODIATRIST

## 2024-09-03 PROCEDURE — 82948 REAGENT STRIP/BLOOD GLUCOSE: CPT

## 2024-09-03 RX ORDER — METOCLOPRAMIDE HYDROCHLORIDE 5 MG/ML
10 INJECTION INTRAMUSCULAR; INTRAVENOUS ONCE AS NEEDED
Status: DISCONTINUED | OUTPATIENT
Start: 2024-09-03 | End: 2024-09-03 | Stop reason: HOSPADM

## 2024-09-03 RX ORDER — MIDAZOLAM HYDROCHLORIDE 2 MG/2ML
INJECTION, SOLUTION INTRAMUSCULAR; INTRAVENOUS AS NEEDED
Status: DISCONTINUED | OUTPATIENT
Start: 2024-09-03 | End: 2024-09-03

## 2024-09-03 RX ORDER — CHLORHEXIDINE GLUCONATE ORAL RINSE 1.2 MG/ML
15 SOLUTION DENTAL ONCE
Status: DISCONTINUED | OUTPATIENT
Start: 2024-09-03 | End: 2024-09-03 | Stop reason: HOSPADM

## 2024-09-03 RX ORDER — FENTANYL CITRATE/PF 50 MCG/ML
50 SYRINGE (ML) INJECTION
Status: DISCONTINUED | OUTPATIENT
Start: 2024-09-03 | End: 2024-09-03 | Stop reason: HOSPADM

## 2024-09-03 RX ORDER — PHENYLEPHRINE HCL IN 0.9% NACL 1 MG/10 ML
SYRINGE (ML) INTRAVENOUS AS NEEDED
Status: DISCONTINUED | OUTPATIENT
Start: 2024-09-03 | End: 2024-09-03

## 2024-09-03 RX ORDER — CHLORHEXIDINE GLUCONATE 40 MG/ML
SOLUTION TOPICAL DAILY PRN
Status: DISCONTINUED | OUTPATIENT
Start: 2024-09-03 | End: 2024-09-03 | Stop reason: HOSPADM

## 2024-09-03 RX ORDER — ONDANSETRON 2 MG/ML
4 INJECTION INTRAMUSCULAR; INTRAVENOUS ONCE AS NEEDED
Status: DISCONTINUED | OUTPATIENT
Start: 2024-09-03 | End: 2024-09-03 | Stop reason: HOSPADM

## 2024-09-03 RX ORDER — FENTANYL CITRATE 50 UG/ML
INJECTION, SOLUTION INTRAMUSCULAR; INTRAVENOUS AS NEEDED
Status: DISCONTINUED | OUTPATIENT
Start: 2024-09-03 | End: 2024-09-03

## 2024-09-03 RX ORDER — PROPOFOL 10 MG/ML
INJECTION, EMULSION INTRAVENOUS AS NEEDED
Status: DISCONTINUED | OUTPATIENT
Start: 2024-09-03 | End: 2024-09-03

## 2024-09-03 RX ORDER — ACETAMINOPHEN 325 MG/1
650 TABLET ORAL EVERY 4 HOURS PRN
Status: DISCONTINUED | OUTPATIENT
Start: 2024-09-03 | End: 2024-09-03 | Stop reason: HOSPADM

## 2024-09-03 RX ORDER — ONDANSETRON 2 MG/ML
4 INJECTION INTRAMUSCULAR; INTRAVENOUS EVERY 6 HOURS PRN
Status: DISCONTINUED | OUTPATIENT
Start: 2024-09-03 | End: 2024-09-03 | Stop reason: HOSPADM

## 2024-09-03 RX ORDER — OXYCODONE AND ACETAMINOPHEN 5; 325 MG/1; MG/1
1 TABLET ORAL EVERY 4 HOURS PRN
Qty: 10 TABLET | Refills: 0 | Status: SHIPPED | OUTPATIENT
Start: 2024-09-03

## 2024-09-03 RX ORDER — OXYCODONE HYDROCHLORIDE 5 MG/1
5 TABLET ORAL EVERY 4 HOURS PRN
Status: DISCONTINUED | OUTPATIENT
Start: 2024-09-03 | End: 2024-09-03 | Stop reason: HOSPADM

## 2024-09-03 RX ORDER — CEFAZOLIN SODIUM 2 G/50ML
2000 SOLUTION INTRAVENOUS ONCE
Status: COMPLETED | OUTPATIENT
Start: 2024-09-03 | End: 2024-09-03

## 2024-09-03 RX ORDER — SODIUM CHLORIDE, SODIUM LACTATE, POTASSIUM CHLORIDE, CALCIUM CHLORIDE 600; 310; 30; 20 MG/100ML; MG/100ML; MG/100ML; MG/100ML
125 INJECTION, SOLUTION INTRAVENOUS CONTINUOUS
Status: DISCONTINUED | OUTPATIENT
Start: 2024-09-03 | End: 2024-09-03 | Stop reason: HOSPADM

## 2024-09-03 RX ORDER — PROPOFOL 10 MG/ML
INJECTION, EMULSION INTRAVENOUS CONTINUOUS PRN
Status: DISCONTINUED | OUTPATIENT
Start: 2024-09-03 | End: 2024-09-03

## 2024-09-03 RX ADMIN — FENTANYL CITRATE 50 MCG: 50 INJECTION, SOLUTION INTRAMUSCULAR; INTRAVENOUS at 14:10

## 2024-09-03 RX ADMIN — MIDAZOLAM 2 MG: 1 INJECTION INTRAMUSCULAR; INTRAVENOUS at 14:00

## 2024-09-03 RX ADMIN — CEFAZOLIN SODIUM 2000 MG: 2 SOLUTION INTRAVENOUS at 14:00

## 2024-09-03 RX ADMIN — Medication 200 MCG: at 14:13

## 2024-09-03 RX ADMIN — Medication 100 MCG: at 14:34

## 2024-09-03 RX ADMIN — FENTANYL CITRATE 50 MCG: 50 INJECTION, SOLUTION INTRAMUSCULAR; INTRAVENOUS at 14:06

## 2024-09-03 RX ADMIN — PROPOFOL 20 MG: 10 INJECTION, EMULSION INTRAVENOUS at 14:06

## 2024-09-03 RX ADMIN — PROPOFOL 50 MCG/KG/MIN: 10 INJECTION, EMULSION INTRAVENOUS at 14:06

## 2024-09-03 RX ADMIN — SODIUM CHLORIDE, SODIUM LACTATE, POTASSIUM CHLORIDE, AND CALCIUM CHLORIDE 125 ML/HR: .6; .31; .03; .02 INJECTION, SOLUTION INTRAVENOUS at 13:42

## 2024-09-03 NOTE — ANESTHESIA POSTPROCEDURE EVALUATION
Post-Op Assessment Note    CV Status:  Stable  Pain Score: 0    Pain management: adequate       Mental Status:  Alert and awake   Hydration Status:  Euvolemic and stable   PONV Controlled:  None   Airway Patency:  Patent     Post Op Vitals Reviewed: Yes    No anethesia notable event occurred.    Staff: CRNA               /61 (09/03/24 1452)    Temp      Pulse 70 (09/03/24 1452)   Resp 18 (09/03/24 1452)    SpO2 100 % (09/03/24 1452)

## 2024-09-03 NOTE — PROGRESS NOTES
"Podiatry - Progress Note  Patient: John Obleschuk 64 y.o. male   MRN: [id number]  PCP: Armaan Jasso MD  Unit/Bed#:  Encounter: [id number]  Date Of Visit: 09/03/24    ASSESSMENT:    John Obleschuk is a 64 y.o. male with:    Left hallux diabetic ulcer, [place] grade 3  Type 2 diabetes mellitus  Diabetic polyneuropathy      PLAN:    Patient to go to OR today,09/03/24, for left partial hallux amputation with Dr. Cotton.  Consent to be signed with surgeon prior to procedure.  Confirmed NPO status.  H&P, vitals, and current labs reviewed.  No acute changes noted.  Alternatives, risks, and complications discussed with patient.  All questions answered.  No guarantees given of outcome.  Rest of medical care per primary team.       SUBJECTIVE:     The patient was seen, evaluated, and assessed at bedside today. The patient was awake, alert, and in no acute distress. Patient confirmed NPO status. All questions and concerns regarding the surgical procedure addressed. Patient understands risks vs benefits of procedure and remains amenable with plan for surgery today. Patient denies N/V/F/chills/SOB/CP.      OBJECTIVE:     Vitals:   Ht 5' 5.75\" (1.67 m)   Wt 89.4 kg (197 lb)   BMI 32.04 kg/m²     No data recorded.      Physical Exam:     General:  Alert, cooperative, and in no distress.  Lower extremity exam:  Cardiovascular status at baseline.  Neurological status at baseline.  Musculoskeletal status at baseline. No calf tenderness noted bilaterally. Dressing left intact to the Operating Room.     Additional Data:     Labs:    Results from last 7 days   Lab Units 08/27/24  1318   WBC Thousand/uL 9.18   HEMOGLOBIN g/dL 13.6   HEMATOCRIT % 43.9   PLATELETS Thousands/uL 191   SEGS PCT % 56   LYMPHO PCT % 26   MONO PCT % 10   EOS PCT % 7*     Results from last 7 days   Lab Units 08/27/24  1318   POTASSIUM mmol/L 4.5   CHLORIDE mmol/L 103   CO2 mmol/L 26   BUN mg/dL 27*   CREATININE mg/dL 0.88   CALCIUM mg/dL 9.5   ALK PHOS U/L 57 " "  ALT U/L 29   AST U/L 23           * I Have Reviewed All Lab Data Listed Above.    Recent Cultures (last 7 days):               Imaging: I have personally reviewed pertinent films in PACS  EKG, Pathology, and Other Studies: I have personally reviewed pertinent reports.    ** Please Note: Portions of the record may have been created with voice recognition software. Occasional wrong word or \"sound a like\" substitutions may have occurred due to the inherent limitations of voice recognition software. Read the chart carefully and recognize, using context, where substitutions have occurred. **      "

## 2024-09-03 NOTE — DISCHARGE SUMMARY
Discharge Summary Outpatient Procedure Podiatry -   John Obleschuk 64 y.o. male MRN: [id number]  Unit/Bed#: OR POOL Encounter: [id number]    Admission Date: 9/3/2024     Admitting Diagnosis: Osteomyelitis of great toe of left foot (HCC) [M86.9]    Discharge Diagnosis: same    Procedures Performed: AMPUTATION TOE: 58702 (CPT®)    Complications: none    Condition at Discharge: stable    Discharge instructions/Information to patient and family:   See after visit summary for information provided to patient and family.      Provisions for Follow-Up Care/Important appointments:  See after visit summary for information related to follow-up care and any pertinent home health orders.      Discharge Medications:  See after visit summary for reconciled discharge medications provided to patient and family.

## 2024-09-03 NOTE — OP NOTE
OPERATIVE REPORT - Podiatry  PATIENT NAME: John Obleschuk    :  1960  MRN: [id number]  Pt Location: [facility] OR ROOM 01    SURGERY DATE: 9/3/2024    Surgeons and Role:     * Cheryle Cotton DPM - Primary     * Jamal Thorpe DPM - Assisting    Pre-op Diagnosis:  Osteomyelitis of great toe of left foot (HCC) [M86.9]    Post-Op Diagnosis Codes:     * Osteomyelitis of great toe of left foot (HCC) [M86.9]    Procedure(s) (LRB):  AMPUTATION TOE (Left)    Specimen(s):  ID Type Source Tests Collected by Time Destination   1 : Left great toe Tissue Toe, Left TISSUE EXAM Cheryle Cotton DPM 9/3/2024 1436        Estimated Blood Loss:   0 mL    Drains:  * No LDAs found *    Anesthesia Type:   Choice with 10 ml of 1% Lidocaine and 0.5% Bupivacaine in a 1:1 mixture    Hemostasis:  Mechanical    Materials:  * No implants in log *  3-0 Prolene  4-0 Prolene    Operative Findings:  1.  Surgical cure believed to be achieved  2.  Hard, white, healthy bone and cartilage noted to the hallux proximal phalanx head  3.  Healthy bleeding viable soft tissue    Complications:   None    Procedure and Technique:     Under mild sedation, the patient was brought into the operating room and placed on the hospital stretcher in the supine position. IV sedation was achieved by anesthesia team and a universal timeout was performed where all parties are in agreement of correct patient, correct procedure and correct site. A left hallux digital block was performed consisting of 10 ml of 1% Lidocaine and 0.5% Bupivacaine in a 1:1 mixture. The foot was then prepped and draped in the usual aseptic manner.  A pneumatic tourniquet was not applied to the left lower extremity for this procedure    Attention was directed to the left 1st toe digit where a modified fishmouth type of incision was made at the level of the DIPJ. Utilizing a sterile 15 blade, this incision was carried deep straight to bone. Soft tissue structures were then reflected off the base of  "the distal hallux phalanx and head of the proximal phalanx to disarticulate the left hallux the DIP joint. The severed digit was then passed off to the back table.  It was noted that all tissue margins were bleeding and viable. No deep sinus tracts or areas of purulence were visualized. The remaining bone on the proximal aspect of the cut was noted to be of hard and viable quality. Any remaining tendinous structures were identified and severed proximally to remove any nidus of infection.  The surgical site was then flushed with copious amounts of sterile saline.  Skin closure achieved using 3-0 Prolene and 4-0 Prolene.  The foot was then cleansed and dried with hydrogen peroxide and sterile saline.  The incision site was then dressed with Adaptic, 4 x 4 gauze, Kerlix, and Ace wrap.      The patient tolerated the procedure and anesthesia well without immediate complications and transferred to PACU with vital signs stable.     As with many limb salvage procedures, we contemplate the possibility of performing further stages to this procedure. Procedures may include debridements, delayed closure, plastic surgery techniques, or more proximal amputations. This procedure may be considered part of a multi-staged limb salvage treatment plan.     Dr. Cotton was present during the entire procedure and participated in all key aspects.    SIGNATURE: Jamal Thorpe DPM  DATE: September 3, 2024  TIME: 2:50 PM      Portions of the record may have been created with voice recognition software. Occasional wrong word or \"sound a like\" substitutions may have occurred due to the inherent limitations of voice recognition software. Read the chart carefully and recognize, using context, where substitutions have occurred.              "

## 2024-09-03 NOTE — DISCHARGE INSTR - AVS FIRST PAGE
Dr. Cheryle Cotton, DPM  Post-op surgery Instructions    Pain / Swelling  There is expected to be some discomfort, swelling and bruising of the foot. You might see some blood on the bandage. This is not a cause for alarm. However, if there is active or persistent bleeding (blood running out of the bandage while at rest) - call the office at once (or) go to a [facility] ER and ask them to page the podiatry residents.  Apply an ice bag to the top of your ankle for 30 minutes for each waking hour, for the first 72 hours. This should be discontinued when sleeping. This will also work through your cast if you have one. Ice must not leak and wet the dressings. Also, using the ice inappropriately can cause permanent nerve damage.  Your foot should be elevated as much as possible for the first 72 hours. The foot should be above heart level. If your foot is below heart level, throbbing and pain will increase.  When sleeping, elevation can be accomplished by putting a small hard suitcase between the box spring and mattress at the foot of the bed.  Walking and standing will increase pain, throbbing and bleeding.  Persistent pain despite elevation and your pain meds can many times be relieved by removing the tight brown compression layer (called the ACE wrap) that is over the white gauze dressing. If you are elevating and taking your pain meds and pain is still severe, remove this brown stretchy layer but leave the gauze intact. Wait 30 minutes. If the pain subsides, reapply the ACE so it’s not so tight. If pain doesn’t get better, call your doctor.   Dressings / Casts  Do not remove your surgical dressings - they will be changed at your doctor appointment. Do not allow surgical dressings to get wet. Sponge baths should be used until the sutures are removed.  Do not try to keep the foot dry using a garbage bag and tape - this rarely works.  If you get your dressings or cast wet - call your doctor immediately.  If your  cast or dressings feel tight - elevate your foot for 30 minutes. If this doesn’t help and you feel tingling or see toe discoloration - call your doctor or go to a [facility] ER and ask them to page the podiatry residents.   Do not put things in your cast such as powder, coat hangers to scratch, etc.. This can cause skin damage and infections.   Infection  If you have a fever at or above 100 degrees, chills, sweats, or see red streaks rising above the dressing or smell odor / see pus (creamy white drainage), call your doctor immediately or go to a [facility] ER and ask them to page the podiatry residents.  Constipation  If you have severe constipation after surgery, this can be due to the pain medication. Notify your doctor and special medication will be prescribed to deal with this.   Blood Clots  If you had surgery and are in a cast, have an external fixation device, or are non-weightbearing using crutches, a knee scooter, a wheelchair, a walker, or an iWalk device - you need to be on a blood thinner. Your doctor will prescribe one of the regimens below. If you run out of the blood thinner checked off below before you are walking normally on your foot and out of your cast - notify your doctor immediately so you can get a refill. Not doing so can lead to blood clots and serious complications including death.    Numbness  It is normal for your foot to be numb until about dinner time. If you’ve had a popliteal block procedure, you might be numb until the following day. When you start to feel pins and needles in the foot - this means the block is wearing off. That is the appropriate time to take your pain medication.   Pain Medication  Do not supplement your pain medication with over the counter drugs, old leftover pain medications, or extra Tylenol. You must discuss any additional medications with your doctor prior to taking them for pain.   Driving  No driving is allowed without discussion with the  doctor  Ambulation  Weight bear as tolerated to surgical foot  If given a flat, stiff shoe / darco wedge shoe, Do not walk at all without it.  Use a device (cane, walker, crutches) to take some weight off of the foot when walking  If instructed not to put weight on the surgical foot, use the following:     Putting weight on the foot will lead to complications.    Finish course of Doxycycline

## 2024-09-03 NOTE — INTERVAL H&P NOTE
H&P reviewed. After examining the patient I find no changes in the patients condition since the H&P had been written.    Vitals:    09/03/24 1327   BP: 135/71   Pulse: 75   Resp: 16   Temp: (!) 97.2 °F (36.2 °C)   SpO2: 99%

## 2024-09-05 ENCOUNTER — REMOTE DEVICE CLINIC VISIT (OUTPATIENT)
Dept: CARDIOLOGY CLINIC | Facility: CLINIC | Age: 64
End: 2024-09-05
Payer: COMMERCIAL

## 2024-09-05 DIAGNOSIS — Z95.818 PRESENCE OF CARDIAC DEVICE: ICD-10-CM

## 2024-09-05 DIAGNOSIS — R00.0 WIDE-COMPLEX TACHYCARDIA: Primary | ICD-10-CM

## 2024-09-05 PROCEDURE — 93298 REM INTERROG DEV EVAL SCRMS: CPT | Performed by: INTERNAL MEDICINE

## 2024-09-05 NOTE — PROGRESS NOTES
"Results for orders placed or performed in visit on 09/05/24   Cardiac EP device report    Narrative    [facility] ASSERT 5500 ICM/ACTIVE SYSTEM IS MRI CONDITIONAL  MERLIN TRANSMISSION:  BATTERY STATUS \"OK.\"  PRESENTING RHYTHM SINUS, 74 BPM.  R WAVE 0.5-0.59.  1 DEVICE CLASSIFIED AF EPISODE, HOWEVER, EGM SHOWS SR, 1ST DEGREE AV BLOCK-NO AF PRESENT.  PT TAKES ASA, METOPROLOL SUCC, DOFETILIDE.  NORMAL DEVICE FUNCTION.  PAS        "

## 2024-09-09 PROCEDURE — 88311 DECALCIFY TISSUE: CPT | Performed by: PATHOLOGY

## 2024-09-09 PROCEDURE — 88305 TISSUE EXAM BY PATHOLOGIST: CPT | Performed by: PATHOLOGY

## 2024-09-10 ENCOUNTER — TELEPHONE (OUTPATIENT)
Age: 64
End: 2024-09-10

## 2024-09-10 ENCOUNTER — OFFICE VISIT (OUTPATIENT)
Dept: WOUND CARE | Facility: HOSPITAL | Age: 64
End: 2024-09-10
Payer: COMMERCIAL

## 2024-09-10 VITALS
RESPIRATION RATE: 16 BRPM | HEART RATE: 97 BPM | TEMPERATURE: 97.5 F | DIASTOLIC BLOOD PRESSURE: 69 MMHG | SYSTOLIC BLOOD PRESSURE: 126 MMHG

## 2024-09-10 DIAGNOSIS — E11.42 DIABETIC POLYNEUROPATHY ASSOCIATED WITH TYPE 2 DIABETES MELLITUS (HCC): ICD-10-CM

## 2024-09-10 DIAGNOSIS — Z89.412 STATUS POST AMPUTATION OF LEFT GREAT TOE (HCC): Primary | ICD-10-CM

## 2024-09-10 PROCEDURE — 99212 OFFICE O/P EST SF 10 MIN: CPT | Performed by: PODIATRIST

## 2024-09-10 PROCEDURE — 99213 OFFICE O/P EST LOW 20 MIN: CPT | Performed by: PODIATRIST

## 2024-09-10 PROCEDURE — G0463 HOSPITAL OUTPT CLINIC VISIT: HCPCS | Performed by: PODIATRIST

## 2024-09-10 NOTE — PATIENT INSTRUCTIONS
Orders Placed This Encounter   Procedures    Wound cleansing and dressings Surgical Left Toe D1, great     Left great toe amputation site; Keep dressing clean dry and intact. Limit walking to what is absolutely necessary. Use surgical shoe and crutches for any walking. Elevate the foot when seated.     Standing Status:   Future     Standing Expiration Date:   9/17/2024

## 2024-09-10 NOTE — PROGRESS NOTES
Patient ID: John Obleschuk is a 64 y.o. male Date of Birth 1960       Chief Complaint   Patient presents with    Follow Up Wound Care Visit     Left great toe amputation site       Allergies:  Atorvastatin, Rosiglitazone, and Trazodone    Diagnosis:  1. Status post amputation of left great toe (HCC)  2. Diabetic polyneuropathy associated with type 2 diabetes mellitus (HCC)     Diagnosis ICD-10-CM Associated Orders   1. Status post amputation of left great toe (HCC)  Z89.412       2. Diabetic polyneuropathy associated with type 2 diabetes mellitus (HCC)  E11.42            Assessment & Plan:  Status post 1 week partial left great toe amputation, sutures are intact, incision is coapting well, Adaptic dry dressing was applied, he is to leave this dry clean and intact for 1 week.  Patient to maintain normal to nonweightbearing with use of surgical shoe and crutches.  Today reviewed frequent elevation, low-sodium diet, proper protein intake, proper glycemic control, strict pressure and friction reduction, he understands and agrees with the plan and will follow-up in 1 week.    Procedures - none    Subjective:   Huan presents today status post 1 week partial left great toe amputation for osteomyelitis of distal phalanx, he has maintained minimal to nonweightbearing with use of surgical shoe and crutches and his dressing is dry clean and intact.  He does not complain of any nausea, vomiting, fever, chills or shortness of breath.          The following portions of the patient's history were reviewed and updated as appropriate:   Patient Active Problem List   Diagnosis    Essential hypertension    Type 2 diabetes mellitus with hyperglycemia, without long-term current use of insulin ([facility])    Mixed hyperlipidemia    Sleep apnea    Coronary artery disease involving native coronary artery    S/P CABG x 5    Depressive disorder    Myalgia and myositis    Right knee pain    Vitamin D deficiency    Diabetic polyneuropathy  associated with type 2 diabetes mellitus (HCC)    Wide-complex tachycardia    Encounter for annual physical exam    Diabetic ulcer of toe of left foot associated with type 2 diabetes mellitus, limited to breakdown of skin (HCC)    Pre-op examination     Past Medical History:   Diagnosis Date    Coronary artery disease     Diabetes mellitus (HCC)     History of transfusion     Hyperlipidemia     Hypertension     Wears dentures      Past Surgical History:   Procedure Laterality Date    ARTERIAL ANEURYSM REPAIR      Right Carotid Aneurysm - age 15.    CARDIAC CATHETERIZATION Left 08/02/2023    Procedure: Cardiac Left Heart Cath;  Surgeon: Luisito Inman DO;  Location: [facility];  Service: Cardiology    CARDIAC CATHETERIZATION N/A 08/02/2023    Procedure: Cardiac Coronary Angiogram;  Surgeon: Luisito Inman DO;  Location: [facility];  Service: Cardiology    CARDIAC CATHETERIZATION  08/02/2023    Procedure: Cardiac catheterization;  Surgeon: Luisito Inman DO;  [facility];  Service: Cardiology    CARDIAC ELECTROPHYSIOLOGY PROCEDURE N/A 2/21/2024    Procedure: Cardiac loop recorder implant;  Surgeon: Luke Siu MD;  [facility]: [facility];  Service: Cardiology    HERNIA REPAIR      NY AMPUTATION TOE METATARSOPHALANGEAL JOINT Left 9/3/2024    Procedure: AMPUTATION TOE;  Surgeon: Cheryle Cotton DPM;  Location: [facility];  Service: Podiatry    NY CORONARY ARTERY BYP W/VEIN & ARTERY GRAFT 4 VEIN N/A 8/11/2023    Procedure: CORONARY ARTERY BYPASS GRAFT (CABG) X-5 VESSELS ; SVG to PDA, Ramus, Diagonal and OM. LIMA --> LAD EVH  W/ JOHN;  Surgeon: Soham Hodgson MD;  Location: [facility];  Service: Cardiac Surgery    VASECTOMY       Social History     Socioeconomic History    Marital status: /Civil Union     Spouse name: None    Number of children: None    Years of education: None    Highest education level: None   Occupational History    None   Tobacco  Use    Smoking status: Never    Smokeless tobacco: Never   Vaping Use    Vaping status: Never Used   Substance and Sexual Activity    Alcohol use: No    Drug use: No    Sexual activity: Yes     Partners: Female   Other Topics Concern    None   Social History Narrative    None     Social Determinants of Health     Financial Resource Strain: Patient Declined (8/24/2023)    Received from [facility]    Overall Financial Resource Strain (CARDIA)     Difficulty of Paying Living Expenses: Patient declined   Food Insecurity: Patient Declined (8/24/2023)    Received from [facility]    Hunger Vital Sign     Worried About Running Out of Food in the Last Year: Patient declined     Ran Out of Food in the Last Year: Patient declined   Transportation Needs: No Transportation Needs (8/24/2023)    Received from [facility]    PRAPARE - Transportation     Lack of Transportation (Medical): No     Lack of Transportation (Non-Medical): No   Physical Activity: Sufficiently Active (12/20/2023)    Exercise Vital Sign     Days of Exercise per Week: 3 days     Minutes of Exercise per Session: 60 min   Stress: Stress Concern Present (12/20/2023)    [employer] [employer] - Occupational Stress Questionnaire     Feeling of Stress : Rather much   Social Connections: Unknown (12/20/2023)    Social Connection and Isolation Panel [NHANES]     Frequency of Communication with Friends and Family: More than three times a week     Frequency of Social Gatherings with Friends and Family: More than three times a week     Attends Buddhism Services: Patient declined     Active Member of Clubs or Organizations: Patient declined     Attends Club or Organization Meetings: Patient declined     Marital Status:    Intimate Partner Violence: Not At Risk (12/20/2023)    Humiliation, Afraid, Rape, and  Kick questionnaire     Fear of Current or Ex-Partner: No     Emotionally Abused: No     Physically Abused: No     Sexually Abused: No   Housing Stability: Unknown (8/24/2023)    [facility]    Housing Stability Vital Sign     Unable to Pay for Housing in the Last Year: Patient refused     Number of Places Lived in the Last Year: Not on file     Unstable Housing in the Last Year: Patient refused        Current Outpatient Medications:     acetaminophen (TYLENOL) 325 mg tablet, Take 1-2 tablets Q4-6 hours PRN pain. Do not take more than 4grams in 24 hours., Disp: , Rfl: 0    aspirin 325 mg tablet, Take 1 tablet (325 mg total) by mouth daily, Disp: 30 tablet, Rfl: 2    cholecalciferol (VITAMIN D3) 25 mcg (1,000 units) tablet, Take 1,000 Units by mouth 2 (two) times a day, Disp: , Rfl:     Continuous Glucose Sensor (FreeStyle Ayanna 2 Sensor) MISC, CHANGE SENSOR EVERY 14 DAYS, Disp: 2 each, Rfl: 5    cyanocobalamin (VITAMIN B-12) 1000 MCG tablet, Take 1,000 mcg by mouth 2 (two) times a day, Disp: , Rfl:     docusate sodium (COLACE) 100 mg capsule, Take 1 capsule (100 mg total) by mouth 2 (two) times a day Hold for soft stools., Disp: 60 capsule, Rfl: 0    dofetilide (TIKOSYN) 500 mcg capsule, Take 1 capsule (500 mcg total) by mouth every 12 (twelve) hours, Disp: 180 capsule, Rfl: 3    gabapentin (NEURONTIN) 100 mg capsule, TAKE 1 CAPSULE BY MOUTH IN THE MORNING AND 3 CAPSULES IN THE EVENING, Disp: 120 capsule, Rfl: 2    glipiZIDE (GLUCOTROL) 5 mg tablet, Take 5 mg by mouth in the morning, Disp: , Rfl:     Jardiance 25 MG TABS, Take 1 tablet (25 mg total) by mouth in the morning, Disp: 90 tablet, Rfl: 3    LORazepam (ATIVAN) 0.5 mg tablet, Take 1 tablet (0.5 mg total) by mouth daily at bedtime, Disp: 30 tablet, Rfl: 3    Magnesium Gluconate 250 MG TABS, Take by mouth daily at bedtime, Disp: , Rfl:     metFORMIN (GLUCOPHAGE-XR) 500 mg 24 hr tablet, Take 2 tablets  (1,000 mg total) by mouth 2 (two) times a day, Disp: 360 tablet, Rfl: 3    metoprolol succinate (TOPROL-XL) 25 mg 24 hr tablet, Take 1 tablet (25 mg total) by mouth daily (Patient taking differently: Take 25 mg by mouth daily evening), Disp: 90 tablet, Rfl: 3    NON FORMULARY, Take 2 capsules by mouth in the morning Balance of Nature (fruit & veg), Disp: , Rfl:     oxyCODONE-acetaminophen (Percocet) 5-325 mg per tablet, Take 1 tablet by mouth every 4 (four) hours as needed for moderate pain for up to 10 doses Max Daily Amount: 6 tablets, Disp: 10 tablet, Rfl: 0    Ozempic, 2 MG/DOSE, 8 MG/3ML injection pen, Inject 0.75 mL (2 mg total) under the skin every 7 days (Patient taking differently: Inject 2 mg under the skin every 7 days Sundays), Disp: 9 mL, Rfl: 3    potassium chloride (K-DUR,KLOR-CON) 10 mEq tablet, Take 1 tablet (10 mEq total) by mouth daily, Disp: 90 tablet, Rfl: 3    rosuvastatin (CRESTOR) 20 MG tablet, TAKE TWO TABLETS BY MOUTH EVERY DAY, Disp: 90 tablet, Rfl: 1    sertraline (ZOLOFT) 50 mg tablet, TAKE ONE TABLET BY MOUTH EVERY DAY, Disp: 30 tablet, Rfl: 5    tadalafil (CIALIS) 20 MG tablet, Take 1 tablet (20 mg total) by mouth daily as needed for erectile dysfunction, Disp: 10 tablet, Rfl: 11  Family History   Problem Relation Age of Onset    Leukemia Mother     Cancer Mother     Heart attack Father 52       Review of Systems   Constitutional:  Negative for chills and fever.   HENT:  Negative for ear pain and sore throat.    Eyes:  Negative for pain and visual disturbance.   Respiratory:  Negative for cough and shortness of breath.    Cardiovascular:  Negative for chest pain and palpitations.   Gastrointestinal:  Negative for abdominal pain and vomiting.   Genitourinary:  Negative for dysuria and hematuria.   Musculoskeletal:  Positive for gait problem. Negative for arthralgias and back pain.   Skin:  Positive for color change and wound. Negative for rash.   Neurological:  Positive for numbness.  Negative for seizures and syncope.   Psychiatric/Behavioral: Negative.     All other systems reviewed and are negative.        Objective:  /69   Pulse 97   Temp 97.5 °F (36.4 °C)   Resp 16     Physical Exam  Constitutional:       Appearance: Normal appearance. He is normal weight.   HENT:      Head: Normocephalic and atraumatic.      Right Ear: External ear normal.      Left Ear: External ear normal.      Nose: Nose normal.      Mouth/Throat:      Mouth: Mucous membranes are moist.      Pharynx: Oropharynx is clear.   Eyes:      Conjunctiva/sclera: Conjunctivae normal.      Pupils: Pupils are equal, round, and reactive to light.   Cardiovascular:      Pulses: Normal pulses.           Dorsalis pedis pulses are 2+ on the right side and 2+ on the left side.        Posterior tibial pulses are 2+ on the right side and 2+ on the left side.   Pulmonary:      Effort: Pulmonary effort is normal.   Musculoskeletal:      Cervical back: Normal range of motion.      Right lower leg: No edema.      Left lower leg: No edema.   Skin:     General: Skin is warm and dry.      Capillary Refill: Capillary refill takes less than 2 seconds.   Neurological:      General: No focal deficit present.      Mental Status: He is alert and oriented to person, place, and time. Mental status is at baseline.      Sensory: Sensory deficit present.      Coordination: Coordination abnormal.      Gait: Gait abnormal.      Deep Tendon Reflexes: Reflexes abnormal.   Psychiatric:         Mood and Affect: Mood normal.         Behavior: Behavior normal.         Judgment: Judgment normal.                     Wound 09/03/24 Surgical Toe D1, great Left (Active)   Wound Image Images linked 09/10/24 1107   Wound Description Eschar;Pink;Epithelialization 09/10/24 1111   Pat-wound Assessment Edema;Pink 09/10/24 1111   Wound Length (cm) 0.1 cm 09/10/24 1111   Wound Width (cm) 4.2 cm 09/10/24 1111   Wound Depth (cm) 0 cm 09/10/24 1111   Wound Surface Area  "(cm^2) 0.42 cm^2 09/10/24 1111   Wound Volume (cm^3) 0 cm^3 09/10/24 1111   Calculated Wound Volume (cm^3) 0 cm^3 09/10/24 1111   Wound Site Closure Sutures 09/10/24 1111   Drainage Amount Small 09/10/24 1111   Drainage Description Sanguineous 09/10/24 1111   Non-staged Wound Description Full thickness 09/10/24 1111   Dressing Status Intact 09/10/24 1111                XR foot left 3+ views    Result Date: 9/6/2024  Narrative: XR FOOT 3+ VW LEFT INDICATION: Post op partial hallux amp left. COMPARISON: August 20, 2024 FINDINGS: No acute fracture or dislocation. Surgical changes from the amputation of the hallux distal phalanx No lytic or blastic osseous lesion. Unremarkable soft tissues.     Impression: Postsurgical changes from the amputation of the of the first distal phalanx Computerized Assisted Algorithm (CAA) may have been used to analyze all applicable images. Workstation performed: [id number]     Cardiac EP device report    Result Date: 9/5/2024  Narrative: VAUGHAN ASSERT 5500 ICM/ACTIVE SYSTEM IS MRI CONDITIONAL MERLIN TRANSMISSION:  BATTERY STATUS \"OK.\"  PRESENTING RHYTHM SINUS, 74 BPM.  R WAVE 0.5-0.59.  1 DEVICE CLASSIFIED AF EPISODE, HOWEVER, EGM SHOWS SR, 1ST DEGREE AV BLOCK-NO AF PRESENT.  PT TAKES ASA, METOPROLOL SUCC, DOFETILIDE.  NORMAL DEVICE FUNCTION.  PAS     XR chest pa & lateral    Result Date: 8/27/2024  Narrative: CHEST INDICATION:   Osteomyelitis, unspecified. COMPARISON:  None. EXAM PERFORMED/VIEWS:  XR CHEST PA & LATERAL FINDINGS: Cardiomediastinal silhouette appears unremarkable. Wireless pacemaker in the left subcutaneous chest wall. Post-CABG. The lungs are clear.  No pneumothorax or pleural effusion. Osseous structures appear within normal limits for patient age. Median sternotomy     Impression: No acute cardiopulmonary disease. Post-CABG. Wireless pacemaker in the left anterior chest wall, new from prior study of 12/5/2023. No other change from the prior exam. Electronically signed: " 08/27/2024 05:27 PM Rodrigo Kumari MD    VAS ARTERIAL DUPLEX- LOWER LIMB BILATERAL    Result Date: 8/20/2024  Narrative:  [facility] REPORT [facility]INICAL: Indications: Patient presents with an ulcer on his left big toe x 1.5 months.  Patient denies any pain with walking. Operative History: 2024-02-21 Cardiac Loop Recorder 2023-08-11 CABG Cardiac cath x3 Right Internal carotid artery aneurysm repair Risk Factors The patient has history of HTN, Diabetes, Hyperlipidemia and CAD.  FINDINGS:  Segment                Right       Left                                          PSV (cm/s)  PSV (cm/s)  Common Femoral Artery          77          86  Prox Profunda                  92          56  Prox SFA                       67          54  Mid SFA                        63          67  Dist SFA                       52          54  Proximal Pop                   49          45  Distal Pop                     75          58  Dist Post Tibial               32          75  Prox. Ant. Tibial              25          30  Dist. Ant. Tibial              26          47     CONCLUSION: Impression: RIGHT LOWER LIMB: Diffuse disease noted throughout the femoral-popliteal and tibioperoneal arteries without significant focal stenosis. Ankle/Brachial index:   Unreliable due to non-compressible vessels. PVR/ PPG tracings are slightly dampened. Metatarsal pressure of 107 mmHg Great toe pressure of 83 mmHg, within the healing range  LEFT LOWER LIMB: Diffuse disease noted throughout the femoral-popliteal and tibioperoneal arteries without significant focal stenosis. Ankle/Brachial index:   Unreliable due to non-compressible vessels. PVR/ PPG tracings are slightly dampened. Metatarsal pressure of 77 mmHg Great toe pressure of 74 mmHg, within the healing range  There are no prior studies available for comparison.  SIGNATURE: Electronically Signed by: ALAN BECK on 2024-08-20 05:11:12 PM    XR foot 3+ vw left    Result Date:  "8/20/2024  Narrative: LEFT FOOT INDICATION:   Type 2 diabetes mellitus with foot ulcer. Non-pressure chronic ulcer of other part of left foot limited to breakdown of skin. COMPARISON:  None. VIEWS:  XR FOOT 3+ VW LEFT Images: 3 FINDINGS: There is no acute fracture or dislocation. No significant degenerative changes. Osseous erosion distal phalanx great toe with adjacent soft tissue ulcer consistent with osteomyelitis. There are atherosclerotic calcifications. Soft tissues are otherwise unremarkable.     Impression: Osteomyelitis distal phalanx great toe. Electronically signed: 08/20/2024 10:26 AM Tom Rawls, MPH,MD      Wound Instructions:  No orders of the defined types were placed in this encounter.        Cheryle Cotton, YESSICAM, DPM, FACFAS    Portions of the record may have been created with voice recognition software. Occasional wrong word or \"sound a like\" substitutions may have occurred due to the inherent limitations of voice recognition software. Read the chart carefully and recognize, using context, where substitutions have occurred.    "

## 2024-09-10 NOTE — PROGRESS NOTES
Wound Procedure Treatment Surgical Left Toe D1, great    Performed by: Leyla Juarez RN  Authorized by: Cheryle Cotton DPM    Associated wounds:   Wound 09/03/24 Surgical Toe D1, great Left  Wound cleansed with:  Soap and water and NSS  Applied primary dressing:  Non adherent contact layer  Applied secondary dressing:  Gauze and ABD  Dressing secured with:  Kerlix and Tubifast  Offloading device appllied:  Surgical shoe

## 2024-09-10 NOTE — TELEPHONE ENCOUNTER
Caller: Huan     Doctor and/or Office: Dr Cotton/  [facility]     # [phone] :     [place]: Appointment Had sx with Dr Cotton was told to f/u with Dr Jeffrey and would like to go to [place],  Thank you

## 2024-09-11 ENCOUNTER — TELEPHONE (OUTPATIENT)
Age: 64
End: 2024-09-11

## 2024-09-11 NOTE — TELEPHONE ENCOUNTER
Hello,    Please advise if a forced appointment can be accommodated for the patient:    Call back #: [phone]    Insurance: [employer] BS x 2    Reason for appointment: [facility]/Diabetic foot care/told to see Dr. Marsh in [place] and has not heard back from office as of yet. Please call back and schedule. Pt said he was told by Dr. Cotton In  to be seen soon for this care. Thanks    Requested doctor and/or location: Dr. Marsh/[place]      Thank you.

## 2024-09-11 NOTE — TELEPHONE ENCOUNTER
Caller: John Obleschuk    Doctor: Dr. Marsh? NP    Reason for call: appt/called before so I attached to last message but did this for reason I was in the chart.    Call back#: NA

## 2024-09-17 ENCOUNTER — OFFICE VISIT (OUTPATIENT)
Dept: WOUND CARE | Facility: HOSPITAL | Age: 64
End: 2024-09-17
Payer: COMMERCIAL

## 2024-09-17 VITALS
SYSTOLIC BLOOD PRESSURE: 122 MMHG | HEART RATE: 97 BPM | TEMPERATURE: 97.4 F | RESPIRATION RATE: 16 BRPM | DIASTOLIC BLOOD PRESSURE: 62 MMHG

## 2024-09-17 DIAGNOSIS — Z89.412 STATUS POST AMPUTATION OF LEFT GREAT TOE (HCC): Primary | ICD-10-CM

## 2024-09-17 DIAGNOSIS — E11.42 DIABETIC POLYNEUROPATHY ASSOCIATED WITH TYPE 2 DIABETES MELLITUS (HCC): ICD-10-CM

## 2024-09-17 DIAGNOSIS — T81.89XA NON-HEALING SURGICAL WOUND, INITIAL ENCOUNTER: ICD-10-CM

## 2024-09-17 PROCEDURE — 97597 DBRDMT OPN WND 1ST 20 CM/<: CPT | Performed by: PODIATRIST

## 2024-09-17 NOTE — PATIENT INSTRUCTIONS
Orders Placed This Encounter   Procedures    Wound cleansing and dressings Surgical Left Toe D1, great     Left great toe amputation site;   Change dressing every other day  Do not get wet with shower water  Apply a strip of xeroform over the incision line  Cover with gauze, rolled gauze, tape or gauze and tape.     Limit walking to what is absolutely necessary. Use surgical shoe and crutches for any walking.   Elevate the foot when seated and as often as possible to allow the incision to heal.     Standing Status:   Future     Standing Expiration Date:   9/24/2024

## 2024-09-17 NOTE — PROGRESS NOTES
Wound Procedure Treatment Surgical Left Toe D1, great    Performed by: Leyla Juarez RN  Authorized by: Cheryle Cotton DPM    Associated wounds:   Wound 09/03/24 Surgical Toe D1, great Left  Applied secondary dressing:  Gauze  Dressing secured with:  Ana, Tape and Tubifast    xeroform

## 2024-09-17 NOTE — PROGRESS NOTES
Patient ID: John Obleschuk is a 64 y.o. male Date of Birth 1960       Chief Complaint   Patient presents with    Follow Up Wound Care Visit     Left great toe amputation site       Allergies:  Atorvastatin, Rosiglitazone, and Trazodone    Diagnosis:  1. Status post amputation of left great toe (HCC)  -     Wound cleansing and dressings Surgical Left Toe D1, great; Future  2. Diabetic polyneuropathy associated with type 2 diabetes mellitus (HCC)  -     Wound cleansing and dressings Surgical Left Toe D1, great; Future  3. Non-healing surgical wound, initial encounter     Diagnosis ICD-10-CM Associated Orders   1. Status post amputation of left great toe (HCC)  Z89.412 Wound cleansing and dressings Surgical Left Toe D1, great      2. Diabetic polyneuropathy associated with type 2 diabetes mellitus (HCC)  E11.42 Wound cleansing and dressings Surgical Left Toe D1, great      3. Non-healing surgical wound, initial encounter  T81.89XA            Assessment & Plan:  Status post 2 weeks partial left great toe amputation disarticulation at IPJ, all sutures were removed, laterally there is a small area of dehiscence secondary to swelling and eschar, this area was selectively debrided and dressed with Xeroform dry dressing, wife will do the same 3 times a week, he will continue to use a cast cover, do not get foot wet in shower.  Patient may begin to drive, he needs to drop his wife off at work and  tomorrow, I have advised him as soon as he gets back to the house he needs to sit and elevate his foot.  Unfortunately he has been sitting at a computer desk with his foot in the dependent position causing swelling and tension at skin edges.  He states he will do better at elevating.  Patient will go to dinner with his brother who is here from out of town a copper[place], I advised him to sit in a fragoso and elevate his left leg on the bench while he is there.  Today I reviewed frequent elevation, low-sodium diet, proper  "protein intake, strict pressure and friction reduction, proper glycemic control, he understands and agrees with the plan and will follow-up in 1 week.    Debridement   Wound 09/03/24 Surgical Toe D1, great Left    Universal Protocol:  procedure performed by consultantConsent: Verbal consent obtained.  Risks and benefits: risks, benefits and alternatives were discussed  Consent given by: patient  Time out: Immediately prior to procedure a \"time out\" was called to verify the correct patient, procedure, equipment, support staff and site/side marked as required.  Patient understanding: patient states understanding of the procedure being performed  Patient identity confirmed: verbally with patient    Debridement Details  Performed by: physician  Debridement type: selective  Pain control: none      Post-debridement measurements  Length (cm): 0.1  Width (cm): 3.5  Depth (cm): 0.2  Percent debrided: 100%  Surface Area (cm^2): 0.35  Area Debrided (cm^2): 0.35  Volume (cm^3): 0.07    Devitalized tissue debrided: biofilm, callus, slough and eschar  Instrument(s) utilized: blade and forceps  Bleeding: small  Hemostasis obtained with: pressure  Procedural pain (0-10): insensate  Post-procedural pain: insensate   Response to treatment: procedure was tolerated well         Subjective:   Huan presents today status post 2 weeks partial left great toe amputation disarticulation at [facility] for osteomyelitis, he presents with his dressing dry clean and intact, wife states he has been relatively compliant, he states he has been sitting with his foot in dependent position and he has not been on very much.  Sugars are okay, no new complaints, no nausea, vomiting, fever, chills, diarrhea or shortness of breath.          The following portions of the patient's history were reviewed and updated as appropriate:   Patient Active Problem List   Diagnosis    Essential hypertension    Type 2 diabetes mellitus with hyperglycemia, without long-term " current use of insulin (HCC)    Mixed hyperlipidemia    Sleep apnea    Coronary artery disease involving native coronary artery    S/P CABG x 5    Depressive disorder    Myalgia and myositis    Right knee pain    Vitamin D deficiency    Diabetic polyneuropathy associated with type 2 diabetes mellitus (HCC)    Wide-complex tachycardia    Encounter for annual physical exam    Diabetic ulcer of toe of left foot associated with type 2 diabetes mellitus, limited to breakdown of skin (HCC)    Pre-op examination     Past Medical History:   Diagnosis Date    Coronary artery disease     Diabetes mellitus (HCC)     History of transfusion     Hyperlipidemia     Hypertension     Wears dentures      Past Surgical History:   Procedure Laterality Date    ARTERIAL ANEURYSM REPAIR      Right Carotid Aneurysm - age 15.    CARDIAC CATHETERIZATION Left 08/02/2023    Procedure: Cardiac Left Heart Cath;  Surgeon: Luisito Inman DO;  Location: [facility];  Service: Cardiology    CARDIAC CATHETERIZATION N/A 08/02/2023    Procedure: Cardiac Coronary Angiogram;  Surgeon: Luisito Inman DO;  [facility]: [facility];  Service: Cardiology    CARDIAC CATHETERIZATION  08/02/2023    Procedure: Cardiac catheterization;  Surgeon: Luisito Inman DO;  [facility];  Service: Cardiology    CARDIAC ELECTROPHYSIOLOGY PROCEDURE N/A 2/21/2024    Procedure: Cardiac loop recorder implant;  Surgeon: Luke Siu MD;  [facility]: [facility];  Service: Cardiology    HERNIA REPAIR      MO AMPUTATION TOE METATARSOPHALANGEAL JOINT Left 9/3/2024    Procedure: AMPUTATION TOE;  Surgeon: Cheryle Cotton DPM;  Location:  MAIN OR;  Service: Podiatry    MO CORONARY ARTERY BYP W/VEIN & ARTERY GRAFT 4 VEIN N/A 8/11/2023    Procedure: CORONARY ARTERY BYPASS GRAFT (CABG) X-5 VESSELS ; SVG to PDA, Ramus, Diagonal and OM. LIMA --> LAD EVH  W/ JOHN;  Surgeon: Soham Hodgson MD;  Location: [facility];  Service:  Cardiac Surgery    VASECTOMY       Social History     Socioeconomic History    Marital status: /Civil Union     Spouse name: Not on file    Number of children: Not on file    Years of education: Not on file    Highest education level: Not on file   Occupational History    Not on file   Tobacco Use    Smoking status: Never    Smokeless tobacco: Never   Vaping Use    Vaping status: Never Used   Substance and Sexual Activity    Alcohol use: No    Drug use: No    Sexual activity: Yes     Partners: Female   Other Topics Concern    Not on file   Social History Narrative    Not on file     Social Determinants of Health     Financial Resource Strain: Patient Declined (8/24/2023)    [facility]    Overall Financial Resource Strain (CARDIA)     Difficulty of Paying Living Expenses: Patient declined   Food Insecurity: Patient Declined (8/24/2023)    [facility]    Hunger Vital Sign     Worried About Running Out of Food in the Last Year: Patient declined     Ran Out of Food in the Last Year: Patient declined   Transportation Needs: No Transportation Needs (8/24/2023)    [facility]    PRAPARE - Transportation     Lack of Transportation (Medical): No     Lack of Transportation (Non-Medical): No   Physical Activity: Sufficiently Active (12/20/2023)    Exercise Vital Sign     Days of Exercise per Week: 3 days     Minutes of Exercise per Session: 60 min   Stress: Stress Concern Present (12/20/2023)    [employer] - Occupational Stress Questionnaire     Feeling of Stress : Rather much   Social Connections: Unknown (12/20/2023)    Social Connection and Isolation Panel [NHANES]     Frequency of Communication with Friends and Family: More than three times a week     Frequency of Social Gatherings with Friends and Family:  More than three times a week     Attends [employer] Services: Patient declined     Active Member of Clubs or Organizations: Patient declined     Attends Club or Organization Meetings: Patient declined     Marital Status:    Intimate Partner Violence: Not At Risk (12/20/2023)    Humiliation, Afraid, Rape, and Kick questionnaire     Fear of Current or Ex-Partner: No     Emotionally Abused: No     Physically Abused: No     Sexually Abused: No   Housing Stability: Unknown (8/24/2023)    [facility]    Housing Stability Vital Sign     Unable to Pay for Housing in the Last Year: Patient refused     Number of Places Lived in the Last Year: Not on file     Unstable Housing in the Last Year: Patient refused        Current Outpatient Medications:     acetaminophen (TYLENOL) 325 mg tablet, Take 1-2 tablets Q4-6 hours PRN pain. Do not take more than 4grams in 24 hours., Disp: , Rfl: 0    aspirin 325 mg tablet, Take 1 tablet (325 mg total) by mouth daily, Disp: 30 tablet, Rfl: 2    cholecalciferol (VITAMIN D3) 25 mcg (1,000 units) tablet, Take 1,000 Units by mouth 2 (two) times a day, Disp: , Rfl:     Continuous Glucose Sensor (FreeStyle Ayanna 2 Sensor) MISC, CHANGE SENSOR EVERY 14 DAYS, Disp: 2 each, Rfl: 5    cyanocobalamin (VITAMIN B-12) 1000 MCG tablet, Take 1,000 mcg by mouth 2 (two) times a day, Disp: , Rfl:     docusate sodium (COLACE) 100 mg capsule, Take 1 capsule (100 mg total) by mouth 2 (two) times a day Hold for soft stools., Disp: 60 capsule, Rfl: 0    dofetilide (TIKOSYN) 500 mcg capsule, Take 1 capsule (500 mcg total) by mouth every 12 (twelve) hours, Disp: 180 capsule, Rfl: 3    gabapentin (NEURONTIN) 100 mg capsule, TAKE 1 CAPSULE BY MOUTH IN THE MORNING AND 3 CAPSULES IN THE EVENING, Disp: 120 capsule, Rfl: 2    glipiZIDE (GLUCOTROL) 5 mg tablet, Take 5 mg by mouth in the morning, Disp: , Rfl:     Jardiance 25 MG TABS, Take 1 tablet (25 mg  total) by mouth in the morning, Disp: 90 tablet, Rfl: 3    LORazepam (ATIVAN) 0.5 mg tablet, Take 1 tablet (0.5 mg total) by mouth daily at bedtime, Disp: 30 tablet, Rfl: 3    Magnesium Gluconate 250 MG TABS, Take by mouth daily at bedtime, Disp: , Rfl:     metFORMIN (GLUCOPHAGE-XR) 500 mg 24 hr tablet, Take 2 tablets (1,000 mg total) by mouth 2 (two) times a day, Disp: 360 tablet, Rfl: 3    metoprolol succinate (TOPROL-XL) 25 mg 24 hr tablet, Take 1 tablet (25 mg total) by mouth daily (Patient taking differently: Take 25 mg by mouth daily evening), Disp: 90 tablet, Rfl: 3    NON FORMULARY, Take 2 capsules by mouth in the morning Balance of Nature (fruit & veg), Disp: , Rfl:     oxyCODONE-acetaminophen (Percocet) 5-325 mg per tablet, Take 1 tablet by mouth every 4 (four) hours as needed for moderate pain for up to 10 doses Max Daily Amount: 6 tablets, Disp: 10 tablet, Rfl: 0    Ozempic, 2 MG/DOSE, 8 MG/3ML injection pen, Inject 0.75 mL (2 mg total) under the skin every 7 days (Patient taking differently: Inject 2 mg under the skin every 7 days Sundays), Disp: 9 mL, Rfl: 3    potassium chloride (K-DUR,KLOR-CON) 10 mEq tablet, Take 1 tablet (10 mEq total) by mouth daily, Disp: 90 tablet, Rfl: 3    rosuvastatin (CRESTOR) 20 MG tablet, TAKE TWO TABLETS BY MOUTH EVERY DAY, Disp: 90 tablet, Rfl: 1    sertraline (ZOLOFT) 50 mg tablet, TAKE ONE TABLET BY MOUTH EVERY DAY, Disp: 30 tablet, Rfl: 5    tadalafil (CIALIS) 20 MG tablet, Take 1 tablet (20 mg total) by mouth daily as needed for erectile dysfunction, Disp: 10 tablet, Rfl: 11  Family History   Problem Relation Age of Onset    Leukemia Mother     Cancer Mother     Heart attack Father 52       Review of Systems   Constitutional:  Negative for chills and fever.   HENT:  Negative for ear pain and sore throat.    Eyes:  Negative for pain and visual disturbance.   Respiratory:  Negative for cough and shortness of breath.    Cardiovascular:  Negative for chest pain and  palpitations.   Gastrointestinal:  Negative for abdominal pain and vomiting.   Genitourinary:  Negative for dysuria and hematuria.   Musculoskeletal:  Positive for gait problem. Negative for arthralgias and back pain.   Skin:  Positive for color change and wound. Negative for rash.   Neurological:  Positive for numbness. Negative for seizures and syncope.   Psychiatric/Behavioral: Negative.     All other systems reviewed and are negative.        Objective:  /62   Pulse 97   Temp (!) 97.4 °F (36.3 °C)   Resp 16     Physical Exam  Constitutional:       Appearance: Normal appearance. He is normal weight.   HENT:      Head: Normocephalic and atraumatic.      Right Ear: External ear normal.      Left Ear: External ear normal.      Nose: Nose normal.      Mouth/Throat:      Mouth: Mucous membranes are moist.      Pharynx: Oropharynx is clear.   Eyes:      Conjunctiva/sclera: Conjunctivae normal.      Pupils: Pupils are equal, round, and reactive to light.   Cardiovascular:      Pulses: Normal pulses.           Dorsalis pedis pulses are 2+ on the right side and 2+ on the left side.        Posterior tibial pulses are 2+ on the right side and 2+ on the left side.   Pulmonary:      Effort: Pulmonary effort is normal.   Musculoskeletal:      Cervical back: Normal range of motion.      Right lower leg: No edema.      Left lower leg: No edema.   Skin:     General: Skin is warm and dry.      Capillary Refill: Capillary refill takes less than 2 seconds.   Neurological:      General: No focal deficit present.      Mental Status: He is alert and oriented to person, place, and time. Mental status is at baseline.      Sensory: Sensory deficit present.      Coordination: Coordination abnormal.      Gait: Gait abnormal.      Deep Tendon Reflexes: Reflexes abnormal.   Psychiatric:         Mood and Affect: Mood normal.         Behavior: Behavior normal.         Thought Content: Thought content normal.         Judgment: Judgment  "normal.                 Wound 09/03/24 Surgical Toe D1, great Left (Active)   Wound Image Images linked 09/17/24 0849   Wound Description Eschar;Epithelialization 09/17/24 0842   Pat-wound Assessment Edema;Pink 09/17/24 0842   Wound Length (cm) 0.1 cm 09/17/24 0842   Wound Width (cm) 4.2 cm 09/17/24 0842   Wound Depth (cm) 0 cm 09/17/24 0842   Wound Surface Area (cm^2) 0.42 cm^2 09/17/24 0842   Wound Volume (cm^3) 0 cm^3 09/17/24 0842   Calculated Wound Volume (cm^3) 0 cm^3 09/17/24 0842   Wound Site Closure Sutures 09/17/24 0842   Drainage Amount None 09/17/24 0842   Dressing Status Intact 09/17/24 0842                XR foot left 3+ views    Result Date: 9/6/2024  Narrative: XR FOOT 3+ VW LEFT INDICATION: Post op partial hallux amp left. COMPARISON: August 20, 2024 FINDINGS: No acute fracture or dislocation. Surgical changes from the amputation of the hallux distal phalanx No lytic or blastic osseous lesion. Unremarkable soft tissues.     Impression: Postsurgical changes from the amputation of the of the first distal phalanx Computerized Assisted Algorithm (CAA) may have been used to analyze all applicable images. Workstation performed: [id number]     Cardiac EP device report    Result Date: 9/5/2024  Narrative: [employer] ASSERT 5500 ICM/ACTIVE SYSTEM IS MRI CONDITIONAL MERLIN TRANSMISSION:  BATTERY STATUS \"OK.\"  PRESENTING RHYTHM SINUS, 74 BPM.  R WAVE 0.5-0.59.  1 DEVICE CLASSIFIED AF EPISODE, HOWEVER, EGM SHOWS SR, 1ST DEGREE AV BLOCK-NO AF PRESENT.  PT TAKES ASA, METOPROLOL SUCC, DOFETILIDE.  NORMAL DEVICE FUNCTION.  PAS     XR chest pa & lateral    Result Date: 8/27/2024  Narrative: CHEST INDICATION:   Osteomyelitis, unspecified. COMPARISON:  None. EXAM PERFORMED/VIEWS:  XR CHEST PA & LATERAL FINDINGS: Cardiomediastinal silhouette appears unremarkable. Wireless pacemaker in the left subcutaneous chest wall. Post-CABG. The lungs are clear.  No pneumothorax or pleural effusion. Osseous structures appear within " normal limits for patient age. Median sternotomy     Impression: No acute cardiopulmonary disease. Post-CABG. Wireless pacemaker in the left anterior chest wall, new from prior study of 12/5/2023. No other change from the prior exam. Electronically signed: 08/27/2024 05:27 PM Rodrigo Kumari MD    VAS ARTERIAL DUPLEX- LOWER LIMB BILATERAL    Result Date: 8/20/2024  Narrative:  [facility] REPORT [facility]INICAL: Indications: Patient presents with an ulcer on his left big toe x 1.5 months.  Patient denies any pain with walking. Operative History: 2024-02-21 Cardiac Loop Recorder 2023-08-11 CABG Cardiac cath x3 Right Internal carotid artery aneurysm repair Risk Factors The patient has history of HTN, Diabetes, Hyperlipidemia and CAD.  FINDINGS:  Segment                Right       Left                                          PSV (cm/s)  PSV (cm/s)  Common Femoral Artery          77          86  Prox Profunda                  92          56  Prox SFA                       67          54  Mid SFA                        63          67  Dist SFA                       52          54  Proximal Pop                   49          45  Distal Pop                     75          58  Dist Post Tibial               32          75  Prox. Ant. Tibial              25          30  Dist. Ant. Tibial              26          47     CONCLUSION: Impression: RIGHT LOWER LIMB: Diffuse disease noted throughout the femoral-popliteal and tibioperoneal arteries without significant focal stenosis. Ankle/Brachial index:   Unreliable due to non-compressible vessels. PVR/ PPG tracings are slightly dampened. Metatarsal pressure of 107 mmHg Great toe pressure of 83 mmHg, within the healing range  LEFT LOWER LIMB: Diffuse disease noted throughout the femoral-popliteal and tibioperoneal arteries without significant focal stenosis. Ankle/Brachial index:   Unreliable due to non-compressible vessels. PVR/ PPG tracings are slightly dampened. Metatarsal  "pressure of 77 mmHg Great toe pressure of 74 mmHg, within the healing range  There are no prior studies available for comparison.  SIGNATURE: Electronically Signed by: ALAN BECK on 2024-08-20 05:11:12 PM    XR foot 3+ vw left    Result Date: 8/20/2024  Narrative: LEFT FOOT INDICATION:   Type 2 diabetes mellitus with foot ulcer. Non-pressure chronic ulcer of other part of left foot limited to breakdown of skin. COMPARISON:  None. VIEWS:  XR FOOT 3+ VW LEFT Images: 3 FINDINGS: There is no acute fracture or dislocation. No significant degenerative changes. Osseous erosion distal phalanx great toe with adjacent soft tissue ulcer consistent with osteomyelitis. There are atherosclerotic calcifications. Soft tissues are otherwise unremarkable.     Impression: Osteomyelitis distal phalanx great toe. Electronically signed: 08/20/2024 10:26 AM Tom Rawls, MPH,MD      Wound Instructions:  Orders Placed This Encounter   Procedures    Wound cleansing and dressings Surgical Left Toe D1, great     Left great toe amputation site;   Change dressing every other day  Do not get wet with shower water  Apply a strip of xeroform over the incision line  Cover with gauze, rolled gauze, tape or gauze and tape.     Limit walking to what is absolutely necessary. Use surgical shoe and crutches for any walking.   Elevate the foot when seated and as often as possible to allow the incision to heal.     Standing Status:   Future     Standing Expiration Date:   9/24/2024         Cheryle Cotton, ELVIRA, DPM, FACFAS    Portions of the record may have been created with voice recognition software. Occasional wrong word or \"sound a like\" substitutions may have occurred due to the inherent limitations of voice recognition software. Read the chart carefully and recognize, using context, where substitutions have occurred.    "

## 2024-09-20 ENCOUNTER — TELEPHONE (OUTPATIENT)
Dept: WOUND CARE | Facility: HOSPITAL | Age: 64
End: 2024-09-20

## 2024-09-20 NOTE — TELEPHONE ENCOUNTER
The following In Basket message rec'd from patient    Good Morning   I changed the dressing on my toe and my wife thought it looked a little yellow in the area next to my other toe.   Wanted to make sure it was not getting am infection.   I attached a picture from this morning.      Please let me know.   Also should I clean it with anything when a change the dressing? ient.     Dr. Cotton reviewed, no changes at this time. Continue to offload as much as possible.    Patient verbalized understanding.

## 2024-09-24 ENCOUNTER — OFFICE VISIT (OUTPATIENT)
Dept: WOUND CARE | Facility: HOSPITAL | Age: 64
End: 2024-09-24
Payer: COMMERCIAL

## 2024-09-24 VITALS
RESPIRATION RATE: 18 BRPM | SYSTOLIC BLOOD PRESSURE: 120 MMHG | HEART RATE: 85 BPM | DIASTOLIC BLOOD PRESSURE: 64 MMHG | TEMPERATURE: 97.4 F

## 2024-09-24 DIAGNOSIS — T81.89XA NON-HEALING SURGICAL WOUND, INITIAL ENCOUNTER: Primary | ICD-10-CM

## 2024-09-24 DIAGNOSIS — Z89.412 STATUS POST AMPUTATION OF LEFT GREAT TOE (HCC): ICD-10-CM

## 2024-09-24 DIAGNOSIS — E11.42 DIABETIC POLYNEUROPATHY ASSOCIATED WITH TYPE 2 DIABETES MELLITUS (HCC): ICD-10-CM

## 2024-09-24 PROCEDURE — 11042 DBRDMT SUBQ TIS 1ST 20SQCM/<: CPT | Performed by: PODIATRIST

## 2024-09-24 NOTE — PATIENT INSTRUCTIONS
Orders Placed This Encounter   Procedures    Wound cleansing and dressings Surgical Left Toe D1, great     Left great toe amputation site;   Change dressing every other day  Do not get wet with shower water  May cleanse wound with mild soap ( unscented dove ) and water before dressing changes. Use wash cloth. Rinse, pat dry. No scrubbing over incision.   Apply a strip of xeroform over the incision line ( Rest of supplies sent home with patient. )  Cover with gauze, rolled gauze, tape or gauze and tape.     Elevate the foot when seated and as often as possible to allow the incision to heal.     Please continue to monitor blood sugars as they are important for wound healing.     No need to use crutches at this point.   May apply more weight to foot. 10 minutes on foot and then sit and elevate foot. Rotate.     Dr. Cotton will order a script for custom diabetic shoes.     Standing Status:   Future     Standing Expiration Date:   10/1/2024    Debridement Surgical Left Toe D1, great     This order was created via procedure documentation    Diabetic Shoe     1 pair diabetic shoes or inserts, 3 pair custom molded inserts.     Order Specific Question:   Type     Answer:   Off the Shelf

## 2024-09-24 NOTE — PROGRESS NOTES
Patient ID: John Obleschuk is a 64 y.o. male Date of Birth 1960       Chief Complaint   Patient presents with   • Follow Up Wound Care Visit     Left toe wound       Allergies:  Atorvastatin, Rosiglitazone, and Trazodone    Diagnosis:  1. Non-healing surgical wound, initial encounter  -     Wound cleansing and dressings Surgical Left Toe D1, great; Future  -     Debridement Surgical Left Toe D1, great  -     Wound Procedure Treatment Surgical Left Toe D1, great  2. Diabetic polyneuropathy associated with type 2 diabetes mellitus (HCC)  -     Wound cleansing and dressings Surgical Left Toe D1, great; Future  -     Diabetic Shoe  -     Wound Procedure Treatment Surgical Left Toe D1, great  3. Status post amputation of left great toe (HCC)  -     Wound cleansing and dressings Surgical Left Toe D1, great; Future  -     Diabetic Shoe  -     Wound Procedure Treatment Surgical Left Toe D1, great     Diagnosis ICD-10-CM Associated Orders   1. Non-healing surgical wound, initial encounter  T81.89XA Wound cleansing and dressings Surgical Left Toe D1, great     Debridement Surgical Left Toe D1, great     Wound Procedure Treatment Surgical Left Toe D1, great      2. Diabetic polyneuropathy associated with type 2 diabetes mellitus (HCC)  E11.42 Wound cleansing and dressings Surgical Left Toe D1, great     Diabetic Shoe     Wound Procedure Treatment Surgical Left Toe D1, great      3. Status post amputation of left great toe ([facility])  Z89.412 Wound cleansing and dressings Surgical Left Toe D1, great     Diabetic Shoe     Wound Procedure Treatment Surgical Left Toe D1, great           Assessment & Plan:  Nonhealing surgical wound from status post 3 weeks partial left great toe amputation, disarticulation at IPJ, small area of dehiscence laterally, this was debrided through subcuticular tissues and dressed with Xeroform dry dressing, wife will do the same 3 times a week.  He will use a cast cover, do not get foot wet in shower.   "He may use a washcloth with soap and water to wash his foot after his shower prior to dressing change.  Patient may discontinue use of crutches, increase weightbearing with surgical shoe, no steps.  Frequent elevation, may continue to drive.  Today reviewed frequent elevation, low-sodium diet, proper protein intake, strict pressure and friction reduction, proper glycemic control.  Patient given a prescription for 1 pair diabetic shoes, 3 pair custom molded inserts, information for  was given.  He understands and agrees with the plan and will follow-up in 1 week.    Debridement   Wound 09/03/24 Surgical Toe D1, great Left    Universal Protocol:  procedure performed by consultantConsent: Verbal consent obtained.  Risks and benefits: risks, benefits and alternatives were discussed  Consent given by: patient  Time out: Immediately prior to procedure a \"time out\" was called to verify the correct patient, procedure, equipment, support staff and site/side marked as required.  Patient understanding: patient states understanding of the procedure being performed  Patient identity confirmed: verbally with patient    Debridement Details  Performed by: physician  Debridement type: surgical  Level of debridement: subcutaneous tissue  Pain control: lidocaine 4%      Post-debridement measurements  Length (cm): 0.4  Width (cm): 0.5  Depth (cm): 0.2  Percent debrided: 100%  Surface Area (cm^2): 0.2  Area Debrided (cm^2): 0.2  Volume (cm^3): 0.04    Tissue and other material debrided: subcutaneous tissue  Devitalized tissue debrided: biofilm, callus, fibrin, necrotic debris, slough and eschar  Instrument(s) utilized: blade  Bleeding: small  Hemostasis obtained with: pressure  Procedural pain (0-10): insensate  Post-procedural pain: insensate   Response to treatment: procedure was tolerated well       Subjective:   Huan presents today status post 3 weeks partial left great toe amputation for osteomyelitis, wife has been changing " dressings with Xeroform dry dressing as directed, he continues to use surgical shoe and crutches.  No nausea, vomiting, fever, chills or pain.        The following portions of the patient's history were reviewed and updated as appropriate:   Patient Active Problem List   Diagnosis   • Essential hypertension   • Type 2 diabetes mellitus with hyperglycemia, without long-term current use of insulin (HCC)   • Mixed hyperlipidemia   • Sleep apnea   • Coronary artery disease involving native coronary artery   • S/P CABG x 5   • Depressive disorder   • Myalgia and myositis   • Right knee pain   • Vitamin D deficiency   • Diabetic polyneuropathy associated with type 2 diabetes mellitus (HCC)   • Wide-complex tachycardia   • Encounter for annual physical exam   • Diabetic ulcer of toe of left foot associated with type 2 diabetes mellitus, limited to breakdown of skin (HCC)   • Pre-op examination   • Status post amputation of left great toe ([facility])     Past Medical History:   Diagnosis Date   • Coronary artery disease    • Diabetes mellitus (HCC)    • History of transfusion    • Hyperlipidemia    • Hypertension    • Wears dentures      Past Surgical History:   Procedure Laterality Date   • ARTERIAL ANEURYSM REPAIR      Right Carotid Aneurysm - age 15.   • CARDIAC CATHETERIZATION Left 08/02/2023    Procedure: Cardiac Left Heart Cath;  Surgeon: Luisito Inman DO;  Location: [facility];  Service: Cardiology   • CARDIAC CATHETERIZATION N/A 08/02/2023    Procedure: Cardiac Coronary Angiogram;  Surgeon: Luisito Inman DO;  [facility]: [facility];  Service: Cardiology   • CARDIAC CATHETERIZATION  08/02/2023    Procedure: Cardiac catheterization;  Surgeon: Luisito Inman DO;  [facility]: [facility];  Service: Cardiology   • CARDIAC ELECTROPHYSIOLOGY PROCEDURE N/A 2/21/2024    Procedure: Cardiac loop recorder implant;  Surgeon: Luke Siu MD;  Location: [facility];  Service:  Cardiology   • HERNIA REPAIR     • AL AMPUTATION TOE METATARSOPHALANGEAL JOINT Left 9/3/2024    Procedure: AMPUTATION TOE;  Surgeon: Cheryle Cotton DPM;  Location:  MAIN OR;  Service: Podiatry   • AL CORONARY ARTERY BYP W/VEIN & ARTERY GRAFT 4 VEIN N/A 8/11/2023    Procedure: CORONARY ARTERY BYPASS GRAFT (CABG) X-5 VESSELS ; SVG to PDA, Ramus, Diagonal and OM. LIMA --> LAD EVH  W/ JOHN;  Surgeon: Soham Hodgson MD;  Location:  MAIN OR;  Service: Cardiac Surgery   • VASECTOMY       Social History     Socioeconomic History   • Marital status: /Civil Union     Spouse name: None   • Number of children: None   • Years of education: None   • Highest education level: None   Occupational History   • None   Tobacco Use   • Smoking status: Never   • Smokeless tobacco: Never   Vaping Use   • Vaping status: Never Used   Substance and Sexual Activity   • Alcohol use: No   • Drug use: No   • Sexual activity: Yes     Partners: Female   Other Topics Concern   • None   Social History Narrative   • None     Social Determinants of Health     Financial Resource Strain: Patient Declined (8/24/2023)    [facility]    Overall Financial Resource Strain (CARDIA)    • Difficulty of Paying Living Expenses: Patient declined   Food Insecurity: Patient Declined (8/24/2023)    [facility]    Hunger Vital Sign    • Worried About Running Out of Food in the Last Year: Patient declined    • Ran Out of Food in the Last Year: Patient declined   Transportation Needs: No Transportation Needs (8/24/2023)    [facility], [facility]    PRAPARE - Transportation    • Lack of Transportation (Medical): No    • Lack of Transportation (Non-Medical): No   Physical Activity: Sufficiently Active (12/20/2023)    Exercise Vital Sign    • Days of Exercise per Week: 3 days    • Minutes of  Exercise per Session: 60 min   Stress: Stress Concern Present (12/20/2023)    [employer] [place] [employer] - Occupational Stress Questionnaire    • Feeling of Stress : Rather much   Social Connections: Unknown (12/20/2023)    Social Connection and Isolation Panel [NHANES]    • Frequency of Communication with Friends and Family: More than three times a week    • Frequency of Social Gatherings with Friends and Family: More than three times a week    • Attends Adventism Services: Patient declined    • Active Member of Clubs or Organizations: Patient declined    • Attends Club or Organization Meetings: Patient declined    • Marital Status:    Intimate Partner Violence: Not At Risk (12/20/2023)    Humiliation, Afraid, Rape, and Kick questionnaire    • Fear of Current or Ex-Partner: No    • Emotionally Abused: No    • Physically Abused: No    • Sexually Abused: No   Housing Stability: Unknown (8/24/2023)    [facility]    Housing Stability Vital Sign    • Unable to Pay for Housing in the Last Year: Patient refused    • Number of Places Lived in the Last Year: Not on file    • Unstable Housing in the Last Year: Patient refused        Current Outpatient Medications:   •  acetaminophen (TYLENOL) 325 mg tablet, Take 1-2 tablets Q4-6 hours PRN pain. Do not take more than 4grams in 24 hours., Disp: , Rfl: 0  •  aspirin 325 mg tablet, Take 1 tablet (325 mg total) by mouth daily, Disp: 30 tablet, Rfl: 2  •  cholecalciferol (VITAMIN D3) 25 mcg (1,000 units) tablet, Take 1,000 Units by mouth 2 (two) times a day, Disp: , Rfl:   •  Continuous Glucose Sensor (FreeStyle Ayanna 2 Sensor) MISC, CHANGE SENSOR EVERY 14 DAYS, Disp: 2 each, Rfl: 5  •  cyanocobalamin (VITAMIN B-12) 1000 MCG tablet, Take 1,000 mcg by mouth 2 (two) times a day, Disp: , Rfl:   •  docusate sodium (COLACE) 100 mg capsule, Take 1 capsule (100 mg total) by mouth 2 (two) times a day Hold  for soft stools., Disp: 60 capsule, Rfl: 0  •  dofetilide (TIKOSYN) 500 mcg capsule, Take 1 capsule (500 mcg total) by mouth every 12 (twelve) hours, Disp: 180 capsule, Rfl: 3  •  gabapentin (NEURONTIN) 100 mg capsule, TAKE 1 CAPSULE BY MOUTH IN THE MORNING AND 3 CAPSULES IN THE EVENING, Disp: 120 capsule, Rfl: 2  •  glipiZIDE (GLUCOTROL) 5 mg tablet, Take 5 mg by mouth in the morning, Disp: , Rfl:   •  Jardiance 25 MG TABS, Take 1 tablet (25 mg total) by mouth in the morning, Disp: 90 tablet, Rfl: 3  •  LORazepam (ATIVAN) 0.5 mg tablet, Take 1 tablet (0.5 mg total) by mouth daily at bedtime, Disp: 30 tablet, Rfl: 3  •  Magnesium Gluconate 250 MG TABS, Take by mouth daily at bedtime, Disp: , Rfl:   •  metFORMIN (GLUCOPHAGE-XR) 500 mg 24 hr tablet, Take 2 tablets (1,000 mg total) by mouth 2 (two) times a day, Disp: 360 tablet, Rfl: 3  •  metoprolol succinate (TOPROL-XL) 25 mg 24 hr tablet, Take 1 tablet (25 mg total) by mouth daily (Patient taking differently: Take 25 mg by mouth daily evening), Disp: 90 tablet, Rfl: 3  •  NON FORMULARY, Take 2 capsules by mouth in the morning Balance of Nature (fruit & veg), Disp: , Rfl:   •  oxyCODONE-acetaminophen (Percocet) 5-325 mg per tablet, Take 1 tablet by mouth every 4 (four) hours as needed for moderate pain for up to 10 doses Max Daily Amount: 6 tablets, Disp: 10 tablet, Rfl: 0  •  Ozempic, 2 MG/DOSE, 8 MG/3ML injection pen, Inject 0.75 mL (2 mg total) under the skin every 7 days (Patient taking differently: Inject 2 mg under the skin every 7 days Sundays), Disp: 9 mL, Rfl: 3  •  potassium chloride (K-DUR,KLOR-CON) 10 mEq tablet, Take 1 tablet (10 mEq total) by mouth daily, Disp: 90 tablet, Rfl: 3  •  rosuvastatin (CRESTOR) 20 MG tablet, TAKE TWO TABLETS BY MOUTH EVERY DAY, Disp: 90 tablet, Rfl: 1  •  sertraline (ZOLOFT) 50 mg tablet, TAKE ONE TABLET BY MOUTH EVERY DAY, Disp: 30 tablet, Rfl: 5  •  tadalafil (CIALIS) 20 MG tablet, Take 1 tablet (20 mg total) by mouth daily  as needed for erectile dysfunction, Disp: 10 tablet, Rfl: 11  Family History   Problem Relation Age of Onset   • Leukemia Mother    • Cancer Mother    • Heart attack Father 52       Review of Systems   Constitutional:  Negative for chills and fever.   HENT:  Negative for ear pain and sore throat.    Eyes:  Negative for pain and visual disturbance.   Respiratory:  Negative for cough and shortness of breath.    Cardiovascular:  Negative for chest pain and palpitations.   Gastrointestinal:  Negative for abdominal pain and vomiting.   Genitourinary:  Negative for dysuria and hematuria.   Musculoskeletal:  Positive for gait problem. Negative for arthralgias and back pain.   Skin:  Positive for color change and wound. Negative for rash.   Neurological:  Positive for numbness. Negative for seizures and syncope.   Psychiatric/Behavioral: Negative.     All other systems reviewed and are negative.        Objective:  /64   Pulse 85   Temp (!) 97.4 °F (36.3 °C)   Resp 18     Physical Exam  Constitutional:       Appearance: Normal appearance. He is normal weight.   HENT:      Head: Normocephalic and atraumatic.      Right Ear: External ear normal.      Left Ear: External ear normal.      Nose: Nose normal.      Mouth/Throat:      Mouth: Mucous membranes are moist.      Pharynx: Oropharynx is clear.   Eyes:      Conjunctiva/sclera: Conjunctivae normal.      Pupils: Pupils are equal, round, and reactive to light.   Cardiovascular:      Pulses: Normal pulses.           Dorsalis pedis pulses are 2+ on the right side and 2+ on the left side.        Posterior tibial pulses are 2+ on the right side and 2+ on the left side.   Pulmonary:      Effort: Pulmonary effort is normal.   Musculoskeletal:      Cervical back: Normal range of motion.      Right lower leg: No edema.      Left lower leg: No edema.   Skin:     General: Skin is warm and dry.      Capillary Refill: Capillary refill takes less than 2 seconds.   Neurological:      " General: No focal deficit present.      Mental Status: He is alert and oriented to person, place, and time. Mental status is at baseline.      Sensory: Sensory deficit present.      Coordination: Coordination abnormal.      Gait: Gait abnormal.      Deep Tendon Reflexes: Reflexes abnormal.   Psychiatric:         Mood and Affect: Mood normal.         Behavior: Behavior normal.         Thought Content: Thought content normal.         Judgment: Judgment normal.         Wound 09/03/24 Surgical Toe D1, great Left (Active)   Wound Image Images linked 09/24/24 1117   Wound Description Eschar;Epithelialization;Pink;Brown;Yellow 09/24/24 1117   Pat-wound Assessment Pink;Dry 09/24/24 1117   Wound Length (cm) 0.3 cm 09/24/24 1117   Wound Width (cm) 4 cm 09/24/24 1117   Wound Depth (cm) 0 cm 09/24/24 1117   Wound Surface Area (cm^2) 1.2 cm^2 09/24/24 1117   Wound Volume (cm^3) 0 cm^3 09/24/24 1117   Calculated Wound Volume (cm^3) 0 cm^3 09/24/24 1117   Drainage Amount Small 09/24/24 1117   Drainage Description Serosanguineous 09/24/24 1117   Non-staged Wound Description Full thickness 09/24/24 1117   Dressing Status Intact 09/24/24 1117                XR foot left 3+ views    Result Date: 9/6/2024  Narrative: XR FOOT 3+ VW LEFT INDICATION: Post op partial hallux amp left. COMPARISON: August 20, 2024 FINDINGS: No acute fracture or dislocation. Surgical changes from the amputation of the hallux distal phalanx No lytic or blastic osseous lesion. Unremarkable soft tissues.     Impression: Postsurgical changes from the amputation of the of the first distal phalanx Computerized Assisted Algorithm (CAA) may have been used to analyze all applicable images. Workstation performed: [id number]     Cardiac EP device report    Result Date: 9/5/2024  Narrative: VAUGHAN ASSERT [email or web address] ICM/ACTIVE SYSTEM IS MRI CONDITIONAL MERLIN TRANSMISSION:  BATTERY STATUS \"OK.\"  PRESENTING RHYTHM SINUS, 74 BPM.  R WAVE 0.5-0.59.  1 DEVICE CLASSIFIED AF " EPISODE, HOWEVER, EGM SHOWS SR, 1ST DEGREE AV BLOCK-NO AF PRESENT.  PT TAKES ASA, METOPROLOL SUCC, DOFETILIDE.  NORMAL DEVICE FUNCTION.  PAS     XR chest pa & lateral    Result Date: 8/27/2024  Narrative: CHEST INDICATION:   Osteomyelitis, unspecified. COMPARISON:  None. EXAM PERFORMED/VIEWS:  XR CHEST PA & LATERAL FINDINGS: Cardiomediastinal silhouette appears unremarkable. Wireless pacemaker in the left subcutaneous chest wall. Post-CABG. The lungs are clear.  No pneumothorax or pleural effusion. Osseous structures appear within normal limits for patient age. Median sternotomy     Impression: No acute cardiopulmonary disease. Post-CABG. Wireless pacemaker in the left anterior chest wall, new from prior study of 12/5/2023. No other change from the prior exam. Electronically signed: 08/27/2024 05:27 PM Rodrigo Kumari MD      Wound Instructions:  Orders Placed This Encounter   Procedures   • Wound cleansing and dressings Surgical Left Toe D1, great     Left great toe amputation site;   Change dressing every other day  Do not get wet with shower water  May cleanse wound with mild soap ( unscented dove ) and water before dressing changes. Use wash cloth. Rinse, pat dry. No scrubbing over incision.   Apply a strip of xeroform over the incision line ( Rest of supplies sent home with patient. )  Cover with gauze, rolled gauze, tape or gauze and tape.     Elevate the foot when seated and as often as possible to allow the incision to heal.     Please continue to monitor blood sugars as they are important for wound healing.     No need to use crutches at this point.   May apply more weight to foot. 10 minutes on foot and then sit and elevate foot. Rotate.     Dr. Cotton will order a script for custom diabetic shoes.     Standing Status:   Future     Standing Expiration Date:   10/1/2024   • Debridement Surgical Left Toe D1, great     This order was created via procedure documentation   • Wound Procedure Treatment Surgical  "Left Toe D1, great     This order was created via procedure documentation   • Diabetic Shoe     1 pair diabetic shoes or inserts, 3 pair custom molded inserts.     Order Specific Question:   Type     Answer:   Off the Shelf         Cheryle Cotton DPM, ELVIRA, FERNANDO    Portions of the record may have been created with voice recognition software. Occasional wrong word or \"sound a like\" substitutions may have occurred due to the inherent limitations of voice recognition software. Read the chart carefully and recognize, using context, where substitutions have occurred.    "

## 2024-09-24 NOTE — PROGRESS NOTES
Wound Procedure Treatment Surgical Left Toe D1, great    Performed by: Brittani Daniels RN  Authorized by: Cheryle Cotton DPM    Associated wounds:   Wound 09/03/24 Surgical Toe D1, great Left  Wound cleansed with:  NSS  Applied primary dressing:  Other  Applied secondary dressing:  Gauze  Dressing secured with:  Tape, Surgilast and Tubifast  Offloading device appllied:  Surgical shoe    Xeroform

## 2024-09-29 DIAGNOSIS — F41.9 ANXIETY: ICD-10-CM

## 2024-09-30 ENCOUNTER — OFFICE VISIT (OUTPATIENT)
Dept: PODIATRY | Facility: CLINIC | Age: 64
End: 2024-09-30
Payer: COMMERCIAL

## 2024-09-30 DIAGNOSIS — B35.1 ONYCHOMYCOSIS: ICD-10-CM

## 2024-09-30 DIAGNOSIS — E11.40 TYPE 2 DIABETES MELLITUS WITH DIABETIC NEUROPATHY, WITHOUT LONG-TERM CURRENT USE OF INSULIN (HCC): Primary | ICD-10-CM

## 2024-09-30 DIAGNOSIS — S98.112A AMPUTATION OF LEFT GREAT TOE (HCC): ICD-10-CM

## 2024-09-30 PROCEDURE — 99213 OFFICE O/P EST LOW 20 MIN: CPT | Performed by: PODIATRIST

## 2024-09-30 NOTE — PROGRESS NOTES
"Ambulatory Visit  Name: John Obleschuk      : 1960      MRN: [id number]  Encounter Provider: Mariano Marsh DPM  Encounter Date: 2024   Encounter department: [facility] PODI[facility] [place]    Assessment & Plan  Type 2 diabetes mellitus with diabetic neuropathy, without long-term current use of insulin (HCC)    Lab Results   Component Value Date    HGBA1C 6.8 (H) 2024            Onychomycosis         Amputation of left great toe (HCC)           Diagnosis and options discussed with patient  Patient agreeable to the plan as stated below  Reviewed his recent amputation admission, wound care notes  Reviewed arterial dopplers, perfusion adequate.     Diagnosis and options discussed with patient  Patient agreeable to the plan as stated below    -DM foot risk is high. Recommend at risk foot care (Q9)  -Discussed DM risk to lower extremities, proper shoe gear, and daily monitoring of feet.   -Patient politely declined DM shoes/inserts. We discussed the risk. His sneakers seem to fit well. Check feet daily for any sign of breakdown.  -Discussed weight loss and suitable exercise regiment.   -Reviewed most recent endocrine visit 2024  -Educated on A1C and the risks of poorly controlled Diabetes. Reviewed recent A1C:  Lab Results   Component Value Date    HGBA1C 6.8 (H) 2024    HGBA1C 7.4 (H) 2023   .       History of Present Illness     John Obleschuk is a 64 y.o. male who presents for postop check. HE has osteomyelitis of his left great toe. IT was amputated 9/3/2024 with Dr. Cotton. He has been seeing her postop but lives much closer to this office and asked to be transferred. HE says he is \"mostly\" healed. His A1C is 6.8. He states his feet are numb.       Review of Systems  As stated in HPI, otherwise normal    Medical History Reviewed by provider this encounter:  Tobacco  Allergies  Meds  Problems  Med Hx  Surg Hx  Fam Hx            Objective     There were no vitals taken " for this visit.    Physical Exam  Vitals reviewed.   Constitutional:       General: He is not in acute distress.  Cardiovascular:      Rate and Rhythm: Normal rate.      Pulses: no weak pulses.           Dorsalis pedis pulses are 2+ on the right side and 2+ on the left side.        Posterior tibial pulses are 1+ on the right side and 1+ on the left side.   Pulmonary:      Effort: Pulmonary effort is normal. No respiratory distress.   Musculoskeletal:         General: Deformity (left hallux IPJ amputation) present.      Right foot: No Charcot foot or foot drop.      Left foot: Deformity present. No Charcot foot or foot drop.   Feet:      Right foot:      Protective Sensation: 10 sites tested.  5 sites sensed.      Skin integrity: Dry skin present. No ulcer, skin breakdown, erythema, warmth or callus.      Toenail Condition: Right toenails are abnormally thick. Fungal disease present.     Left foot:      Protective Sensation: 10 sites tested.  4 sites sensed.      Skin integrity: Dry skin present. No ulcer, skin breakdown, erythema, warmth or callus.      Toenail Condition: Left toenails are abnormally thick. Fungal disease present.  Skin:     Capillary Refill: Capillary refill takes less than 2 seconds.   Neurological:      Mental Status: He is alert and oriented to person, place, and time.      Sensory: Sensory deficit present.      Gait: Gait normal.     Diabetic Foot Exam    Patient's shoes and socks removed.    Right Foot/Ankle   Right Foot Inspection  Skin Exam: skin intact and dry skin. No warmth, no callus, no erythema, no maceration, no ulcer and no callus.     Toe Exam: right toe deformity.     Sensory   Vibration: absent  Monofilament testing: absent    Vascular  Capillary refills: < 3 seconds  The right DP pulse is 2+. The right PT pulse is 1+.     Left Foot/Ankle  Left Foot Inspection  Skin Exam: dry skin and pre-ulcer (left hallux amputation almost fully healed. NO cellulitis). Skin not intact, no  warmth, no erythema, no maceration, no ulcer and no callus.     Toe Exam: left toe deformity.     Sensory   Vibration: absent  Monofilament testing: absent    Vascular  Capillary refills: < 3 seconds  The left DP pulse is 2+. The left PT pulse is 1+.     Assign Risk Category  Deformity present  Loss of protective sensation  No weak pulses  Risk: 3

## 2024-09-30 NOTE — TELEPHONE ENCOUNTER
Refill must be reviewed and completed by the office or provider. The refill is unable to be approved or denied by the medication management team.      Patient Id Prescription # Filled Written Drug Label Qty Days Strength MME** Prescriber Pharmacy Payment REFILL #/Auth State Detail   1 [id number] 09/03/2024 09/03/2024 oxyCODONE HYDROCHLORIDE 5 MG ORAL TABLET/ACETAMINOPHEN 325 MG (Tablet) 10.0 2 325 MG-5 MG 37.50 [facility][id number] Commercial Insurance 0 / 0 [place]    1 [id number] 09/02/2024 05/20/2024 LORazepam (Tablet) 30.0 30 0.5 MG NA [facility] #[id number] Commercial Insurance 3 / 3 [place]    1 [id number] 07/29/2024 05/20/2024 LORazepam (Tablet) 30.0 30 0.5 MG NA [facility][id number] Commercial Insurance 2 / 3 [place]    1 [id number] 07/02/2024 05/20/2024 LORazepam (Tablet) 30.0 30 0.5 MG [facility] #[id number] Commercial Insurance 1 / 3 PA

## 2024-10-01 ENCOUNTER — OFFICE VISIT (OUTPATIENT)
Dept: WOUND CARE | Facility: HOSPITAL | Age: 64
End: 2024-10-01
Payer: COMMERCIAL

## 2024-10-01 VITALS
DIASTOLIC BLOOD PRESSURE: 59 MMHG | RESPIRATION RATE: 18 BRPM | HEART RATE: 103 BPM | TEMPERATURE: 97.6 F | SYSTOLIC BLOOD PRESSURE: 119 MMHG

## 2024-10-01 DIAGNOSIS — E11.42 DIABETIC POLYNEUROPATHY ASSOCIATED WITH TYPE 2 DIABETES MELLITUS (HCC): ICD-10-CM

## 2024-10-01 DIAGNOSIS — T81.89XA NON-HEALING SURGICAL WOUND, INITIAL ENCOUNTER: Primary | ICD-10-CM

## 2024-10-01 DIAGNOSIS — Z89.412 STATUS POST AMPUTATION OF LEFT GREAT TOE (HCC): ICD-10-CM

## 2024-10-01 PROCEDURE — 11042 DBRDMT SUBQ TIS 1ST 20SQCM/<: CPT | Performed by: PODIATRIST

## 2024-10-01 RX ORDER — LORAZEPAM 0.5 MG/1
0.5 TABLET ORAL
Qty: 30 TABLET | Refills: 3 | Status: SHIPPED | OUTPATIENT
Start: 2024-10-01

## 2024-10-01 NOTE — PROGRESS NOTES
Wound Procedure Treatment Surgical Left Toe D1, great    Performed by: Kate Gilliam RN  Authorized by: Cheryle Cotton DPM    Associated wounds:   Wound 09/03/24 Surgical Toe D1, great Left  Wound cleansed with:  NSS  Applied primary dressing:  Non adherent contact layer  Applied secondary dressing:  Gauze  Dressing secured with:  Ana, Tape and Tubifast  Comments:  Xeroform

## 2024-10-01 NOTE — PROGRESS NOTES
Patient ID: John Obleschuk is a 64 y.o. male Date of Birth 1960       Chief Complaint   Patient presents with    Follow Up Wound Care Visit     Left great toe       Allergies:  Atorvastatin, Rosiglitazone, and Trazodone    Diagnosis:  1. Non-healing surgical wound, initial encounter  -     Wound cleansing and dressings Surgical Left Toe D1, great; Future  -     Wound Procedure Treatment Surgical Left Toe D1, great  2. Diabetic polyneuropathy associated with type 2 diabetes mellitus ([facility])  3. Status post amputation of left great toe ([facility])     Diagnosis ICD-10-CM Associated Orders   1. Non-healing surgical wound, initial encounter  T81.89XA Wound cleansing and dressings Surgical Left Toe D1, great     Wound Procedure Treatment Surgical Left Toe D1, great      2. Diabetic polyneuropathy associated with type 2 diabetes mellitus ([facility])  E11.42       3. Status post amputation of left great toe ([facility])  Z89.412            Assessment & Plan:  Nonhealing surgical wound from status post 4 weeks partial left great toe amputation, disarticulation IPJ, small area of dehiscence laterally continues to improve, wound was debrided through subcuticular tissues, there is a deep Monocryl suture that had not dissolved likely keeping the incision from healing, this area was dressed with Xeroform dry dressing, wife will do the same 3 times a week, he will continue to use cast cover, do not get foot wet in shower.  He may use a washcloth with soap and water to wash his foot after shower prior to dressing change.  Patient may continue to weight-bear as tolerated with surgical shoe.  Patient to maintain frequent elevation, low-sodium diet, proper protein intake, proper glycemic control, strict pressure and friction reduction.  Patient does not recall receiving prescription last week for diabetic shoes and custom molded inserts, a new 1 was given today.  He understands and agrees with the plan and will follow-up in 2 weeks.    Debridement  "  Wound 09/03/24 Surgical Toe D1, great Left    Universal Protocol:  procedure performed by consultantConsent: Verbal consent obtained.  Risks and benefits: risks, benefits and alternatives were discussed  Consent given by: patient  Time out: Immediately prior to procedure a \"time out\" was called to verify the correct patient, procedure, equipment, support staff and site/side marked as required.  Patient understanding: patient states understanding of the procedure being performed  Patient identity confirmed: verbally with patient    Debridement Details  Performed by: physician  Debridement type: surgical  Level of debridement: subcutaneous tissue  Pain control: lidocaine 4%      Post-debridement measurements  Length (cm): 0.5  Width (cm): 0.5  Depth (cm): 0.2  Percent debrided: 100%  Surface Area (cm^2): 0.25  Area Debrided (cm^2): 0.25  Volume (cm^3): 0.05    Tissue and other material debrided: subcutaneous tissue  Devitalized tissue debrided: biofilm, fibrin, necrotic debris and slough  Instrument(s) utilized: blade  Bleeding: small  Hemostasis obtained with: pressure  Procedural pain (0-10): insensate  Post-procedural pain: insensate   Response to treatment: procedure was tolerated well               Subjective:   Huan presents today for evaluation and care of nonhealing surgical wound/dehiscence from partial left great toe amputation, wife has been changing dressings as directed, he does not have any new complaints.          The following portions of the patient's history were reviewed and updated as appropriate:   Patient Active Problem List   Diagnosis    Essential hypertension    Type 2 diabetes mellitus with hyperglycemia, without long-term current use of insulin (HCC)    Mixed hyperlipidemia    Sleep apnea    Coronary artery disease involving native coronary artery    S/P CABG x 5    Depressive disorder    Myalgia and myositis    Right knee pain    Vitamin D deficiency    Diabetic polyneuropathy associated " with type 2 diabetes mellitus (HCC)    Wide-complex tachycardia    Encounter for annual physical exam    Diabetic ulcer of toe of left foot associated with type 2 diabetes mellitus, limited to breakdown of skin (HCC)    Pre-op examination    Status post amputation of left great toe (HCC)     Past Medical History:   Diagnosis Date    Coronary artery disease     Diabetes mellitus (HCC)     History of transfusion     Hyperlipidemia     Hypertension     Wears dentures      Past Surgical History:   Procedure Laterality Date    ARTERIAL ANEURYSM REPAIR      Right Carotid Aneurysm - age 15.    CARDIAC CATHETERIZATION Left 08/02/2023    Procedure: Cardiac Left Heart Cath;  Surgeon: Luisito Inman DO;  Location: [facility];  Service: Cardiology    CARDIAC CATHETERIZATION N/A 08/02/2023    Procedure: Cardiac Coronary Angiogram;  Surgeon: Luisito Inman DO;  Location: [facility];  Service: Cardiology    CARDIAC CATHETERIZATION  08/02/2023    Procedure: Cardiac catheterization;  Surgeon: Luisito Inman DO;  Location: [facility];  Service: Cardiology    CARDIAC ELECTROPHYSIOLOGY PROCEDURE N/A 2/21/2024    Procedure: Cardiac loop recorder implant;  Surgeon: Luke Siu MD;  Location: [facility];  Service: Cardiology    HERNIA REPAIR      MI AMPUTATION TOE METATARSOPHALANGEAL JOINT Left 9/3/2024    Procedure: AMPUTATION TOE;  Surgeon: Cheryle Cotton DPM;  Location: [facility];  Service: Podiatry    MI CORONARY ARTERY BYP W/VEIN & ARTERY GRAFT 4 VEIN N/A 8/11/2023    Procedure: CORONARY ARTERY BYPASS GRAFT (CABG) X-5 VESSELS ; SVG to PDA, Ramus, Diagonal and OM. LIMA --> LAD EVH  W/ JOHN;  Surgeon: Soham Hodgson MD;  Location: [facility];  Service: Cardiac Surgery    VASECTOMY       Social History     Socioeconomic History    Marital status: /Civil Union     Spouse name: Not on file    Number of children: Not on file    Years of education: Not on file    Highest  education level: Not on file   Occupational History    Not on file   Tobacco Use    Smoking status: Never    Smokeless tobacco: Never   Vaping Use    Vaping status: Never Used   Substance and Sexual Activity    Alcohol use: No    Drug use: No    Sexual activity: Yes     Partners: Female   Other Topics Concern    Not on file   Social History Narrative    Not on file     Social Determinants of Health     Financial Resource Strain: Patient Declined (8/24/2023)    Received from [facility]    Overall Financial Resource Strain (CARDIA)     Difficulty of Paying Living Expenses: Patient declined   Food Insecurity: Patient Declined (8/24/2023)    Received from [facility]    Hunger Vital Sign     Worried About Running Out of Food in the Last Year: Patient declined     Ran Out of Food in the Last Year: Patient declined   Transportation Needs: No Transportation Needs (8/24/2023)    Received from [facility]    PRAPARE - Transportation     Lack of Transportation (Medical): No     Lack of Transportation (Non-Medical): No   Physical Activity: Sufficiently Active (12/20/2023)    Exercise Vital Sign     Days of Exercise per Week: 3 days     Minutes of Exercise per Session: 60 min   Stress: Stress Concern Present (12/20/2023)    [employer] [place] [employer] - Occupational Stress Questionnaire     Feeling of Stress : Rather much   Social Connections: Unknown (12/20/2023)    Social Connection and Isolation Panel [NHANES]     Frequency of Communication with Friends and Family: More than three times a week     Frequency of Social Gatherings with Friends and Family: More than three times a week     Attends Confucianism Services: Patient declined     Active Member of Clubs or Organizations: Patient declined     Attends Club or Organization Meetings: Patient declined     Marital Status:     Intimate Partner Violence: Not At Risk (12/20/2023)    Humiliation, Afraid, Rape, and Kick questionnaire     Fear of Current or Ex-Partner: No     Emotionally Abused: No     Physically Abused: No     Sexually Abused: No   Housing Stability: Unknown (8/24/2023)    [facility]    Housing Stability Vital Sign     Unable to Pay for Housing in the Last Year: Patient refused     Number of Places Lived in the Last Year: Not on file     Unstable Housing in the Last Year: Patient refused        Current Outpatient Medications:     acetaminophen (TYLENOL) 325 mg tablet, Take 1-2 tablets Q4-6 hours PRN pain. Do not take more than 4grams in 24 hours., Disp: , Rfl: 0    aspirin 325 mg tablet, Take 1 tablet (325 mg total) by mouth daily, Disp: 30 tablet, Rfl: 2    cholecalciferol (VITAMIN D3) 25 mcg (1,000 units) tablet, Take 1,000 Units by mouth 2 (two) times a day, Disp: , Rfl:     Continuous Glucose Sensor (FreeStyle Ayanna 2 Sensor) MISC, CHANGE SENSOR EVERY 14 DAYS, Disp: 2 each, Rfl: 5    cyanocobalamin (VITAMIN B-12) 1000 MCG tablet, Take 1,000 mcg by mouth 2 (two) times a day, Disp: , Rfl:     docusate sodium (COLACE) 100 mg capsule, Take 1 capsule (100 mg total) by mouth 2 (two) times a day Hold for soft stools., Disp: 60 capsule, Rfl: 0    dofetilide (TIKOSYN) 500 mcg capsule, Take 1 capsule (500 mcg total) by mouth every 12 (twelve) hours, Disp: 180 capsule, Rfl: 3    gabapentin (NEURONTIN) 100 mg capsule, TAKE 1 CAPSULE BY MOUTH IN THE MORNING AND 3 CAPSULES IN THE EVENING, Disp: 120 capsule, Rfl: 2    glipiZIDE (GLUCOTROL) 5 mg tablet, Take 5 mg by mouth in the morning, Disp: , Rfl:     Jardiance 25 MG TABS, Take 1 tablet (25 mg total) by mouth in the morning, Disp: 90 tablet, Rfl: 3    LORazepam (ATIVAN) 0.5 mg tablet, Take 1 tablet (0.5 mg total) by mouth daily at bedtime, Disp: 30 tablet, Rfl: 3    Magnesium Gluconate 250 MG TABS, Take by mouth  daily at bedtime, Disp: , Rfl:     metFORMIN (GLUCOPHAGE-XR) 500 mg 24 hr tablet, Take 2 tablets (1,000 mg total) by mouth 2 (two) times a day, Disp: 360 tablet, Rfl: 3    metoprolol succinate (TOPROL-XL) 25 mg 24 hr tablet, Take 1 tablet (25 mg total) by mouth daily (Patient taking differently: Take 25 mg by mouth daily evening), Disp: 90 tablet, Rfl: 3    NON FORMULARY, Take 2 capsules by mouth in the morning Balance of Nature (fruit & veg), Disp: , Rfl:     oxyCODONE-acetaminophen (Percocet) 5-325 mg per tablet, Take 1 tablet by mouth every 4 (four) hours as needed for moderate pain for up to 10 doses Max Daily Amount: 6 tablets, Disp: 10 tablet, Rfl: 0    Ozempic, 2 MG/DOSE, 8 MG/3ML injection pen, Inject 0.75 mL (2 mg total) under the skin every 7 days (Patient taking differently: Inject 2 mg under the skin every 7 days Sundays), Disp: 9 mL, Rfl: 3    potassium chloride (K-DUR,KLOR-CON) 10 mEq tablet, Take 1 tablet (10 mEq total) by mouth daily, Disp: 90 tablet, Rfl: 3    rosuvastatin (CRESTOR) 20 MG tablet, TAKE TWO TABLETS BY MOUTH EVERY DAY, Disp: 90 tablet, Rfl: 1    sertraline (ZOLOFT) 50 mg tablet, TAKE ONE TABLET BY MOUTH EVERY DAY, Disp: 30 tablet, Rfl: 5    tadalafil (CIALIS) 20 MG tablet, Take 1 tablet (20 mg total) by mouth daily as needed for erectile dysfunction, Disp: 10 tablet, Rfl: 11  Family History   Problem Relation Age of Onset    Leukemia Mother     Cancer Mother     Heart attack Father 52       Review of Systems   Constitutional:  Negative for chills and fever.   HENT:  Negative for ear pain and sore throat.    Eyes:  Negative for pain and visual disturbance.   Respiratory:  Negative for cough and shortness of breath.    Cardiovascular:  Negative for chest pain and palpitations.   Gastrointestinal:  Negative for abdominal pain and vomiting.   Genitourinary:  Negative for dysuria and hematuria.   Musculoskeletal:  Positive for gait problem. Negative for arthralgias and back pain.   Skin:   Positive for color change and wound. Negative for rash.   Neurological:  Positive for numbness. Negative for seizures and syncope.   Psychiatric/Behavioral: Negative.     All other systems reviewed and are negative.        Objective:  /59   Pulse 103   Temp 97.6 °F (36.4 °C)   Resp 18     Physical Exam  Constitutional:       Appearance: Normal appearance. He is normal weight.   HENT:      Head: Normocephalic and atraumatic.      Right Ear: External ear normal.      Left Ear: External ear normal.      Nose: Nose normal.      Mouth/Throat:      Mouth: Mucous membranes are moist.      Pharynx: Oropharynx is clear.   Eyes:      Conjunctiva/sclera: Conjunctivae normal.      Pupils: Pupils are equal, round, and reactive to light.   Cardiovascular:      Pulses: Normal pulses.           Dorsalis pedis pulses are 2+ on the right side and 2+ on the left side.        Posterior tibial pulses are 2+ on the right side and 2+ on the left side.   Pulmonary:      Effort: Pulmonary effort is normal.   Musculoskeletal:      Cervical back: Normal range of motion.      Right lower leg: No edema.      Left lower leg: No edema.   Skin:     General: Skin is warm and dry.      Capillary Refill: Capillary refill takes less than 2 seconds.   Neurological:      General: No focal deficit present.      Mental Status: He is alert and oriented to person, place, and time. Mental status is at baseline.      Sensory: Sensory deficit present.      Coordination: Coordination abnormal.      Gait: Gait abnormal.      Deep Tendon Reflexes: Reflexes abnormal.   Psychiatric:         Mood and Affect: Mood normal.         Behavior: Behavior normal.         Thought Content: Thought content normal.         Judgment: Judgment normal.           Wound 09/03/24 Surgical Toe D1, great Left (Active)   Wound Image Images linked 10/01/24 0840   Wound Description Eschar;Epithelialization;Pink;Brown;Dry 10/01/24 0820   Pat-wound Assessment Pink;Dry 10/01/24  0820   Wound Length (cm) 0.3 cm 10/01/24 0820   Wound Width (cm) 0.5 cm 10/01/24 0820   Wound Depth (cm) 0.1 cm 10/01/24 0820   Wound Surface Area (cm^2) 0.15 cm^2 10/01/24 0820   Wound Volume (cm^3) 0.015 cm^3 10/01/24 0820   Calculated Wound Volume (cm^3) 0.02 cm^3 10/01/24 0820   Drainage Amount Small 10/01/24 0820   Drainage Description Serosanguineous 10/01/24 0820   Non-staged Wound Description Full thickness 10/01/24 0820                XR foot left 3+ views    Result Date: 9/6/2024  Narrative: XR FOOT 3+ VW LEFT INDICATION: Post op partial hallux amp left. COMPARISON: August 20, 2024 FINDINGS: No acute fracture or dislocation. Surgical changes from the amputation of the hallux distal phalanx No lytic or blastic osseous lesion. Unremarkable soft tissues.     Impression: Postsurgical changes from the amputation of the of the first distal phalanx Computerized Assisted Algorithm (CAA) may have been used to analyze all applicable images. Workstation performed: [id number]           Wound Instructions:  Orders Placed This Encounter   Procedures    Wound cleansing and dressings Surgical Left Toe D1, great     Left great toe amputation site;   Change dressing every other day  Do not get wet with shower water  May cleanse wound with mild soap ( unscented dove ) and water before dressing changes. Use wash cloth. Rinse, pat dry. No scrubbing over incision.   Apply a strip of Xeroform over the incision line ( Rest of supplies sent home with patient. )  Cover with gauze, rolled gauze, tape or gauze and tape.      Elevate the foot when seated and as often as possible to allow the incision to heal.      May apply more weight to foot. 10 minutes on foot and then sit and elevate foot. Rotate.      Dr. Cotton will order a script for custom diabetic shoes.     Standing Status:   Future     Standing Expiration Date:   10/15/2024    Wound Procedure Treatment Surgical Left Toe D1, great     This order was created via procedure  "documentation         Cheryle Cotton, DPM, DPM, FACFAS    Portions of the record may have been created with voice recognition software. Occasional wrong word or \"sound a like\" substitutions may have occurred due to the inherent limitations of voice recognition software. Read the chart carefully and recognize, using context, where substitutions have occurred.      "

## 2024-10-01 NOTE — PATIENT INSTRUCTIONS
Orders Placed This Encounter   Procedures    Wound cleansing and dressings Surgical Left Toe D1, great     Left great toe amputation site;   Change dressing every other day  Do not get wet with shower water  May cleanse wound with mild soap ( unscented dove ) and water before dressing changes. Use wash cloth. Rinse, pat dry. No scrubbing over incision.   Apply a strip of Xeroform over the incision line ( Rest of supplies sent home with patient. )  Cover with gauze, rolled gauze, tape or gauze and tape.      Elevate the foot when seated and as often as possible to allow the incision to heal.      May apply more weight to foot. 10 minutes on foot and then sit and elevate foot. Rotate.      Dr. Cotton will order a script for custom diabetic shoes.     Standing Status:   Future     Standing Expiration Date:   10/15/2024

## 2024-10-07 DIAGNOSIS — E11.65 TYPE 2 DIABETES MELLITUS WITH HYPERGLYCEMIA, WITHOUT LONG-TERM CURRENT USE OF INSULIN (HCC): ICD-10-CM

## 2024-10-07 DIAGNOSIS — I25.10 CORONARY ARTERY DISEASE INVOLVING NATIVE CORONARY ARTERY: ICD-10-CM

## 2024-10-08 RX ORDER — ROSUVASTATIN CALCIUM 20 MG/1
40 TABLET, COATED ORAL DAILY
Qty: 180 TABLET | Refills: 1 | Status: SHIPPED | OUTPATIENT
Start: 2024-10-08

## 2024-10-11 ENCOUNTER — OFFICE VISIT (OUTPATIENT)
Dept: ENDOCRINOLOGY | Facility: HOSPITAL | Age: 64
End: 2024-10-11
Payer: COMMERCIAL

## 2024-10-11 VITALS
WEIGHT: 206 LBS | BODY MASS INDEX: 29.49 KG/M2 | HEART RATE: 100 BPM | SYSTOLIC BLOOD PRESSURE: 118 MMHG | HEIGHT: 70 IN | DIASTOLIC BLOOD PRESSURE: 70 MMHG | OXYGEN SATURATION: 97 %

## 2024-10-11 DIAGNOSIS — I10 ESSENTIAL HYPERTENSION: ICD-10-CM

## 2024-10-11 DIAGNOSIS — E78.2 MIXED HYPERLIPIDEMIA: ICD-10-CM

## 2024-10-11 DIAGNOSIS — E11.65 TYPE 2 DIABETES MELLITUS WITH HYPERGLYCEMIA, WITHOUT LONG-TERM CURRENT USE OF INSULIN (HCC): Primary | ICD-10-CM

## 2024-10-11 PROCEDURE — 95251 CONT GLUC MNTR ANALYSIS I&R: CPT | Performed by: PHYSICIAN ASSISTANT

## 2024-10-11 PROCEDURE — 99214 OFFICE O/P EST MOD 30 MIN: CPT | Performed by: PHYSICIAN ASSISTANT

## 2024-10-11 RX ORDER — GLIPIZIDE 5 MG/1
5 TABLET ORAL
Qty: 180 TABLET | Refills: 1 | Status: SHIPPED | OUTPATIENT
Start: 2024-10-11

## 2024-10-11 NOTE — PATIENT INSTRUCTIONS
Current diabetes medications.    Continue utilizing freestyle lambert to monitor glucose levels.    Contact the office with any concerns or questions.    Follow-up in 3 months with lab work completed prior to visit.

## 2024-10-11 NOTE — PROGRESS NOTES
John Obleschuk 64 y.o. male MRN: [id number]    Encounter: [id number]      Assessment & Plan     Assessment:  This is a 64 y.o.-year-old male with 2 diabetes with neuropathy, hypertension and hyperlipidemia.    Plan:  .  Type 2 diabetes: Most recent hemoglobin A1c completed about 1.5 months ago was 6.8 which is within target range.  Review of his Dexcom today, I would expect his A1c to be lower if glucose levels continue to trend where they are.  At this time he will continue with Jardiance 25 mg daily, metformin  mg 2 tablets twice a day, glipizide 5 mg daily, Ozempic 2 mg weekly.  Continue utilizing the freestyle lambert to monitor glucose levels.  Continue with lifestyle modifications to help improve glucose levels.  Contact the office with any concerns or questions.  Follow-up in about 3 months with labwork completed prior to visit.    2.  Hypertension: Normotensive in the office today.  Kidney function remained stable with normal electrolytes.  Continue with metoprolol 25 mg daily.  Will continue to monitor over time.  Repeat CMP prior to next office visit.    3.  Hyperlipidemia: Panel doing well at this time.  Continue with rosuvastatin 40 mg daily.  Will continue monitor lipid panel over time.    CC: Type 2 diabetes follow-up    History of Present Illness     HPI:  John Obleschuk is a 64 y.o. year old male with type 2 diabetes around 2008 with hypertension and hyperlipidemia underwent CABG times 4 August 2023.  He is on oral agents at home and takes already and Jardiance 25 mg daily, metformin  mg 2 tablets twice a day, glipizide 5 mg daily, and Ozempic 2 mg weekly.  Does admit some nocturia and polydipsia.  He denies any polyuria, and blurry vision.  He denies nephropathy, heart attack, and stroke but does admit to heart disease post CABG, neuropathy, retinopathy, and claudication.  Limits carbs throughout the day.  Was previously going to the gym until his recent surgery.  Is hoping he will have  clearance soon so he can go back to the gym.    Since last office visit was found to have diabetic ulcer on toe of left foot which she was following up with wound care.  Unfortunately he developed osteomyelitis in the great toe of his left foot and underwent amputation September 3, 2024.  Is doing better at this time and still has a boot on his left foot.  Is a little frustrated as he had the CABG last year, and the amputation this year.  Is hoping once he is out of the boot he can get back to his normal activities.    Hypoglycemic episodes: Yes usually in the afternoon, and typically occurs on a Saturday.  Keep fast acting glucose on hands.  States the reason for the hypoglycemia as he eats less Saturday morning as he is usually on the go.    The patient's last eye exam was in March 2023.  The patient's last foot exam was in September 2024.    Blood Sugar/Glucometer/Pump/CGM review: Download of freestyle lambert from September 28 through October 11, 2024 reveals an average glucose level of 109 with a glucose variability of 25.8%.  He is in target range 95% time, above target range 4% time, and below target range 1% time.  Glucose levels typically trend down overnight, increase with breakfast, come down by early afternoon.  May have some slight increases with lunch and dinner, but nothing as significant as breakfast.    For hyperlipidemia he is currently on rosuvastatin 40 mg daily.  For hypertension he is currently on metoprolol 25 mg daily.  Denies any headaches, vision change, chest pain, MI or strokelike symptoms.     Review of Systems   Constitutional:  Negative for activity change, appetite change, fatigue and unexpected weight change.   HENT:  Negative for trouble swallowing.    Eyes:  Negative for visual disturbance.   Respiratory:  Negative for chest tightness and shortness of breath.    Cardiovascular:  Negative for chest pain, palpitations and leg swelling.   Gastrointestinal:  Negative for abdominal pain,  diarrhea, nausea and vomiting.   Endocrine: Negative for cold intolerance, heat intolerance, polydipsia, polyphagia and polyuria.   Genitourinary:  Negative for frequency.   Skin:  Negative for rash and wound.        Amputation of first digit on left foot   Neurological:  Negative for dizziness, weakness, light-headedness, numbness and headaches.        Neuropathy in bilateral feet   Psychiatric/Behavioral:  Negative for dysphoric mood and sleep disturbance. The patient is not nervous/anxious.        Historical Information   Past Medical History:   Diagnosis Date    Coronary artery disease     Diabetes mellitus (HCC)     History of transfusion     Hyperlipidemia     Hypertension     Wears dentures      Past Surgical History:   Procedure Laterality Date    ARTERIAL ANEURYSM REPAIR      Right Carotid Aneurysm - age 15.    CARDIAC CATHETERIZATION Left 08/02/2023    Procedure: Cardiac Left Heart Cath;  Surgeon: Luisito Inman DO;  Location: [facility];  Service: Cardiology    CARDIAC CATHETERIZATION N/A 08/02/2023    Procedure: Cardiac Coronary Angiogram;  Surgeon: Luisito Inman DO;  Location: [facility];  Service: Cardiology    CARDIAC CATHETERIZATION  08/02/2023    Procedure: Cardiac catheterization;  Surgeon: Luisito Inman DO;  Location: [facility];  Service: Cardiology    CARDIAC ELECTROPHYSIOLOGY PROCEDURE N/A 2/21/2024    Procedure: Cardiac loop recorder implant;  Surgeon: Luke Siu MD;  Location: [facility];  Service: Cardiology    HERNIA REPAIR      TX AMPUTATION TOE METATARSOPHALANGEAL JOINT Left 9/3/2024    Procedure: AMPUTATION TOE;  Surgeon: Cheryle Cotton DPM;  Location:  MAIN OR;  Service: Podiatry    TX CORONARY ARTERY BYP W/VEIN & ARTERY GRAFT 4 VEIN N/A 8/11/2023    Procedure: CORONARY ARTERY BYPASS GRAFT (CABG) X-5 VESSELS ; SVG to PDA, Ramus, Diagonal and OM. LIMA --> LAD EVH  W/ JOHN;  Surgeon: Soham Hodgson MD;  Location: [facility];   Service: Cardiac Surgery    VASECTOMY       Social History   Social History     Substance and Sexual Activity   Alcohol Use No     Social History     Substance and Sexual Activity   Drug Use No     Social History     Tobacco Use   Smoking Status Never   Smokeless Tobacco Never     Family History:   Family History   Problem Relation Age of Onset    Leukemia Mother     Cancer Mother     Heart attack Father 52       Meds/Allergies   Current Outpatient Medications   Medication Sig Dispense Refill    acetaminophen (TYLENOL) 325 mg tablet Take 1-2 tablets Q4-6 hours PRN pain. Do not take more than 4grams in 24 hours.  0    aspirin 325 mg tablet Take 1 tablet (325 mg total) by mouth daily 30 tablet 2    cholecalciferol (VITAMIN D3) 25 mcg (1,000 units) tablet Take 1,000 Units by mouth 2 (two) times a day      Continuous Glucose Sensor (FreeStyle Ayanna 2 Sensor) MISC CHANGE SENSOR EVERY 14 DAYS 6 each 1    cyanocobalamin (VITAMIN B-12) 1000 MCG tablet Take 1,000 mcg by mouth 2 (two) times a day      docusate sodium (COLACE) 100 mg capsule Take 1 capsule (100 mg total) by mouth 2 (two) times a day Hold for soft stools. 60 capsule 0    dofetilide (TIKOSYN) 500 mcg capsule Take 1 capsule (500 mcg total) by mouth every 12 (twelve) hours 180 capsule 3    gabapentin (NEURONTIN) 100 mg capsule TAKE 1 CAPSULE BY MOUTH IN THE MORNING AND 3 CAPSULES IN THE EVENING 120 capsule 2    glipiZIDE (GLUCOTROL) 5 mg tablet Take 5 mg by mouth in the morning      Jardiance 25 MG TABS Take 1 tablet (25 mg total) by mouth in the morning 90 tablet 3    LORazepam (ATIVAN) 0.5 mg tablet TAKE 1 TABLET BY MOUTH DAILY AT BEDTIME 30 tablet 3    Magnesium Gluconate 250 MG TABS Take by mouth daily at bedtime      metFORMIN (GLUCOPHAGE-XR) 500 mg 24 hr tablet Take 2 tablets (1,000 mg total) by mouth 2 (two) times a day 360 tablet 3    metoprolol succinate (TOPROL-XL) 25 mg 24 hr tablet Take 1 tablet (25 mg total) by mouth daily (Patient taking differently:  "Take 25 mg by mouth daily evening) 90 tablet 3    NON FORMULARY Take 2 capsules by mouth in the morning Balance of Nature (fruit & veg)      oxyCODONE-acetaminophen (Percocet) 5-325 mg per tablet Take 1 tablet by mouth every 4 (four) hours as needed for moderate pain for up to 10 doses Max Daily Amount: 6 tablets 10 tablet 0    Ozempic, 2 MG/DOSE, 8 MG/3ML injection pen Inject 0.75 mL (2 mg total) under the skin every 7 days (Patient taking differently: Inject 2 mg under the skin every 7 days Sundays) 9 mL 3    potassium chloride (K-DUR,KLOR-CON) 10 mEq tablet Take 1 tablet (10 mEq total) by mouth daily 90 tablet 3    rosuvastatin (CRESTOR) 20 MG tablet TAKE TWO TABLETS BY MOUTH EVERY  tablet 1    sertraline (ZOLOFT) 50 mg tablet TAKE ONE TABLET BY MOUTH EVERY DAY 30 tablet 5    tadalafil (CIALIS) 20 MG tablet Take 1 tablet (20 mg total) by mouth daily as needed for erectile dysfunction 10 tablet 11     No current facility-administered medications for this visit.     Allergies   Allergen Reactions    Atorvastatin Other (See Comments)     felt flu-like    Rosiglitazone Other (See Comments)     Avandia for DM2. Risks outweigh benefits.    Trazodone Other (See Comments)     Felt \"hung over\"       Objective   Vitals: There were no vitals taken for this visit.    Physical Exam  Vitals and nursing note reviewed.   Constitutional:       General: He is not in acute distress.     Appearance: Normal appearance. He is not diaphoretic.   HENT:      Head: Normocephalic and atraumatic.   Eyes:      General: No scleral icterus.     Extraocular Movements: Extraocular movements intact.      Conjunctiva/sclera: Conjunctivae normal.      Pupils: Pupils are equal, round, and reactive to light.   Cardiovascular:      Rate and Rhythm: Normal rate and regular rhythm.      Heart sounds: No murmur heard.  Pulmonary:      Effort: Pulmonary effort is normal. No respiratory distress.      Breath sounds: Normal breath sounds. No " wheezing.   Musculoskeletal:      Cervical back: Normal range of motion.      Right lower leg: No edema.      Left lower leg: No edema.      Comments: Left foot currently in a boot   Lymphadenopathy:      Cervical: No cervical adenopathy.   Skin:     General: Skin is warm and dry.   Neurological:      Mental Status: He is alert and oriented to person, place, and time. Mental status is at baseline.      Sensory: No sensory deficit.      Gait: Gait normal.   Psychiatric:         Mood and Affect: Mood normal.         Behavior: Behavior normal.         Thought Content: Thought content normal.         The history was obtained from the review of the chart, patient.    Lab Results:   Lab Results   Component Value Date/Time    Hemoglobin A1C 6.8 (H) 08/20/2024 09:40 AM    Hemoglobin A1C 7.3 (H) 05/18/2024 11:19 AM    Hemoglobin A1C 6.9 (H) 12/05/2023 05:09 PM    WBC 9.18 08/27/2024 01:18 PM    WBC 8.52 05/18/2024 11:19 AM    WBC 8.10 12/07/2023 05:04 AM    Hemoglobin 13.6 08/27/2024 01:18 PM    Hemoglobin 14.4 05/18/2024 11:19 AM    Hemoglobin 13.0 12/07/2023 05:04 AM    Hematocrit 43.9 08/27/2024 01:18 PM    Hematocrit 44.3 05/18/2024 11:19 AM    Hematocrit 41.8 12/07/2023 05:04 AM    MCV 89 08/27/2024 01:18 PM    MCV 89 05/18/2024 11:19 AM    MCV 87 12/07/2023 05:04 AM    Platelets 191 08/27/2024 01:18 PM    Platelets 189 05/18/2024 11:19 AM    Platelets 193 12/07/2023 05:04 AM    BUN 27 (H) 08/27/2024 01:18 PM    BUN 22 08/20/2024 09:40 AM    BUN 19 05/18/2024 11:19 AM    Potassium 4.5 08/27/2024 01:18 PM    Potassium 4.9 08/20/2024 09:40 AM    Potassium 4.8 05/18/2024 11:19 AM    Chloride 103 08/27/2024 01:18 PM    Chloride 101 08/20/2024 09:40 AM    Chloride 103 05/18/2024 11:19 AM    CO2 26 08/27/2024 01:18 PM    CO2 25 08/20/2024 09:40 AM    CO2 29 05/18/2024 11:19 AM    Creatinine 0.88 08/27/2024 01:18 PM    Creatinine 0.84 08/20/2024 09:40 AM    Creatinine 0.89 05/18/2024 11:19 AM    AST 23 08/27/2024 01:18 PM     "AST 26 08/20/2024 09:40 AM    AST 32 05/18/2024 11:19 AM    ALT 29 08/27/2024 01:18 PM    ALT 34 08/20/2024 09:40 AM    ALT 46 05/18/2024 11:19 AM    Total Protein 7.3 08/27/2024 01:18 PM    Total Protein 7.9 08/20/2024 09:40 AM    Total Protein 7.7 05/18/2024 11:19 AM    Albumin 4.1 08/27/2024 01:18 PM    Albumin 4.3 08/20/2024 09:40 AM    Albumin 4.5 05/18/2024 11:19 AM    HDL, Direct 46 08/20/2024 09:40 AM    HDL, Direct 39 (L) 12/06/2023 04:43 AM    Triglycerides 64 08/20/2024 09:40 AM    Triglycerides 99 12/06/2023 04:43 AM           Imaging Studies: Results Review Statement: No pertinent imaging studies reviewed.    Portions of the record may have been created with voice recognition software. Occasional wrong word or \"sound a like\" substitutions may have occurred due to the inherent limitations of voice recognition software. Read the chart carefully and recognize, using context, where substitutions have occurred.    "

## 2024-10-15 ENCOUNTER — OFFICE VISIT (OUTPATIENT)
Dept: WOUND CARE | Facility: HOSPITAL | Age: 64
End: 2024-10-15
Payer: COMMERCIAL

## 2024-10-15 VITALS
TEMPERATURE: 97.4 F | SYSTOLIC BLOOD PRESSURE: 118 MMHG | RESPIRATION RATE: 16 BRPM | HEART RATE: 80 BPM | DIASTOLIC BLOOD PRESSURE: 62 MMHG

## 2024-10-15 DIAGNOSIS — E11.42 DIABETIC POLYNEUROPATHY ASSOCIATED WITH TYPE 2 DIABETES MELLITUS (HCC): ICD-10-CM

## 2024-10-15 DIAGNOSIS — T81.89XA NON-HEALING SURGICAL WOUND, INITIAL ENCOUNTER: Primary | ICD-10-CM

## 2024-10-15 DIAGNOSIS — Z89.412 STATUS POST AMPUTATION OF LEFT GREAT TOE (HCC): ICD-10-CM

## 2024-10-15 PROCEDURE — 97597 DBRDMT OPN WND 1ST 20 CM/<: CPT | Performed by: PODIATRIST

## 2024-10-15 RX ORDER — LIDOCAINE 40 MG/G
CREAM TOPICAL ONCE
Status: COMPLETED | OUTPATIENT
Start: 2024-10-15 | End: 2024-10-15

## 2024-10-15 RX ADMIN — LIDOCAINE: 40 CREAM TOPICAL at 08:35

## 2024-10-15 NOTE — PROGRESS NOTES
Wound Procedure Treatment Surgical Left Toe D1, great    Performed by: Jesis Loza RN  Authorized by: Cheryle Cotton DPM    Associated wounds:   Wound 09/03/24 Surgical Toe D1, great Left  Wound cleansed with:  NSS  Applied primary dressing:  Other  Applied secondary dressing:  Gauze  Dressing secured with:  Tape and Surgilast    Xeroform applied to wound

## 2024-10-15 NOTE — PROGRESS NOTES
Patient ID: John Obleschuk is a 64 y.o. male Date of Birth 1960       Chief Complaint   Patient presents with    Follow Up Wound Care Visit       Allergies:  Atorvastatin, Rosiglitazone, and Trazodone    Diagnosis:  1. Non-healing surgical wound, initial encounter  -     lidocaine (LMX) 4 % cream  -     Wound cleansing and dressings Surgical Left Toe D1, great; Future  -     Wound Procedure Treatment Surgical Left Toe D1, great  2. Diabetic polyneuropathy associated with type 2 diabetes mellitus (HCC)  -     lidocaine (LMX) 4 % cream  -     Wound cleansing and dressings Surgical Left Toe D1, great; Future  -     Wound Procedure Treatment Surgical Left Toe D1, great  3. Status post amputation of left great toe (formerly [facility])  -     lidocaine (LMX) 4 % cream  -     Wound cleansing and dressings Surgical Left Toe D1, great; Future  -     Wound Procedure Treatment Surgical Left Toe D1, great     Diagnosis ICD-10-CM Associated Orders   1. Non-healing surgical wound, initial encounter  T81.89XA lidocaine (LMX) 4 % cream     Wound cleansing and dressings Surgical Left Toe D1, great     Wound Procedure Treatment Surgical Left Toe D1, great      2. Diabetic polyneuropathy associated with type 2 diabetes mellitus (formerly [facility])  E11.42 lidocaine (LMX) 4 % cream     Wound cleansing and dressings Surgical Left Toe D1, great     Wound Procedure Treatment Surgical Left Toe D1, great      3. Status post amputation of left great toe (formerly [facility])  Z89.412 lidocaine (LMX) 4 % cream     Wound cleansing and dressings Surgical Left Toe D1, great     Wound Procedure Treatment Surgical Left Toe D1, great           Assessment & Plan:  Nonhealing surgical wound from status post 5 weeks partial left great toe amputation, disarticulation at the IPJ.  Patient with small area of dehiscence laterally continues to improve, wound was debrided through selective tissues and dressed with Xeroform dry dressing, patient or wife will do the same 3 times a week, he will  "continue to use a cast cover, do not get foot wet in shower, he may use a washcloth with soap and water to wash his foot after shower prior to dressing change.  He will continue to weight-bear as tolerated with open toe sandal, he may wear his sneaker for 1 hour to go to the gym to do nonweightbearing exercises for weight lifting.  Patient may continue to ambulate and walk the dog for exercise with use of open toed shoe.  Patient called  to make an appointment but it will be about 6 to 8 weeks, [facility]  Patient to maintain frequent elevation, low-sodium diet, proper protein intake, proper glycemic control, strict pressure and friction reduction, he understands and agrees with the plan and will follow-up in 2 weeks.    Debridement   Wound 09/03/24 Surgical Toe D1, great Left    Universal Protocol:  procedure performed by consultantConsent: Verbal consent obtained.  Risks and benefits: risks, benefits and alternatives were discussed  Consent given by: patient  Time out: Immediately prior to procedure a \"time out\" was called to verify the correct patient, procedure, equipment, support staff and site/side marked as required.  Patient understanding: patient states understanding of the procedure being performed  Patient identity confirmed: verbally with patient    Debridement Details  Performed by: physician  Debridement type: selective  Pain control: lidocaine 4%      Post-debridement measurements  Length (cm): 0.2  Width (cm): 0.2  Depth (cm): 0.1  Percent debrided: 100%  Surface Area (cm^2): 0.04  Area Debrided (cm^2): 0.04  Volume (cm^3): 0    Devitalized tissue debrided: biofilm, callus, slough and eschar  Instrument(s) utilized: blade  Bleeding: small  Hemostasis obtained with: pressure  Procedural pain (0-10): insensate  Post-procedural pain: insensate   Response to treatment: procedure was tolerated well     "           Subjective:   Huan presents status post partial great toe amputation left foot on 9/3/2024, he has been changing dressings as directed, he has increased his weightbearing, presents today wearing open toe sandal.  Blood sugars 99, no new complaints.          The following portions of the patient's history were reviewed and updated as appropriate:   Patient Active Problem List   Diagnosis    Essential hypertension    Type 2 diabetes mellitus with hyperglycemia, without long-term current use of insulin (HCC)    Mixed hyperlipidemia    Sleep apnea    Coronary artery disease involving native coronary artery    S/P CABG x 5    Depressive disorder    Myalgia and myositis    Right knee pain    Vitamin D deficiency    Diabetic polyneuropathy associated with type 2 diabetes mellitus (HCC)    Wide-complex tachycardia    Encounter for annual physical exam    Diabetic ulcer of toe of left foot associated with type 2 diabetes mellitus, limited to breakdown of skin (HCC)    Pre-op examination    Status post amputation of left great toe (HCC)     Past Medical History:   Diagnosis Date    Coronary artery disease     Diabetes mellitus (HCC)     History of transfusion     Hyperlipidemia     Hypertension     Wears dentures      Past Surgical History:   Procedure Laterality Date    ARTERIAL ANEURYSM REPAIR      Right Carotid Aneurysm - age 15.    CARDIAC CATHETERIZATION Left 08/02/2023    Procedure: Cardiac Left Heart Cath;  Surgeon: Luisito Inman DO;  Location: [facility];  Service: Cardiology    CARDIAC CATHETERIZATION N/A 08/02/2023    Procedure: Cardiac Coronary Angiogram;  Surgeon: Luisito Inman DO;  Location: [facility];  Service: Cardiology    CARDIAC CATHETERIZATION  08/02/2023    Procedure: Cardiac catheterization;  Surgeon: Luisito Inman DO;  Location: [facility];  Service: Cardiology    CARDIAC ELECTROPHYSIOLOGY PROCEDURE N/A 2/21/2024    Procedure: Cardiac loop  recorder implant;  Surgeon: Luke Siu MD;  Location: [facility];  Service: Cardiology    HERNIA REPAIR      SC AMPUTATION TOE METATARSOPHALANGEAL JOINT Left 9/3/2024    Procedure: AMPUTATION TOE;  Surgeon: Cheryle Cotton DPM;  Location:  MAIN OR;  Service: Podiatry    SC CORONARY ARTERY BYP W/VEIN & ARTERY GRAFT 4 VEIN N/A 8/11/2023    Procedure: CORONARY ARTERY BYPASS GRAFT (CABG) X-5 VESSELS ; SVG to PDA, Ramus, Diagonal and OM. LIMA --> LAD EVH  W/ JOHN;  Surgeon: Soham Hodgson MD;  Location: [facility];  Service: Cardiac Surgery    VASECTOMY       Social History     Socioeconomic History    Marital status: /Civil Union     Spouse name: Not on file    Number of children: Not on file    Years of education: Not on file    Highest education level: Not on file   Occupational History    Not on file   Tobacco Use    Smoking status: Never    Smokeless tobacco: Never   Vaping Use    Vaping status: Never Used   Substance and Sexual Activity    Alcohol use: No    Drug use: No    Sexual activity: Yes     Partners: Female   Other Topics Concern    Not on file   Social History Narrative    Not on file     Social Determinants of Health     Financial Resource Strain: Patient Declined (8/24/2023)    Received from [facility]    Overall Financial Resource Strain (CARDIA)     Difficulty of Paying Living Expenses: Patient declined   Food Insecurity: Patient Declined (8/24/2023)    Received from [facility]    Hunger Vital Sign     Worried About Running Out of Food in the Last Year: Patient declined     Ran Out of Food in the Last Year: Patient declined   Transportation Needs: No Transportation Needs (8/24/2023)    Received from [facility]    PRAPARE - Transportation     Lack of Transportation (Medical): No     Lack of Transportation (Non-Medical): No   Physical  Activity: Sufficiently Active (12/20/2023)    Exercise Vital Sign     Days of Exercise per Week: 3 days     Minutes of Exercise per Session: 60 min   Stress: Stress Concern Present (12/20/2023)    [place] [employer] - Occupational Stress Questionnaire     Feeling of Stress : Rather much   Social Connections: Unknown (12/20/2023)    Social Connection and Isolation Panel [NHANES]     Frequency of Communication with Friends and Family: More than three times a week     Frequency of Social Gatherings with Friends and Family: More than three times a week     Attends [employer] Services: Patient declined     Active Member of Clubs or Organizations: Patient declined     Attends Club or Organization Meetings: Patient declined     Marital Status:    Intimate Partner Violence: Not At Risk (12/20/2023)    Humiliation, Afraid, Rape, and Kick questionnaire     Fear of Current or Ex-Partner: No     Emotionally Abused: No     Physically Abused: No     Sexually Abused: No   Housing Stability: Unknown (8/24/2023)    [facility]    Housing Stability Vital Sign     Unable to Pay for Housing in the Last Year: Patient refused     Number of Places Lived in the Last Year: Not on file     Unstable Housing in the Last Year: Patient refused        Current Outpatient Medications:     acetaminophen (TYLENOL) 325 mg tablet, Take 1-2 tablets Q4-6 hours PRN pain. Do not take more than 4grams in 24 hours., Disp: , Rfl: 0    aspirin 325 mg tablet, Take 1 tablet (325 mg total) by mouth daily, Disp: 30 tablet, Rfl: 2    cholecalciferol (VITAMIN D3) 25 mcg (1,000 units) tablet, Take 1,000 Units by mouth 2 (two) times a day, Disp: , Rfl:     Continuous Glucose Sensor (FreeStyle Ayanna 2 Sensor) MISC, CHANGE SENSOR EVERY 14 DAYS, Disp: 6 each, Rfl: 1    cyanocobalamin (VITAMIN B-12) 1000 MCG tablet, Take 1,000 mcg by mouth 2 (two) times a day, Disp: , Rfl:      docusate sodium (COLACE) 100 mg capsule, Take 1 capsule (100 mg total) by mouth 2 (two) times a day Hold for soft stools., Disp: 60 capsule, Rfl: 0    dofetilide (TIKOSYN) 500 mcg capsule, Take 1 capsule (500 mcg total) by mouth every 12 (twelve) hours, Disp: 180 capsule, Rfl: 3    gabapentin (NEURONTIN) 100 mg capsule, TAKE 1 CAPSULE BY MOUTH IN THE MORNING AND 3 CAPSULES IN THE EVENING, Disp: 120 capsule, Rfl: 2    glipiZIDE (GLUCOTROL) 5 mg tablet, Take 1 tablet (5 mg total) by mouth 2 (two) times a day before meals, Disp: 180 tablet, Rfl: 1    Jardiance 25 MG TABS, Take 1 tablet (25 mg total) by mouth in the morning, Disp: 90 tablet, Rfl: 3    LORazepam (ATIVAN) 0.5 mg tablet, TAKE 1 TABLET BY MOUTH DAILY AT BEDTIME, Disp: 30 tablet, Rfl: 3    Magnesium Gluconate 250 MG TABS, Take by mouth daily at bedtime, Disp: , Rfl:     metFORMIN (GLUCOPHAGE-XR) 500 mg 24 hr tablet, Take 2 tablets (1,000 mg total) by mouth 2 (two) times a day, Disp: 360 tablet, Rfl: 3    metoprolol succinate (TOPROL-XL) 25 mg 24 hr tablet, Take 1 tablet (25 mg total) by mouth daily (Patient taking differently: Take 25 mg by mouth daily evening), Disp: 90 tablet, Rfl: 3    NON FORMULARY, Take 2 capsules by mouth in the morning Balance of Nature (fruit & veg), Disp: , Rfl:     oxyCODONE-acetaminophen (Percocet) 5-325 mg per tablet, Take 1 tablet by mouth every 4 (four) hours as needed for moderate pain for up to 10 doses Max Daily Amount: 6 tablets, Disp: 10 tablet, Rfl: 0    Ozempic, 2 MG/DOSE, 8 MG/3ML injection pen, Inject 0.75 mL (2 mg total) under the skin every 7 days (Patient taking differently: Inject 2 mg under the skin every 7 days Sundays), Disp: 9 mL, Rfl: 3    potassium chloride (K-DUR,KLOR-CON) 10 mEq tablet, Take 1 tablet (10 mEq total) by mouth daily, Disp: 90 tablet, Rfl: 3    rosuvastatin (CRESTOR) 20 MG tablet, TAKE TWO TABLETS BY MOUTH EVERY DAY, Disp: 180 tablet, Rfl: 1    sertraline (ZOLOFT) 50 mg tablet, TAKE ONE TABLET  BY MOUTH EVERY DAY, Disp: 30 tablet, Rfl: 5    tadalafil (CIALIS) 20 MG tablet, Take 1 tablet (20 mg total) by mouth daily as needed for erectile dysfunction, Disp: 10 tablet, Rfl: 11  No current facility-administered medications for this visit.  Family History   Problem Relation Age of Onset    Leukemia Mother     Cancer Mother     Heart attack Father 52       Review of Systems   Constitutional:  Negative for chills and fever.   HENT:  Negative for ear pain and sore throat.    Eyes:  Negative for pain and visual disturbance.   Respiratory:  Negative for cough and shortness of breath.    Cardiovascular:  Negative for chest pain and palpitations.   Gastrointestinal:  Negative for abdominal pain and vomiting.   Genitourinary:  Negative for dysuria and hematuria.   Musculoskeletal:  Positive for gait problem. Negative for arthralgias and back pain.   Skin:  Positive for color change and wound. Negative for rash.   Neurological:  Positive for numbness. Negative for seizures and syncope.   Psychiatric/Behavioral: Negative.     All other systems reviewed and are negative.        Objective:  /62   Pulse 80   Temp (!) 97.4 °F (36.3 °C)   Resp 16     Physical Exam  Constitutional:       Appearance: Normal appearance. He is normal weight.   HENT:      Head: Normocephalic and atraumatic.      Right Ear: External ear normal.      Left Ear: External ear normal.      Nose: Nose normal.      Mouth/Throat:      Mouth: Mucous membranes are moist.      Pharynx: Oropharynx is clear.   Eyes:      Conjunctiva/sclera: Conjunctivae normal.      Pupils: Pupils are equal, round, and reactive to light.   Cardiovascular:      Pulses: Normal pulses.           Dorsalis pedis pulses are 2+ on the right side and 2+ on the left side.        Posterior tibial pulses are 2+ on the right side and 2+ on the left side.   Pulmonary:      Effort: Pulmonary effort is normal.   Musculoskeletal:      Cervical back: Normal range of motion.      Right  lower leg: No edema.      Left lower leg: No edema.   Skin:     General: Skin is warm and dry.      Capillary Refill: Capillary refill takes less than 2 seconds.   Neurological:      General: No focal deficit present.      Mental Status: He is alert and oriented to person, place, and time. Mental status is at baseline.      Sensory: Sensory deficit present.      Coordination: Coordination abnormal.      Gait: Gait abnormal.      Deep Tendon Reflexes: Reflexes abnormal.   Psychiatric:         Mood and Affect: Mood normal.         Behavior: Behavior normal.         Thought Content: Thought content normal.         Judgment: Judgment normal.           Wound 09/03/24 Surgical Toe D1, great Left (Active)   Wound Image Images linked 10/15/24 0848   Wound Description Pink;Dry;Yellow 10/15/24 0829   Pat-wound Assessment Dry 10/15/24 0829   Wound Length (cm) 0.2 cm 10/15/24 0829   Wound Width (cm) 0.2 cm 10/15/24 0829   Wound Depth (cm) 0.1 cm 10/15/24 0829   Wound Surface Area (cm^2) 0.04 cm^2 10/15/24 0829   Wound Volume (cm^3) 0.004 cm^3 10/15/24 0829   Calculated Wound Volume (cm^3) 0 cm^3 10/15/24 0829   Drainage Amount Scant 10/15/24 0829   Drainage Description Serous;Yellow 10/15/24 0829   Dressing Status Intact 10/15/24 0829                No results found.    Wound Instructions:  Orders Placed This Encounter   Procedures    Wound cleansing and dressings Surgical Left Toe D1, great     Left great toe amputation site;   Change dressing every other day  Do not get wet with shower water  May cleanse wound with mild soap ( unscented dove ) and water before dressing changes. Use wash cloth. Rinse, pat dry. No scrubbing over incision.     Apply a strip of Xeroform to open area  Cover with gauze and secure with tape        Elevate the foot when seated and as often as possible to allow the incision to heal.      May apply more weight to foot. 10 minutes on foot and then sit and elevate foot. Rotate.     Ok to wear sneaker  "when going to gym only     Standing Status:   Future     Standing Expiration Date:   10/29/2024    Wound Procedure Treatment Surgical Left Toe D1, great     This order was created via procedure documentation         Cheryle Cotton DPM, ELVIRA, FACFAS    Portions of the record may have been created with voice recognition software. Occasional wrong word or \"sound a like\" substitutions may have occurred due to the inherent limitations of voice recognition software. Read the chart carefully and recognize, using context, where substitutions have occurred.      "

## 2024-10-15 NOTE — LETTER
October 15, 2024     Patient: John Obleschuk  YOB: 1960  Date of Visit: 10/15/2024      To Whom it May Concern:    John Obleschuk is under my professional care. Huan was seen in my office on 10/15/2024. Huan may return to the gym.    If you have any questions or concerns, please don't hesitate to call.         Sincerely,          Cheryle Cotton DPM        CC: No Recipients

## 2024-10-15 NOTE — PATIENT INSTRUCTIONS
Orders Placed This Encounter   Procedures    Wound cleansing and dressings Surgical Left Toe D1, great     Left great toe amputation site;   Change dressing every other day  Do not get wet with shower water  May cleanse wound with mild soap ( unscented dove ) and water before dressing changes. Use wash cloth. Rinse, pat dry. No scrubbing over incision.     Apply a strip of Xeroform to open area  Cover with gauze and secure with tape        Elevate the foot when seated and as often as possible to allow the incision to heal.      May apply more weight to foot. 10 minutes on foot and then sit and elevate foot. Rotate.     Ok to wear sneaker when going to gym only     Standing Status:   Future     Standing Expiration Date:   10/29/2024

## 2024-10-26 DIAGNOSIS — E11.42 DIABETIC POLYNEUROPATHY ASSOCIATED WITH TYPE 2 DIABETES MELLITUS (HCC): ICD-10-CM

## 2024-10-28 RX ORDER — GABAPENTIN 100 MG/1
CAPSULE ORAL
Qty: 120 CAPSULE | Refills: 5 | Status: SHIPPED | OUTPATIENT
Start: 2024-10-28

## 2024-10-29 ENCOUNTER — OFFICE VISIT (OUTPATIENT)
Dept: WOUND CARE | Facility: HOSPITAL | Age: 64
End: 2024-10-29
Payer: COMMERCIAL

## 2024-10-29 VITALS
SYSTOLIC BLOOD PRESSURE: 120 MMHG | HEART RATE: 93 BPM | TEMPERATURE: 95.7 F | RESPIRATION RATE: 16 BRPM | DIASTOLIC BLOOD PRESSURE: 61 MMHG

## 2024-10-29 DIAGNOSIS — T81.89XA NON-HEALING SURGICAL WOUND, INITIAL ENCOUNTER: Primary | ICD-10-CM

## 2024-10-29 DIAGNOSIS — Z89.412 STATUS POST AMPUTATION OF LEFT GREAT TOE (HCC): ICD-10-CM

## 2024-10-29 DIAGNOSIS — E11.42 DIABETIC POLYNEUROPATHY ASSOCIATED WITH TYPE 2 DIABETES MELLITUS (HCC): ICD-10-CM

## 2024-10-29 PROCEDURE — 99212 OFFICE O/P EST SF 10 MIN: CPT | Performed by: PODIATRIST

## 2024-10-29 PROCEDURE — 99213 OFFICE O/P EST LOW 20 MIN: CPT | Performed by: PODIATRIST

## 2024-10-29 PROCEDURE — G0463 HOSPITAL OUTPT CLINIC VISIT: HCPCS | Performed by: PODIATRIST

## 2024-10-29 NOTE — PATIENT INSTRUCTIONS
Orders Placed This Encounter   Procedures    Wound cleansing and dressings Surgical Left Toe D1, great     Left great toe amputation site is now healed!  May resume activities as tolerated.  Wash with soap and water.  Please apply non scented moisturizer to dry skin.   No picking.     If wound reopens, please give the Wound Center a call immediatly.     Thank you for coming to our Center!     Standing Status:   Future     Standing Expiration Date:   10/29/2024

## 2024-10-29 NOTE — PROGRESS NOTES
Patient ID: John Obleschuk is a 64 y.o. male Date of Birth 1960       Chief Complaint   Patient presents with    Follow Up Wound Care Visit     Left great toe wound appears healed       Allergies:  Atorvastatin, Rosiglitazone, and Trazodone    Diagnosis:  1. Non-healing surgical wound, initial encounter  -     Wound cleansing and dressings Surgical Left Toe D1, great; Future  2. Diabetic polyneuropathy associated with type 2 diabetes mellitus (HCC)  -     Wound cleansing and dressings Surgical Left Toe D1, great; Future  3. Status post amputation of left great toe (HCC)  -     Wound cleansing and dressings Surgical Left Toe D1, great; Future     Diagnosis ICD-10-CM Associated Orders   1. Non-healing surgical wound, initial encounter  T81.89XA Wound cleansing and dressings Surgical Left Toe D1, great      2. Diabetic polyneuropathy associated with type 2 diabetes mellitus (HCC)  E11.42 Wound cleansing and dressings Surgical Left Toe D1, great      3. Status post amputation of left great toe (HCC)  Z89.412 Wound cleansing and dressings Surgical Left Toe D1, great           Assessment & Plan:  Nonhealing surgical wound from 6 status post partial left great toe amputation, disarticulation at hallux IPJ.  The area is well epithelialized and healed, patient may discontinue all dressings, may get foot wet in shower, he may start transition to sneaker from his open toe sandals, 1 hour today, 2 hours tomorrow, stay in sneakers once fully comfortable.  Patient to keep his appointment with  to be measured for diabetic shoes.  Today reviewed proper diabetic footcare, shoe gear, daily foot inspection, frequent elevation, low-sodium diet, proper protein intake, proper glycemic control, strict pressure and friction reduction.  At this time as patient's wound is well-healed he is discharged from wound management center, he is advised if he has any new or recurrent wounds to call us immediately, otherwise he will follow-up  with Dr. Marsh as previously appointed on 12/9/2024.  Patient understands and agrees with the plan.    Procedures - none    Subjective:   Huan presents today for follow-up evaluation and care of nonhealing surgical wound from partial left great toe amputation.  He has been changing dressings as directed, presents today with his open toe sandals and has no new complaints.          The following portions of the patient's history were reviewed and updated as appropriate:   Patient Active Problem List   Diagnosis    Essential hypertension    Type 2 diabetes mellitus with hyperglycemia, without long-term current use of insulin (HCC)    Mixed hyperlipidemia    Sleep apnea    Coronary artery disease involving native coronary artery    S/P CABG x 5    Depressive disorder    Myalgia and myositis    Right knee pain    Vitamin D deficiency    Diabetic polyneuropathy associated with type 2 diabetes mellitus ([facility])    Wide-complex tachycardia    Encounter for annual physical exam    Diabetic ulcer of toe of left foot associated with type 2 diabetes mellitus, limited to breakdown of skin ([facility])    Pre-op examination    Status post amputation of left great toe ([facility])     Past Medical History:   Diagnosis Date    Coronary artery disease     Diabetes mellitus (HCC)     History of transfusion     Hyperlipidemia     Hypertension     Wears dentures      Past Surgical History:   Procedure Laterality Date    ARTERIAL ANEURYSM REPAIR      Right Carotid Aneurysm - age 15.    CARDIAC CATHETERIZATION Left 08/02/2023    Procedure: Cardiac Left Heart Cath;  Surgeon: Luisito Inman DO;  Location: [facility];  Service: Cardiology    CARDIAC CATHETERIZATION N/A 08/02/2023    Procedure: Cardiac Coronary Angiogram;  Surgeon: Luisito Inman DO;  Location: [facility];  Service: Cardiology    CARDIAC CATHETERIZATION  08/02/2023    Procedure: Cardiac catheterization;  Surgeon: Luisito Inman DO;  Location:  [facility];  Service: Cardiology    CARDIAC ELECTROPHYSIOLOGY PROCEDURE N/A 2/21/2024    Procedure: Cardiac loop recorder implant;  Surgeon: Luke Siu MD;  Location: [facility];  Service: Cardiology    HERNIA REPAIR      NY AMPUTATION TOE METATARSOPHALANGEAL JOINT Left 9/3/2024    Procedure: AMPUTATION TOE;  Surgeon: Cheryle Cotton DPM;  Location: [facility];  Service: Podiatry    NY CORONARY ARTERY BYP W/VEIN & ARTERY GRAFT 4 VEIN N/A 8/11/2023    Procedure: CORONARY ARTERY BYPASS GRAFT (CABG) X-5 VESSELS ; SVG to PDA, Ramus, Diagonal and OM. LIMA --> LAD EVH  W/ JOHN;  Surgeon: Soham Hodgson MD;  Location: [facility];  Service: Cardiac Surgery    VASECTOMY       Social History     Socioeconomic History    Marital status: /Civil Union     Spouse name: None    Number of children: None    Years of education: None    Highest education level: None   Occupational History    None   Tobacco Use    Smoking status: Never    Smokeless tobacco: Never   Vaping Use    Vaping status: Never Used   Substance and Sexual Activity    Alcohol use: No    Drug use: No    Sexual activity: Yes     Partners: Female   Other Topics Concern    None   Social History Narrative    None     Social Determinants of Health     Financial Resource Strain: Patient Declined (8/24/2023)    [facility]    Overall Financial Resource Strain (CARDIA)     Difficulty of Paying Living Expenses: Patient declined   Food Insecurity: Patient Declined (8/24/2023)    [facility]    Hunger Vital Sign     Worried About Running Out of Food in the Last Year: Patient declined     Ran Out of Food in the Last Year: Patient declined   Transportation Needs: No Transportation Needs (8/24/2023)    Received from [facility], [facility]    PRAPARE - Transportation     Lack of Transportation  (Medical): No     Lack of Transportation (Non-Medical): No   Physical Activity: Sufficiently Active (12/20/2023)    Exercise Vital Sign     Days of Exercise per Week: 3 days     Minutes of Exercise per Session: 60 min   Stress: Stress Concern Present (12/20/2023)    [employer] - Occupational Stress Questionnaire     Feeling of Stress : Rather much   Social Connections: Unknown (12/20/2023)    Social Connection and Isolation Panel [NHANES]     Frequency of Communication with Friends and Family: More than three times a week     Frequency of Social Gatherings with Friends and Family: More than three times a week     Attends Oriental orthodox Services: Patient declined     Active Member of Clubs or Organizations: Patient declined     Attends Club or Organization Meetings: Patient declined     Marital Status:    Intimate Partner Violence: Not At Risk (12/20/2023)    Humiliation, Afraid, Rape, and Kick questionnaire     Fear of Current or Ex-Partner: No     Emotionally Abused: No     Physically Abused: No     Sexually Abused: No   Housing Stability: Unknown (8/24/2023)    [facility]    Housing Stability Vital Sign     Unable to Pay for Housing in the Last Year: Patient refused     Number of Places Lived in the Last Year: Not on file     Unstable Housing in the Last Year: Patient refused        Current Outpatient Medications:     acetaminophen (TYLENOL) 325 mg tablet, Take 1-2 tablets Q4-6 hours PRN pain. Do not take more than 4grams in 24 hours., Disp: , Rfl: 0    aspirin 325 mg tablet, Take 1 tablet (325 mg total) by mouth daily, Disp: 30 tablet, Rfl: 2    cholecalciferol (VITAMIN D3) 25 mcg (1,000 units) tablet, Take 1,000 Units by mouth 2 (two) times a day, Disp: , Rfl:     Continuous Glucose Sensor (FreeStyle Ayanna 2 Sensor) MISC, CHANGE SENSOR EVERY 14 DAYS, Disp: 6 each, Rfl: 1    cyanocobalamin (VITAMIN B-12) 1000 MCG tablet,  Take 1,000 mcg by mouth 2 (two) times a day, Disp: , Rfl:     docusate sodium (COLACE) 100 mg capsule, Take 1 capsule (100 mg total) by mouth 2 (two) times a day Hold for soft stools., Disp: 60 capsule, Rfl: 0    dofetilide (TIKOSYN) 500 mcg capsule, Take 1 capsule (500 mcg total) by mouth every 12 (twelve) hours, Disp: 180 capsule, Rfl: 3    gabapentin (NEURONTIN) 100 mg capsule, TAKE 1 CAPSULE BY MOUTH IN THE MORNING AND 3 CAPSULES IN THE EVENING, Disp: 120 capsule, Rfl: 5    glipiZIDE (GLUCOTROL) 5 mg tablet, Take 1 tablet (5 mg total) by mouth 2 (two) times a day before meals, Disp: 180 tablet, Rfl: 1    Jardiance 25 MG TABS, Take 1 tablet (25 mg total) by mouth in the morning, Disp: 90 tablet, Rfl: 3    LORazepam (ATIVAN) 0.5 mg tablet, TAKE 1 TABLET BY MOUTH DAILY AT BEDTIME, Disp: 30 tablet, Rfl: 3    Magnesium Gluconate 250 MG TABS, Take by mouth daily at bedtime, Disp: , Rfl:     metFORMIN (GLUCOPHAGE-XR) 500 mg 24 hr tablet, Take 2 tablets (1,000 mg total) by mouth 2 (two) times a day, Disp: 360 tablet, Rfl: 3    metoprolol succinate (TOPROL-XL) 25 mg 24 hr tablet, Take 1 tablet (25 mg total) by mouth daily (Patient taking differently: Take 25 mg by mouth daily evening), Disp: 90 tablet, Rfl: 3    NON FORMULARY, Take 2 capsules by mouth in the morning Balance of Nature (fruit & veg), Disp: , Rfl:     oxyCODONE-acetaminophen (Percocet) 5-325 mg per tablet, Take 1 tablet by mouth every 4 (four) hours as needed for moderate pain for up to 10 doses Max Daily Amount: 6 tablets, Disp: 10 tablet, Rfl: 0    Ozempic, 2 MG/DOSE, 8 MG/3ML injection pen, Inject 0.75 mL (2 mg total) under the skin every 7 days (Patient taking differently: Inject 2 mg under the skin every 7 days Sundays), Disp: 9 mL, Rfl: 3    potassium chloride (K-DUR,KLOR-CON) 10 mEq tablet, Take 1 tablet (10 mEq total) by mouth daily, Disp: 90 tablet, Rfl: 3    rosuvastatin (CRESTOR) 20 MG tablet, TAKE TWO TABLETS BY MOUTH EVERY DAY, Disp: 180  tablet, Rfl: 1    sertraline (ZOLOFT) 50 mg tablet, TAKE ONE TABLET BY MOUTH EVERY DAY, Disp: 30 tablet, Rfl: 5    tadalafil (CIALIS) 20 MG tablet, Take 1 tablet (20 mg total) by mouth daily as needed for erectile dysfunction, Disp: 10 tablet, Rfl: 11  Family History   Problem Relation Age of Onset    Leukemia Mother     Cancer Mother     Heart attack Father 52       Review of Systems   Constitutional:  Negative for chills and fever.   HENT:  Negative for ear pain and sore throat.    Eyes:  Negative for pain and visual disturbance.   Respiratory:  Negative for cough and shortness of breath.    Cardiovascular:  Negative for chest pain and palpitations.   Gastrointestinal:  Negative for abdominal pain and vomiting.   Genitourinary:  Negative for dysuria and hematuria.   Musculoskeletal:  Positive for gait problem. Negative for arthralgias and back pain.   Skin:  Positive for color change and wound. Negative for rash.   Neurological:  Positive for numbness. Negative for seizures and syncope.   Psychiatric/Behavioral: Negative.     All other systems reviewed and are negative.        Objective:  /61   Pulse 93   Temp (!) 95.7 °F (35.4 °C)   Resp 16     Physical Exam  Constitutional:       Appearance: Normal appearance. He is normal weight.   HENT:      Head: Normocephalic and atraumatic.      Right Ear: External ear normal.      Left Ear: External ear normal.      Nose: Nose normal.      Mouth/Throat:      Mouth: Mucous membranes are moist.      Pharynx: Oropharynx is clear.   Eyes:      Conjunctiva/sclera: Conjunctivae normal.      Pupils: Pupils are equal, round, and reactive to light.   Cardiovascular:      Pulses: Normal pulses.           Dorsalis pedis pulses are 2+ on the right side and 2+ on the left side.        Posterior tibial pulses are 2+ on the right side and 2+ on the left side.   Pulmonary:      Effort: Pulmonary effort is normal.   Musculoskeletal:      Cervical back: Normal range of motion.       Right lower leg: No edema.      Left lower leg: No edema.   Feet:      Right foot:      Protective Sensation: 10 sites tested.  0 sites sensed.      Skin integrity: Skin integrity normal.      Toenail Condition: Right toenails are normal.      Left foot:      Protective Sensation: 10 sites tested.  0 sites sensed.      Skin integrity: Skin integrity normal.      Toenail Condition: Left toenails are normal.      Comments: Partial left great toe amputation  Skin:     General: Skin is warm and dry.      Capillary Refill: Capillary refill takes less than 2 seconds.   Neurological:      General: No focal deficit present.      Mental Status: He is alert and oriented to person, place, and time. Mental status is at baseline.      Sensory: Sensory deficit present.      Coordination: Coordination abnormal.      Gait: Gait abnormal.      Deep Tendon Reflexes: Reflexes abnormal.   Psychiatric:         Mood and Affect: Mood normal.         Behavior: Behavior normal.         Thought Content: Thought content normal.         Judgment: Judgment normal.           [REMOVED] Wound 09/03/24 Surgical Toe D1, great Left (Removed)   Wound Image Images linked 10/29/24 0826   Wound Description Brown;Eschar;Epithelialization 10/29/24 0828   Pat-wound Assessment Dry;Intact 10/29/24 0828   Wound Length (cm) 0.1 cm 10/29/24 0828   Wound Width (cm) 0.1 cm 10/29/24 0828   Wound Depth (cm) 0.1 cm 10/29/24 0828   Wound Surface Area (cm^2) 0.01 cm^2 10/29/24 0828   Wound Volume (cm^3) 0.001 cm^3 10/29/24 0828   Calculated Wound Volume (cm^3) 0 cm^3 10/29/24 0828   Drainage Amount None 10/29/24 0828   Non-staged Wound Description Full thickness 10/29/24 0828   Dressing Status Intact 10/29/24 0828                No results found.    Wound Instructions:  Orders Placed This Encounter   Procedures    Wound cleansing and dressings Surgical Left Toe D1, great     Left great toe amputation site is now healed!  May resume activities as tolerated.  Wash with  "soap and water.  Please apply non scented moisturizer to dry skin.   No picking.     If wound reopens, please give the Wound Center a call immediatly.     Thank you for coming to our Center!     Standing Status:   Future     Standing Expiration Date:   10/29/2024         Cheryle Cotton DPM, ELVIRA, FACFAS    Portions of the record may have been created with voice recognition software. Occasional wrong word or \"sound a like\" substitutions may have occurred due to the inherent limitations of voice recognition software. Read the chart carefully and recognize, using context, where substitutions have occurred.    "

## 2024-11-28 DIAGNOSIS — N52.01 ERECTILE DYSFUNCTION DUE TO ARTERIAL INSUFFICIENCY: ICD-10-CM

## 2024-11-29 RX ORDER — TADALAFIL 20 MG/1
20 TABLET ORAL DAILY PRN
Qty: 10 TABLET | Refills: 5 | Status: SHIPPED | OUTPATIENT
Start: 2024-11-29

## 2024-12-05 ENCOUNTER — REMOTE DEVICE CLINIC VISIT (OUTPATIENT)
Dept: CARDIOLOGY CLINIC | Facility: CLINIC | Age: 64
End: 2024-12-05
Payer: COMMERCIAL

## 2024-12-05 ENCOUNTER — RESULTS FOLLOW-UP (OUTPATIENT)
Dept: CARDIOLOGY CLINIC | Facility: CLINIC | Age: 64
End: 2024-12-05

## 2024-12-05 DIAGNOSIS — I47.20 VENTRICULAR TACHYCARDIA (HCC): Primary | ICD-10-CM

## 2024-12-05 PROCEDURE — 93298 REM INTERROG DEV EVAL SCRMS: CPT | Performed by: INTERNAL MEDICINE

## 2024-12-05 NOTE — PROGRESS NOTES
Results for orders placed or performed in visit on 12/05/24   Cardiac EP device report    Narrative    VAUGHAN ASSERT 5500 ICM/ACTIVE SYSTEM IS MRI CONDITIONAL  MERLIN TRANSMISSION: BATTERY VOLTAGE ADEQUATE. DEVICE CLASSIFIED 6 AF EPISODES MAX DURATION 7 HRS- NSR W/ PAC'S ON ECG'S; NO AF. AF BURDEN<1%. DEVICE CLASSIFIED 8 TACHY EPISODES DUE TO OVERSENSING MYOPOTENTIAL NOISE. PVC BURDEN<1%. PT ON ASA 325MG, DOFETILIDE & METOPROLOL. NO PT ACTIVATED EPISODES. PRESENTING RHYTHM NSR W/ PAC'S. NORMAL DEVICE FUNCTION. GV         other/seizures

## 2024-12-09 ENCOUNTER — OFFICE VISIT (OUTPATIENT)
Dept: PODIATRY | Facility: CLINIC | Age: 64
End: 2024-12-09
Payer: COMMERCIAL

## 2024-12-09 DIAGNOSIS — B35.1 ONYCHOMYCOSIS: ICD-10-CM

## 2024-12-09 DIAGNOSIS — S98.112A AMPUTATION OF LEFT GREAT TOE (HCC): ICD-10-CM

## 2024-12-09 DIAGNOSIS — E11.40 TYPE 2 DIABETES MELLITUS WITH DIABETIC NEUROPATHY, WITHOUT LONG-TERM CURRENT USE OF INSULIN (HCC): Primary | ICD-10-CM

## 2024-12-09 PROCEDURE — 11721 DEBRIDE NAIL 6 OR MORE: CPT | Performed by: PODIATRIST

## 2024-12-09 PROCEDURE — RECHECK: Performed by: PODIATRIST

## 2024-12-09 NOTE — PROGRESS NOTES
Huan Obleschuk  1960  AT RISK FOOT CARE    1. Type 2 diabetes mellitus with diabetic neuropathy, without long-term current use of insulin (HCC)        2. Onychomycosis        3. Amputation of left great toe (HCC)            Patient presents for at-risk foot care.  Patient has no acute concerns today.  Patient has significant lower extremity risk due to previous amputation.    On exam patient has thickened, hypertrophic, discolored, brittle toenails with subungual debris and tenderness x9   Callus: minimal  Amputation: left great toe amputations table    Today's treatment includes:  Debridement of toenails. Using nail nipper, vance, and curette, nails were sharply debrided, reduced in thickness and length. Devitalized nail tissue and fungal debris excised and removed. Patient tolerated well.        Discussed proper shoe gear, daily inspections of feet, and general foot health with patient. Patient has Q7  findings and is recommended for at risk foot care every 9-10 weeks.    Patients most recent complete clinical exam was performed: 9/30/2024

## 2024-12-18 NOTE — PROGRESS NOTES
Outpatient Cardiology Note - John Obleschuk 64 y.o. male MRN: [id number]    @ Encounter: [id number]        Patient Active Problem List    Diagnosis Date Noted    Status post amputation of left great toe ([facility]) 09/24/2024    Pre-op examination 08/28/2024    Diabetic ulcer of toe of left foot associated with type 2 diabetes mellitus, limited to breakdown of skin ([facility]) 07/12/2024    Encounter for annual physical exam 03/06/2024    Wide-complex tachycardia 12/05/2023    Diabetic polyneuropathy associated with type 2 diabetes mellitus ([facility]) 08/30/2023    S/P CABG x 5 08/11/2023    Coronary artery disease involving native coronary artery 08/02/2023    Sleep apnea     Type 2 diabetes mellitus with hyperglycemia, without long-term current use of insulin ([facility]) 03/29/2018    Right knee pain 09/23/2014    Myalgia and myositis 06/27/2014    Vitamin D deficiency 06/27/2014    Depressive disorder 05/14/2012    Essential hypertension 05/11/2012    Mixed hyperlipidemia 12/12/2011       Assessment:  # Re-entry Ventricular tachycardia  Asymptomatic- as slower rate,   Rhythm: Tikosyn 500 mg BID    EKG QTc: 480 msec    # CAD s/p CABG x 5 on 8/11/23- Dr Rema HAINES to LAD, SVG to D1, SVG Y to RI and OM1 and SVG to right PDA  Rx: metoprolol 25 mg QHS, Crestor 40 mg daily, asa 325 mg daily, Jardiance    Studies- personally reviewed by me  EKG:  bpm, no ischemic changes    CMR 12/8/23:  LVEF: 55%   Subendocardial scarring of the basal to mid inferior wall suggestive of an ischemic etiology.  Intramyocardial fibrosis within the mid inferolateral wall suggestive of myocarditis, but cannot exclude cardiac sarcoidosis     Echo 12/6/23:  LVEF: 60%  RV: normal    LHC 8/2/23:   Left Anterior Descending   Prox LAD lesion is 70% stenosed.   Mid LAD lesion is 80% stenosed.   Dist LAD lesion is 90% stenosed.      First Diagonal Branch   1st Diag-1 lesion is 90% stenosed.   1st Diag-2 lesion is 80% stenosed.      Ramus Intermedius   Ramus  lesion is 80% stenosed.      Left Circumflex   Mid Cx to Dist Cx lesion is 90% stenosed.      Right Coronary Artery      Right Posterior Descending Artery   Collaterals   RPDA filled by collaterals from Dist LAD.          NM stress 7/31/23: 7 min 17 sec, 7 METs, ischemic EKG changes. Ischemic perfusion defects in inferior segments    Echo 7/27/23:  LVEF: 60%  RV: normal  PASP: normal    Stress echo 4/11/18: 9 min 30 sec, 10.7 METs, negative for ischemia    # HTN- metoprolol 25 mg QHS  # DM2, last A1C 7.4%- glipizide, kombiglyze XR 2.5- 1000 mg, ozempic  # Wide complex tachycardia  # Assert 6 year device loop recorder implanted 2/21/24  Interrogation 12/5/24: no sign events  Interrogation 2/26/24: ST  # Obesity, BMI 32.6%- down to 27  # hyperlipidemia- rosuvastatin 40 mg daily  6/8 /23: , HDL 42  2/21/18: , HDL 43, Tri 132  # carotid artery dz  VAS 8/8/23: known occlusion on right, < 50% on left  # Mild sleep apnea. Stops breathing at night  # depression    Today's Plan:  Add Toprol XL 12.5 mg in AM, want lower heart rates  Stop potassium  Continue metoprolol 25 mg daily- getting fatigue from it- take it at night  Assert 6 year device  Per EP: Depending upon finding of monitor will decide on further therapy  At this time no symptomatic episodes, EF has recovered, no other episodes of  VT    HPI:     63 yo male with hx DM2, last A1C over 9, obesity, hyperlipidemia, HTN, right carotid aneurysm who presents to establish CV care. Occasionally getting a little chest discomfort, intermittent. Walks about a mile or two every day. Does not seem exertional. Wife states he stops breathing at night.     Given concerning anginal symptoms had stress test which was positive and sent for [facility] leading to recommendation of CABG.      Pt at cardiac rehab and looked to have re-entry VT, aymptomatic as slow rate. Seen by EP in hospital. Started on Tikosyn    Interim:  Interrogation 12/5/24: no sign events  Past Medical  "History:   Diagnosis Date    Coronary artery disease     Diabetes mellitus (HCC)     History of transfusion     Hyperlipidemia     Hypertension     Wears dentures        Review of Systems   Constitutional:  Negative for activity change, appetite change, fatigue and unexpected weight change.   HENT:  Negative for congestion and nosebleeds.    Eyes: Negative.    Respiratory:  Negative for cough, chest tightness and shortness of breath.    Cardiovascular:  Negative for chest pain, palpitations and leg swelling.   Gastrointestinal:  Negative for abdominal distention.   Endocrine: Negative.    Genitourinary: Negative.    Musculoskeletal: Negative.    Skin: Negative.    Neurological:  Negative for dizziness, syncope and weakness.   Hematological: Negative.    Psychiatric/Behavioral: Negative.           Allergies   Allergen Reactions    Atorvastatin Other (See Comments)     felt flu-like    Rosiglitazone Other (See Comments)     Avandia for DM2. Risks outweigh benefits.    Trazodone Other (See Comments)     Felt \"hung over\"     .    Current Outpatient Medications:     acetaminophen (TYLENOL) 325 mg tablet, Take 1-2 tablets Q4-6 hours PRN pain. Do not take more than 4grams in 24 hours., Disp: , Rfl: 0    aspirin 325 mg tablet, Take 1 tablet (325 mg total) by mouth daily, Disp: 30 tablet, Rfl: 2    cholecalciferol (VITAMIN D3) 25 mcg (1,000 units) tablet, Take 1,000 Units by mouth 2 (two) times a day, Disp: , Rfl:     Continuous Glucose Sensor (FreeStyle Ayanna 2 Sensor) MISC, CHANGE SENSOR EVERY 14 DAYS, Disp: 6 each, Rfl: 1    cyanocobalamin (VITAMIN B-12) 1000 MCG tablet, Take 1,000 mcg by mouth 2 (two) times a day, Disp: , Rfl:     docusate sodium (COLACE) 100 mg capsule, Take 1 capsule (100 mg total) by mouth 2 (two) times a day Hold for soft stools., Disp: 60 capsule, Rfl: 0    dofetilide (TIKOSYN) 500 mcg capsule, Take 1 capsule (500 mcg total) by mouth every 12 (twelve) hours, Disp: 180 capsule, Rfl: 3    gabapentin " (NEURONTIN) 100 mg capsule, TAKE 1 CAPSULE BY MOUTH IN THE MORNING AND 3 CAPSULES IN THE EVENING, Disp: 120 capsule, Rfl: 5    glipiZIDE (GLUCOTROL) 5 mg tablet, Take 1 tablet (5 mg total) by mouth 2 (two) times a day before meals, Disp: 180 tablet, Rfl: 1    Jardiance 25 MG TABS, Take 1 tablet (25 mg total) by mouth in the morning, Disp: 90 tablet, Rfl: 3    LORazepam (ATIVAN) 0.5 mg tablet, TAKE 1 TABLET BY MOUTH DAILY AT BEDTIME, Disp: 30 tablet, Rfl: 3    Magnesium Gluconate 250 MG TABS, Take by mouth daily at bedtime, Disp: , Rfl:     metFORMIN (GLUCOPHAGE-XR) 500 mg 24 hr tablet, Take 2 tablets (1,000 mg total) by mouth 2 (two) times a day, Disp: 360 tablet, Rfl: 3    metoprolol succinate (TOPROL-XL) 25 mg 24 hr tablet, Take 1 tablet (25 mg total) by mouth daily (Patient taking differently: Take 25 mg by mouth daily evening), Disp: 90 tablet, Rfl: 3    NON FORMULARY, Take 2 capsules by mouth in the morning Balance of Nature (fruit & veg), Disp: , Rfl:     oxyCODONE-acetaminophen (Percocet) 5-325 mg per tablet, Take 1 tablet by mouth every 4 (four) hours as needed for moderate pain for up to 10 doses Max Daily Amount: 6 tablets, Disp: 10 tablet, Rfl: 0    Ozempic, 2 MG/DOSE, 8 MG/3ML injection pen, Inject 0.75 mL (2 mg total) under the skin every 7 days (Patient taking differently: Inject 2 mg under the skin every 7 days Sundays), Disp: 9 mL, Rfl: 3    potassium chloride (K-DUR,KLOR-CON) 10 mEq tablet, Take 1 tablet (10 mEq total) by mouth daily, Disp: 90 tablet, Rfl: 3    rosuvastatin (CRESTOR) 20 MG tablet, TAKE TWO TABLETS BY MOUTH EVERY DAY, Disp: 180 tablet, Rfl: 1    sertraline (ZOLOFT) 50 mg tablet, TAKE ONE TABLET BY MOUTH EVERY DAY, Disp: 30 tablet, Rfl: 5    tadalafil (CIALIS) 20 MG tablet, Take 1 tablet (20 mg total) by mouth daily as needed for erectile dysfunction, Disp: 10 tablet, Rfl: 5    Social History     Socioeconomic History    Marital status: /Civil Union     Spouse name: Not on file     Number of children: Not on file    Years of education: Not on file    Highest education level: Not on file   Occupational History    Not on file   Tobacco Use    Smoking status: Never    Smokeless tobacco: Never   Vaping Use    Vaping status: Never Used   Substance and Sexual Activity    Alcohol use: No    Drug use: No    Sexual activity: Yes     Partners: Female   Other Topics Concern    Not on file   Social History Narrative    Not on file     Social Drivers of Health     Financial Resource Strain: Patient Declined (8/24/2023)    [facility]    Overall Financial Resource Strain (CARDIA)     Difficulty of Paying Living Expenses: Patient declined   Food Insecurity: Patient Declined (8/24/2023)    [facility] from [facility], [facility]    Hunger Vital Sign     Worried About Running Out of Food in the Last Year: Patient declined     Ran Out of Food in the Last Year: Patient declined   Transportation Needs: No Transportation Needs (8/24/2023)    Received from [facility], [facility]    PRAPARE - Transportation     Lack of Transportation (Medical): No     Lack of Transportation (Non-Medical): No   Physical Activity: Sufficiently Active (12/20/2023)    Exercise Vital Sign     Days of Exercise per Week: 3 days     Minutes of Exercise per Session: 60 min   Stress: Stress Concern Present (12/20/2023)    [employer] [place] [employer] - Occupational Stress Questionnaire     Feeling of Stress : Rather much   Social Connections: Unknown (12/20/2023)    Social Connection and Isolation Panel [NHANES]     Frequency of Communication with Friends and Family: More than three times a week     Frequency of Social Gatherings with Friends and Family: More than three times a week     Attends Jain Services: Patient declined     Active Member of Clubs or Organizations: Patient declined      Attends Club or Organization Meetings: Patient declined     Marital Status:    Intimate Partner Violence: Not At Risk (12/20/2023)    Humiliation, Afraid, Rape, and Kick questionnaire     Fear of Current or Ex-Partner: No     Emotionally Abused: No     Physically Abused: No     Sexually Abused: No   Housing Stability: Unknown (8/24/2023)    [facility]    Housing Stability Vital Sign     Unable to Pay for Housing in the Last Year: Patient refused     Number of Places Lived in the Last Year: Not on file     Unstable Housing in the Last Year: Patient refused       Family History   Problem Relation Age of Onset    Leukemia Mother     Cancer Mother     Heart attack Father 52       Physical Exam:    Vitals: There were no vitals taken for this visit., There is no height or weight on file to calculate BMI.,   Wt Readings from Last 3 Encounters:   10/11/24 93.4 kg (206 lb)   09/03/24 88.9 kg (196 lb)   08/28/24 89.7 kg (197 lb 12.8 oz)       Physical Exam:  There were no vitals filed for this visit.        GEN: John Obleschuk appears well, alert and oriented x 3, pleasant and cooperative   HEENT: pupils equal, round, and reactive to light; extraocular muscles intact  NECK: supple, no carotid bruits   HEART: regular rhythm, normal S1 and S2, no murmurs, clicks, gallops or rubs, JVP is    LUNGS: clear to auscultation bilaterally; no wheezes, rales, or rhonchi   ABDOMEN: normal bowel sounds, soft, no tenderness, no distention  EXTREMITIES: peripheral pulses normal; no clubbing, cyanosis, or edema  NEURO: no focal findings   SKIN: normal without suspicious lesions on exposed skin    Labs & Results:    Lab Results   Component Value Date    WBC 9.18 08/27/2024    HGB 13.6 08/27/2024    HCT 43.9 08/27/2024    MCV 89 08/27/2024     08/27/2024     Lab Results   Component Value Date    GLUCOSE 184 (H) 08/11/2023    CALCIUM 9.5 08/27/2024    K 4.5 08/27/2024     "CO2 26 08/27/2024     08/27/2024    BUN 27 (H) 08/27/2024    CREATININE 0.88 08/27/2024     Lab Results   Component Value Date    BNP 17 07/24/2023      No results found for: \"CHOL\"  Lab Results   Component Value Date    HDL 46 08/20/2024    HDL 39 (L) 12/06/2023     Lab Results   Component Value Date    LDLCALC 30 08/20/2024    LDLCALC 31 12/06/2023     Lab Results   Component Value Date    TRIG 64 08/20/2024    TRIG 99 12/06/2023     No results found for: \"CHOLHDL\"      EKG personally reviewed by Bright Chow.     Counseling / Coordination of Care  Total floor / unit time spent today 25 minutes.  Greater than 50% of total time was spent with the patient and / or family counseling and / or coordination of care.  A description of the counseling / coordination of care: 15 min.      Thank you for the opportunity to participate in the care of this patient.    Bright Chow D.O.  Director of Advanced Heart Failure Program  Medical Director of LVAD Program  [facility]  "

## 2024-12-20 ENCOUNTER — OFFICE VISIT (OUTPATIENT)
Dept: CARDIOLOGY CLINIC | Facility: CLINIC | Age: 64
End: 2024-12-20
Payer: COMMERCIAL

## 2024-12-20 VITALS
SYSTOLIC BLOOD PRESSURE: 110 MMHG | OXYGEN SATURATION: 97 % | HEART RATE: 96 BPM | BODY MASS INDEX: 29.7 KG/M2 | DIASTOLIC BLOOD PRESSURE: 60 MMHG | WEIGHT: 207 LBS

## 2024-12-20 DIAGNOSIS — Z95.1 S/P CABG X 5: ICD-10-CM

## 2024-12-20 DIAGNOSIS — I25.10 CORONARY ARTERY DISEASE INVOLVING NATIVE CORONARY ARTERY OF NATIVE HEART WITHOUT ANGINA PECTORIS: Primary | ICD-10-CM

## 2024-12-20 DIAGNOSIS — I10 ESSENTIAL HYPERTENSION: ICD-10-CM

## 2024-12-20 DIAGNOSIS — I47.0 RE-ENTRY VENTRICULAR ARRHYTHMIA (HCC): ICD-10-CM

## 2024-12-20 DIAGNOSIS — G47.33 OBSTRUCTIVE SLEEP APNEA SYNDROME: ICD-10-CM

## 2024-12-20 PROCEDURE — 99214 OFFICE O/P EST MOD 30 MIN: CPT | Performed by: INTERNAL MEDICINE

## 2024-12-20 RX ORDER — METOPROLOL SUCCINATE 25 MG/1
TABLET, EXTENDED RELEASE ORAL
Qty: 135 TABLET | Refills: 3 | Status: SHIPPED | OUTPATIENT
Start: 2024-12-20

## 2025-01-03 ENCOUNTER — IMMUNIZATIONS (OUTPATIENT)
Dept: INTERNAL MEDICINE CLINIC | Facility: CLINIC | Age: 65
End: 2025-01-03
Payer: COMMERCIAL

## 2025-01-03 DIAGNOSIS — Z23 ENCOUNTER FOR IMMUNIZATION: Primary | ICD-10-CM

## 2025-01-03 PROCEDURE — 90673 RIV3 VACCINE NO PRESERV IM: CPT

## 2025-01-03 PROCEDURE — 90471 IMMUNIZATION ADMIN: CPT

## 2025-01-21 DIAGNOSIS — F32.0 CURRENT MILD EPISODE OF MAJOR DEPRESSIVE DISORDER WITHOUT PRIOR EPISODE (HCC): ICD-10-CM

## 2025-01-21 DIAGNOSIS — I25.10 CORONARY ARTERY DISEASE INVOLVING NATIVE CORONARY ARTERY: ICD-10-CM

## 2025-01-22 RX ORDER — METFORMIN HYDROCHLORIDE 500 MG/1
1000 TABLET, EXTENDED RELEASE ORAL 2 TIMES DAILY
Qty: 360 TABLET | Refills: 1 | Status: SHIPPED | OUTPATIENT
Start: 2025-01-22

## 2025-01-31 DIAGNOSIS — F41.9 ANXIETY: ICD-10-CM

## 2025-02-03 DIAGNOSIS — F41.9 ANXIETY: ICD-10-CM

## 2025-02-03 RX ORDER — LORAZEPAM 0.5 MG/1
0.5 TABLET ORAL
Qty: 30 TABLET | Refills: 3 | Status: SHIPPED | OUTPATIENT
Start: 2025-02-03 | End: 2025-02-03 | Stop reason: SDUPTHER

## 2025-02-03 RX ORDER — LORAZEPAM 0.5 MG/1
0.5 TABLET ORAL
Qty: 30 TABLET | Refills: 0 | OUTPATIENT
Start: 2025-02-03

## 2025-02-03 NOTE — TELEPHONE ENCOUNTER
Requested medication(s) are due for refill today: No  Patient has already received a courtesy refill: No  Other reason request has been forwarded to provider: DUPLICATE REQUEST

## 2025-02-05 DIAGNOSIS — I25.10 CORONARY ARTERY DISEASE INVOLVING NATIVE CORONARY ARTERY: ICD-10-CM

## 2025-02-05 RX ORDER — LORAZEPAM 0.5 MG/1
0.5 TABLET ORAL
Qty: 30 TABLET | Refills: 0 | Status: SHIPPED | OUTPATIENT
Start: 2025-02-05

## 2025-02-06 RX ORDER — ROSUVASTATIN CALCIUM 20 MG/1
40 TABLET, COATED ORAL DAILY
Qty: 180 TABLET | Refills: 1 | Status: SHIPPED | OUTPATIENT
Start: 2025-02-06

## 2025-02-08 ENCOUNTER — APPOINTMENT (OUTPATIENT)
Dept: LAB | Facility: MEDICAL CENTER | Age: 65
End: 2025-02-08
Payer: COMMERCIAL

## 2025-02-08 DIAGNOSIS — E11.65 TYPE 2 DIABETES MELLITUS WITH HYPERGLYCEMIA, WITHOUT LONG-TERM CURRENT USE OF INSULIN (HCC): ICD-10-CM

## 2025-02-08 LAB
ALBUMIN SERPL BCG-MCNC: 4.2 G/DL (ref 3.5–5)
ALP SERPL-CCNC: 58 U/L (ref 34–104)
ALT SERPL W P-5'-P-CCNC: 29 U/L (ref 7–52)
ANION GAP SERPL CALCULATED.3IONS-SCNC: 6 MMOL/L (ref 4–13)
AST SERPL W P-5'-P-CCNC: 23 U/L (ref 13–39)
BILIRUB SERPL-MCNC: 0.96 MG/DL (ref 0.2–1)
BUN SERPL-MCNC: 20 MG/DL (ref 5–25)
CALCIUM SERPL-MCNC: 9.6 MG/DL (ref 8.4–10.2)
CHLORIDE SERPL-SCNC: 104 MMOL/L (ref 96–108)
CO2 SERPL-SCNC: 29 MMOL/L (ref 21–32)
CREAT SERPL-MCNC: 0.94 MG/DL (ref 0.6–1.3)
EST. AVERAGE GLUCOSE BLD GHB EST-MCNC: 160 MG/DL
GFR SERPL CREATININE-BSD FRML MDRD: 85 ML/MIN/1.73SQ M
GLUCOSE P FAST SERPL-MCNC: 140 MG/DL (ref 65–99)
HBA1C MFR BLD: 7.2 %
POTASSIUM SERPL-SCNC: 5 MMOL/L (ref 3.5–5.3)
PROT SERPL-MCNC: 7.5 G/DL (ref 6.4–8.4)
SODIUM SERPL-SCNC: 139 MMOL/L (ref 135–147)

## 2025-02-08 PROCEDURE — 83036 HEMOGLOBIN GLYCOSYLATED A1C: CPT

## 2025-02-08 PROCEDURE — 80053 COMPREHEN METABOLIC PANEL: CPT

## 2025-02-08 PROCEDURE — 36415 COLL VENOUS BLD VENIPUNCTURE: CPT

## 2025-02-10 ENCOUNTER — RESULTS FOLLOW-UP (OUTPATIENT)
Dept: ENDOCRINOLOGY | Facility: HOSPITAL | Age: 65
End: 2025-02-10

## 2025-02-14 ENCOUNTER — OFFICE VISIT (OUTPATIENT)
Dept: ENDOCRINOLOGY | Facility: HOSPITAL | Age: 65
End: 2025-02-14
Payer: COMMERCIAL

## 2025-02-14 VITALS
HEART RATE: 70 BPM | HEIGHT: 70 IN | WEIGHT: 221.8 LBS | SYSTOLIC BLOOD PRESSURE: 126 MMHG | BODY MASS INDEX: 31.75 KG/M2 | DIASTOLIC BLOOD PRESSURE: 72 MMHG

## 2025-02-14 DIAGNOSIS — E78.2 MIXED HYPERLIPIDEMIA: ICD-10-CM

## 2025-02-14 DIAGNOSIS — I10 ESSENTIAL HYPERTENSION: ICD-10-CM

## 2025-02-14 DIAGNOSIS — E11.42 DIABETIC POLYNEUROPATHY ASSOCIATED WITH TYPE 2 DIABETES MELLITUS (HCC): ICD-10-CM

## 2025-02-14 DIAGNOSIS — E11.65 TYPE 2 DIABETES MELLITUS WITH HYPERGLYCEMIA, WITHOUT LONG-TERM CURRENT USE OF INSULIN (HCC): Primary | ICD-10-CM

## 2025-02-14 PROCEDURE — 95251 CONT GLUC MNTR ANALYSIS I&R: CPT | Performed by: INTERNAL MEDICINE

## 2025-02-14 PROCEDURE — 99214 OFFICE O/P EST MOD 30 MIN: CPT | Performed by: INTERNAL MEDICINE

## 2025-02-14 NOTE — PATIENT INSTRUCTIONS
Hgba1c  is 7.2%. this is slightly higher.     No change in medications for now.     Work on adding protein at breakfast.     Continue to use the lambert but let me know if you need to change to dexcom.     Eye doctor visit.     Follow up in 4 months with blood work

## 2025-02-14 NOTE — PROGRESS NOTES
2/14/2025    Assessment & Plan      Diagnoses and all orders for this visit:    Type 2 diabetes mellitus with hyperglycemia, without long-term current use of insulin (HCC)  -     Comprehensive metabolic panel; Future  -     Hemoglobin A1C; Future  -     CBC and differential; Future  -     Albumin / creatinine urine ratio; Future  -     TSH, 3rd generation; Future    Diabetic polyneuropathy associated with type 2 diabetes mellitus (HCC)  -     Comprehensive metabolic panel; Future  -     Hemoglobin A1C; Future  -     CBC and differential; Future  -     Albumin / creatinine urine ratio; Future  -     TSH, 3rd generation; Future    Essential hypertension  -     Comprehensive metabolic panel; Future  -     Hemoglobin A1C; Future  -     CBC and differential; Future  -     Albumin / creatinine urine ratio; Future  -     TSH, 3rd generation; Future    Mixed hyperlipidemia  -     Comprehensive metabolic panel; Future  -     Hemoglobin A1C; Future  -     CBC and differential; Future  -     Albumin / creatinine urine ratio; Future  -     TSH, 3rd generation; Future          Assessment & Plan  1. Type 2 Diabetes Mellitus.  His hemoglobin A1c level has slightly increased to 7.2, potentially due to post-breakfast glucose spikes. However, it remains within the controlled range. He is advised to incorporate protein into his breakfast to mitigate these spikes. He is also encouraged to continue using Ayanna and inform us if a transition to Dexcom is necessary, so an order can be placed. If not, there is no cause for concern.  Her nail, he will continue his current dosages of glipizide, metformin, and Ozempic.    2. Hypertension.  He is normotensive in the office today.  He is currently taking metoprolol 25 mg, half in the morning and 1 in the evening. No changes to his medication regimen are necessary at this time.    3. Hyperlipidemia.  He is currently taking rosuvastatin 40 mg daily (two 20 mg pills). No changes to his medication  regimen are necessary at this time.    4.  Diabetic neuropathy.  He experiences intermittent numbness and tingling in his feet. He is currently taking gabapentin 100 mg, 3 in the evening. He reports that taking gabapentin in the morning causes fatigue, so he has discontinued the morning dose. He also uses B12 supplements and compression sleeves, which help with the symptoms. He is advised to continue with the evening dose of gabapentin and B12 supplements. If symptoms worsen, a switch to Lyrica may be considered.    5. Retinopathy.  He is overdue for an eye exam, with the last visit being a year and a half ago. He is advised to schedule an annual eye exam due to his diabetes, which can sometimes be billed through his insurance as a medical necessity.    I have asked him to follow-up in 4 months with preceding hemoglobin A1c, CMP, CBC, TSH, and urine microalbumin to creatinine ratio.        CC: Diabetes type II, blood pressure, lipid follow-up    History of Present Illness    HPI: John Obleschuk is a 64-year-old male with type 2 diabetes, neuropathy, retinopathy diagnosed 16 years ago, hypertension, and hyperlipidemia, who presents for a follow-up visit.     Diabetes complications include neuropathy, retinopathy, claudication, and heart disease, post CABG. He reports no nephropathy, stroke, or actual heart attack.    His current medication regimen includes glipizide 5 mg twice daily, Jardiance 25 mg daily, metformin  mg (2 in the morning and 2 in the evening), and Ozempic 2 mg once weekly. He does not experience frequent urination but reports waking up twice at night to urinate. He does not experience excessive thirst or hunger. His last ophthalmology appointment was approximately 1.5 years ago, and he acknowledges the need for a follow-up. He has changed his eye coverage to [employer]. He recalls that during his last visit, he was informed that his vision had not significantly changed, despite his perception of  difficulty seeing.     He experiences intermittent numbness and tingling in his feet. He reports no foot wounds. He has a scheduled appointment with his podiatrist  on either the 3rd or 4th of the month. He underwent a left toe amputation the day after Labor Day due to osteomyelitis. He continues to take B12 supplements and uses compression sleeves, which he finds beneficial. He continues to take gabapentin 100 mg (3 in the evening) but has discontinued the morning dose due to associated fatigue.    His breakfast typically consists of rice and corn Chex or oatmeal, occasionally supplemented with a banana. He used to consume sausage sandwiches but has reduced his intake due to concerns about fat content. He consumes half a peanut butter sandwich around 4:00 PM before going to the gym. He has not been walking as much as usual but maintains a gym routine of at least three times a week.    He continues to take metoprolol 25 mg (half in the morning and one in the evening).    He continues to take rosuvastatin 40 mg daily for cholesterol management. He reports no chest pain or shortness of breath.    Blood Sugar/Glucometer/Pump/CGM review: He utilizes a freestyle lambert 2 continuous glucose monitoring system to test his blood sugars throughout the day.  Freestyle lambert download from 1/31/2025 through 2/13/2025 was reviewed in the office today.  CGM was active 71% of the time.  Average glucose is 132 mg/dL with a glucose variability of 24.9% and a GMI of 6.5%.  92% of blood sugars are in target range, 6% high, 1% very high, 1% low, and 0% very low.    Historical Information   Past Medical History:   Diagnosis Date    Coronary artery disease     Diabetes mellitus (HCC)     History of transfusion     Hyperlipidemia     Hypertension     Wears dentures      Past Surgical History:   Procedure Laterality Date    ARTERIAL ANEURYSM REPAIR      Right Carotid Aneurysm - age 15.    CARDIAC CATHETERIZATION Left 08/02/2023    Procedure:  Cardiac Left Heart Cath;  Surgeon: Luisito Inman DO;  [facility]: [facility];  Service: Cardiology    CARDIAC CATHETERIZATION N/A 08/02/2023    Procedure: Cardiac Coronary Angiogram;  Surgeon: Luisito Inman DO;  Location: [facility];  Service: Cardiology    CARDIAC CATHETERIZATION  08/02/2023    Procedure: Cardiac catheterization;  Surgeon: Luisito Inman DO;  [facility]: [facility];  Service: Cardiology    CARDIAC ELECTROPHYSIOLOGY PROCEDURE N/A 2/21/2024    Procedure: Cardiac loop recorder implant;  Surgeon: Luke Siu MD;  Location: [facility];  Service: Cardiology    HERNIA REPAIR      NH AMPUTATION TOE METATARSOPHALANGEAL JOINT Left 9/3/2024    Procedure: AMPUTATION TOE;  Surgeon: Cheryle Cotton DPM;  Location:  MAIN OR;  Service: Podiatry    NH CORONARY ARTERY BYP W/VEIN & ARTERY GRAFT 4 VEIN N/A 8/11/2023    Procedure: CORONARY ARTERY BYPASS GRAFT (CABG) X-5 VESSELS ; SVG to PDA, Ramus, Diagonal and OM. LIMA --> LAD EVH  W/ JOHN;  Surgeon: Soham Hodgson MD;  Location: [facility];  Service: Cardiac Surgery    VASECTOMY       Social History   Social History     Substance and Sexual Activity   Alcohol Use No     Social History     Substance and Sexual Activity   Drug Use No     Social History     Tobacco Use   Smoking Status Never   Smokeless Tobacco Never     Family History:   Family History   Problem Relation Age of Onset    Leukemia Mother     Cancer Mother     Heart attack Father 52       Meds/Allergies   Current Outpatient Medications   Medication Sig Dispense Refill    acetaminophen (TYLENOL) 325 mg tablet Take 1-2 tablets Q4-6 hours PRN pain. Do not take more than 4grams in 24 hours.  0    aspirin 325 mg tablet Take 1 tablet (325 mg total) by mouth daily 30 tablet 2    cholecalciferol (VITAMIN D3) 25 mcg (1,000 units) tablet Take 1,000 Units by mouth 2 (two) times a day      Continuous Glucose Sensor (FreeStyle Ayanna 2 Sensor) MISC CHANGE SENSOR  EVERY 14 DAYS 6 each 1    cyanocobalamin (VITAMIN B-12) 1000 MCG tablet Take 1,000 mcg by mouth 2 (two) times a day      docusate sodium (COLACE) 100 mg capsule Take 1 capsule (100 mg total) by mouth 2 (two) times a day Hold for soft stools. 60 capsule 0    dofetilide (TIKOSYN) 500 mcg capsule Take 1 capsule (500 mcg total) by mouth every 12 (twelve) hours 180 capsule 3    gabapentin (NEURONTIN) 100 mg capsule TAKE 1 CAPSULE BY MOUTH IN THE MORNING AND 3 CAPSULES IN THE EVENING 120 capsule 5    glipiZIDE (GLUCOTROL) 5 mg tablet Take 1 tablet (5 mg total) by mouth 2 (two) times a day before meals 180 tablet 1    Jardiance 25 MG TABS Take 1 tablet (25 mg total) by mouth in the morning 90 tablet 3    LORazepam (ATIVAN) 0.5 mg tablet Take 1 tablet (0.5 mg total) by mouth daily at bedtime 30 tablet 0    Magnesium Gluconate 250 MG TABS Take by mouth daily at bedtime      metFORMIN (GLUCOPHAGE-XR) 500 mg 24 hr tablet TAKE 2 TABLETS BY MOUTH 2 TIMES A  tablet 1    metoprolol succinate (TOPROL-XL) 25 mg 24 hr tablet Take 12.5 mg in AM and 25 mg in  tablet 3    oxyCODONE-acetaminophen (Percocet) 5-325 mg per tablet Take 1 tablet by mouth every 4 (four) hours as needed for moderate pain for up to 10 doses Max Daily Amount: 6 tablets 10 tablet 0    Ozempic, 2 MG/DOSE, 8 MG/3ML injection pen Inject 0.75 mL (2 mg total) under the skin every 7 days 9 mL 3    rosuvastatin (CRESTOR) 20 MG tablet TAKE TWO TABLETS BY MOUTH EVERY  tablet 1    sertraline (ZOLOFT) 50 mg tablet TAKE ONE TABLET BY MOUTH EVERY DAY 30 tablet 5    tadalafil (CIALIS) 20 MG tablet Take 1 tablet (20 mg total) by mouth daily as needed for erectile dysfunction 10 tablet 5     No current facility-administered medications for this visit.     Allergies   Allergen Reactions    Atorvastatin Other (See Comments)     felt flu-like    Rosiglitazone Other (See Comments)     Avandia for DM2. Risks outweigh benefits.    Trazodone Other (See Comments)      "Felt \"hung over\"       Objective   Vitals: Blood pressure 126/72, pulse 70, height 5' 10\" (1.778 m), weight 101 kg (221 lb 12.8 oz).  Invasive Devices       None                   Physical Exam    Thyroid normal in size.  No palpable thyroid nodules.  Lungs: Clear to auscultation.  Coronary: Regular rate and rhythm.  No murmurs.  Extremities: No lower extremity edema.      The history was obtained from the review of the chart and from the patient.    Lab Results:    Most recent Alc is  Lab Results   Component Value Date    HGBA1C 7.2 (H) 02/08/2025           Blood work performed on 2/8/2025 showed a CMP with a glucose of 140 fasting but was otherwise normal.    Lab Results   Component Value Date    CREATININE 0.94 02/08/2025    CREATININE 0.88 08/27/2024    CREATININE 0.84 08/20/2024    BUN 20 02/08/2025    K 5.0 02/08/2025     02/08/2025    CO2 29 02/08/2025     GFR, Calculated   Date Value Ref Range Status   06/05/2020 91 >60 mL/min/1.73m2 Final     Comment:     mL/min per 1.73 square meters                                            Normal Function or Mild Renal    Disease (if clinically at risk):  >or=60  Moderately Decreased:                30-59  Severely Decreased:                  15-29  Renal Failure:                         <15                                            -American GFR: multiply reported GFR by 1.16    Please note that the eGFR is based on the CKD-EPI calculation, and is not intended to be used for drug dosing.                                            Note: Calculated GFR may not be an accurate indicator of renal function if the patient's renal function is not in a steady state.     eGFRcr   Date Value Ref Range Status   06/08/2023 72 >59 Final     eGFR   Date Value Ref Range Status   02/08/2025 85 ml/min/1.73sq m Final         Lab Results   Component Value Date    HDL 46 08/20/2024    TRIG 64 08/20/2024       Lab Results   Component Value Date    ALT 29 02/08/2025    AST 23 " 02/08/2025    ALKPHOS 58 02/08/2025       Lab Results   Component Value Date    TSH 1.49 12/13/2021             Future Appointments   Date Time Provider Department Center   3/3/2025  8:15 AM Mariano Marsh DPM [facility] POD [facility]   3/6/2025  4:15 PM DEVICE REMOTE [place] [facility]ARD [facility]   3/14/2025  8:30 AM Luke Siu MD CARD [facility]   4/24/2025  8:00 PM AL SLEEP ROOM 02 [facility] Sleep [facility]   6/5/2025  4:15 PM DEVICE REMOTE [place] [facility]   6/17/2025  4:20 PM Bright Chow DO CARD [facility]   7/18/2025  8:40 AM Darlin Almazan MD ENDO [facility]

## 2025-02-16 DIAGNOSIS — E11.65 TYPE 2 DIABETES MELLITUS WITH HYPERGLYCEMIA, WITHOUT LONG-TERM CURRENT USE OF INSULIN (HCC): ICD-10-CM

## 2025-02-17 RX ORDER — SEMAGLUTIDE 2.68 MG/ML
2 INJECTION, SOLUTION SUBCUTANEOUS
Qty: 9 ML | Refills: 1 | Status: SHIPPED | OUTPATIENT
Start: 2025-02-17

## 2025-02-23 DIAGNOSIS — E11.65 TYPE 2 DIABETES MELLITUS WITH HYPERGLYCEMIA, WITHOUT LONG-TERM CURRENT USE OF INSULIN (HCC): ICD-10-CM

## 2025-02-24 RX ORDER — EMPAGLIFLOZIN 25 MG/1
25 TABLET, FILM COATED ORAL EVERY MORNING
Qty: 90 TABLET | Refills: 1 | Status: SHIPPED | OUTPATIENT
Start: 2025-02-24

## 2025-02-27 ENCOUNTER — RESULTS FOLLOW-UP (OUTPATIENT)
Dept: NON INVASIVE DIAGNOSTICS | Facility: HOSPITAL | Age: 65
End: 2025-02-27

## 2025-03-03 ENCOUNTER — OFFICE VISIT (OUTPATIENT)
Dept: PODIATRY | Facility: CLINIC | Age: 65
End: 2025-03-03
Payer: COMMERCIAL

## 2025-03-03 VITALS — HEIGHT: 70 IN | BODY MASS INDEX: 31.64 KG/M2 | WEIGHT: 221 LBS

## 2025-03-03 DIAGNOSIS — B35.1 ONYCHOMYCOSIS: ICD-10-CM

## 2025-03-03 DIAGNOSIS — S98.112A AMPUTATION OF LEFT GREAT TOE (HCC): ICD-10-CM

## 2025-03-03 DIAGNOSIS — E11.40 TYPE 2 DIABETES MELLITUS WITH DIABETIC NEUROPATHY, WITHOUT LONG-TERM CURRENT USE OF INSULIN (HCC): Primary | ICD-10-CM

## 2025-03-03 PROCEDURE — RECHECK: Performed by: PODIATRIST

## 2025-03-03 PROCEDURE — 11721 DEBRIDE NAIL 6 OR MORE: CPT | Performed by: PODIATRIST

## 2025-03-03 NOTE — PROGRESS NOTES
Huan Namitasarameagan  1960  AT RISK FOOT CARE    1. Type 2 diabetes mellitus with diabetic neuropathy, without long-term current use of insulin ([facility])        2. Onychomycosis        3. Amputation of left great toe (HCC)            Patient presents for at-risk foot care.  Patient has no acute concerns today.  Patient has significant lower extremity risk due to previous amputation.    On exam patient has thickened, hypertrophic, discolored, brittle toenails with subungual debris and tenderness x9   Callus: negative  Amputation: left great toe stable amputation    Today's treatment includes:  Debridement of toenails. Using nail nipper, vance, and curette, nails were sharply debrided, reduced in thickness and length. Devitalized nail tissue and fungal debris excised and removed. Patient tolerated well.        Discussed proper shoe gear, daily inspections of feet, and general foot health with patient. Patient has Q7  findings and is recommended for at risk foot care every 9-10 weeks.    Patients most recent complete clinical exam was performed: 9/30/2024

## 2025-03-07 ENCOUNTER — REMOTE DEVICE CLINIC VISIT (OUTPATIENT)
Dept: CARDIOLOGY CLINIC | Facility: CLINIC | Age: 65
End: 2025-03-07
Payer: COMMERCIAL

## 2025-03-07 DIAGNOSIS — Z95.818 PRESENCE OF OTHER CARDIAC IMPLANTS AND GRAFTS: Primary | ICD-10-CM

## 2025-03-07 PROCEDURE — 93298 REM INTERROG DEV EVAL SCRMS: CPT | Performed by: INTERNAL MEDICINE

## 2025-03-07 NOTE — PROGRESS NOTES
"Results for orders placed or performed in visit on 03/07/25   Cardiac EP device report    Narrative    VAUGHAN ASSERT 5500 ICM/ACTIVE SYSTEM IS MRI CONDITIONAL  SINCE 2.26.25 ALERT  [place] TRANSMISSION: PRESENTING EGRAM VS@ 92 BPM. BATTERY STATUS \"OK.\" NO PATIENT OR DEVICE ACTIVATED EPISODES. NORMAL DEVICE FUNCTION. NC                 "

## 2025-03-12 ENCOUNTER — RESULTS FOLLOW-UP (OUTPATIENT)
Dept: NON INVASIVE DIAGNOSTICS | Facility: HOSPITAL | Age: 65
End: 2025-03-12

## 2025-03-14 ENCOUNTER — OFFICE VISIT (OUTPATIENT)
Dept: CARDIOLOGY CLINIC | Facility: CLINIC | Age: 65
End: 2025-03-14
Payer: COMMERCIAL

## 2025-03-14 VITALS
DIASTOLIC BLOOD PRESSURE: 72 MMHG | HEIGHT: 70 IN | BODY MASS INDEX: 31.71 KG/M2 | HEART RATE: 92 BPM | SYSTOLIC BLOOD PRESSURE: 114 MMHG

## 2025-03-14 DIAGNOSIS — J30.2 SEASONAL ALLERGIES: ICD-10-CM

## 2025-03-14 DIAGNOSIS — Z89.412 STATUS POST AMPUTATION OF LEFT GREAT TOE (HCC): ICD-10-CM

## 2025-03-14 DIAGNOSIS — I47.20 VENTRICULAR TACHYCARDIA (HCC): ICD-10-CM

## 2025-03-14 DIAGNOSIS — G47.33 OBSTRUCTIVE SLEEP APNEA SYNDROME: ICD-10-CM

## 2025-03-14 DIAGNOSIS — R00.0 WIDE-COMPLEX TACHYCARDIA: ICD-10-CM

## 2025-03-14 DIAGNOSIS — Z95.1 S/P CABG X 5: ICD-10-CM

## 2025-03-14 DIAGNOSIS — I25.10 CORONARY ARTERY DISEASE INVOLVING NATIVE CORONARY ARTERY OF NATIVE HEART WITHOUT ANGINA PECTORIS: ICD-10-CM

## 2025-03-14 DIAGNOSIS — E78.2 MIXED HYPERLIPIDEMIA: ICD-10-CM

## 2025-03-14 DIAGNOSIS — I47.0 RE-ENTRY VENTRICULAR ARRHYTHMIA (HCC): ICD-10-CM

## 2025-03-14 DIAGNOSIS — I10 ESSENTIAL HYPERTENSION: ICD-10-CM

## 2025-03-14 DIAGNOSIS — E11.65 TYPE 2 DIABETES MELLITUS WITH HYPERGLYCEMIA, WITHOUT LONG-TERM CURRENT USE OF INSULIN (HCC): ICD-10-CM

## 2025-03-14 DIAGNOSIS — R53.83 OTHER FATIGUE: ICD-10-CM

## 2025-03-14 PROCEDURE — 99214 OFFICE O/P EST MOD 30 MIN: CPT | Performed by: INTERNAL MEDICINE

## 2025-03-14 PROCEDURE — 93000 ELECTROCARDIOGRAM COMPLETE: CPT | Performed by: INTERNAL MEDICINE

## 2025-03-14 NOTE — PROGRESS NOTES
Consultation - Electrophysiology-Cardiology (EP)   John Obleschuk 64 y.o. male MRN: [id number]  Unit/Bed#:  Encounter: [id number]      1. Seasonal allergies        2. Other fatigue        3. Ventricular tachycardia (HCC)  POCT ECG      4. Essential hypertension        5. Coronary artery disease involving native coronary artery of native heart without angina pectoris        6. Re-entry ventricular arrhythmia (HCC)        7. Obstructive sleep apnea syndrome        8. Type 2 diabetes mellitus with hyperglycemia, without long-term current use of insulin (HCC)        9. Status post amputation of left great toe (HCC)        10. S/P CABG x 5        11. Mixed hyperlipidemia        12. Wide-complex tachycardia                Consults  Physician Requesting Consult: PCP  Reason for Consult / Principal Problem: Hospital follow up wide complex tachycardia       Summary of my recommendation for the patient    Patient complains of allergies and wants to start medication  He is on dofetilide with QTc around 480 ms  Should avoid common antihistamines like Benadryl, Phenergan which will prolong QT  Can start with nasal corticosteroids  Montelukast is an alternative  Any medications started by primary should be followed up with EKG in 48 hours in the week to confirm that there is no significant QT prolongation      Patient complaining of fatigue  , And causes of fatigue reviewed and are as below  - Rhythm monitoring-assert-March 2025 /December 2024-no VT noted in last 6 months , no A-fib, significant PVC  - Echocardiogram December 2023-EF was 60%  - Sleep study, June 2024-mild to moderate DUDLEY, positive pressure ventilation recommended  -Hemoglobin A1c 7.2  -Liver and kidney function-February 2025 normal  -TSH normal in May 2024  -Hemoglobin 13.6 in August 2024  At the current time treat sleep apnea aggressively      No further episodes of palpitation/VT since patient has been started on dofetilide and metoprolol    Patient is on  metoprolol 25 mg daily taken at night to reduce his fatigue -do not want to reduce the drug anymore as it will lose efficacy    Implantable Assert monitor reviewed -no wide-complex tachycardia    QTc is in normal range-480 ms, continue with potassium supplementation    Patient does have a history of snoring, morning fatigue and daytime sleepiness -evaluate for sleep apnea and consult pulmonary if positive    Depending upon finding of monitor will decide on further therapy  At this time no symptomatic episodes, EF has recovered, no other episodes of  VT          Clinical conditions  Allergies  Fatigue  Wide-complex tachycardia, 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 Marrufo  Hypertension  Diabetes mellitus type 2, hemoglobin A1c 7.4  Obesity/overweight-BMI down from 32.6-28.4  Carotid artery disease  Sleep apnea            Assessment & Plan     Seasonal allergies  Patient complains of allergies and wants to start medication  He is on dofetilide with QTc around 480 ms  Should avoid common antihistamines like Benadryl, Phenergan which will prolong QT  Can start with nasal corticosteroids  Montelukast is an alternative  Any medications started by primary should be followed up with EKG in 48 hours in the week to confirm that there is no significant QT prolongation      Fatigue  Patient complaining of fatigue  , And causes of fatigue reviewed and are as below  - Rhythm monitoring-assert-March 2025 /December 2024-no VT noted in last 6 months , no A-fib, significant PVC  - Echocardiogram December 2023-EF was 60%  - Sleep study, June 2024-mild to moderate DUDLEY, positive pressure ventilation recommended  -Hemoglobin A1c 7.2  -Liver and kidney function-February 2025 normal  -TSH normal in May 2024  -Hemoglobin 13.6 in August 2024  At the current time treat sleep apnea aggressively        Wide-complex tachycardia, 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 Marrufo  Patient not complaining of any angina      Hypertension  Blood pressure is under 116/50  Patient currently on metoprolol      Diabetes mellitus type 2, hemoglobin A1c 7.4  Continue with aggressive management of blood sugars  Currently on metformin, Jardiance       Obesity/overweight-BMI down from 32  Advised aggressive management of body weight      Carotid artery disease  Continue with metoprolol, Jardiance, aspirin, rosuvastatin      Mixed hyperlipidemia  Patient is on rosuvastatin  No myositis or myalgia      Sleep apnea  As per primary      History of Present Illness   HPI: John Obleschuk is a 64 y.o. year old male has been referred to me by PCP for the evaluation and management of wide-complex tachycardia    Patient is doing well without any significant palpitations    Some complaints of fatigue    Some complaints of seasonal allergy    The patient has significant medical illnesses which include  Wide-complex tachycardia, 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 Marrufo  Hypertension  Diabetes mellitus type 2, hemoglobin A1c 7.4  Obesity/overweight-BMI down from 32.6-28.4  Carotid artery disease  Sleep apnea    The patient is not complaining of anginal-like chest pain or pressure  There is no new worsening of orthopnea or PND  Patient is not complaining of palpitations, presyncope or  syncope  Exertional tolerance is also improved    The patient does not abuse tobacco alcohol or energy drinks    Historical Information   Past Medical History:   Diagnosis Date    Coronary artery disease     Diabetes mellitus (HCC)     History of transfusion     Hyperlipidemia     Hypertension     Wears dentures      Past Surgical History:   Procedure Laterality Date    ARTERIAL ANEURYSM REPAIR      Right Carotid Aneurysm - age 15.    CARDIAC CATHETERIZATION Left 08/02/2023    Procedure: Cardiac Left Heart Cath;  Surgeon: Luisito Inman DO;  Location: [facility];  Service: Cardiology    CARDIAC CATHETERIZATION N/A 08/02/2023    Procedure: Cardiac Coronary Angiogram;  Surgeon: Luisito Inman DO;  Location: [facility];  Service: Cardiology    CARDIAC CATHETERIZATION  08/02/2023    Procedure: Cardiac catheterization;  Surgeon: Luisito Inman DO;  [facility]: [facility];  Service: Cardiology    CARDIAC ELECTROPHYSIOLOGY PROCEDURE N/A 2/21/2024    Procedure: Cardiac loop recorder implant;  Surgeon: Luke Siu MD;  Location: [facility];  Service: Cardiology    HERNIA REPAIR      NE AMPUTATION TOE METATARSOPHALANGEAL JOINT Left 9/3/2024    Procedure: AMPUTATION TOE;  Surgeon: Cheryle Cotton DPM;  Location:  MAIN OR;  Service: Podiatry    NE CORONARY ARTERY BYP W/VEIN & ARTERY GRAFT 4 VEIN N/A 8/11/2023    Procedure: CORONARY ARTERY BYPASS GRAFT (CABG) X-5 VESSELS ; SVG to PDA, Ramus, Diagonal and OM. LIMA --> LAD EVH  W/ JOHN;  Surgeon: Soham Hodgson MD;  Location: [facility];  Service: Cardiac Surgery    VASECTOMY       Social History     Substance and Sexual Activity   Alcohol Use No     Social History     Substance and Sexual Activity   Drug Use No     Social History     Tobacco Use   Smoking Status Never   Smokeless Tobacco Never     Social History     Socioeconomic History    Marital status: /Civil Union     Spouse name: Not on file    Number of  children: Not on file    Years of education: Not on file    Highest education level: Not on file   Occupational History    Not on file   Tobacco Use    Smoking status: Never    Smokeless tobacco: Never   Vaping Use    Vaping status: Never Used   Substance and Sexual Activity    Alcohol use: No    Drug use: No    Sexual activity: Yes     Partners: Female   Other Topics Concern    Not on file   Social History Narrative    Not on file     Social Drivers of Health     Financial Resource Strain: Patient Declined (8/24/2023)    [facility]    Overall Financial Resource Strain (CARDIA)     Difficulty of Paying Living Expenses: Patient declined   Food Insecurity: Patient Declined (8/24/2023)    [facility], [facility]    Hunger Vital Sign     Worried About Running Out of Food in the Last Year: Patient declined     Ran Out of Food in the Last Year: Patient declined   Transportation Needs: No Transportation Needs (8/24/2023)    Received from [facility], [facility]    PRAPARE - Transportation     Lack of Transportation (Medical): No     Lack of Transportation (Non-Medical): No   Physical Activity: Sufficiently Active (12/20/2023)    Exercise Vital Sign     Days of Exercise per Week: 3 days     Minutes of Exercise per Session: 60 min   Stress: Stress Concern Present (12/20/2023)    [employer] - Occupational Stress Questionnaire     Feeling of Stress : Rather much   Social Connections: Unknown (12/20/2023)    Social Connection and Isolation Panel [NHANES]     Frequency of Communication with Friends and Family: More than three times a week     Frequency of Social Gatherings with Friends and Family: More than three times a week     Attends [employer] Services: Patient declined     Active Member of Clubs or Organizations: Patient declined     Attends Club or  "Organization Meetings: Patient declined     Marital Status:    Intimate Partner Violence: Not At Risk (12/20/2023)    Humiliation, Afraid, Rape, and Kick questionnaire     Fear of Current or Ex-Partner: No     Emotionally Abused: No     Physically Abused: No     Sexually Abused: No   Housing Stability: Unknown (8/24/2023)    [facility]    Housing Stability Vital Sign     Unable to Pay for Housing in the Last Year: Patient refused     Number of Places Lived in the Last Year: Not on file     Unstable Housing in the Last Year: Patient refused     .  Family History:  Family History   Problem Relation Age of Onset    Leukemia Mother     Cancer Mother     Heart attack Father 52         Meds/Allergies      No current facility-administered medications for this visit.       Not in a hospital admission.      Allergies   Allergen Reactions    Atorvastatin Other (See Comments)     felt flu-like    Rosiglitazone Other (See Comments)     Avandia for DM2. Risks outweigh benefits.    Trazodone Other (See Comments)     Felt \"hung over\"           Objective   Vitals: Visit Vitals  /72 (BP Location: Right arm, Patient Position: Sitting, Cuff Size: Standard)   Pulse 92   Ht 5' 10\" (1.778 m)   BMI 31.71 kg/m²   Smoking Status Never   BSA 2.18 m²      There were no vitals filed for this visit.    [unfilled]    Invasive Devices       None                     ROS  Review of Systems   All other systems reviewed and are negative.  As described in my history of present illness        PHYSICAL EXAM  Physical Exam  Constitutional:       General: He is not in acute distress.     Appearance: Normal appearance. He is not ill-appearing.   HENT:      Head: Normocephalic and atraumatic.      Right Ear: External ear normal.      Left Ear: External ear normal.      Nose: Nose normal.      Mouth/Throat:      Pharynx: Oropharynx is clear.   Eyes:      General: No scleral icterus.    "  Extraocular Movements: Extraocular movements intact.      Conjunctiva/sclera: Conjunctivae normal.      Pupils: Pupils are equal, round, and reactive to light.   Cardiovascular:      Rate and Rhythm: Normal rate and regular rhythm.      Heart sounds: Normal heart sounds. No murmur heard.     No gallop.   Pulmonary:      Effort: No respiratory distress.      Breath sounds: Normal breath sounds. No wheezing or rales.   Abdominal:      General: Bowel sounds are normal. There is no distension.      Palpations: Abdomen is soft.   Musculoskeletal:         General: No swelling or deformity.      Cervical back: Neck supple. No rigidity.   Skin:     Coloration: Skin is not jaundiced.      Findings: No bruising or lesion.   Neurological:      Mental Status: He is alert and oriented to person, place, and time. Mental status is at baseline.      Motor: No weakness.   Psychiatric:         Mood and Affect: Mood normal.         Behavior: Behavior normal.         Thought Content: Thought content normal.         Judgment: Judgment normal.               LAB RESULTS:    CBC:  WBC   Date Value Ref Range Status   08/27/2024 9.18 4.31 - 10.16 Thousand/uL Final     Hemoglobin   Date Value Ref Range Status   08/27/2024 13.6 12.0 - 17.0 g/dL Final     Hematocrit   Date Value Ref Range Status   08/27/2024 43.9 36.5 - 49.3 % Final     MCV   Date Value Ref Range Status   08/27/2024 89 82 - 98 fL Final     Platelets   Date Value Ref Range Status   08/27/2024 191 149 - 390 Thousands/uL Final     RBC   Date Value Ref Range Status   08/27/2024 4.94 3.88 - 5.62 Million/uL Final     MCH   Date Value Ref Range Status   08/27/2024 27.5 26.8 - 34.3 pg Final     MCHC   Date Value Ref Range Status   08/27/2024 31.0 (L) 31.4 - 37.4 g/dL Final     RDW   Date Value Ref Range Status   08/27/2024 13.5 11.6 - 15.1 % Final     MPV   Date Value Ref Range Status   08/27/2024 10.5 8.9 - 12.7 fL Final     nRBC   Date Value Ref Range Status   08/27/2024 0 /100 WBCs  Final       CMP:  Potassium   Date Value Ref Range Status   02/08/2025 5.0 3.5 - 5.3 mmol/L Final   06/08/2023 4.9 3.5 - 5.2 mmol/L Final     Chloride   Date Value Ref Range Status   02/08/2025 104 96 - 108 mmol/L Final   06/08/2023 104 100 - 109 mmol/L Final     Carbon Dioxide   Date Value Ref Range Status   06/08/2023 23 21 - 31 mmol/L Final     CO2   Date Value Ref Range Status   02/08/2025 29 21 - 32 mmol/L Final     CO2, i-STAT   Date Value Ref Range Status   08/11/2023 23 21 - 32 mmol/L Final     BUN   Date Value Ref Range Status   02/08/2025 20 5 - 25 mg/dL Final   06/08/2023 23 7 - 28 mg/dL Final     Creatinine   Date Value Ref Range Status   02/08/2025 0.94 0.60 - 1.30 mg/dL Final     Comment:     Standardized to IDMS reference method   06/08/2023 1.14 0.53 - 1.30 mg/dL Final     Glucose, i-STAT   Date Value Ref Range Status   08/11/2023 184 (H) 65 - 140 mg/dl Final     Calcium   Date Value Ref Range Status   02/08/2025 9.6 8.4 - 10.2 mg/dL Final   06/08/2023 9.4 8.5 - 10.1 mg/dL Final     AST   Date Value Ref Range Status   02/08/2025 23 13 - 39 U/L Final   06/08/2023 16 <41 U/L Final     ALT   Date Value Ref Range Status   02/08/2025 29 7 - 52 U/L Final     Comment:     Specimen collection should occur prior to Sulfasalazine administration due to the potential for falsely depressed results.    06/08/2023 18 <56 U/L Final     Alkaline Phosphatase   Date Value Ref Range Status   02/08/2025 58 34 - 104 U/L Final   06/08/2023 47 35 - 120 U/L Final     GFR, Calculated   Date Value Ref Range Status   06/05/2020 91 >60 mL/min/1.73m2 Final     Comment:     mL/min per 1.73 square meters                                            Normal Function or Mild Renal    Disease (if clinically at risk):  >or=60  Moderately Decreased:                30-59  Severely Decreased:                  15-29  Renal Failure:                         <15                                            -American GFR: multiply reported  GFR by 1.16    Please note that the eGFR is based on the CKD-EPI calculation, and is not intended to be used for drug dosing.                                            Note: Calculated GFR may not be an accurate indicator of renal function if the patient's renal function is not in a steady state.     eGFRcr   Date Value Ref Range Status   06/08/2023 72 >59 Final     eGFR   Date Value Ref Range Status   02/08/2025 85 ml/min/1.73sq m Final        Magnesium:   Magnesium   Date Value Ref Range Status   12/08/2023 2.0 1.9 - 2.7 mg/dL Final        A1C:  Hemoglobin A1C   Date Value Ref Range Status   02/08/2025 7.2 (H) Normal 4.0-5.6%; PreDiabetic 5.7-6.4%; Diabetic >=6.5%; Glycemic control for adults with diabetes <7.0% % Final   06/08/2023 7.4 (H) <5.7 % Final     Comment:     Reference Range  Non-diabetic                     <5.7  Pre-diabetic                     5.7-6.4  Diabetic                         >=6.5  ADA target for diabetic control  <=7        TSH:  TSH   Date Value Ref Range Status   12/13/2021 1.49 0.36 - 3.74 uIU/mL Final     TSH 3RD GENERATON   Date Value Ref Range Status   05/18/2024 1.167 0.450 - 4.500 uIU/mL Final     Comment:     Adult TSH (3rd generation) reference range follows the recommended guidelines of the American Thyroid Association, January, 2020.        PT/INR:  Protime   Date Value Ref Range Status   08/03/2023 13.1 11.6 - 14.5 seconds Final     INR   Date Value Ref Range Status   08/03/2023 0.97 0.84 - 1.19 Final       Lipid Panel:  Cholesterol   Date Value Ref Range Status   08/20/2024 89 See Comment mg/dL Final     Comment:     Cholesterol:         Pediatric <18 Years        Desirable          <170 mg/dL      Borderline High    170-199 mg/dL      High               >=200 mg/dL        Adult >=18 Years            Desirable        <200 mg/dL      Borderline High  200-239 mg/dL      High             >239 mg/dL       Triglycerides   Date Value Ref Range Status   08/20/2024 64 See Comment  "mg/dL Final     Comment:     Triglyceride:     0-9Y            <75mg/dL     10Y-17Y         <90 mg/dL       >=18Y     Normal          <150 mg/dL     Borderline High 150-199 mg/dL     High            200-499 mg/dL        Very High       >499 mg/dL    Specimen collection should occur prior to Metamizole administration due to the potential for falsely depressed results.     HDL, Direct   Date Value Ref Range Status   2024 46 >=40 mg/dL Final       Troponin:  No results found for: \"TROPONINI\"      Imaging:    [id number]      JOHN:  No results found for this or any previous visit.      Echocardiogram:  Results for orders placed during the hospital encounter of 23    Echo complete w/ contrast if indicated    Interpretation Summary    Left Ventricle: Left ventricular cavity size is normal. Wall thickness is upper normal. The left ventricular ejection fraction is 60%. Systolic function is normal. Wall motion is normal. Diastolic function is mildly abnormal, consistent with grade I (abnormal) relaxation.    Mitral Valve: There is mild annular calcification. There is trace regurgitation.    Aorta: The aortic root is normal in size. The ascending aorta is mildly dilated. The ascending aorta is 3.9 cm.    Results for orders placed during the hospital encounter of 23    Echo follow up/limited w/ contrast if indicated    Interpretation Summary    Left Ventricle: Left ventricular cavity size is normal. Wall thickness is mildly increased. There is concentric remodeling. The left ventricular ejection fraction is 60%. Systolic function is normal. Wall motion is normal.    IVS: There is abnormal septal motion consistent with post-operative status.    Right Ventricle: Right ventricular cavity size is normal. Systolic function is normal.    Mitral Valve: There is mild annular calcification.      Stress Test:   Results for orders placed during the hospital encounter of 23    NM myocardial perfusion spect " (stress and/or rest)    Interpretation Summary    Stress ECG: A Johnny protocol stress test was performed. The patient reached stage 2.0 of the protocol after exercising for 7 min and 17 sec and had a maximal HR of 139 bpm (88 % of MPHR) and 7.0 METS. The patient experienced non-limiting angina during the test. The test was stopped because the patient experienced dyspnea. The patient achieved the target heart rate. The patient reported dyspnea during the stress test. Symptoms began during stress and ended during recovery. Blood pressure demonstrated a blunted response and heart rate demonstrated a normal response to stress. The patient's heart rate recovery was normal.    Stress EC.0 mm of ST elevation (aVR) and Horizontal ST depression of 2.0 mm (II, III, aVF, V5 and V6) is noted. Improvement of ST depression within 2 minutes of recovery. However, there are still persistent ST-T changes even after 4 minutes into recovery. Arrhythmias during stress: rare PVCs. There were no arrhythmias during recovery. The ECG was positive for ischemia. The stress ECG is consistent with ischemia after maximal exercise, with reproduction of symptoms.    Perfusion Defect Conclusion: The stress/rest perfusion ratio is 1.11 . There is no evidence of transient ischemic dilation (TID).    Stress Function: Left ventricular function post-stress is normal. Stress ejection fraction is 55 %. There is a defect in the mid to apical inferior location(s). The defect has mildly reduced function.    Stress Combined Conclusion: The ECG and SPECT imaging portions of the stress study are concordant with findings concerning for stress induced myocardial ischemia.    Perfusion: There is a left ventricular perfusion defect that is large in size with severe reduction in uptake present in the entire inferior location(s) that is reversible suggestive of inferior wall ischemia.    Abnormal study  Exercise 7 minutes with blunted BP response.  Appropriate HR  response and he met 85% of his MPHR  He did develop chest pain with exercise  1.0 mm of ST elevation (aVR) and Horizontal ST depression of 2.0 mm (II, III, aVF, V5 and V6) is noted. Improvement of ST depression within 2 minutes of recovery. However, there are still persistent ST-T changes even after 4 minutes into recovery.  Minimal Ectopy  There is a left ventricular perfusion defect that is large in size with severe reduction in uptake present in the entire inferior location(s) that is reversible suggestive of inferior wall ischemia. Results were discussed with the patient and referring provider and he will be scheduled for coronary angiography.  EF normal at 55% but mid-apical inferior wall motion  No TID      Cardiac Catheterization:    Cardiac Coronary Angiogram, Left Heart Cath  08/02/2023    Coronary Findings    Diagnostic  Dominance: Right    Left Anterior Descending   Prox LAD lesion is 70% stenosed.   Mid LAD lesion is 80% stenosed.   Dist LAD lesion is 90% stenosed.      First Diagonal Branch   1st Diag-1 lesion is 90% stenosed.   1st Diag-2 lesion is 80% stenosed.      Ramus Intermedius   Ramus lesion is 80% stenosed.      Left Circumflex   Mid Cx to Dist Cx lesion is 90% stenosed.      Right Coronary Artery      Right Posterior Descending Artery   Collaterals   RPDA filled by collaterals from Dist LAD.          HOLTER MONITOR: 24 HOUR/48 HOUR MONITORS  No results found for this or any previous visit.      AMB Extended Holter Monitor: Zio XT/AT or BioTel  Results for orders placed in visit on 01/05/2024      AMB extended holter monitor    Zio AT  12/10/2023 - 12/24/2023   Pierce Galvan MD  1/10/2024  2:26 PM EST       [facility]     Event Recorder Results     Duration: 14 days     Indication: VT     Findings:     Patient had a min HR of 61 bpm, max HR of 150 bpm, and avg HR of 80  bpm. Predominant underlying rhythm was Sinus Rhythm. 2 Ventricular runs occurred, the run with the  fastest interval lasting 5 beats with a max rate of 150 bpm, the longest lasting 4 beats with an avg rate of 117 bpm. 1 run of Supraventricular Tachycardia occurred lasting 13 beats with a max rate of 136 bpm (avg 126 bpm). Isolated SVEs were occasional (2.3%, 06992), SVE Couplets were rare (<1.0%, 6347), and SVE Triplets were rare (<1.0%, 2818). Isolated VEs were rare (<1.0%, 513), VE Couplets were rare (<1.0%, 16), and VE Triplets were rare (<1.0%, 3).     Triggered episodes: 4 symptomatic episodes correlated with sinus rhythm or mild SV ectopy.     Conclusions:  No sustained ventricular tachycardia noted.     Overall these test results are reassuring.  Please call patient with test results. Continue current medications as advised by Dr. Siu.             Cardiac MRI:    MRI Cardiac w wo Contrast  12/08/2023    Findings:  1. Technically difficult study due to presence of ectopy.  Unable to accurately measure cardiac chamber sizes or ejection fraction.  2. The left ventricle is normal in size with normal wall thickness.  Left ventricular systolic function is normal with an estimated ejection fraction of 55%.  There is mild hypokinesis of the basal inferior wall.  3. The right ventricle is normal in size with normal right ventricular systolic function.  4. The aortic, mitral, and tricuspid valves open without restriction.  There is no significant valvular regurgitation, however cine MRI is inaccurate in the qualitative assessment of valvular regurgitation.  5. The left atrium is mildly dilated. The aortic root is mildly dilated.  5. Delayed post-gadolinium imaging demonstrates subendocardial hyperenhancement of the basal to mid inferior wall and intramyocardial hyperenhancement of the mid inferolateral wall.     IMPRESSION:  Impression:  1. Normal left ventricular size and systolic function with mild basal inferior hypokinesis.  2. Normal right ventricular size and systolic function.  3. No significant valvular  "abnormalities.  4. Subendocardial scarring of the basal to mid inferior wall suggestive of an ischemic etiology.  Intramyocardial fibrosis within the mid inferolateral wall suggestive of myocarditis, but cannot exclude cardiac sarcoidosis.         DEVICE CHECK:     Results for orders placed or performed in visit on 03/07/25   Cardiac EP device report    Narrative    [employer] ASSERT 5500 ICM/ACTIVE SYSTEM IS MRI CONDITIONAL  SINCE 2.26.25 ALERT  MERLIN TRANSMISSION: PRESENTING EGRAM VS@ 92 BPM. BATTERY STATUS \"OK.\" NO PATIENT OR DEVICE ACTIVATED EPISODES. NORMAL DEVICE FUNCTION. NC             Code Status: [unfilled]  Advance Directive and Living Will:      Power of :    POLST:      Counseling / Coordination of Care  Reviewed in detail with patient with regards to wide-complex tachycardia      Discussed management of seasonal allergy as well as fatigue      Luke Siu MD   "

## 2025-03-25 DIAGNOSIS — F41.9 ANXIETY: ICD-10-CM

## 2025-03-25 RX ORDER — LORAZEPAM 0.5 MG/1
0.5 TABLET ORAL
Qty: 30 TABLET | Refills: 0 | Status: SHIPPED | OUTPATIENT
Start: 2025-03-25

## 2025-04-21 DIAGNOSIS — E11.42 DIABETIC POLYNEUROPATHY ASSOCIATED WITH TYPE 2 DIABETES MELLITUS (HCC): ICD-10-CM

## 2025-04-22 RX ORDER — GABAPENTIN 100 MG/1
CAPSULE ORAL
Qty: 120 CAPSULE | Refills: 5 | Status: SHIPPED | OUTPATIENT
Start: 2025-04-22

## 2025-04-23 DIAGNOSIS — R00.0 WIDE-COMPLEX TACHYCARDIA: ICD-10-CM

## 2025-04-23 RX ORDER — DOFETILIDE 0.5 MG/1
500 CAPSULE ORAL EVERY 12 HOURS SCHEDULED
Qty: 180 CAPSULE | Refills: 1 | Status: SHIPPED | OUTPATIENT
Start: 2025-04-23

## 2025-04-23 NOTE — TELEPHONE ENCOUNTER
Patient wants a [employer] message sent when prescription is sent to pharmacy.     Reason for call:   [x] Refill   [] Prior Auth  [] Other:     Office:   [] PCP/Provider -   [x] Specialty/Provider - Cardio     Medication: dofetilide (TIKOSYN) 500 mcg capsule     Dose/Frequency: 500 mcg, Oral, Every 12 hours scheduled     Quantity: 180    Pharmacy: [facility] [address] [place] [address]   Does the patient have enough for 3 days?   [] Yes   [x] No - Send as HP to POD    Mail Away Pharmacy   Does the patient have enough for 10 days?   [] Yes   [] No - Send as HP to POD

## 2025-04-24 ENCOUNTER — HOSPITAL ENCOUNTER (OUTPATIENT)
Dept: SLEEP CENTER | Facility: CLINIC | Age: 65
Discharge: HOME | End: 2025-04-24
Payer: COMMERCIAL

## 2025-04-24 DIAGNOSIS — G47.34 SLEEP RELATED HYPOXIA: ICD-10-CM

## 2025-04-24 DIAGNOSIS — R40.0 DAYTIME SLEEPINESS: ICD-10-CM

## 2025-04-24 DIAGNOSIS — G47.33 OBSTRUCTIVE SLEEP APNEA SYNDROME: ICD-10-CM

## 2025-04-24 PROCEDURE — 95811 POLYSOM 6/>YRS CPAP 4/> PARM: CPT

## 2025-04-24 PROCEDURE — 95811 POLYSOM 6/>YRS CPAP 4/> PARM: CPT | Performed by: INTERNAL MEDICINE

## 2025-04-25 DIAGNOSIS — G47.33 OSA (OBSTRUCTIVE SLEEP APNEA): Primary | ICD-10-CM

## 2025-04-29 ENCOUNTER — TELEPHONE (OUTPATIENT)
Dept: SLEEP CENTER | Facility: CLINIC | Age: 65
End: 2025-04-29

## 2025-04-29 NOTE — TELEPHONE ENCOUNTER
CPAP titration study resulted, previously diagnosed sleep disordered breathing is effectively remediated with PAP therapy.    CPAP recommended and prescription provided.    Call placed to patient, advised of above.    Patient amenable to using [employer] as DME supplier.  Added to the Epic secure DME list for order to be processed with the request to expedite.    Compliance follow-up scheduled with Dr. Varner 7/16/2025 in the [place] office.

## 2025-05-01 ENCOUNTER — TELEPHONE (OUTPATIENT)
Dept: SLEEP CENTER | Facility: CLINIC | Age: 65
End: 2025-05-01

## 2025-05-01 NOTE — TELEPHONE ENCOUNTER
Rx and clinicals for cpap sent to [facility] via [employer]. Message sent to [facility].

## 2025-05-02 DIAGNOSIS — E11.65 TYPE 2 DIABETES MELLITUS WITH HYPERGLYCEMIA, WITHOUT LONG-TERM CURRENT USE OF INSULIN (HCC): ICD-10-CM

## 2025-05-02 LAB
DME PARACHUTE DELIVERY DATE REQUESTED: NORMAL
DME PARACHUTE ITEM DESCRIPTION: NORMAL
DME PARACHUTE ORDER STATUS: NORMAL
DME PARACHUTE SUPPLIER NAME: NORMAL
DME PARACHUTE SUPPLIER PHONE: NORMAL

## 2025-05-03 DIAGNOSIS — F41.9 ANXIETY: ICD-10-CM

## 2025-05-05 ENCOUNTER — NURSE TRIAGE (OUTPATIENT)
Age: 65
End: 2025-05-05

## 2025-05-05 RX ORDER — LORAZEPAM 0.5 MG/1
0.5 TABLET ORAL
Qty: 30 TABLET | Refills: 0 | Status: SHIPPED | OUTPATIENT
Start: 2025-05-05

## 2025-05-05 NOTE — TELEPHONE ENCOUNTER
Received call from pt, asking for an update on ILR report review and asking what he needs to do at this time.  Please advise.

## 2025-05-05 NOTE — TELEPHONE ENCOUNTER
"FOLLOW UP: Warm transferred to [place] at  clinical    REASON FOR CONVERSATION: chest pain  Patient calling with Right side chest pain, right arm pain, which he thinks may be related to exercise, fluttering in chest- all started about 1 week ago. Patient also reports feeling lightheaded this morning.   Patient submitted loop recorder transmission this morning. Spoke with Shari in device clinic, who confirmed transmission was received, and she will forward to provider for urgent review.  No urgent appointments available for cardiology.  Warm transferred patient to [place] at  clinical. Patient sees Dr. Siu for history of Vtach.   Advised patient that he may be referred to seek ED evaluation.   Patient verbalized understanding.    SYMPTOMS: Right sided chest pain, fluttering in chest, right arm pain and feeling lightheaded this morning    OTHER: Pt uploaded loop recorder transmission today    DISPOSITION: Discuss with Provider and Call Back Patient    Answer Assessment - Initial Assessment Questions  1. LOCATION: \"Where does it hurt?\"        Right chest/ right arm pain  2. RADIATION: \"Does the pain go anywhere else?\" (e.g., into neck, jaw, arms, back)      unsure  3. ONSET: \"When did the chest pain begin?\" (Minutes, hours or days)       1 week ago  4. PATTERN: \"Does the pain come and go, or has it been constant since it started?\"  \"Does it get worse with exertion?\"       Comes and goes  5. DURATION: \"How long does it last\" (e.g., seconds, minutes, hours)      30 seconds to 1 minutes  6. SEVERITY: \"How bad is the pain?\"  (e.g., Scale 1-10; mild, moderate, or severe)      mild  7. CARDIAC RISK FACTORS: \"Do you have any history of heart problems or risk factors for heart disease?\" (e.g., angina, prior heart attack; diabetes, high blood pressure, high cholesterol, smoker, or strong family history of heart disease)      CAD, CABG, Diabetes; family history of heart disease  8. PULMONARY RISK FACTORS: \"Do you have any " "history of lung disease?\"  (e.g., blood clots in lung, asthma, emphysema, birth control pills)      denies  9. CAUSE: \"What do you think is causing the chest pain?\"      unsure  10. OTHER SYMPTOMS: \"Do you have any other symptoms?\" (e.g., dizziness, nausea, vomiting, sweating, fever, difficulty breathing, cough)        Lightheaded; fluttering in chest; nausea    Protocols used: Chest Pain-Adult-[place]    "

## 2025-05-05 NOTE — TELEPHONE ENCOUNTER
Regarding: chest pain and fluttering  ----- Message from Estrella WISEMAN sent at 5/5/2025 11:22 AM EDT -----  Patient reports a fluttering in his chest occasionally accompanied by pain in the R side of his chest

## 2025-05-05 NOTE — TELEPHONE ENCOUNTER
"Spoke with pt to which he c/o right sided chest and arm pain/achiness, fluttering, lightheadedness, an occasional \"heart drop\" feeling in his chest and fatigue. Pt stated the fluttering has been going on for about a week and the arm pain for a couple weeks, but he associated the arm pain with him going to the gym.    BP: 156/94  HR: 71 bpm    Pt did state he sent out a loop recorder transmission and I also advised him that if symptoms worsen to report to the ER.  "

## 2025-05-11 ENCOUNTER — RESULTS FOLLOW-UP (OUTPATIENT)
Dept: CARDIOLOGY CLINIC | Facility: CLINIC | Age: 65
End: 2025-05-11

## 2025-05-12 ENCOUNTER — OFFICE VISIT (OUTPATIENT)
Dept: PODIATRY | Facility: CLINIC | Age: 65
End: 2025-05-12
Payer: COMMERCIAL

## 2025-05-12 VITALS — BODY MASS INDEX: 31.21 KG/M2 | HEIGHT: 70 IN | WEIGHT: 218 LBS

## 2025-05-12 DIAGNOSIS — E11.40 TYPE 2 DIABETES MELLITUS WITH DIABETIC NEUROPATHY, WITHOUT LONG-TERM CURRENT USE OF INSULIN (HCC): Primary | ICD-10-CM

## 2025-05-12 DIAGNOSIS — S98.112A AMPUTATION OF LEFT GREAT TOE (HCC): ICD-10-CM

## 2025-05-12 DIAGNOSIS — B35.1 ONYCHOMYCOSIS: ICD-10-CM

## 2025-05-12 DIAGNOSIS — R21 RASH OF FOOT: ICD-10-CM

## 2025-05-12 PROCEDURE — 99214 OFFICE O/P EST MOD 30 MIN: CPT | Performed by: PODIATRIST

## 2025-05-12 RX ORDER — CLOTRIMAZOLE AND BETAMETHASONE DIPROPIONATE 10; .64 MG/G; MG/G
CREAM TOPICAL 2 TIMES DAILY
Qty: 45 G | Refills: 0 | Status: SHIPPED | OUTPATIENT
Start: 2025-05-12

## 2025-05-12 NOTE — PROGRESS NOTES
"Name: John Obleschuk      : 1960      MRN: [id number]  Encounter Provider: Mariano Marsh DPM  Encounter Date: 2025   Encounter department: [facility] [place]  :  Assessment & Plan  Type 2 diabetes mellitus with diabetic neuropathy, without long-term current use of insulin (HCC)  He gets RFC scheduled regularly, no acute issues today  Diagnosis and options discussed with patient  Patient agreeable to the plan as stated below    -DM foot risk is low. Recommend at risk foot care (Q7)  -Discussed DM risk to lower extremities, proper shoe gear, and daily monitoring of feet.   -Discussed weight loss and suitable exercise regiment.   -Reviewed most recent PCP visit on 2025  -Educated on A1C and the risks of poorly controlled Diabetes. Reviewed recent A1C:  Lab Results   Component Value Date    HGBA1C 7.2 (H) 2025    HGBA1C 7.4 (H) 2023   .     Lab Results   Component Value Date    HGBA1C 7.2 (H) 2025       Orders:  •  Diabetic Shoe Inserts  •  Diabetic Shoe    Onychomycosis  RFC scheduled (Q7)  Orders:  •  Diabetic Shoe Inserts  •  Diabetic Shoe    Amputation of left great toe (HCC)  stable  Orders:  •  Diabetic Shoe Inserts  •  Diabetic Shoe    Rash of foot    Orders:  •  clotrimazole-betamethasone (LOTRISONE) 1-0.05 % cream; Apply topically 2 (two) times a day        History of Present Illness   HPI  John Obleschuk is a 65 y.o. male who presents with a rash on his right foot. HE is not sure what it is from, it doesn't itch but he does have neuropathy.       Review of Systems  As stated in HPI, otherwise normal    Medical History Reviewed by provider this encounter:  Tobacco  Allergies  Meds  Problems  Med Hx  Surg Hx  Fam Hx           Objective   Ht 5' 10\" (1.778 m)   Wt 98.9 kg (218 lb)   BMI 31.28 kg/m²      Physical Exam  Vitals reviewed.   Cardiovascular:      Pulses: Normal pulses. no weak pulses.           Dorsalis pedis pulses are 2+ on the right side " and 2+ on the left side.        Posterior tibial pulses are 2+ on the right side and 2+ on the left side.   Musculoskeletal:         General: Deformity (left hallux amputation stable) present.   Feet:      Right foot:      Skin integrity: Dry skin present.   Skin:     Capillary Refill: Capillary refill takes less than 2 seconds.      Findings: Rash (right foot) present.   Neurological:      Mental Status: He is alert.      Sensory: Sensory deficit present.     Diabetic Foot Exam    Patient's shoes and socks removed.    Right Foot/Ankle   Right Foot Inspection  Skin Exam: skin intact, dry skin and abnormal color.     Toe Exam: right toe deformity.     Sensory   Vibration: absent  Monofilament testing: diminished    Vascular  Capillary refills: < 3 seconds  The right DP pulse is 2+. The right PT pulse is 2+.     Left Foot/Ankle  Left Foot Inspection  Skin Exam: skin intact.     Toe Exam: left toe deformity.     Sensory   Vibration: absent  Monofilament testing: diminished    Vascular  Capillary refills: < 3 seconds  The left DP pulse is 2+. The left PT pulse is 2+.     Assign Risk Category  Deformity present  Loss of protective sensation  No weak pulses  Risk: 3

## 2025-05-12 NOTE — ASSESSMENT & PLAN NOTE
He gets RFC scheduled regularly, no acute issues today  Diagnosis and options discussed with patient  Patient agreeable to the plan as stated below    -DM foot risk is low. Recommend at risk foot care (Q7)  -Discussed DM risk to lower extremities, proper shoe gear, and daily monitoring of feet.   -Discussed weight loss and suitable exercise regiment.   -Reviewed most recent PCP visit on 2/14/2025  -Educated on A1C and the risks of poorly controlled Diabetes. Reviewed recent A1C:  Lab Results   Component Value Date    HGBA1C 7.2 (H) 02/08/2025    HGBA1C 7.4 (H) 06/08/2023   .     Lab Results   Component Value Date    HGBA1C 7.2 (H) 02/08/2025       Orders:  •  Diabetic Shoe Inserts  •  Diabetic Shoe

## 2025-05-13 LAB

## 2025-05-22 DIAGNOSIS — E11.65 TYPE 2 DIABETES MELLITUS WITH HYPERGLYCEMIA, WITHOUT LONG-TERM CURRENT USE OF INSULIN (HCC): ICD-10-CM

## 2025-05-22 LAB

## 2025-05-22 RX ORDER — GLIPIZIDE 5 MG/1
5 TABLET ORAL
Qty: 180 TABLET | Refills: 1 | Status: SHIPPED | OUTPATIENT
Start: 2025-05-22

## 2025-05-23 RX ORDER — GLIPIZIDE 5 MG/1
5 TABLET ORAL
Qty: 180 TABLET | Refills: 0 | OUTPATIENT
Start: 2025-05-23

## 2025-06-02 DIAGNOSIS — F41.9 ANXIETY: ICD-10-CM

## 2025-06-03 DIAGNOSIS — F41.9 ANXIETY: ICD-10-CM

## 2025-06-03 NOTE — TELEPHONE ENCOUNTER
Refill must be reviewed and completed by the office or provider. The refill is unable to be approved or denied by the medication management team.      This refill cannot be delegated.   05/05/2025 05/05/2025 05/05/2025 LORazepam (Tablet) 30.0 30 0.5 MG NA MARCO CHIN [id number] Commercial Insurance 0 / 0 PA   1 [id number] 03/30/2025 03/29/2025 03/25/2025 LORazepam (Tablet) 30.0 30 0.5 MG NA MARCO [id number] Commercial Insurance 0 / 0 PA

## 2025-06-04 RX ORDER — LORAZEPAM 0.5 MG/1
0.5 TABLET ORAL
Qty: 30 TABLET | Refills: 0 | Status: SHIPPED | OUTPATIENT
Start: 2025-06-04

## 2025-06-04 RX ORDER — LORAZEPAM 0.5 MG/1
0.5 TABLET ORAL
Qty: 30 TABLET | Refills: 0 | OUTPATIENT
Start: 2025-06-04

## 2025-06-05 ENCOUNTER — TELEPHONE (OUTPATIENT)
Dept: SLEEP CENTER | Facility: CLINIC | Age: 65
End: 2025-06-05

## 2025-06-05 ENCOUNTER — REMOTE DEVICE CLINIC VISIT (OUTPATIENT)
Dept: CARDIOLOGY CLINIC | Facility: CLINIC | Age: 65
End: 2025-06-05
Payer: COMMERCIAL

## 2025-06-05 DIAGNOSIS — R00.0 TACHYCARDIA: Primary | ICD-10-CM

## 2025-06-05 DIAGNOSIS — I25.10 CORONARY ARTERY DISEASE INVOLVING NATIVE CORONARY ARTERY: ICD-10-CM

## 2025-06-05 LAB

## 2025-06-05 PROCEDURE — 93298 REM INTERROG DEV EVAL SCRMS: CPT | Performed by: INTERNAL MEDICINE

## 2025-06-05 RX ORDER — ROSUVASTATIN CALCIUM 20 MG/1
40 TABLET, COATED ORAL DAILY
Qty: 180 TABLET | Refills: 1 | OUTPATIENT
Start: 2025-06-05

## 2025-06-05 NOTE — TELEPHONE ENCOUNTER
Pt was set up on 6/5/25 in [facility] with a resemd S11 set at 6-9cm EPR 2 and gave mask F&P michael full. He cannot have a mask with magnet due to his loop recorder.

## 2025-06-05 NOTE — PROGRESS NOTES
"[employer] ASSERT 5500 ICM/ACTIVE SYSTEM IS MRI CONDITIONAL  MERLIN TRANSMISSION: LOOP RECORDER. PRESENTING RHYTHM NSR @ 92 BPM. BATTERY STATUS \"OK.\" 2 PT ACTIVATED EPISODES PREVIOUSLY ADDRESSED IN ALERTS. 10 DEVICE CLASSIFIED AF EPISODES, AVAILABLE STRIPS DEMONSTRATE PACs, HOWEVER CAN NOT R/O ATRIAL FIBRILLATION. AF BURDEN IS <1%. PT TAKES TIKOSYN, METOPROLOL SUCC AND . NO NEW PATIENT OR NEW DEVICE ACTIVATED EPISODES. NORMAL DEVICE FUNCTION. DL           "

## 2025-06-07 ENCOUNTER — RESULTS FOLLOW-UP (OUTPATIENT)
Dept: NON INVASIVE DIAGNOSTICS | Facility: HOSPITAL | Age: 65
End: 2025-06-07

## 2025-06-16 NOTE — PROGRESS NOTES
Outpatient Cardiology Note - John Obleschuk 65 y.o. male MRN: [id number]    @ Encounter: [id number]        Patient Active Problem List    Diagnosis Date Noted    Onychomycosis 05/12/2025    Type 2 diabetes mellitus with diabetic neuropathy, without long-term current use of insulin ([facility]) 05/12/2025    Amputation of left great toe ([facility]) 05/12/2025    DUDLEY (obstructive sleep apnea) 04/25/2025    Seasonal allergies 03/14/2025    Fatigue 03/14/2025    Re-entry ventricular arrhythmia ([facility]) 12/20/2024    Status post amputation of left great toe ([facility]) 09/24/2024    Pre-op examination 08/28/2024    Diabetic ulcer of toe of left foot associated with type 2 diabetes mellitus, limited to breakdown of skin ([facility]) 07/12/2024    Encounter for annual physical exam 03/06/2024    Wide-complex tachycardia 12/05/2023    Diabetic polyneuropathy associated with type 2 diabetes mellitus ([facility]) 08/30/2023    S/P CABG x 5 08/11/2023    Coronary artery disease involving native coronary artery 08/02/2023    Sleep apnea     Type 2 diabetes mellitus with hyperglycemia, without long-term current use of insulin ([facility]) 03/29/2018    Right knee pain 09/23/2014    Myalgia and myositis 06/27/2014    Vitamin D deficiency 06/27/2014    Depressive disorder 05/14/2012    Essential hypertension 05/11/2012    Mixed hyperlipidemia 12/12/2011       Assessment:  # Re-entry Ventricular tachycardia  Asymptomatic- as slower rate,   Rhythm: Tikosyn 500 mg BID    EKG QTc: 480 msec    # CAD s/p CABG x 5 on 8/11/23- Dr Rema HAINES to LAD, SVG to D1, SVG Y to RI and OM1 and SVG to right PDA  Rx: metoprolol 25 mg QHS, Crestor 40 mg daily, asa 325 mg daily, Jardiance    Studies- personally reviewed by me  EKG:  bpm, no ischemic changes    CMR 12/8/23:  LVEF: 55%   Subendocardial scarring of the basal to mid inferior wall suggestive of an ischemic etiology.  Intramyocardial fibrosis within the mid inferolateral wall suggestive of myocarditis, but cannot exclude  cardiac sarcoidosis     Echo 12/6/23:  LVEF: 60%  RV: normal    LHC 8/2/23:   Left Anterior Descending   Prox LAD lesion is 70% stenosed.   Mid LAD lesion is 80% stenosed.   Dist LAD lesion is 90% stenosed.      First Diagonal Branch   1st Diag-1 lesion is 90% stenosed.   1st Diag-2 lesion is 80% stenosed.      Ramus Intermedius   Ramus lesion is 80% stenosed.      Left Circumflex   Mid Cx to Dist Cx lesion is 90% stenosed.      Right Coronary Artery      Right Posterior Descending Artery   Collaterals   RPDA filled by collaterals from Dist LAD.          NM stress 7/31/23: 7 min 17 sec, 7 METs, ischemic EKG changes. Ischemic perfusion defects in inferior segments    Echo 7/27/23:  LVEF: 60%  RV: normal  PASP: normal    Stress echo 4/11/18: 9 min 30 sec, 10.7 METs, negative for ischemia    # HTN- metoprolol 25 mg QHS, 12.5 mg AM  # DM2, last A1C 7.2%- glipizide, kombiglyze XR 2.5- 1000 mg, ozempic  # Wide complex tachycardia  # Assert 6 year device loop recorder implanted 2/21/24  Interrogation 6/5/25: no events  Interrogation 12/5/24: no sign events  Interrogation 2/26/24: ST  # Obesity, BMI 32.6%- down to 27  # hyperlipidemia- rosuvastatin 40 mg daily  8/20/24: LDL 30, HDL 46  6/8 /23: , HDL 42  2/21/18: , HDL 43, Tri 132  # carotid artery dz  VAS 8/8/23: known occlusion on right, < 50% on left  # Mild sleep apnea. Stops breathing at night  # depression  # DUDLEY- now on CPAP    Today's Plan:  No change in metoprolol  Now on CPAP  Watch weight  Continue metoprolol 25 mg daily- getting fatigue from it- take it at night  Assert 6 year device  Per EP: Depending upon finding of monitor will decide on further therapy  At this time no symptomatic episodes, EF has recovered, no other episodes of  VT    HPI:     64 yo male with hx DM2, last A1C over 9, obesity, hyperlipidemia, HTN, right carotid aneurysm who presents to establish CV care. Occasionally getting a little chest discomfort, intermittent. Walks about  "a mile or two every day. Does not seem exertional. Wife states he stops breathing at night.     Given concerning anginal symptoms had stress test which was positive and sent for [facility] leading to recommendation of CABG.      Pt at cardiac rehab and looked to have re-entry VT, aymptomatic as slow rate. Seen by EP in hospital. Started on Tikosyn    Interim:  Interrogation 6/5/25: no events    Went to [place],     Now on CPAP, waking up less    Gaining some weight- eating more  Past Medical History:   Diagnosis Date    Coronary artery disease     Diabetes mellitus (HCC)     History of transfusion     Hyperlipidemia     Hypertension     Wears dentures        Review of Systems   Constitutional:  Negative for activity change, appetite change, fatigue and unexpected weight change.   HENT:  Negative for congestion and nosebleeds.    Eyes: Negative.    Respiratory:  Negative for cough, chest tightness and shortness of breath.    Cardiovascular:  Negative for chest pain, palpitations and leg swelling.   Gastrointestinal:  Negative for abdominal distention.   Endocrine: Negative.    Genitourinary: Negative.    Musculoskeletal: Negative.    Skin: Negative.    Neurological:  Negative for dizziness, syncope and weakness.   Hematological: Negative.    Psychiatric/Behavioral: Negative.           Allergies   Allergen Reactions    Atorvastatin Other (See Comments)     felt flu-like    Rosiglitazone Other (See Comments)     Avandia for DM2. Risks outweigh benefits.    Trazodone Other (See Comments)     Felt \"hung over\"     .    Current Outpatient Medications:     acetaminophen (TYLENOL) 325 mg tablet, Take 1-2 tablets Q4-6 hours PRN pain. Do not take more than 4grams in 24 hours. (Patient not taking: Reported on 3/14/2025), Disp: , Rfl: 0    aspirin 325 mg tablet, Take 1 tablet (325 mg total) by mouth daily, Disp: 30 tablet, Rfl: 2    cholecalciferol (VITAMIN D3) 25 mcg (1,000 units) tablet, Take 1,000 Units by mouth 2 (two) times a day, " Disp: , Rfl:     clotrimazole-betamethasone (LOTRISONE) 1-0.05 % cream, Apply topically 2 (two) times a day, Disp: 45 g, Rfl: 0    Continuous Glucose Sensor (FreeStyle Ayanna 2 Sensor) MISC, Change sensor every 14 days, Disp: 6 each, Rfl: 1    cyanocobalamin (VITAMIN B-12) 1000 MCG tablet, Take 1,000 mcg by mouth 2 (two) times a day, Disp: , Rfl:     docusate sodium (COLACE) 100 mg capsule, Take 1 capsule (100 mg total) by mouth 2 (two) times a day Hold for soft stools., Disp: 60 capsule, Rfl: 0    dofetilide (TIKOSYN) 500 mcg capsule, Take 1 capsule (500 mcg total) by mouth every 12 (twelve) hours, Disp: 180 capsule, Rfl: 1    gabapentin (NEURONTIN) 100 mg capsule, TAKE 1 CAPSULE BY MOUTH IN THE MORNING AND 3 CAPSULES IN THE EVENING, Disp: 120 capsule, Rfl: 5    glipiZIDE (GLUCOTROL) 5 mg tablet, TAKE ONE TABLET BY MOUTH TWICE A DAY BEFORE MEALS, Disp: 180 tablet, Rfl: 1    Jardiance 25 MG TABS, TAKE 1 TABLET BY MOUTH IN THE MORNING, Disp: 90 tablet, Rfl: 1    LORazepam (ATIVAN) 0.5 mg tablet, Take 1 tablet (0.5 mg total) by mouth daily at bedtime, Disp: 30 tablet, Rfl: 0    Magnesium Gluconate 250 MG TABS, Take by mouth daily at bedtime, Disp: , Rfl:     metFORMIN (GLUCOPHAGE-XR) 500 mg 24 hr tablet, TAKE 2 TABLETS BY MOUTH 2 TIMES A DAY, Disp: 360 tablet, Rfl: 1    metoprolol succinate (TOPROL-XL) 25 mg 24 hr tablet, Take 12.5 mg in AM and 25 mg in PM, Disp: 135 tablet, Rfl: 3    oxyCODONE-acetaminophen (Percocet) 5-325 mg per tablet, Take 1 tablet by mouth every 4 (four) hours as needed for moderate pain for up to 10 doses Max Daily Amount: 6 tablets (Patient not taking: Reported on 3/14/2025), Disp: 10 tablet, Rfl: 0    Ozempic, 2 MG/DOSE, 8 MG/3ML injection pen, INJECT 0.75 ML (2 MG TOTAL) UNDER THE SKIN EVERY 7 DAYS, Disp: 9 mL, Rfl: 1    rosuvastatin (CRESTOR) 20 MG tablet, TAKE TWO TABLETS BY MOUTH EVERY DAY, Disp: 180 tablet, Rfl: 1    sertraline (ZOLOFT) 50 mg tablet, TAKE ONE TABLET BY MOUTH EVERY DAY,  Disp: 30 tablet, Rfl: 5    tadalafil (CIALIS) 20 MG tablet, Take 1 tablet (20 mg total) by mouth daily as needed for erectile dysfunction, Disp: 10 tablet, Rfl: 5    Social History     Socioeconomic History    Marital status: /Civil Union     Spouse name: Not on file    Number of children: Not on file    Years of education: Not on file    Highest education level: Not on file   Occupational History    Not on file   Tobacco Use    Smoking status: Never    Smokeless tobacco: Never   Vaping Use    Vaping status: Never Used   Substance and Sexual Activity    Alcohol use: No    Drug use: No    Sexual activity: Yes     Partners: Female   Other Topics Concern    Not on file   Social History Narrative    Not on file     Social Drivers of Health     Financial Resource Strain: Patient Declined (8/24/2023)    Received from [facility]    Overall Financial Resource Strain (CARDIA)     Difficulty of Paying Living Expenses: Patient declined   Food Insecurity: Patient Declined (8/24/2023)    Received from [facility]    Hunger Vital Sign     Within the past 12 months, you worried that your food would run out before you got the money to buy more.: Patient declined     Within the past 12 months, the food you bought just didn't last and you didn't have money to get more.: Patient declined   Transportation Needs: No Transportation Needs (8/24/2023)    Received from [facility]    PRAPARE - Transportation     Lack of Transportation (Medical): No     Lack of Transportation (Non-Medical): No   Physical Activity: Sufficiently Active (12/20/2023)    Exercise Vital Sign     Days of Exercise per Week: 3 days     Minutes of Exercise per Session: 60 min   Stress: Stress Concern Present (12/20/2023)    [employer] [place] [employer] - Occupational Stress Questionnaire     Feeling of Stress : Rather much   Social Connections: Unknown (12/20/2023)    Social Connection and  Isolation Panel     Frequency of Communication with Friends and Family: More than three times a week     Frequency of Social Gatherings with Friends and Family: More than three times a week     Attends Rastafarian Services: Patient declined     Active Member of Clubs or Organizations: Patient declined     Attends Club or Organization Meetings: Patient declined     Marital Status:    Intimate Partner Violence: Not At Risk (12/20/2023)    Humiliation, Afraid, Rape, and Kick questionnaire     Fear of Current or Ex-Partner: No     Emotionally Abused: No     Physically Abused: No     Sexually Abused: No   Housing Stability: Unknown (8/24/2023)    Received from [facility]    Housing Stability Vital Sign     Unable to Pay for Housing in the Last Year: Patient refused     Number of Places Lived in the Last Year: Not on file     Unstable Housing in the Last Year: Patient refused       Family History   Problem Relation Name Age of Onset    Leukemia Mother Rose Obleschuk     Cancer Mother Rose Obleschuk     Heart attack Father  52       Physical Exam:    Vitals: There were no vitals taken for this visit., There is no height or weight on file to calculate BMI.,   Wt Readings from Last 3 Encounters:   05/12/25 98.9 kg (218 lb)   03/03/25 100 kg (221 lb)   02/14/25 101 kg (221 lb 12.8 oz)       Physical Exam:  There were no vitals filed for this visit.        GEN: John Obleschuk appears well, alert and oriented x 3, pleasant and cooperative   HEENT: pupils equal, round, and reactive to light; extraocular muscles intact  NECK: supple, no carotid bruits   HEART: regular rhythm, normal S1 and S2, no murmurs, clicks, gallops or rubs, JVP is    LUNGS: clear to auscultation bilaterally; no wheezes, rales, or rhonchi   ABDOMEN: normal bowel sounds, soft, no tenderness, no distention  EXTREMITIES: peripheral pulses normal; no clubbing, cyanosis, or edema  NEURO: no focal findings   SKIN: normal without suspicious  "lesions on exposed skin    Labs & Results:    Lab Results   Component Value Date    WBC 9.18 08/27/2024    HGB 13.6 08/27/2024    HCT 43.9 08/27/2024    MCV 89 08/27/2024     08/27/2024     Lab Results   Component Value Date    GLUCOSE 184 (H) 08/11/2023    CALCIUM 9.6 02/08/2025    K 5.0 02/08/2025    CO2 29 02/08/2025     02/08/2025    BUN 20 02/08/2025    CREATININE 0.94 02/08/2025     Lab Results   Component Value Date    BNP 17 07/24/2023      No results found for: \"CHOL\"  Lab Results   Component Value Date    HDL 46 08/20/2024    HDL 39 (L) 12/06/2023     Lab Results   Component Value Date    LDLCALC 30 08/20/2024    LDLCALC 31 12/06/2023     Lab Results   Component Value Date    TRIG 64 08/20/2024    TRIG 99 12/06/2023     No results found for: \"CHOLHDL\"      EKG personally reviewed by Bright Chow.     Counseling / Coordination of Care  Total floor / unit time spent today 25 minutes.  Greater than 50% of total time was spent with the patient and / or family counseling and / or coordination of care.  A description of the counseling / coordination of care: 15 min.      Thank you for the opportunity to participate in the care of this patient.    Bright Chow D.O.  Director of Advanced Heart Failure Program  Medical Director of LVAD Program  [facility]  "

## 2025-06-17 ENCOUNTER — OFFICE VISIT (OUTPATIENT)
Dept: CARDIOLOGY CLINIC | Facility: CLINIC | Age: 65
End: 2025-06-17
Payer: COMMERCIAL

## 2025-06-17 VITALS
HEART RATE: 89 BPM | SYSTOLIC BLOOD PRESSURE: 108 MMHG | WEIGHT: 228 LBS | DIASTOLIC BLOOD PRESSURE: 70 MMHG | HEIGHT: 70 IN | BODY MASS INDEX: 32.64 KG/M2 | OXYGEN SATURATION: 97 %

## 2025-06-17 DIAGNOSIS — N52.01 ERECTILE DYSFUNCTION DUE TO ARTERIAL INSUFFICIENCY: ICD-10-CM

## 2025-06-17 DIAGNOSIS — G47.33 OSA (OBSTRUCTIVE SLEEP APNEA): Primary | ICD-10-CM

## 2025-06-17 DIAGNOSIS — F41.9 ANXIETY: ICD-10-CM

## 2025-06-17 DIAGNOSIS — G47.30 SLEEP APNEA, UNSPECIFIED TYPE: ICD-10-CM

## 2025-06-17 DIAGNOSIS — I25.10 CORONARY ARTERY DISEASE INVOLVING NATIVE CORONARY ARTERY OF NATIVE HEART WITHOUT ANGINA PECTORIS: ICD-10-CM

## 2025-06-17 DIAGNOSIS — I47.0 RE-ENTRY VENTRICULAR ARRHYTHMIA (HCC): ICD-10-CM

## 2025-06-17 DIAGNOSIS — I10 ESSENTIAL HYPERTENSION: ICD-10-CM

## 2025-06-17 PROCEDURE — 99214 OFFICE O/P EST MOD 30 MIN: CPT | Performed by: INTERNAL MEDICINE

## 2025-06-17 RX ORDER — LORAZEPAM 0.5 MG/1
0.5 TABLET ORAL
Qty: 60 TABLET | Refills: 2 | Status: SHIPPED | OUTPATIENT
Start: 2025-06-17

## 2025-06-17 RX ORDER — TADALAFIL 20 MG/1
20 TABLET ORAL DAILY PRN
Qty: 10 TABLET | Refills: 5 | Status: SHIPPED | OUTPATIENT
Start: 2025-06-17 | End: 2025-06-17

## 2025-06-17 RX ORDER — TADALAFIL 20 MG/1
20 TABLET ORAL DAILY PRN
Qty: 10 TABLET | Refills: 3 | Status: SHIPPED | OUTPATIENT
Start: 2025-06-17

## 2025-06-17 NOTE — PATIENT INSTRUCTIONS
No changes to your current therapy    I think you are doing well    Watch your weight, stay active

## 2025-07-12 ENCOUNTER — APPOINTMENT (OUTPATIENT)
Dept: LAB | Facility: MEDICAL CENTER | Age: 65
End: 2025-07-12
Payer: COMMERCIAL

## 2025-07-12 DIAGNOSIS — E78.2 MIXED HYPERLIPIDEMIA: ICD-10-CM

## 2025-07-12 DIAGNOSIS — I10 ESSENTIAL HYPERTENSION: ICD-10-CM

## 2025-07-12 DIAGNOSIS — E11.65 TYPE 2 DIABETES MELLITUS WITH HYPERGLYCEMIA, WITHOUT LONG-TERM CURRENT USE OF INSULIN (HCC): ICD-10-CM

## 2025-07-12 DIAGNOSIS — E11.42 DIABETIC POLYNEUROPATHY ASSOCIATED WITH TYPE 2 DIABETES MELLITUS (HCC): ICD-10-CM

## 2025-07-12 LAB
ALBUMIN SERPL BCG-MCNC: 4.4 G/DL (ref 3.5–5)
ALP SERPL-CCNC: 65 U/L (ref 34–104)
ALT SERPL W P-5'-P-CCNC: 21 U/L (ref 7–52)
ANION GAP SERPL CALCULATED.3IONS-SCNC: 8 MMOL/L (ref 4–13)
AST SERPL W P-5'-P-CCNC: 24 U/L (ref 13–39)
BASOPHILS # BLD AUTO: 0.1 THOUSANDS/ÂΜL (ref 0–0.1)
BASOPHILS NFR BLD AUTO: 1 % (ref 0–1)
BILIRUB SERPL-MCNC: 1.17 MG/DL (ref 0.2–1)
BUN SERPL-MCNC: 18 MG/DL (ref 5–25)
CALCIUM SERPL-MCNC: 9.4 MG/DL (ref 8.4–10.2)
CHLORIDE SERPL-SCNC: 103 MMOL/L (ref 96–108)
CO2 SERPL-SCNC: 28 MMOL/L (ref 21–32)
CREAT SERPL-MCNC: 0.89 MG/DL (ref 0.6–1.3)
CREAT UR-MCNC: 31.1 MG/DL
EOSINOPHIL # BLD AUTO: 0.35 THOUSAND/ÂΜL (ref 0–0.61)
EOSINOPHIL NFR BLD AUTO: 4 % (ref 0–6)
ERYTHROCYTE [DISTWIDTH] IN BLOOD BY AUTOMATED COUNT: 13.6 % (ref 11.6–15.1)
EST. AVERAGE GLUCOSE BLD GHB EST-MCNC: 166 MG/DL
GFR SERPL CREATININE-BSD FRML MDRD: 89 ML/MIN/1.73SQ M
GLUCOSE P FAST SERPL-MCNC: 151 MG/DL (ref 65–99)
HBA1C MFR BLD: 7.4 %
HCT VFR BLD AUTO: 49.1 % (ref 36.5–49.3)
HGB BLD-MCNC: 15.4 G/DL (ref 12–17)
IMM GRANULOCYTES # BLD AUTO: 0.03 THOUSAND/UL (ref 0–0.2)
IMM GRANULOCYTES NFR BLD AUTO: 0 % (ref 0–2)
LYMPHOCYTES # BLD AUTO: 2.66 THOUSANDS/ÂΜL (ref 0.6–4.47)
LYMPHOCYTES NFR BLD AUTO: 31 % (ref 14–44)
MCH RBC QN AUTO: 27.7 PG (ref 26.8–34.3)
MCHC RBC AUTO-ENTMCNC: 31.4 G/DL (ref 31.4–37.4)
MCV RBC AUTO: 88 FL (ref 82–98)
MICROALBUMIN UR-MCNC: 9.9 MG/L
MICROALBUMIN/CREAT 24H UR: 32 MG/G CREATININE (ref 0–30)
MONOCYTES # BLD AUTO: 0.77 THOUSAND/ÂΜL (ref 0.17–1.22)
MONOCYTES NFR BLD AUTO: 9 % (ref 4–12)
NEUTROPHILS # BLD AUTO: 4.76 THOUSANDS/ÂΜL (ref 1.85–7.62)
NEUTS SEG NFR BLD AUTO: 55 % (ref 43–75)
NRBC BLD AUTO-RTO: 0 /100 WBCS
PLATELET # BLD AUTO: 187 THOUSANDS/UL (ref 149–390)
PMV BLD AUTO: 10.8 FL (ref 8.9–12.7)
POTASSIUM SERPL-SCNC: 4.8 MMOL/L (ref 3.5–5.3)
PROT SERPL-MCNC: 7.5 G/DL (ref 6.4–8.4)
RBC # BLD AUTO: 5.56 MILLION/UL (ref 3.88–5.62)
SODIUM SERPL-SCNC: 139 MMOL/L (ref 135–147)
TSH SERPL DL<=0.05 MIU/L-ACNC: 1.73 UIU/ML (ref 0.45–4.5)
WBC # BLD AUTO: 8.67 THOUSAND/UL (ref 4.31–10.16)

## 2025-07-12 PROCEDURE — 80053 COMPREHEN METABOLIC PANEL: CPT

## 2025-07-12 PROCEDURE — 84443 ASSAY THYROID STIM HORMONE: CPT

## 2025-07-12 PROCEDURE — 36415 COLL VENOUS BLD VENIPUNCTURE: CPT

## 2025-07-12 PROCEDURE — 85025 COMPLETE CBC W/AUTO DIFF WBC: CPT

## 2025-07-12 PROCEDURE — 82570 ASSAY OF URINE CREATININE: CPT

## 2025-07-12 PROCEDURE — 82043 UR ALBUMIN QUANTITATIVE: CPT

## 2025-07-12 PROCEDURE — 83036 HEMOGLOBIN GLYCOSYLATED A1C: CPT

## 2025-07-16 ENCOUNTER — OFFICE VISIT (OUTPATIENT)
Dept: SLEEP CENTER | Facility: CLINIC | Age: 65
End: 2025-07-16
Payer: COMMERCIAL

## 2025-07-16 ENCOUNTER — TELEPHONE (OUTPATIENT)
Dept: SLEEP CENTER | Facility: CLINIC | Age: 65
End: 2025-07-16

## 2025-07-16 VITALS
WEIGHT: 221 LBS | BODY MASS INDEX: 31.64 KG/M2 | SYSTOLIC BLOOD PRESSURE: 120 MMHG | HEIGHT: 70 IN | DIASTOLIC BLOOD PRESSURE: 80 MMHG

## 2025-07-16 DIAGNOSIS — G47.33 OSA (OBSTRUCTIVE SLEEP APNEA): Primary | ICD-10-CM

## 2025-07-16 DIAGNOSIS — E66.9 OBESITY (BMI 30-39.9): ICD-10-CM

## 2025-07-16 DIAGNOSIS — F40.240 CLAUSTROPHOBIA: ICD-10-CM

## 2025-07-16 DIAGNOSIS — G47.61 PERIODIC LIMB MOVEMENTS OF SLEEP: ICD-10-CM

## 2025-07-16 DIAGNOSIS — F41.9 ANXIETY: ICD-10-CM

## 2025-07-16 DIAGNOSIS — E11.42 DIABETIC POLYNEUROPATHY ASSOCIATED WITH TYPE 2 DIABETES MELLITUS (HCC): ICD-10-CM

## 2025-07-16 DIAGNOSIS — R40.0 DAYTIME SLEEPINESS: ICD-10-CM

## 2025-07-16 DIAGNOSIS — I25.10 CORONARY ARTERY DISEASE INVOLVING NATIVE CORONARY ARTERY OF NATIVE HEART WITHOUT ANGINA PECTORIS: ICD-10-CM

## 2025-07-16 DIAGNOSIS — I10 ESSENTIAL HYPERTENSION: ICD-10-CM

## 2025-07-16 DIAGNOSIS — R00.0 WIDE-COMPLEX TACHYCARDIA: ICD-10-CM

## 2025-07-16 DIAGNOSIS — G47.34 SLEEP RELATED HYPOXIA: ICD-10-CM

## 2025-07-16 PROCEDURE — 99214 OFFICE O/P EST MOD 30 MIN: CPT | Performed by: INTERNAL MEDICINE

## 2025-07-16 NOTE — PROGRESS NOTES
Name: John Obleschuk      : 1960      MRN: [id number]  Encounter Provider: Andi Varner MD  Encounter Date: 2025   Encounter department: [facility] [place]  :  Assessment & Plan  DUDLEY (obstructive sleep apnea)    Orders:    PAP DME Pressure Change    PAP DME Resupply/Reorder    Sleep related hypoxia         Periodic limb movements of sleep         Daytime sleepiness         Claustrophobia         Anxiety         Essential hypertension         Wide-complex tachycardia         Coronary artery disease involving native coronary artery of native heart without angina pectoris         Diabetic polyneuropathy associated with type 2 diabetes mellitus (HCC)    Lab Results   Component Value Date    HGBA1C 7.4 (H) 2025            Obesity (BMI 30-39.9)               PLAN: Above listed Problems & Comorbidities were Addressed this Visit.  Improved/stable/controlled/resolved conditions as reviewed in notes.   Results of prior studies and physiologic data reviewed  with the patient.   I discussed treatment options with risks and benefits.  Treatment with  PAP is medically necessary and Huan is agreable to continue use.   Care of equipment, methods to improve comfort using PAP and importance of compliance with therapy were discussed.  Pressure setting:change 7-10 cmH2O. Mask type full face  Rx provided to replace supplies and Care coordinated with DME provider.   Continue gabapentin.  If PLM's increase, he may benefit from an alternative to sertraline.  Discussed strategies for weight reduction.    Follow-up is advised in 1 year or sooner if needed to monitor progress, compliance and to adjust therapy.        History of Present Illness   HPI          Follow-Up Note - Sleep Center   John Obleschuk  65 y.o. male  :1960  MRN:[id number]  DOS:2025    CC: I saw this patient for follow-up in clinic today for Sleep disordered breathing, Coexisting Sleep and Medical Problems.. Interval changes:  Treatment was initiated using a ResMed machine.  A sleep study to titrate Positive airway pressure (PAP) therapy was undertaken. Patient is here to review results and to initiate therapy.    The study demonstrated sleep disordered breathing was appeared to be adequately remediated with PAP at 6 cm H2O. there were persistent periodic limb movements of sleep.  Auto titrating PAP 6-9 cm H2O was recommended.     HST on 8/7/2024 demonstrateda respiratory event index (KRISTY) of 11.9 .  The lowest SPO2 recorded is 78.0% and 21 minutes during the study was spent with saturations below 90%.  The snore index was 1.6%.    HST in 2018 demonstrated a respiratory event index of 13.3/h, considerably higher while supine. Minimum oxygen saturation was 68%.     PFSH, Problem List, Medications & Allergies were reviewed in EMR.   He  has a past medical history of Coronary artery disease, Diabetes mellitus (HCC), History of transfusion, Hyperlipidemia, Hypertension, and Wears dentures.    He has a current medication list which includes the following prescription(s): aspirin, cholecalciferol, clotrimazole-betamethasone, freestyle lambert 2 sensor, cyanocobalamin, dofetilide, gabapentin, glipizide, jardiance, lorazepam, magnesium gluconate, metformin, metoprolol succinate, ozempic (2 mg/dose), rosuvastatin, sertraline, tadalafil, acetaminophen, docusate sodium, and oxycodone-acetaminophen.    PHYSIOLOGICAL DATA REVIEW : Using PAP > 4 hours/night 100%. Estimated KRISTY 1.9/hour with pressure of 8.8cm H2O@90th/95th percentile;.  INTERPRETATION: Compliance is excellent; Pressure setting is:adequate; ;     SUBJECTIVE: With respect to use of PAP, Huan  is experiencing no adverse effects; claustrophobia has improved.:.He derives benefit.. Is satisfied with sleep and daytime function.     Sleep Routine: Huan reports getting (Patient-Rptd) (P) 7- 8 hrs sleep; he has no difficulty initiating and maintaining sleep . He arises before alarm most days and  "(Patient-Rptd) (P)  feels more refreshed since on Rx.Huan (Patient-Rptd) (P) Sometimes]reports daytime sleepiness, dozes off when sedentary at home.  He rated himself at Total score: (Patient-Rptd) (P) 7 /24 on the [place] Sleepiness Scale.   Other issues: He is not aware of jerking limb movements during sleep..  He is on gabapentin for diabetic peripheral neuropathy.    Habits: Reports that he has never smoked. He has never used smokeless tobacco.,  Reports no history of alcohol use.,  Reports no history of drug use., Caffeine use: none; Exercise routine: regular.      ROS: Significant for weight has been fluctuating.  He is reporting no nasal, respiratory or cardiac symptoms.  Anxiety is controlled on Zoloft.    EXAM: /80   Ht 5' 10\" (1.778 m)   Wt 100 kg (221 lb)   BMI 31.71 kg/m²     Wt Readings from Last 3 Encounters:   07/16/25 100 kg (221 lb)   06/17/25 103 kg (228 lb)   05/12/25 98.9 kg (218 lb)      Patient is well groomed; well appearing.   CNS: Alert, orientated, speech clear & coherent  Psych: cooperative and in no distress. Mental state: Appears normal.  H&N: EOMI; NC/AT: No facial pressure marks, no rashes.    Skin/Extrem: col & hydration normal; no edema  Resp: Respiratory effort is normal  Physical findings otherwise essentially unchanged from previous.    Lab Results   Component Value Date    SODIUM 139 07/12/2025    K 4.8 07/12/2025     07/12/2025    CO2 28 07/12/2025    AGAP 8 07/12/2025    BUN 18 07/12/2025    CREATININE 0.89 07/12/2025    GLUC 77 08/27/2024    GLUF 151 (H) 07/12/2025    CALCIUM 9.4 07/12/2025    AST 24 07/12/2025    ALT 21 07/12/2025    ALKPHOS 65 07/12/2025    TP 7.5 07/12/2025    TBILI 1.17 (H) 07/12/2025    EGFR 89 07/12/2025   ;   Lab Results   Component Value Date    WBC 8.67 07/12/2025    HGB 15.4 07/12/2025    HCT 49.1 07/12/2025    MCV 88 07/12/2025     07/12/2025   : [unfilled]    Sincerely,     Authenticated electronically on 07/16/25   Board " "Certified Specialist     Portions of the record may have been created with voice recognition software. Occasional wrong word or \"sound a like\" substitutions may have occurred due to the inherent limitations of voice recognition software. There may also be notations and random deletions of words or characters from malfunctioning software. Read the chart carefully and recognize, using context, where substitutions/deletions have occurred.          Review of Systems           "

## 2025-07-18 ENCOUNTER — OFFICE VISIT (OUTPATIENT)
Dept: ENDOCRINOLOGY | Facility: HOSPITAL | Age: 65
End: 2025-07-18
Payer: COMMERCIAL

## 2025-07-18 VITALS
WEIGHT: 221.6 LBS | HEART RATE: 96 BPM | SYSTOLIC BLOOD PRESSURE: 120 MMHG | DIASTOLIC BLOOD PRESSURE: 80 MMHG | BODY MASS INDEX: 31.73 KG/M2 | HEIGHT: 70 IN

## 2025-07-18 DIAGNOSIS — E11.65 TYPE 2 DIABETES MELLITUS WITH HYPERGLYCEMIA, WITHOUT LONG-TERM CURRENT USE OF INSULIN (HCC): Primary | ICD-10-CM

## 2025-07-18 DIAGNOSIS — I10 ESSENTIAL HYPERTENSION: ICD-10-CM

## 2025-07-18 DIAGNOSIS — E78.2 MIXED HYPERLIPIDEMIA: ICD-10-CM

## 2025-07-18 DIAGNOSIS — E11.42 DIABETIC POLYNEUROPATHY ASSOCIATED WITH TYPE 2 DIABETES MELLITUS (HCC): ICD-10-CM

## 2025-07-18 LAB

## 2025-07-18 PROCEDURE — 95251 CONT GLUC MNTR ANALYSIS I&R: CPT | Performed by: INTERNAL MEDICINE

## 2025-07-18 PROCEDURE — 99214 OFFICE O/P EST MOD 30 MIN: CPT | Performed by: INTERNAL MEDICINE

## 2025-07-18 RX ORDER — BLOOD-GLUCOSE SENSOR
1 EACH MISCELLANEOUS
Qty: 6 EACH | Refills: 3 | Status: SHIPPED | OUTPATIENT
Start: 2025-07-18

## 2025-07-18 NOTE — ASSESSMENT & PLAN NOTE
Lab Results   Component Value Date    HGBA1C 7.4 (H) 07/12/2025       Orders:    Comprehensive metabolic panel; Future    Hemoglobin A1C; Future    Lipid Panel with Direct LDL reflex; Future    Continuous Glucose Sensor (FreeStyle Ayanna 2 Plus Sensor) MISC; Use 1 each every 15 (fifteen) days

## 2025-07-18 NOTE — PATIENT INSTRUCTIONS
Hgba1c is 7.4%. this is not bad a but a bit higher.     Continue the ozempic, jardiance, metformin, and glipizide.     Switch to the freestyle lambert 2 plus.     Work on breakfast, increasing protein to prevent the spike.     You are overdue for eye doctor visit.     Follow up in 4 months with blood work.

## 2025-07-18 NOTE — ASSESSMENT & PLAN NOTE
Lab Results   Component Value Date    HGBA1C 7.4 (H) 07/12/2025       Orders:    Comprehensive metabolic panel; Future    Hemoglobin A1C; Future    Lipid Panel with Direct LDL reflex; Future

## 2025-07-18 NOTE — PROGRESS NOTES
Name: John Obleschuk      : 1960      MRN: [id number]  Encounter Provider: Darlin Almazan MD  Encounter Date: 2025   Encounter department: [facility]    No chief complaint on file.  :  Assessment & Plan  Type 2 diabetes mellitus with hyperglycemia, without long-term current use of insulin (HCC)    Lab Results   Component Value Date    HGBA1C 7.4 (H) 2025       Orders:    Comprehensive metabolic panel; Future    Hemoglobin A1C; Future    Lipid Panel with Direct LDL reflex; Future    Continuous Glucose Sensor (FreeStyle Ayanna 2 Plus Sensor) MISC; Use 1 each every 15 (fifteen) days    Diabetic polyneuropathy associated with type 2 diabetes mellitus (HCC)    Lab Results   Component Value Date    HGBA1C 7.4 (H) 2025       Orders:    Comprehensive metabolic panel; Future    Hemoglobin A1C; Future    Lipid Panel with Direct LDL reflex; Future    Essential hypertension    Orders:    Comprehensive metabolic panel; Future    Hemoglobin A1C; Future    Lipid Panel with Direct LDL reflex; Future    Mixed hyperlipidemia    Orders:    Comprehensive metabolic panel; Future    Hemoglobin A1C; Future    Lipid Panel with Direct LDL reflex; Future      Assessment & Plan  1. Type 2 diabetes mellitus.  - Hemoglobin A1c has increased to 7.4.  - Advised to incorporate more protein into breakfast to prevent blood sugar spikes. Discussed the need to switch from Ayanna 2 to Aynana 2+ for better functionality.  - Continue current regimen of Ozempic 2 mg once a week, Jardiance 25 mg a day, glipizide 5 mg twice a day, and metformin 500 mg (2 in the morning, 2 in the evening).    2. Hypertension.  - Continue current medication regimen of metoprolol 25 mg (half a pill in the morning and a whole one later in the day).  Normotensive in the office today.    3. Hyperlipidemia.  - An LDL cholesterol test will be ordered during the next blood work.  - Continue current medication  regimen of rosuvastatin 40 mg a day.    4.  Diabetic neuropathy.  - Reports improvement in numbness and tingling of feet. No new wounds are present.    5. Retinopathy.  - Reports a little blurry vision.  - Acknowledges the need to visit an eye doctor. Advised to schedule an eye exam soon.    I have asked him to follow-up in 4 months with preceding hemoglobin A1c, CMP, and lipid panel.      Pertinent Medical History   John Obleschuk is a 65-year-old male with a history of type 2 diabetes with neuropathy and retinopathy diagnosed 16 years ago, hypertension, and hyperlipidemia.    Diabetes complications include neuropathy, retinopathy, claudication, and heart disease post CABG. He has no nephropathy, stroke, or heart attack.      History of Present Illness   History of Present Illness  John Obleschuk is a 65-year-old male with a history of type 2 diabetes with neuropathy and retinopathy diagnosed 16 years ago, hypertension, and hyperlipidemia, here for a follow-up visit.     Diabetes complications include neuropathy, retinopathy, claudication, and heart disease post CABG. He has no nephropathy, stroke, or heart attack.    He reports no excessive urination but mentions that he used to wake up at night to urinate before starting CPAP therapy. Now, he only wakes up once during the night to urinate. He does not experience excessive thirst or hunger but mentions increased snacking due to recent stress. His weight has remained stable. He reports no abdominal pain, nausea, diarrhea, or constipation. His bowel movements have normalized since his quintuple bypass surgery 2 years ago. He experiences mild blurry vision and plans to visit an eye doctor. He has changed his insurance and is unsure about the coverage. He reports improvement in numbness and tingling in his feet. His last foot exam was conducted by Dr. Eva Powers in 05/2025. He had a scheduled appointment with him on Monday, but he canceled it as he did not need  his nails trimmed. He reports no wounds.    Current diabetes medications include glipizide 5 mg twice daily, Jardiance 25 mg daily, Ozempic 2 mg weekly, and metformin 500 mg twice daily. He has recently started consuming protein shakes and is considering switching from Ayanna 2 to Ayanna 3 for blood sugar monitoring. He records his blood sugar levels on his phone and has noticed that his Freestyle Ayanna sensor only works for 6 hours before dying.    He continues to take rosuvastatin 40 mg daily and metoprolol 25 mg, half a pill in the morning and a whole one later in the day. He is also on Tikosyn prescribed by his cardiologist. He reports no chest pain or shortness of breath. He experienced an anxiety attack around his birthday.    Diet Recall:   -Breakfast: Oatmeal or keto cereal      CGM Interpretation:  Date Range: 7/4/2025 through 7/17/2025  Device used: Freestyle Ayanna 2  Type: Type 2 Diabetes  Home use , CGM active 67% of the time   Analysis of data:   Average Glucose: 139 mg/dL  GMI: 6.6%  Coefficient of Variation: 27.5%  Glucose Variability: 27.5%  Time in Target Range: 88%   Time Above Range: 8% high, 3% very high  Time Below Range: 1% low, 0% very low  Interpretation of data: Overall freestyle ayanna download demonstrates relatively stable sugars throughout the day except for spikes into the 200s post breakfast.  More than 72 hours of data was reviewed. Report to be scanned to chart.     Current diabetic regimen:   He takes glipizide 5 mg twice a day, Jardiance 25 mg daily, Ozempic 2 mg once a week, and metformin  mg 2 tablets twice a day for diabetes management.    He takes metoprolol 25 mg half in the morning and 1 in the evening for hypertension.    He takes rosuvastatin 40 mg daily for hyperlipidemia.      Review of Systems as per HPI    Medical History Reviewed by provider this encounter:  Tobacco  Allergies  Meds  Problems  Med Hx  Surg Hx  Fam Hx     .  Medications Ordered Prior to  "Encounter[1]   Social History[2]     Medical History Reviewed by provider this encounter:  Tobacco  Allergies  Meds  Problems  Med Hx  Surg Hx  Fam Hx     .    Objective   /80   Pulse 96   Ht 5' 10\" (1.778 m)   Wt 101 kg (221 lb 9.6 oz)   BMI 31.80 kg/m²      Body mass index is 31.8 kg/m².  Wt Readings from Last 3 Encounters:   07/18/25 101 kg (221 lb 9.6 oz)   07/16/25 100 kg (221 lb)   06/17/25 103 kg (228 lb)     Physical Exam    Physical Exam  Vitals and nursing note reviewed.   Constitutional:       General: He is not in acute distress.     Appearance: Normal appearance. He is well-developed. He is obese.   HENT:      Head: Normocephalic and atraumatic.     Eyes:      Conjunctiva/sclera: Conjunctivae normal.     Neck:      Vascular: No carotid bruit.      Comments: Thyroid normal in size.  No palpable thyroid nodules.  Cardiovascular:      Rate and Rhythm: Normal rate and regular rhythm.      Heart sounds: Normal heart sounds. No murmur heard.  Pulmonary:      Effort: Pulmonary effort is normal. No respiratory distress.      Breath sounds: Normal breath sounds. No wheezing.   Abdominal:      Palpations: Abdomen is soft.     Musculoskeletal:         General: No swelling.      Cervical back: Neck supple.      Right lower leg: No edema.      Left lower leg: No edema.   Lymphadenopathy:      Cervical: No cervical adenopathy.     Skin:     General: Skin is warm and dry.     Neurological:      Mental Status: He is alert and oriented to person, place, and time.     Psychiatric:         Mood and Affect: Mood normal.       Last Eye Exam: 03/29/2023  Last Foot Exam: 05/12/2025  Health Maintenance   Topic Date Due    Diabetic Eye Exam  03/29/2025    Diabetic Foot Exam  05/12/2026       Results    Labs: I have reviewed pertinent labs including:   Lab Results   Component Value Date    HGBA1C 7.4 (H) 07/12/2025    HGBA1C 7.2 (H) 02/08/2025    HGBA1C 6.8 (H) 08/20/2024     Blood work performed on 7/12/2025 " showed a CMP with a glucose of 151 fasting, total bilirubin 1.17, but was otherwise normal.    CBC is normal.    Urine microalbumin to creatinine ratio was 32.    TSH is 1.726.     Lab Results   Component Value Date    CREATININE 0.89 07/12/2025    CREATININE 0.94 02/08/2025    CREATININE 0.88 08/27/2024    BUN 18 07/12/2025    K 4.8 07/12/2025     07/12/2025    CO2 28 07/12/2025      GFR, Calculated   Date Value Ref Range Status   06/05/2020 91 >60 mL/min/1.73m2 Final     Comment:     mL/min per 1.73 square meters                                            Normal Function or Mild Renal    Disease (if clinically at risk):  >or=60  Moderately Decreased:                30-59  Severely Decreased:                  15-29  Renal Failure:                         <15                                            -American GFR: multiply reported GFR by 1.16    Please note that the eGFR is based on the CKD-EPI calculation, and is not intended to be used for drug dosing.                                            Note: Calculated GFR may not be an accurate indicator of renal function if the patient's renal function is not in a steady state.     eGFRcr   Date Value Ref Range Status   06/08/2023 72 >59 Final     eGFR   Date Value Ref Range Status   07/12/2025 89 ml/min/1.73sq m Final      HDL, Direct   Date Value Ref Range Status   08/20/2024 46 >=40 mg/dL Final     Triglycerides   Date Value Ref Range Status   08/20/2024 64 See Comment mg/dL Final     Comment:     Triglyceride:     0-9Y            <75mg/dL     10Y-17Y         <90 mg/dL       >=18Y     Normal          <150 mg/dL     Borderline High 150-199 mg/dL     High            200-499 mg/dL        Very High       >499 mg/dL    Specimen collection should occur prior to Metamizole administration due to the potential for falsely depressed results.      ALT   Date Value Ref Range Status   07/12/2025 21 7 - 52 U/L Final     Comment:     Specimen collection should occur  prior to Sulfasalazine administration due to the potential for falsely depressed results.    06/08/2023 18 <56 U/L Final     AST   Date Value Ref Range Status   07/12/2025 24 13 - 39 U/L Final   06/08/2023 16 <41 U/L Final     Alkaline Phosphatase   Date Value Ref Range Status   07/12/2025 65 34 - 104 U/L Final   06/08/2023 47 35 - 120 U/L Final      Lab Results   Component Value Date    BBE5CIOATREQ 1.726 07/12/2025           Patient Instructions   Hgba1c is 7.4%. this is not bad a but a bit higher.     Continue the ozempic, jardiance, metformin, and glipizide.     Switch to the freestyle lambert 2 plus.     Work on breakfast, increasing protein to prevent the spike.     You are overdue for eye doctor visit.     Follow up in 4 months with blood work.     Discussed with the patient and all questioned fully answered. He will call me if any problems arise.           [1]   Current Outpatient Medications on File Prior to Visit   Medication Sig Dispense Refill    aspirin 325 mg tablet Take 1 tablet (325 mg total) by mouth daily 30 tablet 2    cholecalciferol (VITAMIN D3) 25 mcg (1,000 units) tablet Take 1,000 Units by mouth in the morning and 1,000 Units in the evening.      clotrimazole-betamethasone (LOTRISONE) 1-0.05 % cream Apply topically 2 (two) times a day 45 g 0    cyanocobalamin (VITAMIN B-12) 1000 MCG tablet Take 1,000 mcg by mouth in the morning and 1,000 mcg before bedtime.      docusate sodium (COLACE) 100 mg capsule Take 1 capsule (100 mg total) by mouth 2 (two) times a day Hold for soft stools. 60 capsule 0    dofetilide (TIKOSYN) 500 mcg capsule Take 1 capsule (500 mcg total) by mouth every 12 (twelve) hours 180 capsule 1    gabapentin (NEURONTIN) 100 mg capsule TAKE 1 CAPSULE BY MOUTH IN THE MORNING AND 3 CAPSULES IN THE EVENING 120 capsule 5    glipiZIDE (GLUCOTROL) 5 mg tablet TAKE ONE TABLET BY MOUTH TWICE A DAY BEFORE MEALS 180 tablet 1    Jardiance 25 MG TABS TAKE 1 TABLET BY MOUTH IN THE MORNING 90  tablet 1    LORazepam (ATIVAN) 0.5 mg tablet Take 1 tablet (0.5 mg total) by mouth daily at bedtime 60 tablet 2    Magnesium Gluconate 250 MG TABS Take by mouth daily at bedtime      metFORMIN (GLUCOPHAGE-XR) 500 mg 24 hr tablet TAKE 2 TABLETS BY MOUTH 2 TIMES A  tablet 1    metoprolol succinate (TOPROL-XL) 25 mg 24 hr tablet Take 12.5 mg in AM and 25 mg in  tablet 3    Ozempic, 2 MG/DOSE, 8 MG/3ML injection pen INJECT 0.75 ML (2 MG TOTAL) UNDER THE SKIN EVERY 7 DAYS 9 mL 1    rosuvastatin (CRESTOR) 20 MG tablet TAKE TWO TABLETS BY MOUTH EVERY  tablet 1    sertraline (ZOLOFT) 50 mg tablet TAKE ONE TABLET BY MOUTH EVERY DAY 30 tablet 5    tadalafil (CIALIS) 20 MG tablet Take 1 tablet (20 mg total) by mouth daily as needed for erectile dysfunction 10 tablet 3    acetaminophen (TYLENOL) 325 mg tablet Take 1-2 tablets Q4-6 hours PRN pain. Do not take more than 4grams in 24 hours. (Patient not taking: Reported on 6/17/2025)  0    oxyCODONE-acetaminophen (Percocet) 5-325 mg per tablet Take 1 tablet by mouth every 4 (four) hours as needed for moderate pain for up to 10 doses Max Daily Amount: 6 tablets (Patient not taking: Reported on 3/14/2025) 10 tablet 0     No current facility-administered medications on file prior to visit.   [2]   Social History  Tobacco Use    Smoking status: Never    Smokeless tobacco: Never   Vaping Use    Vaping status: Never Used   Substance and Sexual Activity    Alcohol use: No    Drug use: No    Sexual activity: Yes     Partners: Female

## 2025-07-19 DIAGNOSIS — F32.0 CURRENT MILD EPISODE OF MAJOR DEPRESSIVE DISORDER WITHOUT PRIOR EPISODE (HCC): ICD-10-CM

## 2025-07-19 DIAGNOSIS — I25.10 CORONARY ARTERY DISEASE INVOLVING NATIVE CORONARY ARTERY: ICD-10-CM

## 2025-07-21 RX ORDER — METFORMIN HYDROCHLORIDE 500 MG/1
1000 TABLET, EXTENDED RELEASE ORAL 2 TIMES DAILY
Qty: 360 TABLET | Refills: 1 | Status: SHIPPED | OUTPATIENT
Start: 2025-07-21

## 2025-07-25 DIAGNOSIS — E11.65 TYPE 2 DIABETES MELLITUS WITH HYPERGLYCEMIA, WITHOUT LONG-TERM CURRENT USE OF INSULIN (HCC): ICD-10-CM

## 2025-07-28 RX ORDER — SEMAGLUTIDE 2.68 MG/ML
2 INJECTION, SOLUTION SUBCUTANEOUS
Qty: 9 ML | Refills: 1 | Status: SHIPPED | OUTPATIENT
Start: 2025-07-28

## 2025-08-18 ENCOUNTER — OFFICE VISIT (OUTPATIENT)
Dept: PODIATRY | Facility: CLINIC | Age: 65
End: 2025-08-18
Payer: COMMERCIAL

## 2025-08-18 VITALS — WEIGHT: 218 LBS | HEIGHT: 70 IN | BODY MASS INDEX: 31.21 KG/M2

## 2025-08-18 DIAGNOSIS — B35.1 ONYCHOMYCOSIS: ICD-10-CM

## 2025-08-18 DIAGNOSIS — E11.65 TYPE 2 DIABETES MELLITUS WITH HYPERGLYCEMIA, WITHOUT LONG-TERM CURRENT USE OF INSULIN (HCC): ICD-10-CM

## 2025-08-18 DIAGNOSIS — S98.112A AMPUTATION OF LEFT GREAT TOE (HCC): ICD-10-CM

## 2025-08-18 DIAGNOSIS — E11.40 TYPE 2 DIABETES MELLITUS WITH DIABETIC NEUROPATHY, WITHOUT LONG-TERM CURRENT USE OF INSULIN (HCC): Primary | ICD-10-CM

## 2025-08-18 PROCEDURE — 11721 DEBRIDE NAIL 6 OR MORE: CPT | Performed by: PODIATRIST

## 2025-08-19 RX ORDER — EMPAGLIFLOZIN 25 MG/1
25 TABLET, FILM COATED ORAL EVERY MORNING
Qty: 90 TABLET | Refills: 1 | Status: SHIPPED | OUTPATIENT
Start: 2025-08-19

## (undated) DEVICE — RADIFOCUS OPTITORQUE ANGIOGRAPHIC CATHETER: Brand: OPTITORQUE

## (undated) DEVICE — EVERGRIP INSERT SET 86MM: Brand: FOGARTY EVERGRIP

## (undated) DEVICE — BONE WAX WHITE: Brand: BONE WAX WHITE

## (undated) DEVICE — GLOVE SRG BIOGEL 7

## (undated) DEVICE — 32 FR RIGHT ANGLE – SOFT PVC CATHETER: Brand: PVC THORACIC CATHETERS

## (undated) DEVICE — BANDAGE, ESMARK LF STR 6"X9' (20/CS): Brand: CYPRESS

## (undated) DEVICE — SUT SILK 2 60 IN SA8H

## (undated) DEVICE — AIRLIFE™ TRI-FLO™ SUCTION CATHETER: Brand: AIRLIFE™

## (undated) DEVICE — PAD GROUNDING DUAL ADULT

## (undated) DEVICE — SUT PROLENE 7-0 BV175-8/BV175-8 24 IN EPM8747

## (undated) DEVICE — UMBILICAL TAPE: Brand: DEROYAL

## (undated) DEVICE — TUBING INSUFFLATION SET ISO CONNECTOR

## (undated) DEVICE — ADHESIVE SKIN HIGH VISCOSITY EXOFIN 1ML

## (undated) DEVICE — PACK CUSTOM PERFUSION ANH

## (undated) DEVICE — Device: Brand: ASAHI SILVERWAY

## (undated) DEVICE — PACK CABG PBDS

## (undated) DEVICE — GLOVE INDICATOR PI UNDERGLOVE SZ 6.5 BLUE

## (undated) DEVICE — 32 FR STRAIGHT – SOFT PVC CATHETER: Brand: PVC THORACIC CATHETERS

## (undated) DEVICE — SUT SILK 0 CT-1 30 IN 424H

## (undated) DEVICE — TRAY FOLEY 16FR SURESTEP TEMP SENS URIMETER STAT LOK

## (undated) DEVICE — ALCON OPHTHALMIC KNIFE 15 °: Brand: ALCON

## (undated) DEVICE — ACE WRAP 6 IN UNSTERILE

## (undated) DEVICE — DGW .035 FC J3MM 260CM TEF: Brand: EMERALD

## (undated) DEVICE — 40601 PROLONGED POSITIONING SYSTEM: Brand: 40601 PROLONGED POSITIONING SYSTEM

## (undated) DEVICE — SUT PROLENE 4-0 RB-1/RB-1 36 IN 8557H

## (undated) DEVICE — PACK CUSTOM PERFUSION PLEG PK

## (undated) DEVICE — ANTIBACTERIAL UNDYED BRAIDED (POLYGLACTIN 910), SYNTHETIC ABSORBABLE SUTURE: Brand: COATED VICRYL

## (undated) DEVICE — FILTER SMOKE EVAC VIROSAFE

## (undated) DEVICE — 3000CC GUARDIAN II: Brand: GUARDIAN

## (undated) DEVICE — ACE WRAP 4 IN UNSTERILE

## (undated) DEVICE — GLOVE INDICATOR PI UNDERGLOVE SZ 7.5 BLUE

## (undated) DEVICE — INTENDED FOR TISSUE SEPARATION, AND OTHER PROCEDURES THAT REQUIRE A SHARP SURGICAL BLADE TO PUNCTURE OR CUT.: Brand: BARD-PARKER ® CARBON RIB-BACK BLADES

## (undated) DEVICE — BLANKET HYPOTHERMIA ADULT GAYMAR

## (undated) DEVICE — GLOVE INDICATOR PI UNDERGLOVE SZ 8.5 BLUE

## (undated) DEVICE — DRESSING ALLEVYN LIFE HEEL 25 X 25.2CM

## (undated) DEVICE — BETHLEHEM UNIV MAJ EXT ,KIT: Brand: CARDINAL HEALTH

## (undated) DEVICE — GLOVE SRG BIOGEL ECLIPSE 7.5

## (undated) DEVICE — STERNAL WIRE

## (undated) DEVICE — PLUMEPEN PRO 10FT

## (undated) DEVICE — SUT ETHIBOND 2-0 SH-1/SH-1 30 IN X763H

## (undated) DEVICE — OASIS DRAIN, SINGLE, INLINE & ATS COMPATIBLE: Brand: OASIS

## (undated) DEVICE — INTENDED FOR TISSUE SEPARATION, AND OTHER PROCEDURES THAT REQUIRE A SHARP SURGICAL BLADE TO PUNCTURE OR CUT.: Brand: BARD-PARKER SAFETY BLADES SIZE 15, STERILE

## (undated) DEVICE — SUT PROLENE 3-0 PC-5 18 IN 8632G

## (undated) DEVICE — SUT PDS PLUS 1 CTB 36 IN PDPB359T

## (undated) DEVICE — BLADE BEAVER MINI SZ 69

## (undated) DEVICE — EVERGRIP INSERT SET 61MM: Brand: FOGARTY EVERGRIP

## (undated) DEVICE — ELECTRODE BLADE E-Z CLEAN 2.75IN 7CM -0012A

## (undated) DEVICE — GLOVE SRG BIOGEL 8.5

## (undated) DEVICE — CURITY NON-ADHERENT STRIPS: Brand: CURITY

## (undated) DEVICE — SUT SILK 3-0 SH CR/8 18 IN C013D

## (undated) DEVICE — SILVER-COATED ANTIBACTERIAL BARRIER DRESSING: Brand: ACTICOAT SURGIC 10X12CM 5PK US

## (undated) DEVICE — GLIDESHEATH BASIC HYDROPHILIC COATED INTRODUCER SHEATH: Brand: GLIDESHEATH

## (undated) DEVICE — GAUZE SPONGES,16 PLY: Brand: CURITY

## (undated) DEVICE — HEMOCLIP CARTRIDGE LRG

## (undated) DEVICE — LIGACLIP MCA MULTIPLE CLIP APPLIERS, 20 SMALL CLIPS: Brand: LIGACLIP

## (undated) DEVICE — CHLORAPREP HI-LITE 26ML ORANGE

## (undated) DEVICE — RECIP.STERNUM SAW BLADE 34/7.5/0.7MM: Brand: AESCULAP

## (undated) DEVICE — GLOVE SRG BIOGEL ECLIPSE 6.5

## (undated) DEVICE — LIGHT HANDLE COVER SLEEVE DISP BLUE STELLAR

## (undated) DEVICE — VASOVIEW HEMOPRO 2: Brand: VASOVIEW HEMOPRO 2

## (undated) DEVICE — THE VASC BAND HEMOSTAT IS A COMPRESSION DEVICE TO ASSIST HEMOSTASIS OF ARTERIAL, VENOUS AND HEMODIALYSIS PERCUTANEOUS ACCESS SITES.: Brand: VASC BAND™ HEMOSTAT

## (undated) DEVICE — RED RUBBER URETHRAL CATHETER: Brand: DOVER

## (undated) DEVICE — GLOVE INDICATOR PI UNDERGLOVE SZ 8 BLUE

## (undated) DEVICE — SILVER-COATED ANTIBACTERIAL BARRIER DRESSING: Brand: ACTICOAT SURGIC 10X25CM 5PK US

## (undated) DEVICE — SUT MONOCRYL PLUS 3-0 PS-2 27 IN MCP427H

## (undated) DEVICE — THERMOFLECT BLANKET, L, 25EA                               TS THERMOFLECT BLANKET, 48" X 84", SILVER, 5/BG, 5 BG/CS NW: Brand: THERMOFLECT

## (undated) DEVICE — SUT PROLENE 4-0 PC-3 18 IN 8634G

## (undated) DEVICE — AORTIC PUNCH 5.2 MM DISP

## (undated) DEVICE — PUMP TUBING FUSION PACK

## (undated) DEVICE — KERLIX BANDAGE ROLL: Brand: KERLIX

## (undated) DEVICE — SUT PROLENE 6-0 C-1/C-1 30 IN 8307H

## (undated) DEVICE — PENCIL ELECTROSURG E-Z CLEAN -0035H